# Patient Record
Sex: FEMALE | Race: BLACK OR AFRICAN AMERICAN | Employment: OTHER | URBAN - METROPOLITAN AREA
[De-identification: names, ages, dates, MRNs, and addresses within clinical notes are randomized per-mention and may not be internally consistent; named-entity substitution may affect disease eponyms.]

---

## 2017-06-02 ENCOUNTER — CLINICAL SUPPORT (OUTPATIENT)
Dept: SURGERY | Age: 67
End: 2017-06-02

## 2017-06-02 VITALS
HEART RATE: 83 BPM | OXYGEN SATURATION: 95 % | TEMPERATURE: 97.9 F | HEIGHT: 61 IN | BODY MASS INDEX: 32.05 KG/M2 | WEIGHT: 169.75 LBS | RESPIRATION RATE: 16 BRPM

## 2017-06-02 DIAGNOSIS — Z12.11 COLON CANCER SCREENING: Primary | ICD-10-CM

## 2017-06-02 NOTE — MR AVS SNAPSHOT
Visit Information Date & Time Provider Department Dept. Phone Encounter #  
 6/2/2017 10:00 AM TSS HBV NURSE VISIT [facility] [phone] [id number] Your Appointments 9/12/2017  9:30 AM  
ESTABLISHED PATIENT with Lonny Peacock MD  
[facility]) Appt Note: 9 months [address] [facility], [address] [facility] [address] [address], [address] [place] [address] Upcoming Health Maintenance Date Due Hepatitis C Screening 1950 DTaP/Tdap/Td series (1 - Tdap) 6/10/1971 FOBT Q 1 YEAR AGE 50-75 6/10/2000 ZOSTER VACCINE AGE 60> 6/10/2010 GLAUCOMA SCREENING Q2Y 6/10/2015 OSTEOPOROSIS SCREENING (DEXA) 6/10/2015 Pneumococcal 65+ Low/Medium Risk (1 of 2 - PCV13) 6/10/2015 MEDICARE YEARLY EXAM 6/10/2015 INFLUENZA AGE 9 TO ADULT 8/1/2017 BREAST CANCER SCRN MAMMOGRAM 7/23/2018 Allergies as of 6/2/2017  Review Complete On: 6/2/2017 By: Екатерина Sam. Early, LPN Severity Noted Reaction Type Reactions Latex, Natural Rubber  05/30/2013    Itching Symbicort [Budesonide-formoterol] High 11/23/2015   Systemic Other (comments) Tachycardia pt denies Vicodin [Hydrocodone-acetaminophen]  05/30/2013    Itching Current Immunizations  Never Reviewed Name Date Influenza Vaccine (Quad) PF 10/7/2015 11:42 AM  
  
 Not reviewed this visit You Were Diagnosed With   
  
 Codes Comments Colon cancer screening    -  Primary ICD-10-CM: Z12.11 ICD-9-CM: V76.51 Vitals Pulse Temp Resp Height(growth percentile) Weight(growth percentile) SpO2  
 83 97.9 °F (36.6 °C) (Oral) 16 5' 1\" (1.549 m) 169 lb 12.1 oz (77 kg) 95% BMI OB Status Smoking Status 32.07 kg/m2 Hysterectomy Former Smoker Vitals History BMI and BSA Data Body Mass Index Body Surface Area 32.07 kg/m 2 1.82 m 2 Preferred Pharmacy Pharmacy Name Phone [address] [phone] Your Updated Medication List  
  
   
This list is accurate as of: 6/2/17 10:14 AM.  Always use your most recent med list.  
  
  
  
  
 albuterol 90 mcg/actuation inhaler Commonly known as:  PROVENTIL HFA, VENTOLIN HFA, PROAIR HFA Take 2 Puffs by inhalation every four (4) hours as needed for Wheezing. BABY ASPIRIN PO Take 81 mg by mouth. fluticasone 110 mcg/actuation inhaler Commonly known as:  FLOVENT HFA Take 2 Puffs by inhalation every twelve (12) hours. HYDROcodone-acetaminophen 5-325 mg per tablet Commonly known as:  Ernesto Tolu Take 1 Tab by mouth every four (4) hours as needed. Max Daily Amount: 6 Tabs. iron polysaccharides 150 mg iron capsule Commonly known as:  Archanacarly Rowels Take 1 Cap by mouth daily. methIMAzole 10 mg tablet Commonly known as:  TAPAZOLE Take 10 mg by mouth daily. montelukast 10 mg tablet Commonly known as:  SINGULAIR Take 10 mg by mouth daily. omeprazole 20 mg capsule Commonly known as:  PRILOSEC Take 20 mg by mouth daily. VITAMIN D3 2,000 unit Tab Generic drug:  cholecalciferol (vitamin D3) Take  by mouth. To-Do List   
 08/02/2017 GI:  COLONOSCOPY Introducing [facility]! Dear Mariela Scruggs: Thank you for requesting a Imperator account. Our records indicate that you already have an active Imperator account. You can access your account anytime at [email or web address]. [employer] Did you know that you can access your hospital and ER discharge instructions at any time in Imperator? You can also review all of your test results from your hospital stay or ER visit. Additional Information If you have questions, please visit the Frequently Asked Questions section of the Imperator website at [email or web address]. [employer]/. Remember, GeeYeehart is NOT to be used for urgent needs. For medical emergencies, dial 911. Now available from your iPhone and Android! Please provide this summary of care documentation to your next provider. Your primary care clinician is listed as Julia Leiva. If you have any questions after today's visit, please call [phone].

## 2017-06-02 NOTE — PROGRESS NOTES
Review of Systems   Constitutional: Positive for diaphoresis. Negative for chills, fever, malaise/fatigue and weight loss. Hot flashes occas. HENT: Positive for ear pain. Negative for congestion, ear discharge, hearing loss, nosebleeds, sore throat and tinnitus. Eyes: Positive for blurred vision. Negative for double vision, photophobia, pain, discharge and redness. Respiratory: Negative. Negative for stridor. Cardiovascular: Negative. Gastrointestinal: Positive for constipation. Negative for abdominal pain, blood in stool, diarrhea, heartburn, melena, nausea and vomiting. Genitourinary: Negative. Musculoskeletal: Positive for neck pain. Negative for back pain, falls, joint pain and myalgias. Skin: Negative. Neurological: Negative. Negative for weakness and headaches. Endo/Heme/Allergies: Negative. Psychiatric/Behavioral: Negative. Colon Screen    Patient: Donna White MRN: [id number]  SSN: [id number]    YOB: 1950  Age: 77 y.o. Sex: female        Subjective:   Donna White is here to schedule her recall colonoscopy. Her PCP is Dimitris Robledo MD.  Patient referred for colonoscopy for   Personal history of colon polyps (screening only). Patient denies rectal pain or bleeding. Abdominal surgeries as described below, specifically laparoscopic right hemicolectomy due to benign cecal polyp, tubal ligation and hysterectomy. Family history as described below, specifically none. Last colonoscopy was 2 years ago which found a cecal polyp that had to be surgically removed. Polyp was benign, she was recalled for 2 years for a surveillance colonoscopy.     Allergies   Allergen Reactions    Latex, Natural Rubber Itching    Symbicort [Budesonide-Formoterol] Other (comments)     Tachycardia pt denies    Vicodin [Hydrocodone-Acetaminophen] Itching       Past Medical History:   Diagnosis Date    Arthritis     Asthma     Chronic obstructive pulmonary disease ([facility] [address]     mild    GERD (gastroesophageal reflux disease)     Hypercholesteremia     Hyperthyroidism     Thyroid disease      Past Surgical History:   Procedure Laterality Date    HX BUNIONECTOMY      bilateral and \"removed some arthritis in feet\"    HX CATARACT REMOVAL      with implants    HX COLECTOMY  10/6/15    lap right hemicolectomy    HX COLONOSCOPY  08/2015    HX HAMMER TOE REPAIR      right     HX HEMORRHOIDECTOMY      HX HYSTERECTOMY N/A     HX ORTHOPAEDIC      HX OTHER SURGICAL      keiloid removed off chest near shoulder area    HX TUBAL LIGATION      US GUIDED CORE BREAST BIOPSY Right 2013    benign      Family History   Problem Relation Age of Onset    Hypertension Mother     Stroke Mother     Cancer Father     Asthma Sister     Cancer Brother      lung cancer    Asthma Brother     Breast Cancer Maternal Grandmother 78    Cancer Brother      Social History   Substance Use Topics    Smoking status: Former Smoker     Packs/day: 0.20     Years: 5.00     Types: Cigarettes     Start date: 3/13/1972     Quit date: 3/13/1977    Smokeless tobacco: Never Used      Comment: quit smoking in 1970's    Alcohol use 0.0 oz/week     0 Standard drinks or equivalent per week      Comment: rarely      Prior to Admission medications    Medication Sig Start Date End Date Taking? Authorizing Provider   montelukast (SINGULAIR) 10 mg tablet Take 10 mg by mouth daily. Yes Historical Provider   fluticasone (FLOVENT HFA) 110 mcg/actuation inhaler Take 2 Puffs by inhalation every twelve (12) hours. 11/23/15  Yes Claudia Arvizu MD   methimazole (TAPAZOLE) 10 mg tablet Take 10 mg by mouth daily. Yes Historical Provider   albuterol (PROVENTIL HFA, VENTOLIN HFA, PROAIR HFA) 90 mcg/actuation inhaler Take 2 Puffs by inhalation every four (4) hours as needed for Wheezing. Yes Historical Provider   omeprazole (PRILOSEC) 20 mg capsule Take 20 mg by mouth daily.    Yes Historical Provider   BABY ASPIRIN PO Take 81 mg by mouth. Yes Historical Provider   cholecalciferol, vitamin D3, (VITAMIN D3) 2,000 unit tab Take  by mouth. Yes Historical Provider   HYDROcodone-acetaminophen (NORCO) 5-325 mg per tablet Take 1 Tab by mouth every four (4) hours as needed. Max Daily Amount: 6 Tabs. 10/9/15   Jeri Levin MD   iron polysaccharides (NIFEREX) 150 mg iron capsule Take 1 Cap by mouth daily. Patient taking differently: Take 150 mg by mouth two (2) times a day. 10/9/15   Jeri Levin MD          Review of Systems:      Risks colonoscopy described- colon injury, missed lesion, anesthesia problems, bleeding       Anyi Moore, LPN  June 2, 6860  10:50 AM

## 2017-06-15 ENCOUNTER — HOSPITAL ENCOUNTER (OUTPATIENT)
Dept: LAB | Age: 67
Discharge: HOME | End: 2017-06-15
Payer: MEDICARE

## 2017-06-15 DIAGNOSIS — E78.00 HYPERCHOLESTEREMIA: ICD-10-CM

## 2017-06-15 DIAGNOSIS — J45.20 MILD INTERMITTENT ASTHMA WITHOUT COMPLICATION: ICD-10-CM

## 2017-06-15 LAB
ALBUMIN SERPL BCP-MCNC: 3.7 G/DL (ref 3.4–5)
ALBUMIN/GLOB SERPL: 1.2 {RATIO} (ref 0.8–1.7)
ALP SERPL-CCNC: 135 U/L (ref 45–117)
ALT SERPL-CCNC: 15 U/L (ref 13–56)
ANION GAP BLD CALC-SCNC: 5 MMOL/L (ref 3–18)
AST SERPL W P-5'-P-CCNC: 11 U/L (ref 15–37)
BASOPHILS # BLD AUTO: 0 K/UL (ref 0–0.1)
BASOPHILS # BLD: 0 % (ref 0–2)
BILIRUB SERPL-MCNC: 0.3 MG/DL (ref 0.2–1)
BUN SERPL-MCNC: 11 MG/DL (ref 7–18)
BUN/CREAT SERPL: 14 (ref 12–20)
CALCIUM SERPL-MCNC: 9 MG/DL (ref 8.5–10.1)
CHLORIDE SERPL-SCNC: 111 MMOL/L (ref 100–108)
CHOLEST SERPL-MCNC: 159 MG/DL
CO2 SERPL-SCNC: 31 MMOL/L (ref 21–32)
CREAT SERPL-MCNC: 0.77 MG/DL (ref 0.6–1.3)
DIFFERENTIAL METHOD BLD: ABNORMAL
EOSINOPHIL # BLD: 0.1 K/UL (ref 0–0.4)
EOSINOPHIL NFR BLD: 2 % (ref 0–5)
ERYTHROCYTE [DISTWIDTH] IN BLOOD BY AUTOMATED COUNT: 15.7 % (ref 11.6–14.5)
GLOBULIN SER CALC-MCNC: 3 G/DL (ref 2–4)
GLUCOSE SERPL-MCNC: 93 MG/DL (ref 74–99)
HCT VFR BLD AUTO: 35.6 % (ref 35–45)
HDLC SERPL-MCNC: 85 MG/DL (ref 40–60)
HDLC SERPL: 1.9 {RATIO} (ref 0–5)
HGB BLD-MCNC: 11.5 G/DL (ref 12–16)
LDLC SERPL CALC-MCNC: 49.4 MG/DL (ref 0–100)
LIPID PROFILE,FLP: ABNORMAL
LYMPHOCYTES # BLD AUTO: 47 % (ref 21–52)
LYMPHOCYTES # BLD: 3.4 K/UL (ref 0.9–3.6)
MCH RBC QN AUTO: 27.8 PG (ref 24–34)
MCHC RBC AUTO-ENTMCNC: 32.3 G/DL (ref 31–37)
MCV RBC AUTO: 86 FL (ref 74–97)
MONOCYTES # BLD: 0.4 K/UL (ref 0.05–1.2)
MONOCYTES NFR BLD AUTO: 6 % (ref 3–10)
NEUTS SEG # BLD: 3.2 K/UL (ref 1.8–8)
NEUTS SEG NFR BLD AUTO: 45 % (ref 40–73)
PLATELET # BLD AUTO: 275 K/UL (ref 135–420)
PMV BLD AUTO: 11.3 FL (ref 9.2–11.8)
POTASSIUM SERPL-SCNC: 3.9 MMOL/L (ref 3.5–5.5)
PROT SERPL-MCNC: 6.7 G/DL (ref 6.4–8.2)
RBC # BLD AUTO: 4.14 M/UL (ref 4.2–5.3)
SODIUM SERPL-SCNC: 147 MMOL/L (ref 136–145)
TRIGL SERPL-MCNC: 123 MG/DL (ref ?–150)
VLDLC SERPL CALC-MCNC: 24.6 MG/DL
WBC # BLD AUTO: 7.1 K/UL (ref 4.6–13.2)

## 2017-06-15 PROCEDURE — 36415 COLL VENOUS BLD VENIPUNCTURE: CPT | Performed by: FAMILY MEDICINE

## 2017-06-15 PROCEDURE — 80061 LIPID PANEL: CPT | Performed by: FAMILY MEDICINE

## 2017-06-15 PROCEDURE — 80053 COMPREHEN METABOLIC PANEL: CPT | Performed by: FAMILY MEDICINE

## 2017-06-15 PROCEDURE — 85025 COMPLETE CBC W/AUTO DIFF WBC: CPT | Performed by: FAMILY MEDICINE

## 2017-08-11 ENCOUNTER — HOSPITAL ENCOUNTER (OUTPATIENT)
Dept: MAMMOGRAPHY | Age: 67
Discharge: HOME | End: 2017-08-11
Attending: FAMILY MEDICINE
Payer: MEDICARE

## 2017-08-11 DIAGNOSIS — Z12.31 VISIT FOR SCREENING MAMMOGRAM: ICD-10-CM

## 2017-08-11 PROCEDURE — 77063 BREAST TOMOSYNTHESIS BI: CPT

## 2018-02-20 ENCOUNTER — ANESTHESIA EVENT (OUTPATIENT)
Dept: ENDOSCOPY | Age: 68
End: 2018-02-20
Payer: MEDICARE

## 2018-02-21 ENCOUNTER — HOSPITAL ENCOUNTER (OUTPATIENT)
Age: 68
Setting detail: OUTPATIENT SURGERY
Discharge: HOME | End: 2018-02-21
Attending: COLON & RECTAL SURGERY | Admitting: COLON & RECTAL SURGERY
Payer: MEDICARE

## 2018-02-21 ENCOUNTER — ANESTHESIA (OUTPATIENT)
Dept: ENDOSCOPY | Age: 68
End: 2018-02-21
Payer: MEDICARE

## 2018-02-21 VITALS
TEMPERATURE: 97.6 F | HEART RATE: 73 BPM | DIASTOLIC BLOOD PRESSURE: 72 MMHG | RESPIRATION RATE: 17 BRPM | WEIGHT: 171 LBS | BODY MASS INDEX: 32.28 KG/M2 | OXYGEN SATURATION: 100 % | HEIGHT: 61 IN | SYSTOLIC BLOOD PRESSURE: 157 MMHG

## 2018-02-21 PROCEDURE — 74011000250 HC RX REV CODE- 250: Performed by: NURSE ANESTHETIST, CERTIFIED REGISTERED

## 2018-02-21 PROCEDURE — 74011000250 HC RX REV CODE- 250

## 2018-02-21 PROCEDURE — 76060000032 HC ANESTHESIA 0.5 TO 1 HR: Performed by: COLON & RECTAL SURGERY

## 2018-02-21 PROCEDURE — 77030011223 HC DEV LIG POLYLP OCOA -B: Performed by: COLON & RECTAL SURGERY

## 2018-02-21 PROCEDURE — 77030003657 HC NDL SCLER BSC -B: Performed by: COLON & RECTAL SURGERY

## 2018-02-21 PROCEDURE — 74011250636 HC RX REV CODE- 250/636: Performed by: NURSE ANESTHETIST, CERTIFIED REGISTERED

## 2018-02-21 PROCEDURE — 77030013992 HC SNR POLYP ENDOSC BSC -B: Performed by: COLON & RECTAL SURGERY

## 2018-02-21 PROCEDURE — 77030020018 HC MRKR ENDOSC SPOT 5ML SYR GISP -B: Performed by: COLON & RECTAL SURGERY

## 2018-02-21 PROCEDURE — 74011250636 HC RX REV CODE- 250/636: Performed by: COLON & RECTAL SURGERY

## 2018-02-21 PROCEDURE — 77030011640 HC PAD GRND REM COVD -A: Performed by: COLON & RECTAL SURGERY

## 2018-02-21 PROCEDURE — 88305 TISSUE EXAM BY PATHOLOGIST: CPT | Performed by: COLON & RECTAL SURGERY

## 2018-02-21 PROCEDURE — 74011250636 HC RX REV CODE- 250/636

## 2018-02-21 PROCEDURE — 76040000007: Performed by: COLON & RECTAL SURGERY

## 2018-02-21 RX ORDER — EPINEPHRINE 0.1 MG/ML
INJECTION INTRACARDIAC; INTRAVENOUS AS NEEDED
Status: DISCONTINUED | OUTPATIENT
Start: 2018-02-21 | End: 2018-02-21 | Stop reason: HOSPADM

## 2018-02-21 RX ORDER — SODIUM CHLORIDE, SODIUM LACTATE, POTASSIUM CHLORIDE, CALCIUM CHLORIDE 600; 310; 30; 20 MG/100ML; MG/100ML; MG/100ML; MG/100ML
75 INJECTION, SOLUTION INTRAVENOUS CONTINUOUS
Status: DISCONTINUED | OUTPATIENT
Start: 2018-02-21 | End: 2018-02-21 | Stop reason: HOSPADM

## 2018-02-21 RX ORDER — PROPOFOL 10 MG/ML
INJECTION, EMULSION INTRAVENOUS AS NEEDED
Status: DISCONTINUED | OUTPATIENT
Start: 2018-02-21 | End: 2018-02-21 | Stop reason: HOSPADM

## 2018-02-21 RX ORDER — SODIUM CHLORIDE 0.9 % (FLUSH) 0.9 %
5-10 SYRINGE (ML) INJECTION AS NEEDED
Status: DISCONTINUED | OUTPATIENT
Start: 2018-02-21 | End: 2018-02-21 | Stop reason: HOSPADM

## 2018-02-21 RX ORDER — SODIUM CHLORIDE, SODIUM LACTATE, POTASSIUM CHLORIDE, CALCIUM CHLORIDE 600; 310; 30; 20 MG/100ML; MG/100ML; MG/100ML; MG/100ML
INJECTION, SOLUTION INTRAVENOUS
Status: DISCONTINUED | OUTPATIENT
Start: 2018-02-21 | End: 2018-02-21 | Stop reason: HOSPADM

## 2018-02-21 RX ORDER — LIDOCAINE HYDROCHLORIDE 20 MG/ML
INJECTION, SOLUTION EPIDURAL; INFILTRATION; INTRACAUDAL; PERINEURAL AS NEEDED
Status: DISCONTINUED | OUTPATIENT
Start: 2018-02-21 | End: 2018-02-21 | Stop reason: HOSPADM

## 2018-02-21 RX ORDER — SODIUM CHLORIDE 0.9 % (FLUSH) 0.9 %
5-10 SYRINGE (ML) INJECTION EVERY 8 HOURS
Status: DISCONTINUED | OUTPATIENT
Start: 2018-02-21 | End: 2018-02-21 | Stop reason: HOSPADM

## 2018-02-21 RX ADMIN — PROPOFOL 30 MG: 10 INJECTION, EMULSION INTRAVENOUS at 08:55

## 2018-02-21 RX ADMIN — LIDOCAINE HYDROCHLORIDE 50 MG: 20 INJECTION, SOLUTION EPIDURAL; INFILTRATION; INTRACAUDAL; PERINEURAL at 08:27

## 2018-02-21 RX ADMIN — PROPOFOL 30 MG: 10 INJECTION, EMULSION INTRAVENOUS at 08:52

## 2018-02-21 RX ADMIN — PROPOFOL 30 MG: 10 INJECTION, EMULSION INTRAVENOUS at 08:44

## 2018-02-21 RX ADMIN — SODIUM CHLORIDE, SODIUM LACTATE, POTASSIUM CHLORIDE, CALCIUM CHLORIDE: 600; 310; 30; 20 INJECTION, SOLUTION INTRAVENOUS at 08:25

## 2018-02-21 RX ADMIN — PROPOFOL 30 MG: 10 INJECTION, EMULSION INTRAVENOUS at 08:42

## 2018-02-21 RX ADMIN — PROPOFOL 30 MG: 10 INJECTION, EMULSION INTRAVENOUS at 08:30

## 2018-02-21 RX ADMIN — PROPOFOL 30 MG: 10 INJECTION, EMULSION INTRAVENOUS at 08:38

## 2018-02-21 RX ADMIN — PROPOFOL 50 MG: 10 INJECTION, EMULSION INTRAVENOUS at 08:28

## 2018-02-21 RX ADMIN — FAMOTIDINE 20 MG: 10 INJECTION, SOLUTION INTRAVENOUS at 08:19

## 2018-02-21 RX ADMIN — SODIUM CHLORIDE, SODIUM LACTATE, POTASSIUM CHLORIDE, AND CALCIUM CHLORIDE 75 ML/HR: 600; 310; 30; 20 INJECTION, SOLUTION INTRAVENOUS at 08:19

## 2018-02-21 RX ADMIN — PROPOFOL 30 MG: 10 INJECTION, EMULSION INTRAVENOUS at 08:34

## 2018-02-21 RX ADMIN — PROPOFOL 20 MG: 10 INJECTION, EMULSION INTRAVENOUS at 08:48

## 2018-02-21 NOTE — DISCHARGE INSTRUCTIONS
From Dr. Rainer Scott: FOLLOW UP VISIT Appointment in: Other (Specify) No aspirin or ibuprofen (e.g. Aleve, Motrin, Advil) for 7 days. Repeat colonoscopy in 2 years. Colonoscopy: What to Expect at [address] [place] [address]  After you have a colonoscopy, you will stay at the clinic for 1 to 2 hours until the medicines wear off. Then you can go home. But you will need to arrange for a ride. Your doctor will tell you when you can eat and do your other usual activities. Your doctor will talk to you about when you will need your next colonoscopy. Your doctor can help you decide how often you need to be checked. This will depend on the results of your test and your risk for colorectal cancer. After the test, you may be bloated or have gas pains. You may need to pass gas. If a biopsy was done or a polyp was removed, you may have streaks of blood in your stool (feces) for a few days. This care sheet gives you a general idea about how long it will take for you to recover. But each person recovers at a different pace. Follow the steps below to get better as quickly as possible. How can you care for yourself at home? Activity  · Rest when you feel tired. · You can do your normal activities when it feels okay to do so. Diet  · Follow your doctor's directions for eating. · Unless your doctor has told you not to, drink plenty of fluids. This helps to replace the fluids that were lost during the colon prep. · Do not drink alcohol. Medicines  · If polyps were removed or a biopsy was done during the test, your doctor may tell you not to take aspirin or other anti-inflammatory medicines for a few days. These include ibuprofen (Advil, Motrin) and naproxen (Aleve). Other instructions  · For your safety, do not drive or operate machinery until the medicine wears off and you can think clearly.  Your doctor may tell you not to drive or operate machinery until the day after your test.  · Do not sign legal documents or make major decisions until the medicine wears off and you can think clearly. The anesthesia can make it hard for you to fully understand what you are agreeing to. Follow-up care is a key part of your treatment and safety. Be sure to make and go to all appointments, and call your doctor if you are having problems. It's also a good idea to know your test results and keep a list of the medicines you take. When should you call for help? Call 911 anytime you think you may need emergency care. For example, call if:  · You passed out (lost consciousness). · You pass maroon or bloody stools. · You have severe belly pain. Call your doctor now or seek immediate medical care if:  · Your stools are black and tarlike. · Your stools have streaks of blood, but you did not have a biopsy or any polyps removed. · You have belly pain, or your belly is swollen and firm. · You vomit. · You have a fever. · You are very dizzy. Watch closely for changes in your health, and be sure to contact your doctor if you have any problems. Where can you learn more? Go to [employer]  Enter E264 in the search box to learn more about \"Colonoscopy: What to Expect at Home. \"   © 5070-3827 [employer]wise[employer]. Care instructions adapted under license by Nadia Shin (which disclaims liability or warranty for this information). This care instruction is for use with your licensed healthcare professional. If you have questions about a medical condition or this instruction, always ask your healthcare professional. [place] [address] any warranty or liability for your use of this information. Content Version: 15.9.538333; Current as of: November 14, 2014     Colon Polyps: Care Instructions  Your Care Instructions    Colon polyps are growths in the colon or the rectum. The cause of most colon polyps is not known, and most people who get them do not have any problems. But a certain kind can turn into cancer.  For this reason, regular testing for colon polyps is important for people age 48 and older and anyone who has an increased risk for colon cancer. Polyps are usually found through routine colon cancer screening tests. Although most colon polyps are not cancerous, they are usually removed and then tested for cancer. Screening for colon cancer saves lives because the cancer can usually be cured if it is caught early. If you have a polyp that is the type that can turn into cancer, you may need more tests to examine your entire colon. The doctor will remove any other polyps that he or she finds, and you will be tested more often. Follow-up care is a key part of your treatment and safety. Be sure to make and go to all appointments, and call your doctor if you are having problems. It's also a good idea to know your test results and keep a list of the medicines you take. How can you care for yourself at home? Regular exams to look for colon polyps are the best way to prevent polyps from turning into colon cancer. These can include stool tests, sigmoidoscopy, colonoscopy, and CT colonography. Talk with your doctor about a testing schedule that is right for you. To prevent polyps  There is no home treatment that can prevent colon polyps. But these steps may help lower your risk for cancer. · Stay active. Being active can help you get to and stay at a healthy weight. Try to exercise on most days of the week. Walking is a good choice. · Eat well. Choose a variety of vegetables, fruits, legumes (such as peas and beans), fish, poultry, and whole grains. · Do not smoke. If you need help quitting, talk to your doctor about stop-smoking programs and medicines. These can increase your chances of quitting for good. · If you drink alcohol, limit how much you drink. Limit alcohol to 2 drinks a day for men and 1 drink a day for women. When should you call for help?   Call your doctor now or seek immediate medical care if:  ? · You have severe belly pain. ? · Your stools are maroon or very bloody. ? Watch closely for changes in your health, and be sure to contact your doctor if:  ? · You have a fever. ? · You have nausea or vomiting. ? · You have a change in bowel habits (new constipation or diarrhea). ? · Your symptoms get worse or are not improving as expected. Where can you learn more? Go to http://jeri-gita.info/. Enter 95 [id number] in the search box to learn more about \"Colon Polyps: Care Instructions. \"  Current as of: May 12, 2017  Content Version: 11.4  © 2563-6075 [employer]. Care instructions adapted under license by [employer] (which disclaims liability or warranty for this information). If you have questions about a medical condition or this instruction, always ask your healthcare professional. [address] any warranty or liability for your use of this information. DISCHARGE SUMMARY from Nurse     POST-PROCEDURE INSTRUCTIONS:    Call your Physician if you:  ? Observe any excess bleeding. ? Develop a temperature over 100.5o F.  ? Experience abdominal, shoulder or chest pain. ? Notice any signs of decreased circulation or nerve impairment to an extremity such as a change in color, persistent numbness, tingling, coldness or increase in pain. ? Vomit blood or you have nausea and vomiting lasting longer than 4 hours. ? Are unable to take medications. ? Are unable to urinate within 8 hours after discharge following general anesthesia or intravenous sedation. For the next 24 hours after receiving general anesthesia or intravenous sedation, or while taking prescription Narcotics, limit your activities:  ? Do NOT drive a motor vehicle, operate hazard machinery or power tools, or perform tasks that require coordination. The medication you received during your procedure may have some effect on your mental awareness.   ? Do NOT make important personal or business decisions. The medication you received during your procedure may have some effect on your mental awareness. ? Do NOT drink alcoholic beverages. These drinks do not mix well with the medications that have been given to you. ? Upon discharge from the hospital, you must be accompanied by a responsible adult. ? Resume your diet as directed by your physician. ? Resume medications as your physician has prescribed. ? Please give a list of your current medications to your Primary Care Provider. ? Please update this list whenever your medications are discontinued, doses are changed, or new medications (including over-the-counter products) are added. ? Please carry medication information at all times in case of emergency situations. These are general instructions for a healthy lifestyle:    No smoking/ No tobacco products/ Avoid exposure to second hand smoke.  Surgeon General's Warning:  Quitting smoking now greatly reduces serious risk to your health. Obesity, smoking, and a sedentary lifestyle greatly increase your risk for illness.  A healthy diet, regular physical exercise & weight monitoring are important for maintaining a healthy lifestyle   You may be retaining fluid if you have a history of heart failure or if you experience any of the following symptoms:  Weight gain of 3 pounds or more overnight or 5 pounds in a week, increased swelling in our hands or feet or shortness of breath while lying flat in bed. Please call your doctor as soon as you notice any of these symptoms; do not wait until your next office visit. Recognize signs and symptoms of STROKE:  F  -  Face looks uneven  A  -  Arms unable to move or move unevenly  S  -  Speech slurred or non-existent  T  -  Time to call 911 - as soon as signs and symptoms begin - DO NOT go back to bed or wait to see If you get better - TIME IS BRAIN.     Colorectal Screening   Colorectal cancer almost always develops from precancerous polyps (abnormal growths) in the colon or rectum. Screening tests can find precancerous polyps, so that they can be removed before they turn into cancer. Screening tests can also find colorectal cancer early, when treatment works best.  [employer] Speak with your physician about when you should begin screening and how often you should be tested. Additional Information    If you have questions, please call [phone]. Remember, MyChart is NOT to be used for urgent needs. For medical emergencies, dial 911. Educational references and/or instructions provided during this visit included:    Colon Polyps      APPOINTMENTS:    Please make a follow-up appointment with your physician. Discharge information has been reviewed with the patient. The patient verbalized understanding.

## 2018-02-21 NOTE — PROCEDURES
[facility]  [facility] [address] [facility], [address]  [phone]                    Colonoscopy Procedure Note      Olayinka Vaughan  1950  [id number]                Date of Procedure: 2/21/2018    Preoperative diagnosis: History of Polyps, s/p Lap R Colectomy     Postoperative diagnosis: Mid Transverse polyps x 2, Giant Sigmoid polyp    :  Cathie Bacon MD    Assistant(s): Endoscopy Technician-1: Amita Sawyer  Endoscopy RN-1: Wily Castillo RN; Namita Vieyra RN    Sedation: MAC    Procedure Details:  Prior to the procedure, a history and physical were performed. The patients medications, allergies and sensitivities were reviewed and all questions were answered. After informed consent was obtained for the procedure, with all risks and benefits of procedure explained. The patient was taken to the endoscopy suite and placed in the left lateral decubitus position. Patient identification and proposed procedure were verified prior to the procedure by the nurse and I. Following sequential administration of sedation as per Anesthesia, a digital rectal exam was performed and was normal.  The Olympus video colonoscope was introduced through the anus and advanced to surgical anastomosis . The quality of preparation was excellent. The colonoscope was slowly withdrawn and the mucosa examined for any abnormalities. Cecal withdrawl time was greater than six minutes. The patient tolerated the procedure well. Findings/Interventions:   Polyps - #1, 5 mm in size, located in the transverse colon, removed by snare cautery and retrieved for pathology, - #2, 5 mm in size, located in the transverse colon, removed by snare cautery and retrieved for pathology, - #3, 20 mm in size, pedunculated, located in the sigmoid, ligated with polyloop after injection of epinephrine, removed by snare cautery and retrieved for pathology. Ink injected proximal to lesion.     EBL: none    Recommendations: -Repeat colonoscopy in 2 years   NO aspirin for 7 days.      Discharge Disposition:  Eddie Tejeda MD  2/21/2018  9:03 AM

## 2018-02-21 NOTE — ANESTHESIA POSTPROCEDURE EVALUATION
Post-Anesthesia Evaluation and Assessment    Patient: Roc Gerard MRN: [id number]  SSN: [id number]    YOB: 1950  Age: 79 y.o. Sex: female       Cardiovascular Function/Vital Signs  Visit Vitals    /52    Pulse 87    Temp 37.1 °C (98.7 °F)    Resp 20    Ht 5' 1.25\" (1.556 m)    Wt 77.6 kg (171 lb)    SpO2 100%    BMI 32.05 kg/m2       Patient is status post MAC anesthesia for Procedure(s):  COLONOSCOPY/polypectomy/polyloop/ink tatoo . Nausea/Vomiting: None    Postoperative hydration reviewed and adequate. Pain:  Pain Scale 1: Numeric (0 - 10) (02/21/18 0810)  Pain Intensity 1: 0 (02/21/18 0810)   Managed    Neurological Status: At baseline    Mental Status and Level of Consciousness: Arousable    Pulmonary Status:   O2 Device: Room air (02/21/18 0906)   Adequate oxygenation and airway patent    Complications related to anesthesia: None    Post-anesthesia assessment completed.  No concerns    Signed By: Mounika Choudhury MD     February 21, 2018

## 2018-02-21 NOTE — IP AVS SNAPSHOT
[address] [facility] [address] [address] [address] [address] [phone] Patient: Angela Parson MRN: [id number] About your hospitalization You were admitted on:  February 21, 2018 You last received care in the:  [facility] You were discharged on:  February 21, 2018 Why you were hospitalized Your primary diagnosis was:  Not on File Follow-up Information Follow up With Details Comments Contact Info Patty Spencer MD   [address] 
[phone] Katie Bales MD   [address] [facility] [address] 
[phone] Discharge Orders None A check rosa indicates which time of day the medication should be taken. My Medications CONTINUE taking these medications Instructions Each Dose to Equal  
 Morning Noon Evening Bedtime  
 albuterol 90 mcg/actuation inhaler Commonly known as:  PROVENTIL HFA, VENTOLIN HFA, PROAIR HFA Your last dose was: Your next dose is: Take 2 Puffs by inhalation every four (4) hours as needed for Wheezing. 2 Puff  
    
   
   
   
  
 fluticasone 110 mcg/actuation inhaler Commonly known as:  FLOVENT HFA Your last dose was: Your next dose is: Take 2 Puffs by inhalation every twelve (12) hours. 2 Puff  
    
   
   
   
  
 methIMAzole 10 mg tablet Commonly known as:  TAPAZOLE Your last dose was: Your next dose is: Take 10 mg by mouth daily. 10 mg  
    
   
   
   
  
 montelukast 10 mg tablet Commonly known as:  SINGULAIR Your last dose was: Your next dose is: Take 10 mg by mouth daily. 10 mg  
    
   
   
   
  
 omeprazole 20 mg capsule Commonly known as:  PRILOSEC Your last dose was: Your next dose is: Take 20 mg by mouth daily.   
 20 mg  
    
   
   
   
  
 VITAMIN D3 2,000 unit Tab Generic drug:  cholecalciferol (vitamin D3) Your last dose was: Your next dose is: Take  by mouth. STOP taking these medications BABY ASPIRIN PO Discharge Instructions From Dr. Alan Benavides: FOLLOW UP VISIT Appointment in: Other (Specify) No aspirin or ibuprofen (e.g. Aleve, Motrin, Advil) for 7 days. Repeat colonoscopy in 2 years. Colonoscopy: What to Expect at [facility] Your Recovery After you have a colonoscopy, you will stay at the clinic for 1 to 2 hours until the medicines wear off. Then you can go home. But you will need to arrange for a ride. Your doctor will tell you when you can eat and do your other usual activities. Your doctor will talk to you about when you will need your next colonoscopy. Your doctor can help you decide how often you need to be checked. This will depend on the results of your test and your risk for colorectal cancer. After the test, you may be bloated or have gas pains. You may need to pass gas. If a biopsy was done or a polyp was removed, you may have streaks of blood in your stool (feces) for a few days. This care sheet gives you a general idea about how long it will take for you to recover. But each person recovers at a different pace. Follow the steps below to get better as quickly as possible. How can you care for yourself at home? Activity · Rest when you feel tired. · You can do your normal activities when it feels okay to do so. Diet · Follow your doctor's directions for eating. · Unless your doctor has told you not to, drink plenty of fluids. This helps to replace the fluids that were lost during the colon prep. · Do not drink alcohol. Medicines · If polyps were removed or a biopsy was done during the test, your doctor may tell you not to take aspirin or other anti-inflammatory medicines for a few days. These include ibuprofen (Advil, Motrin) and naproxen (Aleve). Other instructions · For your safety, do not drive or operate machinery until the medicine wears off and you can think clearly. Your doctor may tell you not to drive or operate machinery until the day after your test. 
· Do not sign legal documents or make major decisions until the medicine wears off and you can think clearly. The anesthesia can make it hard for you to fully understand what you are agreeing to. Follow-up care is a key part of your treatment and safety. Be sure to make and go to all appointments, and call your doctor if you are having problems. It's also a good idea to know your test results and keep a list of the medicines you take. When should you call for help? Call 911 anytime you think you may need emergency care. For example, call if: 
· You passed out (lost consciousness). · You pass maroon or bloody stools. · You have severe belly pain. Call your doctor now or seek immediate medical care if: 
· Your stools are black and tarlike. · Your stools have streaks of blood, but you did not have a biopsy or any polyps removed. · You have belly pain, or your belly is swollen and firm. · You vomit. · You have a fever. · You are very dizzy. Watch closely for changes in your health, and be sure to contact your doctor if you have any problems. Where can you learn more? Go to [employer]be Enter E264 in the search box to learn more about \"Colonoscopy: What to Expect at Home. \"  
© 8022-7780 Healthwise, Incorporated. Care instructions adapted under license by Dion Hylton (which disclaims liability or warranty for this information).  This care instruction is for use with your licensed healthcare professional. If you have questions about a medical condition or this instruction, always ask your healthcare professional. [address] any warranty or liability for your use of this information. Content Version: 27.8.403097; Current as of: November 14, 2014 Colon Polyps: Care Instructions Your Care Instructions Colon polyps are growths in the colon or the rectum. The cause of most colon polyps is not known, and most people who get them do not have any problems. But a certain kind can turn into cancer. For this reason, regular testing for colon polyps is important for people age 48 and older and anyone who has an increased risk for colon cancer. Polyps are usually found through routine colon cancer screening tests. Although most colon polyps are not cancerous, they are usually removed and then tested for cancer. Screening for colon cancer saves lives because the cancer can usually be cured if it is caught early. If you have a polyp that is the type that can turn into cancer, you may need more tests to examine your entire colon. The doctor will remove any other polyps that he or she finds, and you will be tested more often. Follow-up care is a key part of your treatment and safety. Be sure to make and go to all appointments, and call your doctor if you are having problems. It's also a good idea to know your test results and keep a list of the medicines you take. How can you care for yourself at home? Regular exams to look for colon polyps are the best way to prevent polyps from turning into colon cancer. These can include stool tests, sigmoidoscopy, colonoscopy, and CT colonography. Talk with your doctor about a testing schedule that is right for you. To prevent polyps There is no home treatment that can prevent colon polyps. But these steps may help lower your risk for cancer. · Stay active. Being active can help you get to and stay at a healthy weight. Try to exercise on most days of the week. Walking is a good choice. · Eat well. Choose a variety of vegetables, fruits, legumes (such as peas and beans), fish, poultry, and whole grains. · Do not smoke. If you need help quitting, talk to your doctor about stop-smoking programs and medicines. These can increase your chances of quitting for good. · If you drink alcohol, limit how much you drink. Limit alcohol to 2 drinks a day for men and 1 drink a day for women. When should you call for help? Call your doctor now or seek immediate medical care if: 
? · You have severe belly pain. ? · Your stools are maroon or very bloody. ? Watch closely for changes in your health, and be sure to contact your doctor if: 
? · You have a fever. ? · You have nausea or vomiting. ? · You have a change in bowel habits (new constipation or diarrhea). ? · Your symptoms get worse or are not improving as expected. Where can you learn more? Go to http://jeri-gita.info/. Enter 95 [id number] in the search box to learn more about \"Colon Polyps: Care Instructions. \" Current as of: May 12, 2017 Content Version: 11.4 © 5085-7293 [place] [employer]. Care instructions adapted under license by [employer] (which disclaims liability or warranty for this information). If you have questions about a medical condition or this instruction, always ask your healthcare professional. [place] [address] any warranty or liability for your use of this information. DISCHARGE SUMMARY from Nurse POST-PROCEDURE INSTRUCTIONS: 
 
Call your Physician if you: 
? Observe any excess bleeding. ? Develop a temperature over 100.5o F. 
? Experience abdominal, shoulder or chest pain. ? Notice any signs of decreased circulation or nerve impairment to an extremity such as a change in color, persistent numbness, tingling, coldness or increase in pain. ? Vomit blood or you have nausea and vomiting lasting longer than 4 hours. ? Are unable to take medications. ? Are unable to urinate within 8 hours after discharge following general anesthesia or intravenous sedation. For the next 24 hours after receiving general anesthesia or intravenous sedation, or while taking prescription Narcotics, limit your activities: 
? Do NOT drive a motor vehicle, operate hazard machinery or power tools, or perform tasks that require coordination. The medication you received during your procedure may have some effect on your mental awareness. ? Do NOT make important personal or business decisions. The medication you received during your procedure may have some effect on your mental awareness. ? Do NOT drink alcoholic beverages. These drinks do not mix well with the medications that have been given to you. ? Upon discharge from the hospital, you must be accompanied by a responsible adult. ? Resume your diet as directed by your physician. ? Resume medications as your physician has prescribed. ? Please give a list of your current medications to your Primary Care Provider. ? Please update this list whenever your medications are discontinued, doses are changed, or new medications (including over-the-counter products) are added. ? Please carry medication information at all times in case of emergency situations. These are general instructions for a healthy lifestyle: No smoking/ No tobacco products/ Avoid exposure to second hand smoke. ? Surgeon General's Warning:  Quitting smoking now greatly reduces serious risk to your health. Obesity, smoking, and a sedentary lifestyle greatly increase your risk for illness. ? A healthy diet, regular physical exercise & weight monitoring are important for maintaining a healthy lifestyle ? You may be retaining fluid if you have a history of heart failure or if you experience any of the following symptoms:  Weight gain of 3 pounds or more overnight or 5 pounds in a week, increased swelling in our hands or feet or shortness of breath while lying flat in bed.   Please call your doctor as soon as you notice any of these symptoms; do not wait until your next office visit. Recognize signs and symptoms of STROKE: 
F  -  Face looks uneven A  -  Arms unable to move or move unevenly S  -  Speech slurred or non-existent T  -  Time to call 911 - as soon as signs and symptoms begin - DO NOT go back to bed or wait to see If you get better - TIME IS BRAIN. Colorectal Screening ? Colorectal cancer almost always develops from precancerous polyps (abnormal growths) in the colon or rectum. Screening tests can find precancerous polyps, so that they can be removed before they turn into cancer. Screening tests can also find colorectal cancer early, when treatment works best. 
? Speak with your physician about when you should begin screening and how often you should be tested. Additional Information If you have questions, please call [phone]. Remember, Fastly is NOT to be used for urgent needs. For medical emergencies, dial 911. Educational references and/or instructions provided during this visit included: 
 
Colon Polyps APPOINTMENTS: 
 
Please make a follow-up appointment with your physician. Discharge information has been reviewed with the patient. The patient verbalized understanding. Introducing [facility]! Dear Katheryn Curling: Thank you for requesting a Fastly account. Our records indicate that you already have an active Fastly account. You can access your account anytime at [email or web address] Did you know that you can access your hospital and ER discharge instructions at any time in Fastly? You can also review all of your test results from your hospital stay or ER visit. Additional Information If you have questions, please visit the Frequently Asked Questions section of the Fastly website at [email or web address] Remember, Fastly is NOT to be used for urgent needs.  For medical emergencies, dial 911. Now available from your iPhone and Android! Providers Seen During Your Hospitalization Provider Specialty Primary office phone Kerwin Haney MD Colon and Rectal Surgery [phone] Your Primary Care Physician (PCP) Primary Care Physician Office Phone Office Fax Marcelino Peres [phone] You are allergic to the following Allergen Reactions Latex, Natural Rubber Itching Symbicort (Budesonide-Formoterol) Other (comments) Tachycardia pt denies Vicodin (Hydrocodone-Acetaminophen) Itching Recent Documentation Height Weight BMI OB Status Smoking Status 1.556 m 77.6 kg 32.05 kg/m2 Hysterectomy Former Smoker Emergency Contacts Name Discharge Info Relation Home Work Mobile Jose Florentino DISCHARGE CAREGIVER [3] Spouse [3] [phone] Patient Belongings The following personal items are in your possession at time of discharge: 
  Dental Appliances: None  Visual Aid: None Please provide this summary of care documentation to your next provider. Signatures-by signing, you are acknowledging that this After Visit Summary has been reviewed with you and you have received a copy. Patient Signature:  ____________________________________________________________ Date:  ____________________________________________________________  
  
Belen Berrios Provider Signature:  ____________________________________________________________ Date:  ____________________________________________________________

## 2018-02-21 NOTE — ANESTHESIA PREPROCEDURE EVALUATION
Anesthetic History   No history of anesthetic complications            Review of Systems / Medical History  Patient summary reviewed, nursing notes reviewed and pertinent labs reviewed    Pulmonary    COPD        Asthma        Neuro/Psych   Within defined limits           Cardiovascular  Within defined limits                     GI/Hepatic/Renal     GERD           Endo/Other      Hypothyroidism  Arthritis     Other Findings   Comments: Documentation of current medication  Current medications obtained, documented and obtained? YES      Risk Factors for Postoperative nausea/vomiting:       History of postoperative nausea/vomiting? NO       Female? YES       Motion sickness? NO       Intended opioid administration for postoperative analgesia? NO      Smoking Abstinence:  Current Smoker? NO  Elective Surgery? YES  Seen preoperatively by anesthesiologist or proxy prior to day of surgery? YES  Pt abstained from smoking 24 hours prior to anesthesia?  YES    Preventive care/screening for High Blood Pressure:  Aged 18 years and older: YES  Screened for high blood pressure: YES  Patients with high blood pressure referred to primary care provider   for BP management: YES                 Physical Exam    Airway  Mallampati: I  TM Distance: 4 - 6 cm  Neck ROM: normal range of motion   Mouth opening: Normal     Cardiovascular    Rhythm: regular  Rate: normal         Dental  No notable dental hx       Pulmonary  Breath sounds clear to auscultation               Abdominal  GI exam deferred       Other Findings            Anesthetic Plan    ASA: 2  Anesthesia type: MAC          Induction: Intravenous  Anesthetic plan and risks discussed with: Patient

## 2018-02-21 NOTE — H&P
HPI: Sheryle Alder is a 79 y.o. female presenting with chief complain of history of polyps and r colectomy. Past Medical History:   Diagnosis Date    Arthritis     Asthma     Chronic obstructive pulmonary disease (HCC)     mild    GERD (gastroesophageal reflux disease)     Hypercholesteremia     Hyperthyroidism     Thyroid disease        Past Surgical History:   Procedure Laterality Date    HX BUNIONECTOMY      bilateral and \"removed some arthritis in feet\"    HX CATARACT REMOVAL      with implants    HX COLECTOMY  10/6/15    lap right hemicolectomy    HX COLONOSCOPY  08/2015    HX HAMMER TOE REPAIR      right     HX HEMORRHOIDECTOMY      HX HYSTERECTOMY N/A     HX ORTHOPAEDIC      HX OTHER SURGICAL      keiloid removed off chest near shoulder area    HX TUBAL LIGATION      US GUIDED CORE BREAST BIOPSY Right 2013    benign       Family History   Problem Relation Age of Onset    Hypertension Mother     Stroke Mother     Cancer Father     Asthma Sister     Cancer Brother      lung cancer    Asthma Brother     Breast Cancer Maternal Grandmother 78    Cancer Brother        Social History     Social History    Marital status:      Spouse name: N/A    Number of children: N/A    Years of education: N/A     Social History Main Topics    Smoking status: Former Smoker     Packs/day: 0.20     Years: 5.00     Types: Cigarettes     Start date: 3/13/1972     Quit date: 3/13/1977    Smokeless tobacco: Never Used      Comment: quit smoking in 1970's    Alcohol use 0.0 oz/week     0 Standard drinks or equivalent per week      Comment: rarely    Drug use: No    Sexual activity: Yes     Partners: Male     Birth control/ protection: None     Other Topics Concern    None     Social History Narrative    Worked as  for [employer] until 2010. Denies any history of asbestos and or chemical exposure.          Review of Systems - negative    Outpatient Prescriptions Marked as Taking for the 2/21/18 encounter [facility] Encounter)   Medication Sig Dispense Refill    montelukast (SINGULAIR) 10 mg tablet Take 10 mg by mouth daily.  methimazole (TAPAZOLE) 10 mg tablet Take 10 mg by mouth daily.  albuterol (PROVENTIL HFA, VENTOLIN HFA, PROAIR HFA) 90 mcg/actuation inhaler Take 2 Puffs by inhalation every four (4) hours as needed for Wheezing.  omeprazole (PRILOSEC) 20 mg capsule Take 20 mg by mouth daily.  BABY ASPIRIN PO Take 81 mg by mouth.  cholecalciferol, vitamin D3, (VITAMIN D3) 2,000 unit tab Take  by mouth. Allergies   Allergen Reactions    Latex, Natural Rubber Itching    Symbicort [Budesonide-Formoterol] Other (comments)     Tachycardia pt denies    Vicodin [Hydrocodone-Acetaminophen] Itching       Vitals:    10/11/17 1141 02/21/18 0810   BP:  149/62   Pulse:  87   Resp:  16   Temp:  98.7 °F (37.1 °C)   SpO2:  100%   Weight: 77.6 kg (171 lb) 77.6 kg (171 lb)   Height: 5' 1.25\" (1.556 m) 5' 1.25\" (1.556 m)       Physical Exam   Constitutional: She appears well-developed and well-nourished. HENT:   Head: Normocephalic and atraumatic. Eyes: Conjunctivae and EOM are normal.   Abdominal: Soft. She exhibits no distension. There is no tenderness. Musculoskeletal: Normal range of motion. Lymphadenopathy:     She has no cervical adenopathy. Right: No inguinal adenopathy present. Left: No inguinal adenopathy present. Neurological: She exhibits normal muscle tone. Skin: Skin is warm and dry. No rash noted. Psychiatric: She has a normal mood and affect. Her speech is normal.       Assessment / Plan    colonoscopy    The diagnoses and plan were discussed with the patient. All questions answered. Plan of care agreed to by all concerned.

## 2018-03-08 ENCOUNTER — TELEPHONE (OUTPATIENT)
Dept: SURGERY | Age: 68
End: 2018-03-08

## 2018-03-08 NOTE — TELEPHONE ENCOUNTER
----- Message from Eder Luz MD sent at 2/26/2018  8:07 AM EST -----  Benign polyp(s). Repeat colonoscopy in 2 years as planned. Notified patient of pathology results. Rolf in [place] for 2 years. Patient understands.

## 2018-08-16 ENCOUNTER — HOSPITAL ENCOUNTER (OUTPATIENT)
Dept: MAMMOGRAPHY | Age: 68
Discharge: HOME | End: 2018-08-16
Attending: FAMILY MEDICINE
Payer: MEDICARE

## 2018-08-16 DIAGNOSIS — Z12.31 VISIT FOR SCREENING MAMMOGRAM: ICD-10-CM

## 2018-08-16 PROCEDURE — 77063 BREAST TOMOSYNTHESIS BI: CPT

## 2018-08-25 ENCOUNTER — HOSPITAL ENCOUNTER (OUTPATIENT)
Dept: LAB | Age: 68
Discharge: HOME | End: 2018-08-25
Payer: MEDICARE

## 2018-08-25 DIAGNOSIS — J30.9 ALLERGIC RHINITIS, MILD: ICD-10-CM

## 2018-08-25 DIAGNOSIS — E78.00 HYPERCHOLESTEREMIA: ICD-10-CM

## 2018-08-25 LAB
ALBUMIN SERPL-MCNC: 3.6 G/DL (ref 3.4–5)
ALBUMIN/GLOB SERPL: 1.1 {RATIO} (ref 0.8–1.7)
ALP SERPL-CCNC: 119 U/L (ref 45–117)
ALT SERPL-CCNC: 20 U/L (ref 13–56)
ANION GAP SERPL CALC-SCNC: 7 MMOL/L (ref 3–18)
AST SERPL-CCNC: 11 U/L (ref 15–37)
BASOPHILS # BLD: 0 K/UL (ref 0–0.1)
BASOPHILS NFR BLD: 0 % (ref 0–2)
BILIRUB SERPL-MCNC: 0.3 MG/DL (ref 0.2–1)
BUN SERPL-MCNC: 12 MG/DL (ref 7–18)
BUN/CREAT SERPL: 17 (ref 12–20)
CALCIUM SERPL-MCNC: 9.1 MG/DL (ref 8.5–10.1)
CHLORIDE SERPL-SCNC: 111 MMOL/L (ref 100–108)
CHOLEST SERPL-MCNC: 156 MG/DL
CO2 SERPL-SCNC: 29 MMOL/L (ref 21–32)
CREAT SERPL-MCNC: 0.71 MG/DL (ref 0.6–1.3)
DIFFERENTIAL METHOD BLD: NORMAL
EOSINOPHIL # BLD: 0.1 K/UL (ref 0–0.4)
EOSINOPHIL NFR BLD: 2 % (ref 0–5)
ERYTHROCYTE [DISTWIDTH] IN BLOOD BY AUTOMATED COUNT: 14 % (ref 11.6–14.5)
GLOBULIN SER CALC-MCNC: 3.2 G/DL (ref 2–4)
GLUCOSE SERPL-MCNC: 99 MG/DL (ref 74–99)
HCT VFR BLD AUTO: 37.2 % (ref 35–45)
HDLC SERPL-MCNC: 68 MG/DL (ref 40–60)
HDLC SERPL: 2.3 {RATIO} (ref 0–5)
HGB BLD-MCNC: 12.3 G/DL (ref 12–16)
LDLC SERPL CALC-MCNC: 65.2 MG/DL (ref 0–100)
LIPID PROFILE,FLP: ABNORMAL
LYMPHOCYTES # BLD: 2.8 K/UL (ref 0.9–3.6)
LYMPHOCYTES NFR BLD: 41 % (ref 21–52)
MCH RBC QN AUTO: 27.4 PG (ref 24–34)
MCHC RBC AUTO-ENTMCNC: 33.1 G/DL (ref 31–37)
MCV RBC AUTO: 82.9 FL (ref 74–97)
MONOCYTES # BLD: 0.4 K/UL (ref 0.05–1.2)
MONOCYTES NFR BLD: 6 % (ref 3–10)
NEUTS SEG # BLD: 3.5 K/UL (ref 1.8–8)
NEUTS SEG NFR BLD: 51 % (ref 40–73)
PLATELET # BLD AUTO: 298 K/UL (ref 135–420)
PMV BLD AUTO: 11.3 FL (ref 9.2–11.8)
POTASSIUM SERPL-SCNC: 4.2 MMOL/L (ref 3.5–5.5)
PROT SERPL-MCNC: 6.8 G/DL (ref 6.4–8.2)
RBC # BLD AUTO: 4.49 M/UL (ref 4.2–5.3)
SODIUM SERPL-SCNC: 147 MMOL/L (ref 136–145)
TRIGL SERPL-MCNC: 114 MG/DL (ref ?–150)
VLDLC SERPL CALC-MCNC: 22.8 MG/DL
WBC # BLD AUTO: 6.8 K/UL (ref 4.6–13.2)

## 2018-08-25 PROCEDURE — 80061 LIPID PANEL: CPT | Performed by: FAMILY MEDICINE

## 2018-08-25 PROCEDURE — 85025 COMPLETE CBC W/AUTO DIFF WBC: CPT | Performed by: FAMILY MEDICINE

## 2018-08-25 PROCEDURE — 36415 COLL VENOUS BLD VENIPUNCTURE: CPT | Performed by: FAMILY MEDICINE

## 2018-08-25 PROCEDURE — 80053 COMPREHEN METABOLIC PANEL: CPT | Performed by: FAMILY MEDICINE

## 2018-08-30 ENCOUNTER — HOSPITAL ENCOUNTER (OUTPATIENT)
Dept: ULTRASOUND IMAGING | Age: 68
Discharge: HOME | End: 2018-08-30
Attending: NURSE PRACTITIONER
Payer: MEDICARE

## 2018-08-30 ENCOUNTER — HOSPITAL ENCOUNTER (OUTPATIENT)
Dept: MAMMOGRAPHY | Age: 68
Discharge: HOME | End: 2018-08-30
Attending: NURSE PRACTITIONER
Payer: MEDICARE

## 2018-08-30 DIAGNOSIS — R92.8 ABNORMAL MAMMOGRAM: ICD-10-CM

## 2018-08-30 PROCEDURE — 77062 BREAST TOMOSYNTHESIS BI: CPT

## 2018-08-30 PROCEDURE — 76642 ULTRASOUND BREAST LIMITED: CPT

## 2018-09-07 ENCOUNTER — HOSPITAL ENCOUNTER (OUTPATIENT)
Dept: GENERAL RADIOLOGY | Age: 68
Discharge: HOME | End: 2018-09-07
Payer: MEDICARE

## 2018-09-07 DIAGNOSIS — M50.30 MILD DEGENERATION OF CERVICAL INTERVERTEBRAL DISC: ICD-10-CM

## 2018-09-07 PROCEDURE — 72052 X-RAY EXAM NECK SPINE 6/>VWS: CPT

## 2019-06-17 ENCOUNTER — HOSPITAL ENCOUNTER (OUTPATIENT)
Dept: LAB | Age: 69
Discharge: HOME | End: 2019-06-17
Payer: MEDICARE

## 2019-06-17 DIAGNOSIS — E05.90 HYPERTHYROIDISM: ICD-10-CM

## 2019-06-17 DIAGNOSIS — E78.00 HYPERCHOLESTEREMIA: ICD-10-CM

## 2019-06-17 DIAGNOSIS — E55.9 VITAMIN D DEFICIENCY: ICD-10-CM

## 2019-06-17 DIAGNOSIS — Z00.00 BLOOD TESTS FOR ROUTINE GENERAL PHYSICAL EXAMINATION: ICD-10-CM

## 2019-06-17 LAB
25(OH)D3 SERPL-MCNC: 23.5 NG/ML (ref 30–100)
ALBUMIN SERPL-MCNC: 3.3 G/DL (ref 3.4–5)
ALBUMIN/GLOB SERPL: 1.1 {RATIO} (ref 0.8–1.7)
ALP SERPL-CCNC: 117 U/L (ref 45–117)
ALT SERPL-CCNC: 15 U/L (ref 13–56)
ANION GAP SERPL CALC-SCNC: 3 MMOL/L (ref 3–18)
AST SERPL-CCNC: 9 U/L (ref 15–37)
BASOPHILS # BLD: 0 K/UL (ref 0–0.1)
BASOPHILS NFR BLD: 0 % (ref 0–2)
BILIRUB SERPL-MCNC: 0.3 MG/DL (ref 0.2–1)
BUN SERPL-MCNC: 12 MG/DL (ref 7–18)
BUN/CREAT SERPL: 16 (ref 12–20)
CALCIUM SERPL-MCNC: 9.2 MG/DL (ref 8.5–10.1)
CHLORIDE SERPL-SCNC: 110 MMOL/L (ref 100–108)
CHOLEST SERPL-MCNC: 137 MG/DL
CO2 SERPL-SCNC: 31 MMOL/L (ref 21–32)
CREAT SERPL-MCNC: 0.76 MG/DL (ref 0.6–1.3)
DIFFERENTIAL METHOD BLD: ABNORMAL
EOSINOPHIL # BLD: 0.1 K/UL (ref 0–0.4)
EOSINOPHIL NFR BLD: 2 % (ref 0–5)
ERYTHROCYTE [DISTWIDTH] IN BLOOD BY AUTOMATED COUNT: 14.3 % (ref 11.6–14.5)
GLOBULIN SER CALC-MCNC: 3.1 G/DL (ref 2–4)
GLUCOSE SERPL-MCNC: 97 MG/DL (ref 74–99)
HCT VFR BLD AUTO: 37.4 % (ref 35–45)
HDLC SERPL-MCNC: 73 MG/DL (ref 40–60)
HDLC SERPL: 1.9 {RATIO} (ref 0–5)
HGB BLD-MCNC: 11.9 G/DL (ref 12–16)
LDLC SERPL CALC-MCNC: 48.4 MG/DL (ref 0–100)
LIPID PROFILE,FLP: ABNORMAL
LYMPHOCYTES # BLD: 2.7 K/UL (ref 0.9–3.6)
LYMPHOCYTES NFR BLD: 30 % (ref 21–52)
MCH RBC QN AUTO: 26.9 PG (ref 24–34)
MCHC RBC AUTO-ENTMCNC: 31.8 G/DL (ref 31–37)
MCV RBC AUTO: 84.6 FL (ref 74–97)
MONOCYTES # BLD: 0.4 K/UL (ref 0.05–1.2)
MONOCYTES NFR BLD: 5 % (ref 3–10)
NEUTS SEG # BLD: 5.5 K/UL (ref 1.8–8)
NEUTS SEG NFR BLD: 63 % (ref 40–73)
PLATELET # BLD AUTO: 265 K/UL (ref 135–420)
PMV BLD AUTO: 11.2 FL (ref 9.2–11.8)
POTASSIUM SERPL-SCNC: 4.2 MMOL/L (ref 3.5–5.5)
PROT SERPL-MCNC: 6.4 G/DL (ref 6.4–8.2)
RBC # BLD AUTO: 4.42 M/UL (ref 4.2–5.3)
SODIUM SERPL-SCNC: 144 MMOL/L (ref 136–145)
TRIGL SERPL-MCNC: 78 MG/DL (ref ?–150)
TSH SERPL DL<=0.05 MIU/L-ACNC: 0.01 UIU/ML (ref 0.36–3.74)
VLDLC SERPL CALC-MCNC: 15.6 MG/DL
WBC # BLD AUTO: 8.8 K/UL (ref 4.6–13.2)

## 2019-06-17 PROCEDURE — 84443 ASSAY THYROID STIM HORMONE: CPT

## 2019-06-17 PROCEDURE — 85025 COMPLETE CBC W/AUTO DIFF WBC: CPT

## 2019-06-17 PROCEDURE — 82306 VITAMIN D 25 HYDROXY: CPT

## 2019-06-17 PROCEDURE — 80053 COMPREHEN METABOLIC PANEL: CPT

## 2019-06-17 PROCEDURE — 36415 COLL VENOUS BLD VENIPUNCTURE: CPT

## 2019-06-17 PROCEDURE — 80061 LIPID PANEL: CPT

## 2019-06-24 ENCOUNTER — HOSPITAL ENCOUNTER (OUTPATIENT)
Dept: CT IMAGING | Age: 69
Discharge: HOME | End: 2019-06-24
Attending: FAMILY MEDICINE
Payer: MEDICARE

## 2019-06-24 DIAGNOSIS — R10.30 LOWER ABDOMINAL PAIN, UNSPECIFIED: ICD-10-CM

## 2019-06-24 LAB — CREAT UR-MCNC: 0.4 MG/DL (ref 0.6–1.3)

## 2019-06-24 PROCEDURE — 74011636320 HC RX REV CODE- 636/320: Performed by: FAMILY MEDICINE

## 2019-06-24 PROCEDURE — 74177 CT ABD & PELVIS W/CONTRAST: CPT

## 2019-06-24 PROCEDURE — 82565 ASSAY OF CREATININE: CPT

## 2019-06-24 RX ADMIN — IOPAMIDOL 100 ML: 612 INJECTION, SOLUTION INTRAVENOUS at 08:44

## 2019-07-11 ENCOUNTER — OFFICE VISIT (OUTPATIENT)
Dept: SURGERY | Age: 69
End: 2019-07-11

## 2019-07-11 VITALS
HEART RATE: 85 BPM | BODY MASS INDEX: 31.91 KG/M2 | OXYGEN SATURATION: 98 % | RESPIRATION RATE: 16 BRPM | DIASTOLIC BLOOD PRESSURE: 70 MMHG | TEMPERATURE: 98.2 F | SYSTOLIC BLOOD PRESSURE: 128 MMHG | HEIGHT: 61 IN | WEIGHT: 169 LBS

## 2019-07-11 DIAGNOSIS — L30.9 DERMATITIS OF PERIANAL REGION: ICD-10-CM

## 2019-07-11 DIAGNOSIS — K57.32 DIVERTICULITIS OF COLON: Primary | ICD-10-CM

## 2019-07-11 RX ORDER — ASPIRIN 81 MG/1
81 TABLET ORAL DAILY
COMMUNITY

## 2019-07-11 RX ORDER — SIMVASTATIN 40 MG/1
40 TABLET, FILM COATED ORAL
COMMUNITY

## 2019-07-11 NOTE — LETTER
7/11/19 Patient: Alix Torres YOB: 1950 Date of Visit: 7/11/2019 Cara Cook MD 
[address] [facility] 
[address] [facility] [address] VIA Facsimile: [phone] Dear Raymond Ariza, I saw Pool Manrique in the office in follow-up for abdominal pain. She recently had developed a severe episode of crampy lower abdominal pain. CT imaging raise suspicion for diverticulitis. She has completed your antibiotic course and currently is pain-free. Her exam is benign. I reviewed the CT imaging and think this is extremely mild disease at worst.  I do not recommend surgical therapy at this point, but observation alone. I have told her if she begins to develop constipation again she continue with the Linzess or simply try MiraLAX daily. She will follow-up with me in the office PRN. If you have questions, please do not hesitate to call me. I look forward to following your patient along with you. Sincerely, Francisco Boswell MD

## 2019-07-11 NOTE — PROGRESS NOTES
Subjective: Patient had developed some abdominal pain. CT imaging shows diverticulitis she was put on a course of antibiotics. Currently she says her pain is resolved. Her bowels are still loose. She has been on Linzess in the past for constipation predominant irritable bowel syndrome. She has no prior history of diverticulitis. She also complains of some soreness to the perianal area. She feels there are some abrasions which forms as she wipes. Past medical history and ROS were reviewed and unchanged. Abdomen: Soft, nontender nondistended  Rectum: Normal perianal exam, no erythema  Digital rectal exam: Moderate tone no mass    CT abdomen pelvis personally visualized by me; extremely mild to possibly nonexistent disease in the proximal sigmoid colon    Assessment / Plan    Simple diverticulitis, first episode and resolved  Expectant management for now, do not recommend surgery    Perianal dermatitis  Reviewed proper perianal hygiene  Follow-up if symptoms persist    A total of 15 minutes was spent with the patient, with >50% of time spent on counseling and coordination of care. The diagnoses and plan were discussed with patient. All questions answered. Plan of care agreed to by all concerned.

## 2019-07-11 NOTE — PATIENT INSTRUCTIONS
Discontinue use of all wipes     Instead of wipes try Balneol or [employer] brand perianal hygiene lotion to use on toilet paper    Use bland soap such as Dove    Do not over clean area, 1-2 swipes then done    After washing use barrier cream such as Desitin or A & D ointment

## 2019-07-19 ENCOUNTER — APPOINTMENT (OUTPATIENT)
Dept: GENERAL RADIOLOGY | Age: 69
End: 2019-07-19
Attending: EMERGENCY MEDICINE
Payer: MEDICARE

## 2019-07-19 ENCOUNTER — APPOINTMENT (OUTPATIENT)
Dept: CT IMAGING | Age: 69
End: 2019-07-19
Attending: EMERGENCY MEDICINE
Payer: MEDICARE

## 2019-07-19 ENCOUNTER — DOCUMENTATION ONLY (OUTPATIENT)
Dept: SURGERY | Age: 69
End: 2019-07-19

## 2019-07-19 ENCOUNTER — HOSPITAL ENCOUNTER (EMERGENCY)
Age: 69
Discharge: HOME | End: 2019-07-19
Attending: EMERGENCY MEDICINE
Payer: MEDICARE

## 2019-07-19 VITALS
BODY MASS INDEX: 31.91 KG/M2 | RESPIRATION RATE: 14 BRPM | TEMPERATURE: 98.2 F | WEIGHT: 169 LBS | OXYGEN SATURATION: 97 % | HEART RATE: 94 BPM | DIASTOLIC BLOOD PRESSURE: 94 MMHG | SYSTOLIC BLOOD PRESSURE: 184 MMHG | HEIGHT: 61 IN

## 2019-07-19 DIAGNOSIS — K57.92 DIVERTICULITIS: Primary | ICD-10-CM

## 2019-07-19 LAB
ALBUMIN SERPL-MCNC: 3.8 G/DL (ref 3.4–5)
ALBUMIN/GLOB SERPL: 0.9 {RATIO} (ref 0.8–1.7)
ALP SERPL-CCNC: 129 U/L (ref 45–117)
ALT SERPL-CCNC: 21 U/L (ref 13–56)
ANION GAP SERPL CALC-SCNC: 6 MMOL/L (ref 3–18)
AST SERPL-CCNC: 9 U/L (ref 10–38)
BASOPHILS # BLD: 0 K/UL (ref 0–0.1)
BASOPHILS NFR BLD: 0 % (ref 0–2)
BILIRUB DIRECT SERPL-MCNC: 0.1 MG/DL (ref 0–0.2)
BILIRUB SERPL-MCNC: 0.2 MG/DL (ref 0.2–1)
BUN SERPL-MCNC: 8 MG/DL (ref 7–18)
BUN/CREAT SERPL: 10 (ref 12–20)
CALCIUM SERPL-MCNC: 9.6 MG/DL (ref 8.5–10.1)
CHLORIDE SERPL-SCNC: 110 MMOL/L (ref 100–111)
CO2 SERPL-SCNC: 28 MMOL/L (ref 21–32)
CREAT SERPL-MCNC: 0.78 MG/DL (ref 0.6–1.3)
DIFFERENTIAL METHOD BLD: ABNORMAL
EOSINOPHIL # BLD: 0 K/UL (ref 0–0.4)
EOSINOPHIL NFR BLD: 0 % (ref 0–5)
ERYTHROCYTE [DISTWIDTH] IN BLOOD BY AUTOMATED COUNT: 13.9 % (ref 11.6–14.5)
GLOBULIN SER CALC-MCNC: 4.1 G/DL (ref 2–4)
GLUCOSE SERPL-MCNC: 129 MG/DL (ref 74–99)
HCT VFR BLD AUTO: 39.5 % (ref 35–45)
HGB BLD-MCNC: 13 G/DL (ref 12–16)
LACTATE BLD-SCNC: 1.78 MMOL/L (ref 0.4–2)
LIPASE SERPL-CCNC: 70 U/L (ref 73–393)
LYMPHOCYTES # BLD: 1.3 K/UL (ref 0.9–3.6)
LYMPHOCYTES NFR BLD: 14 % (ref 21–52)
MCH RBC QN AUTO: 27.1 PG (ref 24–34)
MCHC RBC AUTO-ENTMCNC: 32.9 G/DL (ref 31–37)
MCV RBC AUTO: 82.3 FL (ref 74–97)
MONOCYTES # BLD: 0.1 K/UL (ref 0.05–1.2)
MONOCYTES NFR BLD: 1 % (ref 3–10)
NEUTS SEG # BLD: 8.3 K/UL (ref 1.8–8)
NEUTS SEG NFR BLD: 85 % (ref 40–73)
PLATELET # BLD AUTO: 287 K/UL (ref 135–420)
PMV BLD AUTO: 11 FL (ref 9.2–11.8)
POTASSIUM SERPL-SCNC: 3.8 MMOL/L (ref 3.5–5.5)
PROT SERPL-MCNC: 7.9 G/DL (ref 6.4–8.2)
RBC # BLD AUTO: 4.8 M/UL (ref 4.2–5.3)
SODIUM SERPL-SCNC: 144 MMOL/L (ref 136–145)
WBC # BLD AUTO: 9.8 K/UL (ref 4.6–13.2)

## 2019-07-19 PROCEDURE — 99283 EMERGENCY DEPT VISIT LOW MDM: CPT

## 2019-07-19 PROCEDURE — 96374 THER/PROPH/DIAG INJ IV PUSH: CPT

## 2019-07-19 PROCEDURE — 74177 CT ABD & PELVIS W/CONTRAST: CPT

## 2019-07-19 PROCEDURE — 96375 TX/PRO/DX INJ NEW DRUG ADDON: CPT

## 2019-07-19 PROCEDURE — 74011636320 HC RX REV CODE- 636/320: Performed by: EMERGENCY MEDICINE

## 2019-07-19 PROCEDURE — 74011250636 HC RX REV CODE- 250/636: Performed by: EMERGENCY MEDICINE

## 2019-07-19 PROCEDURE — 80076 HEPATIC FUNCTION PANEL: CPT

## 2019-07-19 PROCEDURE — 83690 ASSAY OF LIPASE: CPT

## 2019-07-19 PROCEDURE — 85025 COMPLETE CBC W/AUTO DIFF WBC: CPT

## 2019-07-19 PROCEDURE — 83605 ASSAY OF LACTIC ACID: CPT

## 2019-07-19 PROCEDURE — 71046 X-RAY EXAM CHEST 2 VIEWS: CPT

## 2019-07-19 PROCEDURE — 80048 BASIC METABOLIC PNL TOTAL CA: CPT

## 2019-07-19 RX ORDER — CIPROFLOXACIN 500 MG/1
500 TABLET ORAL 2 TIMES DAILY
Qty: 20 TAB | Refills: 0 | Status: SHIPPED | OUTPATIENT
Start: 2019-07-19 | End: 2019-07-29

## 2019-07-19 RX ORDER — METRONIDAZOLE 500 MG/1
500 TABLET ORAL 4 TIMES DAILY
Qty: 40 TAB | Refills: 0 | Status: SHIPPED | OUTPATIENT
Start: 2019-07-19 | End: 2019-07-29

## 2019-07-19 RX ORDER — ONDANSETRON 2 MG/ML
4 INJECTION INTRAMUSCULAR; INTRAVENOUS
Status: COMPLETED | OUTPATIENT
Start: 2019-07-19 | End: 2019-07-19

## 2019-07-19 RX ORDER — ACETAMINOPHEN AND CODEINE PHOSPHATE 300; 30 MG/1; MG/1
1 TABLET ORAL
Qty: 18 TAB | Refills: 0 | Status: SHIPPED | OUTPATIENT
Start: 2019-07-19 | End: 2019-07-22

## 2019-07-19 RX ORDER — MORPHINE SULFATE 2 MG/ML
4 INJECTION, SOLUTION INTRAMUSCULAR; INTRAVENOUS ONCE
Status: DISCONTINUED | OUTPATIENT
Start: 2019-07-19 | End: 2019-07-19 | Stop reason: HOSPADM

## 2019-07-19 RX ORDER — MORPHINE SULFATE 2 MG/ML
4 INJECTION, SOLUTION INTRAMUSCULAR; INTRAVENOUS ONCE
Status: COMPLETED | OUTPATIENT
Start: 2019-07-19 | End: 2019-07-19

## 2019-07-19 RX ADMIN — ONDANSETRON 4 MG: 2 INJECTION INTRAMUSCULAR; INTRAVENOUS at 11:08

## 2019-07-19 RX ADMIN — IOPAMIDOL 100 ML: 612 INJECTION, SOLUTION INTRAVENOUS at 12:17

## 2019-07-19 RX ADMIN — MORPHINE SULFATE 4 MG: 2 INJECTION, SOLUTION INTRAMUSCULAR; INTRAVENOUS at 11:13

## 2019-07-19 NOTE — PROGRESS NOTES
Patient calling with 10/10 constant left side abdominal pain since 2 AM this morning. Patient denies fever, chills, nausea and vomiting. She was instructed to go to ED to be evaluated. Patient verbalized understanding.  She will be going to [facility] ED.

## 2019-07-19 NOTE — ED TRIAGE NOTES
\"I have diverticulitis. My stomach has been hurting since about 2:30 this morning. \"  Denies vomiting or diarrhea.

## 2019-07-19 NOTE — ED PROVIDER NOTES
EMERGENCY DEPARTMENT HISTORY AND PHYSICAL EXAM    10:50 AM  Date: 7/19/2019  Patient Name: Pretty Mojica    History of Presenting Illness     Chief Complaint   Patient presents with    Abdominal Pain        History Provided By: Patient    HPI: Pretty Mojica is a 71 y.o. female with history of COPD and hypertension. She was recently diagnosed with diverticulitis and finished a 10-day course of antibiotics 5 days ago. She reports that she was feeling better until 2 AM this morning when she had sharp left lower quadrant abdominal pain that woke her up from sleep and is been constant since then. Denies nausea vomiting or diarrhea. She had a normal bowel movement this morning that was nonbloody. No fever or chills. The pain is similar to her first diverticulitis episode but now it significantly worse. No urinary symptoms    Location:  Severity:  Timing/course:    Onset/Duration:     PCP: Tyrell Swann., MD    Past History     Past Medical History:  Past Medical History:   Diagnosis Date    Arthritis     Asthma     Chronic obstructive pulmonary disease ([address]     mild    GERD (gastroesophageal reflux disease)     Hypercholesteremia     Hyperthyroidism     Thyroid disease        Past Surgical History:  Past Surgical History:   Procedure Laterality Date    COLONOSCOPY N/A 2/21/2018    COLONOSCOPY/polypectomy/polyloop/ink tatoo  performed by Andreea Goode MD at [facility] ENDOSCOPY    HX BUNIONECTOMY      bilateral and \"removed some arthritis in feet\"    HX CATARACT REMOVAL      with implants    HX COLECTOMY  10/6/15    lap right hemicolectomy    HX COLONOSCOPY  08/2015    HX HAMMER TOE REPAIR      right     HX HEMORRHOIDECTOMY      HX HYSTERECTOMY N/A     HX ORTHOPAEDIC      HX OTHER SURGICAL      keiloid removed off chest near shoulder area    HX TUBAL LIGATION      US GUIDED CORE BREAST BIOPSY Right 2013    benign       Family History:  Family History   Problem Relation Age of Onset    Hypertension Mother     Stroke Mother     Cancer Father     Asthma Sister     Breast Problems Sister     Cancer Brother         lung cancer    Asthma Brother     Breast Cancer Maternal Grandmother 78    Cancer Brother     Breast Cancer Maternal Grandfather        Social History:  Social History     Tobacco Use    Smoking status: Former Smoker     Packs/day: 0.20     Years: 5.00     Pack years: 1.00     Types: Cigarettes     Start date: 3/13/1972     Last attempt to quit: 3/13/1977     Years since quittin.3    Smokeless tobacco: Never Used    Tobacco comment: quit smoking in    Substance Use Topics    Alcohol use: Yes     Alcohol/week: 0.0 standard drinks     Comment: rarely    Drug use: No       Allergies: Allergies   Allergen Reactions    Latex, Natural Rubber Itching    Symbicort [Budesonide-Formoterol] Other (comments)     Tachycardia pt denies    Vicodin [Hydrocodone-Acetaminophen] Itching       Review of Systems   Review of Systems   Gastrointestinal: Positive for abdominal pain. All other systems reviewed and are negative. Physical Exam     Patient Vitals for the past 12 hrs:   Temp Pulse Resp BP SpO2   19 1000 98.2 °F (36.8 °C) 94 14 (!) 184/94 97 %       Physical Exam   Constitutional: She is oriented to person, place, and time. She appears well-developed and well-nourished. No distress. HENT:   Head: Normocephalic and atraumatic. Eyes: Conjunctivae and EOM are normal.   Neck: Normal range of motion. Neck supple. Cardiovascular: Normal rate and intact distal pulses. Pulmonary/Chest: Effort normal. No respiratory distress. Abdominal: Soft. She exhibits no distension. There is no tenderness. Musculoskeletal: Normal range of motion. She exhibits no deformity. Neurological: She is alert and oriented to person, place, and time. Skin: Skin is warm and dry. Psychiatric: She has a normal mood and affect.  Her behavior is normal. Judgment and thought content normal.       Diagnostic Study Results     Labs -  Recent Results (from the past 12 hour(s))   CBC WITH AUTOMATED DIFF    Collection Time: 07/19/19 10:16 AM   Result Value Ref Range    WBC 9.8 4.6 - 13.2 K/uL    RBC 4.80 4.20 - 5.30 M/uL    HGB 13.0 12.0 - 16.0 g/dL    HCT 39.5 35.0 - 45.0 %    MCV 82.3 74.0 - 97.0 FL    MCH 27.1 24.0 - 34.0 PG    MCHC 32.9 31.0 - 37.0 g/dL    RDW 13.9 11.6 - 14.5 %    PLATELET 008 044 - 558 K/uL    MPV 11.0 9.2 - 11.8 FL    NEUTROPHILS 85 (H) 40 - 73 %    LYMPHOCYTES 14 (L) 21 - 52 %    MONOCYTES 1 (L) 3 - 10 %    EOSINOPHILS 0 0 - 5 %    BASOPHILS 0 0 - 2 %    ABS. NEUTROPHILS 8.3 (H) 1.8 - 8.0 K/UL    ABS. LYMPHOCYTES 1.3 0.9 - 3.6 K/UL    ABS. MONOCYTES 0.1 0.05 - 1.2 K/UL    ABS. EOSINOPHILS 0.0 0.0 - 0.4 K/UL    ABS. BASOPHILS 0.0 0.0 - 0.1 K/UL    DF AUTOMATED     POC LACTIC ACID    Collection Time: 07/19/19 10:24 AM   Result Value Ref Range    Lactic Acid (POC) 1.78 0.40 - 2.00 mmol/L       Radiologic Studies -   Xr Chest Pa Lat    Result Date: 7/19/2019  IMPRESSION: No acute cardiopulmonary disease. No significant interval change. Medical Decision Making     ED Course: Progress Notes, Reevaluation, and Consults:    10:50 AM Initial assessment performed. The patients presenting problems have been discussed, and they/their family are in agreement with the care plan formulated and outlined with them. I have encouraged them to ask questions as they arise throughout their visit. Provider Notes (Medical Decision Making): 51-year-old female history of recent diverticulitis S/P 10-day course of antibiotics. Patient is presenting with worsening of her pain woke her up from sleep today. No other constitutional symptoms or red flags on history or exam.  However I am still concerned about an abscess versus microperforation. Patient is well-appearing and not toxic. Will order labs IV hydration and analgesics and abdominal CT.   Also check urine    1:07 PM  I reviewed the results of the CT scan with Dr. Benjamin Pierson who had come to the ER to see the patient because he was aware she was no he feels comfortable with the plan of discharging the patient home with another course of antibiotics and he will see her in the office next week    Procedures:     Critical Care Time:     Vital Signs-Reviewed the patient's vital signs. Reviewed pt's pulse ox reading. EKG: Interpreted by the EP. Time Interpreted:    Rate:    Rhythm:    Interpretation:   Comparison:     Records Reviewed: Nursing Notes, Old Medical Records, Previous electrocardiograms, Previous Radiology Studies and Previous Laboratory Studies (Time of Review: 10:50 AM)  -I am the first provider for this patient.  -I reviewed the vital signs, available nursing notes, past medical history, past surgical history, family history and social history. Current Outpatient Medications   Medication Sig Dispense Refill    simvastatin (ZOCOR) 40 mg tablet Take  by mouth nightly.  aspirin delayed-release 81 mg tablet Take  by mouth daily.  montelukast (SINGULAIR) 10 mg tablet Take 10 mg by mouth daily.  fluticasone (FLOVENT HFA) 110 mcg/actuation inhaler Take 2 Puffs by inhalation every twelve (12) hours. 3 Inhaler 3    methimazole (TAPAZOLE) 10 mg tablet Take 10 mg by mouth daily.  albuterol (PROVENTIL HFA, VENTOLIN HFA, PROAIR HFA) 90 mcg/actuation inhaler Take 2 Puffs by inhalation every four (4) hours as needed for Wheezing.  omeprazole (PRILOSEC) 20 mg capsule Take 20 mg by mouth daily.  cholecalciferol, vitamin D3, (VITAMIN D3) 2,000 unit tab Take  by mouth. Clinical Impression     Clinical Impression: No diagnosis found. Disposition: Dc      DISCHARGE NOTE:     Pt has been reexamined. Patient has no new complaints, changes, or physical findings. Care plan outlined and precautions discussed. Results of labs and CT were reviewed with the patient.  All medications were reviewed with the patient; will d/c home with Cipro and Flagyl. All of pt's questions and concerns were addressed. Patient was instructed and agrees to follow up with Dr. Ivan Marcelino, as well as to return to the ED upon further deterioration. Patient is ready to go home. This note was dictated utilizing voice recognition software which may lead to typographical errors. I apologize in advance if the situation occurs. If questions arise please do not hesitate to contact me or call our department.     Giuseppe Aguilar MD  10:50 AM

## 2019-07-19 NOTE — DISCHARGE INSTRUCTIONS
Patient Education        Diverticulitis: Care Instructions  Your Care Instructions    Diverticulitis occurs when pouches form in the wall of the colon and become inflamed or infected. It can be very painful. Doctors aren't sure what causes diverticulitis. There is no proof that foods such as nuts, seeds, or berries cause it or make it worse. A low-fiber diet may cause the colon to work harder to push stool forward. Pouches may form because of this extra work. It may be hard to think about healthy eating while you're in pain. But as you recover, you might think about how you can use healthy eating for overall better health. Healthy eating may help you avoid future attacks. Follow-up care is a key part of your treatment and safety. Be sure to make and go to all appointments, and call your doctor if you are having problems. It's also a good idea to know your test results and keep a list of the medicines you take. How can you care for yourself at home? · Drink plenty of fluids, enough so that your urine is light yellow or clear like water. If you have kidney, heart, or liver disease and have to limit fluids, talk with your doctor before you increase the amount of fluids you drink. · Stick to liquids or a bland diet (plain rice, bananas, dry toast or crackers, applesauce) until you are feeling better. Then you can return to regular foods and gradually increase the amount of fiber in your diet. · Use a heating pad set on low on your belly to relieve mild cramps and pain. · Get extra rest until you are feeling better. · Be safe with medicines. Read and follow all instructions on the label. ? If the doctor gave you a prescription medicine for pain, take it as prescribed. ? If you are not taking a prescription pain medicine, ask your doctor if you can take an over-the-counter medicine. · If your doctor prescribed antibiotics, take them as directed. Do not stop taking them just because you feel better.  You need to take the full course of antibiotics. To prevent future attacks of diverticulitis  · Avoid constipation:  ? Include fruits, vegetables, beans, and whole grains in your diet each day. These foods are high in fiber. ? Drink plenty of fluids, enough so that your urine is light yellow or clear like water. If you have kidney, heart, or liver disease and have to limit fluids, talk with your doctor before you increase the amount of fluids you drink. ? Get some exercise every day. Build up slowly to 30 to 60 minutes a day on 5 or more days of the week. ? Take a fiber supplement, such as Citrucel or Metamucil, every day if needed. Read and follow all instructions on the label. ? Schedule time each day for a bowel movement. Having a daily routine may help. Take your time and do not strain when having a bowel movement. When should you call for help? Call your doctor now or seek immediate medical care if:    · You have a fever.     · You are vomiting.     · You have new or worse belly pain.     · You cannot pass stools or gas.    Watch closely for changes in your health, and be sure to contact your doctor if you have any problems. Where can you learn more? Go to http://jeri-gita.info/. Enter H901 in the search box to learn more about \"Diverticulitis: Care Instructions. \"  Current as of: March 27, 2018  Content Version: 11.9  © 0092-1524 [employer]. Care instructions adapted under license by [employer] (which disclaims liability or warranty for this information). If you have questions about a medical condition or this instruction, always ask your healthcare professional. [place] [address] any warranty or liability for your use of this information.

## 2019-07-19 NOTE — PROGRESS NOTES
HPI: Hugo Fletcher is a 71 y.o. female presenting with chief complain of abdominal pain. Pain began this am and was constant, sharp, in the LLQ, and severe. Pt had bm which did not change pain. Normal bm. Denies fevers. Has had mild diverticular episode in recent past, better after antibiotic therapy. Denies nausea, vomiting. History of R colectomy.      Past Medical History:   Diagnosis Date    Arthritis     Asthma     Chronic obstructive pulmonary disease ([address]     mild    GERD (gastroesophageal reflux disease)     Hypercholesteremia     Hyperthyroidism     Thyroid disease        Past Surgical History:   Procedure Laterality Date    COLONOSCOPY N/A 2/21/2018    COLONOSCOPY/polypectomy/polyloop/ink tatoo  performed by Boogie Kessler [facility] at [facility] HX BUNIONECTOMY      bilateral and \"removed some arthritis in feet\"    HX CATARACT REMOVAL      with implants    HX COLECTOMY  10/6/15    lap right hemicolectomy    HX COLONOSCOPY  08/2015    HX HAMMER TOE REPAIR      right     HX HEMORRHOIDECTOMY      HX HYSTERECTOMY N/A     HX ORTHOPAEDIC      HX OTHER SURGICAL      keiloid removed off chest near shoulder area    HX TUBAL LIGATION      US GUIDED CORE BREAST BIOPSY Right 2013    benign       Family History   Problem Relation Age of Onset    Hypertension Mother     Stroke Mother     Cancer Father     Asthma Sister     Breast Problems Sister     Cancer Brother         lung cancer    Asthma Brother     Breast Cancer Maternal Grandmother 78    Cancer Brother     Breast Cancer Maternal Grandfather        Social History     Socioeconomic History    Marital status:      Spouse name: Not on file    Number of children: Not on file    Years of education: Not on file    Highest education level: Not on file   Tobacco Use    Smoking status: Former Smoker     Packs/day: 0.20     Years: 5.00     Pack years: 1.00     Types: Cigarettes     Start date: 3/13/1972     Last attempt to quit: 3/13/1977     Years since quittin.3    Smokeless tobacco: Never Used    Tobacco comment: quit smoking in 1970's   Substance and Sexual Activity    Alcohol use: Yes     Alcohol/week: 0.0 standard drinks     Comment: rarely    Drug use: No    Sexual activity: Yes     Partners: Male     Birth control/protection: None   Social History Narrative    Worked as  for [employer] until . Denies any history of asbestos and or chemical exposure. Review of Systems - aside from above all others negative    Allergies   Allergen Reactions    Latex, Natural Rubber Itching    Symbicort [Budesonide-Formoterol] Other (comments)     Tachycardia pt denies    Vicodin [Hydrocodone-Acetaminophen] Itching       Vitals:    19 1000   BP: (!) 184/94   Pulse: 94   Resp: 14   Temp: 98.2 °F (36.8 °C)   SpO2: 97%   Weight: 76.7 kg (169 lb)   Height: 5' 1\" (1.549 m)       Physical Exam   Constitutional: She appears well-developed and well-nourished. HENT:   Head: Normocephalic and atraumatic. Eyes: Conjunctivae and EOM are normal.   Abdominal: Soft. She exhibits no distension. There is tenderness (LLQ, mild). Musculoskeletal: Normal range of motion. Lymphadenopathy:     She has no cervical adenopathy. Right: No inguinal adenopathy present. Left: No inguinal adenopathy present. Neurological: No sensory deficit. Skin: Skin is warm and dry. No rash noted. Psychiatric: She has a normal mood and affect.  Her speech is normal.   area of hernia seen on CT is non-tender    CMP:   Lab Results   Component Value Date/Time     2019 10:16 AM    K 3.8 2019 10:16 AM     2019 10:16 AM    CO2 28 2019 10:16 AM    AGAP 6 2019 10:16 AM     (H) 2019 10:16 AM    BUN 8 2019 10:16 AM    CREA 0.78 2019 10:16 AM    GFRAA >60 2019 10:16 AM    GFRNA >60 2019 10:16 AM    CA 9.6 2019 10:16 AM    ALB 3.8 2019 10:16 AM    TP 7.9 07/19/2019 10:16 AM    GLOB 4.1 (H) 07/19/2019 10:16 AM    AGRAT 0.9 07/19/2019 10:16 AM    SGOT 9 (L) 07/19/2019 10:16 AM    ALT 21 07/19/2019 10:16 AM     CBC:   Lab Results   Component Value Date/Time    WBC 9.8 07/19/2019 10:16 AM    HGB 13.0 07/19/2019 10:16 AM    HCT 39.5 07/19/2019 10:16 AM     07/19/2019 10:16 AM     CT personally visualized by me; no clear diverticulitis, however sigmoid colon looks slightly thickened; hernia with omentum    Assessment / Plan    Possible mild diverticulitis  Await radiology read of CT  Recommend home on low fiber diet and 2 weeks cipro/flagyl  Follow up my office 2 weeks    The diagnoses and plan were discussed with the patient. All questions answered. Plan of care agreed to by all concerned.

## 2019-07-25 ENCOUNTER — OFFICE VISIT (OUTPATIENT)
Dept: SURGERY | Age: 69
End: 2019-07-25

## 2019-07-25 VITALS
DIASTOLIC BLOOD PRESSURE: 71 MMHG | OXYGEN SATURATION: 98 % | BODY MASS INDEX: 31.53 KG/M2 | RESPIRATION RATE: 16 BRPM | HEIGHT: 61 IN | TEMPERATURE: 97.4 F | WEIGHT: 167 LBS | HEART RATE: 68 BPM | SYSTOLIC BLOOD PRESSURE: 151 MMHG

## 2019-07-25 DIAGNOSIS — K57.32 DIVERTICULITIS OF COLON: Primary | ICD-10-CM

## 2019-08-08 ENCOUNTER — OFFICE VISIT (OUTPATIENT)
Dept: SURGERY | Age: 69
End: 2019-08-08

## 2019-08-08 VITALS
BODY MASS INDEX: 31.53 KG/M2 | SYSTOLIC BLOOD PRESSURE: 131 MMHG | HEIGHT: 61 IN | RESPIRATION RATE: 16 BRPM | WEIGHT: 167 LBS | TEMPERATURE: 98.5 F | HEART RATE: 81 BPM | DIASTOLIC BLOOD PRESSURE: 70 MMHG

## 2019-08-08 DIAGNOSIS — K57.32 DIVERTICULITIS OF COLON: Primary | ICD-10-CM

## 2019-08-08 RX ORDER — HYOSCYAMINE SULFATE 0.125 MG
125 TABLET ORAL
Qty: 40 TAB | Refills: 0 | Status: SHIPPED | OUTPATIENT
Start: 2019-08-08 | End: 2019-08-09 | Stop reason: SDUPTHER

## 2019-08-08 NOTE — PROGRESS NOTES
Subjective: Diet tolerated. Moving her bowels. Denies pain. Past medical history and ROS were reviewed and unchanged. Abdomen: Soft, nontender nondistended    Assessment / Plan    Possibly mild simple diverticulitis, resolved  This may be IBS, trial of Levsin  Follow-up in the office PRN    A total of 15 minutes was spent with the patient, with >50% of time spent on counseling and coordination of care. The diagnoses and plan were discussed with patient. All questions answered. Plan of care agreed to by all concerned.

## 2019-08-08 NOTE — LETTER
8/8/19 Patient: Mary Floyd YOB: 1950 Date of Visit: 8/8/2019 Melva Doss MD 
[address] [facility] 
[address] [facility] [address] VIA Facsimile: [phone] Dear Juan Luis Zavalaangelo Recinos is seen in follow-up after recent emergency room visit for abdominal pain. Although the radiologist read mild diverticulitis I am not truly convinced this is the case. She may have irritable bowel syndrome. I have recommended Levsin for any further abdominal pain and she will follow-up in the office if symptoms persist.  I do not recommend surgery for her at this time. If you have questions, please do not hesitate to call me. I look forward to following your patient along with you. Sincerely, Gely Michel MD

## 2019-08-09 DIAGNOSIS — K57.32 DIVERTICULITIS OF COLON: ICD-10-CM

## 2019-08-09 RX ORDER — HYOSCYAMINE SULFATE 0.125 MG
125 TABLET ORAL
Qty: 40 TAB | Refills: 0 | Status: SHIPPED | OUTPATIENT
Start: 2019-08-09 | End: 2020-11-09 | Stop reason: SDUPTHER

## 2019-08-27 ENCOUNTER — HOSPITAL ENCOUNTER (OUTPATIENT)
Age: 69
Discharge: HOME | End: 2019-08-27
Attending: FAMILY MEDICINE
Payer: MEDICARE

## 2019-08-27 DIAGNOSIS — N28.89 RENAL MASS: ICD-10-CM

## 2019-08-27 LAB — CREAT UR-MCNC: 0.7 MG/DL (ref 0.6–1.3)

## 2019-08-27 PROCEDURE — 82565 ASSAY OF CREATININE: CPT

## 2019-08-27 PROCEDURE — 74183 MRI ABD W/O CNTR FLWD CNTR: CPT

## 2019-08-27 PROCEDURE — A9577 INJ MULTIHANCE: HCPCS

## 2019-08-27 PROCEDURE — 74011250636 HC RX REV CODE- 250/636

## 2019-08-27 RX ADMIN — GADOBENATE DIMEGLUMINE 20 ML: 529 INJECTION, SOLUTION INTRAVENOUS at 08:00

## 2019-10-15 ENCOUNTER — OFFICE VISIT (OUTPATIENT)
Dept: UROLOGY | Age: 69
End: 2019-10-15

## 2019-10-15 VITALS
HEIGHT: 61 IN | WEIGHT: 167 LBS | BODY MASS INDEX: 31.53 KG/M2 | DIASTOLIC BLOOD PRESSURE: 71 MMHG | HEART RATE: 81 BPM | SYSTOLIC BLOOD PRESSURE: 172 MMHG | OXYGEN SATURATION: 100 %

## 2019-10-15 DIAGNOSIS — N28.89 RENAL MASS: Primary | ICD-10-CM

## 2019-10-15 LAB
BILIRUB UR QL STRIP: NORMAL
GLUCOSE UR-MCNC: NEGATIVE MG/DL
KETONES P FAST UR STRIP-MCNC: NEGATIVE MG/DL
PH UR STRIP: 5.5 [PH] (ref 4.6–8)
PROT UR QL STRIP: NORMAL
SP GR UR STRIP: 1.03 (ref 1–1.03)
UA UROBILINOGEN AMB POC: NORMAL (ref 0.2–1)
URINALYSIS CLARITY POC: CLEAR
URINALYSIS COLOR POC: YELLOW
URINE BLOOD POC: NEGATIVE
URINE LEUKOCYTES POC: NEGATIVE
URINE NITRITES POC: NEGATIVE

## 2019-10-15 NOTE — PATIENT INSTRUCTIONS
Learning About Your Kidneys What do your kidneys do? Your kidneys are two bean-shaped organs. Each one is about the size of your fist. They are part of your internal organs. They are located in the back on either side of your spine. Your kidneys are protected by your ribs. The kidneys do three main things in your body: · Remove wastes. They filter waste products and extra fluid out of your blood. These wastes leave your body through your urine. · Balance the fluids and chemicals in your body. The kidneys keep the right balance of fluids and chemicals your body needs. · Produce hormones. They make hormones that help your body make red blood cells. Hormones also manage your blood pressure, build healthy bones, and keep your muscles working as they should. What problems can happen to your kidneys? High blood pressure and diabetes can lead to kidney problems. These include kidney infections and chronic kidney disease. Chronic kidney disease means that for some time your kidneys have not been working the way they should. Some medicines can also lead to kidney damage. Other kidney problems include kidney stones and kidney cancer. How can you prevent kidney problems? Many kidney problems come from other conditions. These include high blood pressure and diabetes. If you take steps to manage these, your kidneys can be healthier. A healthy lifestyle may help prevent kidney problems: 
· Stay at a healthy weight. · Talk to your doctor about getting more exercise. · Avoid or limit alcohol and salt. · Eat plenty of fruits, vegetables, legumes, whole grains, and low-fat dairy products. · Drink plenty of fluids, enough so that your urine is light yellow or clear like water. · Lower the amount of saturated fat in your diet. · Don't smoke. Smoking can make these conditions worse. If you need help quitting, talk to your doctor about stop-smoking programs and medicines. These can increase your chances of quitting for good. Doing these things may also lower your risk for kidney cancer. Where can you learn more? Go to http://jeri-gita.info/. Enter E620 in the search box to learn more about \"Learning About Your Kidneys. \" Current as of: October 31, 2018 Content Version: 12.2 © 8586-7498 [employer]. Care instructions adapted under license by [employer] (which disclaims liability or warranty for this information). If you have questions about a medical condition or this instruction, always ask your healthcare professional. [place] [address] any warranty or liability for your use of this information.

## 2019-10-15 NOTE — PROGRESS NOTES
Ms. Thom Dominguez has a reminder for a \"due or due soon\" health maintenance. I have asked that she contact her primary care provider for follow-up on this health maintenance.

## 2019-10-16 NOTE — PROGRESS NOTES
Cisco Florentino 71 y.o. female     Ms. Florentino seen today for evaluation of a small mass lesion in the left kidney found on imaging study in July 2019 assessing symptomatic diverticulitis  Patient has no history of  tract disease, trauma, or surgery-no episodes of gross or microscopic hematuria      CT scan imaging of the abdomen and pelvis 19 July 2019: Images have been reviewed on PACS today with the patient downloaded for scanning into the electronic medical record also laser printed for the patient  IMPRESSION:  1. Severe sigmoid diverticulitis with very minimal pericolonic stranding that  may represent early inflammation or scarring due to prior inflammation. Overall  improved appearance since comparison CT 6/24/2019.  2. Redemonstration of left kidney partially exophytic complex mass 1.2 cm which  could represent enhancing mass/renal cell carcinoma versus hemorrhagic cyst with  recent hemorrhage, recommend evaluation with CT or MRI renal protocol with and  without contrast or at least initially with ultrasound to exclude solid mass. 3. Moderate sized periumbilical fat-containing hernia. 4. Cholelithiasis without evidence of acute cholecystitis. MRI of the abdomen on 27 August 2019:  IMPRESSION:  Left renal lesion appears solid, with some imaging features of angiomyolipoma as  described above. Lesion has been present since at least 2015, but has  progressively become more heterogeneous in appearance with mild growth since  2015. Recommend urology consultation. At the minimum, continued imaging  follow-up is recommended. CT scan imaging of the abdomen pelvis on 23 March 2015   Kidneys: A 7 x 11 mm hypodensity is noted in the anterior left kidney. This  measures about 30 Hounsfield units-implying a non-simple abnormality. Kidneys  are otherwise unremarkable.     Allergies:    Allergies   Allergen Reactions    Latex, Natural Rubber Itching    Symbicort [Budesonide-Formoterol] Other (comments) Tachycardia pt denies    Vicodin [Hydrocodone-Acetaminophen] Itching      Medications:    Current Outpatient Medications   Medication Sig Dispense Refill    simvastatin (ZOCOR) 40 mg tablet Take  by mouth nightly.  aspirin delayed-release 81 mg tablet Take  by mouth daily.  montelukast (SINGULAIR) 10 mg tablet Take 10 mg by mouth daily.  methimazole (TAPAZOLE) 10 mg tablet Take 10 mg by mouth daily.  albuterol (PROVENTIL HFA, VENTOLIN HFA, PROAIR HFA) 90 mcg/actuation inhaler Take 2 Puffs by inhalation every four (4) hours as needed for Wheezing.  omeprazole (PRILOSEC) 20 mg capsule Take 20 mg by mouth daily.  cholecalciferol, vitamin D3, (VITAMIN D3) 2,000 unit tab Take  by mouth.  hyoscyamine (LEVSIN) 0.125 mg tablet Take 1 Tab by mouth every four (4) hours as needed for Cramping. 40 Tab 0    fluticasone (FLOVENT HFA) 110 mcg/actuation inhaler Take 2 Puffs by inhalation every twelve (12) hours.  3 Inhaler 3       Past Medical History:   Diagnosis Date    Arthritis     Asthma     Chronic obstructive pulmonary disease (HCC)     mild    GERD (gastroesophageal reflux disease)     Hypercholesteremia     Hyperthyroidism     Thyroid disease       Past Surgical History:   Procedure Laterality Date    COLONOSCOPY N/A 2/21/2018    COLONOSCOPY/polypectomy/polyloop/ink tatoo  performed by [facility]rell Hutchins [facility] at [facility]COPY    HX BUNIONECTOMY      bilateral and \"removed some arthritis in feet\"    HX CATARACT REMOVAL      with implants    HX COLECTOMY  10/6/15    lap right hemicolectomy    HX COLONOSCOPY  08/2015    HX HAMMER TOE REPAIR      right     HX HEMORRHOIDECTOMY      HX HYSTERECTOMY N/A     HX ORTHOPAEDIC      HX OTHER SURGICAL      keiloid removed off chest near shoulder area    HX TUBAL LIGATION     [facility] BREAST BIOPSY Right 2013    benign     Social History     Socioeconomic History    Marital status:      Spouse name: Not on file    Number of children: Not on file    Years of education: Not on file    Highest education level: Not on file   Occupational History    Not on file   Social Needs    Financial resource strain: Not on file    Food insecurity:     Worry: Not on file     Inability: Not on file    Transportation needs:     Medical: Not on file     Non-medical: Not on file   Tobacco Use    Smoking status: Former Smoker     Packs/day: 0.20     Years: 5.00     Pack years: 1.00     Types: Cigarettes     Start date: 3/13/1972     Last attempt to quit: 3/13/1977     Years since quittin.6    Smokeless tobacco: Never Used    Tobacco comment: quit smoking in    Substance and Sexual Activity    Alcohol use: Not Currently     Alcohol/week: 0.0 standard drinks     Comment: rarely    Drug use: Never    Sexual activity: Yes     Partners: Male     Birth control/protection: None   Lifestyle    Physical activity:     Days per week: Not on file     Minutes per session: Not on file    Stress: Not on file   Relationships    Social connections:     Talks on phone: Not on file     Gets together: Not on file     Attends [employer] service: Not on file     Active member of club or organization: Not on file     Attends meetings of clubs or organizations: Not on file     Relationship status: Not on file    Intimate partner violence:     Fear of current or ex partner: Not on file     Emotionally abused: Not on file     Physically abused: Not on file     Forced sexual activity: Not on file   Other Topics Concern    Not on file   Social History Narrative    Worked as  for [address] [place] [address] until . Denies any history of asbestos and or chemical exposure.         Family History   Problem Relation Age of Onset    Hypertension Mother     Stroke Mother     Cancer Father     Heart Disease Father     Hypertension Father     Asthma Sister     Breast Problems Sister     Cancer Sister     Cancer Brother         lung cancer    Asthma Brother     Breast Cancer Maternal Grandmother 78    Cancer Brother     Breast Cancer Maternal Grandfather     Diabetes Paternal Uncle         Physical Examination: Well-nourished mature female in no apparent distress    Abdomen is nontender no palpable masses  Back-no percussion CVA tenderness on either side  Lower extremity motor and sensory function are normal    Urinalysis: Negative dipstick/nitrite negative/heme-negative    Impression: 1.2 cm enlarging complex left renal mass lesion-suspect renal cell carcinoma    Plan: Renal ultrasound imaging    Consult urologic oncology-Dr. Juan Jose Granados for evaluation and consideration of patient as a candidate for wedge resection    RTC prquynh Rangel MD  -electronically signed-    PLEASE NOTE:  This document has been produced using voice recognition software. Unrecognized errors in transcription may be present.

## 2019-10-22 ENCOUNTER — HOSPITAL ENCOUNTER (OUTPATIENT)
Dept: MAMMOGRAPHY | Age: 69
Discharge: HOME | End: 2019-10-22
Attending: FAMILY MEDICINE
Payer: MEDICARE

## 2019-10-22 DIAGNOSIS — Z12.31 SCREENING MAMMOGRAM FOR HIGH-RISK PATIENT: ICD-10-CM

## 2019-10-22 PROCEDURE — 77063 BREAST TOMOSYNTHESIS BI: CPT

## 2019-12-18 ENCOUNTER — HOSPITAL ENCOUNTER (OUTPATIENT)
Dept: LAB | Age: 69
Discharge: HOME | End: 2019-12-18
Payer: MEDICARE

## 2019-12-18 ENCOUNTER — HOSPITAL ENCOUNTER (OUTPATIENT)
Dept: GENERAL RADIOLOGY | Age: 69
Discharge: HOME | End: 2019-12-18
Payer: MEDICARE

## 2019-12-18 DIAGNOSIS — N28.89 RENAL MASS: ICD-10-CM

## 2019-12-18 DIAGNOSIS — E05.90 HYPERTHYROIDISM: ICD-10-CM

## 2019-12-18 DIAGNOSIS — R06.02 SOB (SHORTNESS OF BREATH): ICD-10-CM

## 2019-12-18 LAB
ANION GAP SERPL CALC-SCNC: 4 MMOL/L (ref 3–18)
BUN SERPL-MCNC: 14 MG/DL (ref 7–18)
BUN/CREAT SERPL: 17 (ref 12–20)
CALCIUM SERPL-MCNC: 9.1 MG/DL (ref 8.5–10.1)
CHLORIDE SERPL-SCNC: 110 MMOL/L (ref 100–111)
CO2 SERPL-SCNC: 30 MMOL/L (ref 21–32)
CREAT SERPL-MCNC: 0.81 MG/DL (ref 0.6–1.3)
ERYTHROCYTE [DISTWIDTH] IN BLOOD BY AUTOMATED COUNT: 15.1 % (ref 11.6–14.5)
GLUCOSE SERPL-MCNC: 98 MG/DL (ref 74–99)
HCT VFR BLD AUTO: 38.2 % (ref 35–45)
HGB BLD-MCNC: 12.2 G/DL (ref 12–16)
MCH RBC QN AUTO: 27.2 PG (ref 24–34)
MCHC RBC AUTO-ENTMCNC: 31.9 G/DL (ref 31–37)
MCV RBC AUTO: 85.3 FL (ref 74–97)
PLATELET # BLD AUTO: 271 K/UL (ref 135–420)
PMV BLD AUTO: 11.5 FL (ref 9.2–11.8)
POTASSIUM SERPL-SCNC: 4.2 MMOL/L (ref 3.5–5.5)
RBC # BLD AUTO: 4.48 M/UL (ref 4.2–5.3)
SODIUM SERPL-SCNC: 144 MMOL/L (ref 136–145)
WBC # BLD AUTO: 6.5 K/UL (ref 4.6–13.2)

## 2019-12-18 PROCEDURE — 87086 URINE CULTURE/COLONY COUNT: CPT

## 2019-12-18 PROCEDURE — 36415 COLL VENOUS BLD VENIPUNCTURE: CPT

## 2019-12-18 PROCEDURE — 85027 COMPLETE CBC AUTOMATED: CPT

## 2019-12-18 PROCEDURE — 71046 X-RAY EXAM CHEST 2 VIEWS: CPT

## 2019-12-18 PROCEDURE — 93005 ELECTROCARDIOGRAM TRACING: CPT

## 2019-12-18 PROCEDURE — 80048 BASIC METABOLIC PNL TOTAL CA: CPT

## 2019-12-19 LAB
ATRIAL RATE: 68 BPM
BACTERIA SPEC CULT: NORMAL
CALCULATED P AXIS, ECG09: 58 DEGREES
CALCULATED R AXIS, ECG10: 4 DEGREES
CALCULATED T AXIS, ECG11: 37 DEGREES
DIAGNOSIS, 93000: NORMAL
P-R INTERVAL, ECG05: 136 MS
Q-T INTERVAL, ECG07: 414 MS
QRS DURATION, ECG06: 84 MS
QTC CALCULATION (BEZET), ECG08: 440 MS
SERVICE CMNT-IMP: NORMAL
VENTRICULAR RATE, ECG03: 68 BPM

## 2020-01-22 ENCOUNTER — HOSPITAL ENCOUNTER (OUTPATIENT)
Dept: LAB | Age: 70
Discharge: HOME | End: 2020-01-22
Payer: MEDICARE

## 2020-01-22 DIAGNOSIS — C64.9 RENAL CELL CARCINOMA, UNSPECIFIED LATERALITY (HCC): ICD-10-CM

## 2020-01-22 LAB
ANION GAP SERPL CALC-SCNC: 2 MMOL/L (ref 3–18)
BUN SERPL-MCNC: 10 MG/DL (ref 7–18)
BUN/CREAT SERPL: 11 (ref 12–20)
CALCIUM SERPL-MCNC: 9.8 MG/DL (ref 8.5–10.1)
CHLORIDE SERPL-SCNC: 112 MMOL/L (ref 100–111)
CO2 SERPL-SCNC: 32 MMOL/L (ref 21–32)
CREAT SERPL-MCNC: 0.87 MG/DL (ref 0.6–1.3)
ERYTHROCYTE [DISTWIDTH] IN BLOOD BY AUTOMATED COUNT: 14.2 % (ref 11.6–14.5)
GLUCOSE SERPL-MCNC: 90 MG/DL (ref 74–99)
HCT VFR BLD AUTO: 33.6 % (ref 35–45)
HGB BLD-MCNC: 10.8 G/DL (ref 12–16)
MCH RBC QN AUTO: 27.2 PG (ref 24–34)
MCHC RBC AUTO-ENTMCNC: 32.1 G/DL (ref 31–37)
MCV RBC AUTO: 84.6 FL (ref 74–97)
PLATELET # BLD AUTO: 391 K/UL (ref 135–420)
PMV BLD AUTO: 11.1 FL (ref 9.2–11.8)
POTASSIUM SERPL-SCNC: 4.8 MMOL/L (ref 3.5–5.5)
RBC # BLD AUTO: 3.97 M/UL (ref 4.2–5.3)
SODIUM SERPL-SCNC: 146 MMOL/L (ref 136–145)
WBC # BLD AUTO: 7.4 K/UL (ref 4.6–13.2)

## 2020-01-22 PROCEDURE — 85027 COMPLETE CBC AUTOMATED: CPT

## 2020-01-22 PROCEDURE — 80048 BASIC METABOLIC PNL TOTAL CA: CPT

## 2020-01-22 PROCEDURE — 36415 COLL VENOUS BLD VENIPUNCTURE: CPT

## 2020-02-05 PROBLEM — N28.89 RENAL MASS: Status: ACTIVE | Noted: 2019-12-30

## 2020-08-27 ENCOUNTER — HOSPITAL ENCOUNTER (OUTPATIENT)
Dept: GENERAL RADIOLOGY | Age: 70
Discharge: HOME | End: 2020-08-27
Payer: MEDICARE

## 2020-08-27 ENCOUNTER — HOSPITAL ENCOUNTER (OUTPATIENT)
Dept: LAB | Age: 70
Discharge: HOME | End: 2020-08-27
Payer: MEDICARE

## 2020-08-27 DIAGNOSIS — E05.90 PRETIBIAL MYXEDEMA: ICD-10-CM

## 2020-08-27 DIAGNOSIS — Z00.00 ROUTINE GENERAL MEDICAL EXAMINATION AT A HEALTH CARE FACILITY: ICD-10-CM

## 2020-08-27 DIAGNOSIS — E78.00 PURE HYPERCHOLESTEROLEMIA: ICD-10-CM

## 2020-08-27 DIAGNOSIS — J44.9 OBSTRUCTIVE CHRONIC BRONCHITIS WITHOUT EXACERBATION (HCC): ICD-10-CM

## 2020-08-27 LAB
ALBUMIN SERPL-MCNC: 3.6 G/DL (ref 3.4–5)
ALBUMIN/GLOB SERPL: 1 {RATIO} (ref 0.8–1.7)
ALP SERPL-CCNC: 123 U/L (ref 45–117)
ALT SERPL-CCNC: 14 U/L (ref 13–56)
ANION GAP SERPL CALC-SCNC: 6 MMOL/L (ref 3–18)
AST SERPL-CCNC: 12 U/L (ref 10–38)
BASOPHILS # BLD: 0 K/UL (ref 0–0.1)
BASOPHILS NFR BLD: 0 % (ref 0–2)
BILIRUB SERPL-MCNC: 0.5 MG/DL (ref 0.2–1)
BUN SERPL-MCNC: 13 MG/DL (ref 7–18)
BUN/CREAT SERPL: 13 (ref 12–20)
CALCIUM SERPL-MCNC: 9 MG/DL (ref 8.5–10.1)
CHLORIDE SERPL-SCNC: 114 MMOL/L (ref 100–111)
CHOLEST SERPL-MCNC: 155 MG/DL
CO2 SERPL-SCNC: 27 MMOL/L (ref 21–32)
CREAT SERPL-MCNC: 0.97 MG/DL (ref 0.6–1.3)
DIFFERENTIAL METHOD BLD: ABNORMAL
EOSINOPHIL # BLD: 0.1 K/UL (ref 0–0.4)
EOSINOPHIL NFR BLD: 2 % (ref 0–5)
ERYTHROCYTE [DISTWIDTH] IN BLOOD BY AUTOMATED COUNT: 16.9 % (ref 11.6–14.5)
GLOBULIN SER CALC-MCNC: 3.5 G/DL (ref 2–4)
GLUCOSE SERPL-MCNC: 95 MG/DL (ref 74–99)
HCT VFR BLD AUTO: 33.9 % (ref 35–45)
HDLC SERPL-MCNC: 90 MG/DL (ref 40–60)
HDLC SERPL: 1.7 {RATIO} (ref 0–5)
HGB BLD-MCNC: 10.4 G/DL (ref 12–16)
LDLC SERPL CALC-MCNC: 46.8 MG/DL (ref 0–100)
LIPID PROFILE,FLP: ABNORMAL
LYMPHOCYTES # BLD: 2.5 K/UL (ref 0.9–3.6)
LYMPHOCYTES NFR BLD: 38 % (ref 21–52)
MCH RBC QN AUTO: 23.9 PG (ref 24–34)
MCHC RBC AUTO-ENTMCNC: 30.7 G/DL (ref 31–37)
MCV RBC AUTO: 77.8 FL (ref 74–97)
MONOCYTES # BLD: 0.3 K/UL (ref 0.05–1.2)
MONOCYTES NFR BLD: 5 % (ref 3–10)
NEUTS SEG # BLD: 3.7 K/UL (ref 1.8–8)
NEUTS SEG NFR BLD: 55 % (ref 40–73)
PLATELET # BLD AUTO: 334 K/UL (ref 135–420)
PMV BLD AUTO: 11 FL (ref 9.2–11.8)
POTASSIUM SERPL-SCNC: 4.4 MMOL/L (ref 3.5–5.5)
PROT SERPL-MCNC: 7.1 G/DL (ref 6.4–8.2)
RBC # BLD AUTO: 4.36 M/UL (ref 4.2–5.3)
SODIUM SERPL-SCNC: 147 MMOL/L (ref 136–145)
TRIGL SERPL-MCNC: 91 MG/DL (ref ?–150)
TSH SERPL DL<=0.05 MIU/L-ACNC: 0.16 UIU/ML (ref 0.36–3.74)
VLDLC SERPL CALC-MCNC: 18.2 MG/DL
WBC # BLD AUTO: 6.7 K/UL (ref 4.6–13.2)

## 2020-08-27 PROCEDURE — 85025 COMPLETE CBC W/AUTO DIFF WBC: CPT

## 2020-08-27 PROCEDURE — 80061 LIPID PANEL: CPT

## 2020-08-27 PROCEDURE — 36415 COLL VENOUS BLD VENIPUNCTURE: CPT

## 2020-08-27 PROCEDURE — 84443 ASSAY THYROID STIM HORMONE: CPT

## 2020-08-27 PROCEDURE — 80053 COMPREHEN METABOLIC PANEL: CPT

## 2020-08-27 PROCEDURE — 71046 X-RAY EXAM CHEST 2 VIEWS: CPT

## 2020-09-16 ENCOUNTER — OFFICE VISIT (OUTPATIENT)
Dept: PULMONOLOGY | Age: 70
End: 2020-09-16

## 2020-09-16 VITALS
BODY MASS INDEX: 37.08 KG/M2 | DIASTOLIC BLOOD PRESSURE: 80 MMHG | SYSTOLIC BLOOD PRESSURE: 124 MMHG | HEART RATE: 80 BPM | OXYGEN SATURATION: 99 % | HEIGHT: 61 IN | RESPIRATION RATE: 18 BRPM | TEMPERATURE: 98 F | WEIGHT: 196.4 LBS

## 2020-09-16 DIAGNOSIS — R06.02 SOB (SHORTNESS OF BREATH): Primary | ICD-10-CM

## 2020-09-16 RX ORDER — FLUTICASONE FUROATE, UMECLIDINIUM BROMIDE AND VILANTEROL TRIFENATATE 100; 62.5; 25 UG/1; UG/1; UG/1
1 POWDER RESPIRATORY (INHALATION) DAILY
COMMUNITY
Start: 2020-12-04 | End: 2020-12-04

## 2020-09-16 NOTE — PATIENT INSTRUCTIONS
Stop taking Trelegy Albuterol 2 puffs every 4 hours as needed only if you require albuterol too often to control respiratory symptoms call the office for severe symptoms go to the emergency room Obtain your COVID 19 virus test at [facility] on Monday Wednesday or Friday  1 week before your return visit and breathing tests

## 2020-09-16 NOTE — PROGRESS NOTES
[facility]  Pulmonary, Critical Care, and Sleep Medicine      Dear Ricardo Hitchcock,    Chief complaint:  Shortness of breath primarily dyspnea on exertion    HPI:  Brady Holden is 79years old and she relates for at least 6 years she has had shortness of breath but it seems to have improved significantly several years ago and then this year her shortness of breath was noted again with activity like housework shopping for food. She says she has no PND and is never been seen to stop breathing when sleeping but she does take naps occasionally. She denies chest pain but says she feels some chest tightness when she gets short of breath which is substernal and nonradiating she also relates she has no leg edema. She denies a cough mucus production hemoptysis. She takes Trelegy but her technique is not that good and it does not appear to have helped her breathing but she is only been taking it for short period of time she also uses albuterol occasionally. The patient denies fever chills poor appetite and says she is been eating more and gaining weight in the last 6 months  Allergies   Allergen Reactions    Latex Swelling and Contact Dermatitis    Latex, Natural Rubber Itching    Other Food Hives, Diarrhea and Other (comments)     Mushrooms and oranges/OJ Other: throat swelling and mouth 9/16/209sores  Pt denes it's allergy related    Codeine Anaphylaxis     Pt denies    Symbicort [Budesonide-Formoterol] Other (comments)     Tachycardia pt denies    Iodine Povacry-Iso Alcohol Other (comments)     Blisters  9/16/20 Pt. denies    Vicodin [Hydrocodone-Acetaminophen] Itching    Tramadol Other (comments)     9/16/20 Pt. denies     Current Outpatient Medications   Medication Sig    albuterol 2.5 mg /3 mL (0.083 %) nebu 3 mL, albuterol-ipratropium 2.5 mg-0.5 mg/3 ml nebu 3 mL 1 Dose by Nebulization route every four (4) hours as needed for Wheezing or Shortness of Breath.     fluticasone-umeclidinium-vilanterol (Trelegy Ellipta) 100-62.5-25 mcg inhaler Take 1 Puff by inhalation daily.  simvastatin (ZOCOR) 40 mg tablet Take  by mouth nightly.  aspirin delayed-release 81 mg tablet Take  by mouth daily.  montelukast (SINGULAIR) 10 mg tablet Take 10 mg by mouth daily.  methimazole (TAPAZOLE) 10 mg tablet Take 10 mg by mouth daily.  albuterol (PROVENTIL HFA, VENTOLIN HFA, PROAIR HFA) 90 mcg/actuation inhaler Take 2 Puffs by inhalation every four (4) hours as needed for Wheezing.  omeprazole (PRILOSEC) 20 mg capsule Take 20 mg by mouth daily.  cholecalciferol, vitamin D3, (VITAMIN D3) 2,000 unit tab Take  by mouth.  hyoscyamine (LEVSIN) 0.125 mg tablet Take 1 Tab by mouth every four (4) hours as needed for Cramping.  fluticasone (FLOVENT HFA) 110 mcg/actuation inhaler Take 2 Puffs by inhalation every twelve (12) hours. No current facility-administered medications for this visit.       Past Medical History:   Diagnosis Date    Alopecia     Arthritis     Asthma     Chronic obstructive pulmonary disease (HCC)     mild    GERD (gastroesophageal reflux disease)     Hypercholesteremia     Hyperthyroidism     Thyroid disease    She denies heart disease diabetes hypertension kidney disease liver disease ulcers tuberculosis cancer  Past Surgical History:   Procedure Laterality Date    COLONOSCOPY N/A 2/21/2018    COLONOSCOPY/polypectomy/polyloop/ink tatoo  performed by Megan [facility]hani [facility] at [facility] ENDOSCOPY    HX BUNIONECTOMY      bilateral and \"removed some arthritis in feet\"    HX CATARACT REMOVAL      with implants    HX COLECTOMY  10/6/15    lap right hemicolectomy    HX COLONOSCOPY  08/2015    HX HAMMER TOE REPAIR      right     HX HEMORRHOIDECTOMY      HX HYSTERECTOMY N/A     HX ORTHOPAEDIC      HX OTHER SURGICAL      keiloid removed off chest near shoulder area    HX TUBAL LIGATION      [address] [facility] [address] BREAST BIOPSY Right 2013    benign Family history: Aneurysm and cancer        Social History: Smokes cigarettes for about 6 years no smoking for over 40 years    Review of systems:  She denies cold intolerance easy bruising seizures syncope focal muscle weakness or numbness trouble hearing trouble seeing trouble swallowing chronic abdominal pain melena blood in her stools dysuria hematuria rashes but does note arthritis symptoms in her back and hands    Physical Exam:  Visit Vitals  /80 (BP 1 Location: Left arm, BP Patient Position: Sitting)   Pulse 80   Temp 98 °F (36.7 °C)   Resp 18   Ht 5' 1\" (1.549 m)   Wt 89.1 kg (196 lb 6.4 oz)   SpO2 99%   BMI 37.11 kg/m²     Well-developed obese  HEENT: pupils equal, reactive, sclera, non-icteric   Oropharynx tongue: normal   Neck: Supple   Lymph Nodes: Supra clavicular and cervical nodes, negative   Chest: Equal symmetrical expansion, no dullness, no wheezes, rales or rubs   Heart: Regular, rhythm without carlos manuel or murmur no carotid bruits  Abdomen: soft, non-tender no masses or organomegaly   Extremities: no cyanosis, clubbing, no edema no calf tenderness  Musculoskeletal: No acute joint inflammation or muscle wasting  Skin: No rash   Neurological: alert, oriented, moves all extremities      LABS:  O2 sat 99% room air at rest  Chest x-ray 8/27/2020: Personally reviewed no significant abnormalities except for a linear scar in the left lung base  Spirometry 6/9/14: Very mild obstructive lung defect    Impression:   The cause of the patient's shortness of breath is unclear it would be unlikely to be due to COPD because the patient has not smoked in many years is not having a cough or symptoms suggestive of an exacerbation of COPD. Also her chest x-ray does not suggest a cause such as heart failure or pneumonia interstitial lung disease.   The most likely cause would be deconditioning but this is a diagnosis of exclusion    Plan:  Echocardiogram, repeat spirometry, 6-minute walk and follow-up in approximately 1 month    Thanks for allowing me to share in this patient's evaluation    Sincerely,    Greer Suh MD , [facility]    CC: Dustin Martinez., MD    [address] [facility]. [facility], [address] [place] [id number],[id number]     P: [phone]     F: [phone]

## 2020-09-16 NOTE — PROGRESS NOTES
The pt. C/o some chest tightness today. She denies wheezing and coughing. SOB with activity scale 5/10. Albuterol MDI with good effect. Nebulizer at home with good effect. Shweta Smith presents today for COPD. Is someone accompanying this pt? *No  Is the patient using any DME equipment during OV? Nebulizer   -[address]    Depression Screening:  3 most recent PHQ Screens 10/15/2019   Little interest or pleasure in doing things Not at all   Feeling down, depressed, irritable, or hopeless Not at all   Total Score PHQ 2 0       Learning Assessment:  Learning Assessment 6/2/2017   PRIMARY LEARNER Patient   BARRIERS PRIMARY LEARNER NONE   PRIMARY LANGUAGE ENGLISH   LEARNER PREFERENCE PRIMARY READING     DEMONSTRATION     -   ANSWERED BY patient   RELATIONSHIP SELF       Abuse Screening:  Abuse Screening Questionnaire 10/15/2019   Do you ever feel afraid of your partner? N   Are you in a relationship with someone who physically or mentally threatens you? N   Is it safe for you to go home? Y       Fall Risk  Fall Risk Assessment, last 12 mths 9/16/2020   Able to walk? Yes   Fall in past 12 months? No         Coordination of Care:  1. Have you been to the ER, urgent care clinic since your last visit? Hospitalized since your last visit? IP 12/30 for surg. 2. Have you seen or consulted any other health care providers outside of the [address] [place] [address] since your last visit? PCP, Dr. Amber Aguilar (GI)    Medication variance in dosage/sig per patient as follows: Per Med Rec.

## 2020-09-29 ENCOUNTER — HOSPITAL ENCOUNTER (OUTPATIENT)
Dept: NON INVASIVE DIAGNOSTICS | Age: 70
Discharge: HOME | End: 2020-09-29
Attending: INTERNAL MEDICINE
Payer: MEDICARE

## 2020-09-29 VITALS
HEIGHT: 61 IN | BODY MASS INDEX: 37 KG/M2 | WEIGHT: 196 LBS | DIASTOLIC BLOOD PRESSURE: 80 MMHG | SYSTOLIC BLOOD PRESSURE: 124 MMHG

## 2020-09-29 DIAGNOSIS — R06.02 SOB (SHORTNESS OF BREATH): ICD-10-CM

## 2020-09-29 LAB
ECHO AO ROOT DIAM: 3.15 CM
ECHO LA AREA 4C: 15.78 CM2
ECHO LA VOL 2C: 57.35 ML (ref 22–52)
ECHO LA VOL 4C: 43.24 ML (ref 22–52)
ECHO LA VOL BP: 55.25 ML (ref 22–52)
ECHO LA VOL/BSA BIPLANE: 29.5 ML/M2 (ref 16–28)
ECHO LA VOLUME INDEX A2C: 30.62 ML/M2 (ref 16–28)
ECHO LA VOLUME INDEX A4C: 23.09 ML/M2 (ref 16–28)
ECHO LV INTERNAL DIMENSION DIASTOLIC: 4.6 CM (ref 3.9–5.3)
ECHO LV INTERNAL DIMENSION SYSTOLIC: 3.13 CM
ECHO LV IVSD: 0.94 CM (ref 0.6–0.9)
ECHO LV MASS 2D: 150.2 G (ref 67–162)
ECHO LV MASS INDEX 2D: 80.2 G/M2 (ref 43–95)
ECHO LV POSTERIOR WALL DIASTOLIC: 0.98 CM (ref 0.6–0.9)
ECHO LVOT DIAM: 1.75 CM
ECHO LVOT PEAK GRADIENT: 6.3 MMHG
ECHO LVOT PEAK VELOCITY: 125.48 CM/S
ECHO LVOT SV: 57.4 ML
ECHO LVOT VTI: 23.91 CM
ECHO MV A VELOCITY: 119.59 CM/S
ECHO MV E DECELERATION TIME (DT): 0.26 S
ECHO MV E VELOCITY: 77.99 CM/S
ECHO MV E/A RATIO: 0.65
ECHO TV REGURGITANT MAX VELOCITY: 258 CM/S
ECHO TV REGURGITANT PEAK GRADIENT: 26.6 MMHG
LVOT MG: 3.17 MMHG

## 2020-09-29 PROCEDURE — 93306 TTE W/DOPPLER COMPLETE: CPT

## 2020-10-23 ENCOUNTER — HOSPITAL ENCOUNTER (OUTPATIENT)
Dept: MAMMOGRAPHY | Age: 70
Discharge: HOME | End: 2020-10-23
Attending: FAMILY MEDICINE
Payer: MEDICARE

## 2020-10-23 DIAGNOSIS — Z12.31 VISIT FOR SCREENING MAMMOGRAM: ICD-10-CM

## 2020-10-23 PROCEDURE — 77067 SCR MAMMO BI INCL CAD: CPT

## 2020-10-23 PROCEDURE — 77063 BREAST TOMOSYNTHESIS BI: CPT

## 2020-11-09 ENCOUNTER — OFFICE VISIT (OUTPATIENT)
Dept: SURGERY | Age: 70
End: 2020-11-09
Payer: MEDICARE

## 2020-11-09 VITALS
OXYGEN SATURATION: 99 % | SYSTOLIC BLOOD PRESSURE: 118 MMHG | WEIGHT: 194 LBS | RESPIRATION RATE: 18 BRPM | HEIGHT: 61 IN | BODY MASS INDEX: 36.63 KG/M2 | TEMPERATURE: 98.2 F | DIASTOLIC BLOOD PRESSURE: 66 MMHG | HEART RATE: 72 BPM

## 2020-11-09 DIAGNOSIS — Z86.010 HISTORY OF COLON POLYPS: ICD-10-CM

## 2020-11-09 DIAGNOSIS — K58.9 IRRITABLE BOWEL SYNDROME, UNSPECIFIED TYPE: Primary | ICD-10-CM

## 2020-11-09 DIAGNOSIS — E66.01 SEVERE OBESITY (HCC): ICD-10-CM

## 2020-11-09 DIAGNOSIS — K57.32 DIVERTICULITIS OF COLON: ICD-10-CM

## 2020-11-09 PROCEDURE — G8400 PT W/DXA NO RESULTS DOC: HCPCS | Performed by: COLON & RECTAL SURGERY

## 2020-11-09 PROCEDURE — 1090F PRES/ABSN URINE INCON ASSESS: CPT | Performed by: COLON & RECTAL SURGERY

## 2020-11-09 PROCEDURE — G8536 NO DOC ELDER MAL SCRN: HCPCS | Performed by: COLON & RECTAL SURGERY

## 2020-11-09 PROCEDURE — G8417 CALC BMI ABV UP PARAM F/U: HCPCS | Performed by: COLON & RECTAL SURGERY

## 2020-11-09 PROCEDURE — G9711 PT HX TOT COL OR COLON CA: HCPCS | Performed by: COLON & RECTAL SURGERY

## 2020-11-09 PROCEDURE — G8427 DOCREV CUR MEDS BY ELIG CLIN: HCPCS | Performed by: COLON & RECTAL SURGERY

## 2020-11-09 PROCEDURE — G8432 DEP SCR NOT DOC, RNG: HCPCS | Performed by: COLON & RECTAL SURGERY

## 2020-11-09 PROCEDURE — 99213 OFFICE O/P EST LOW 20 MIN: CPT | Performed by: COLON & RECTAL SURGERY

## 2020-11-09 PROCEDURE — G9899 SCRN MAM PERF RSLTS DOC: HCPCS | Performed by: COLON & RECTAL SURGERY

## 2020-11-09 PROCEDURE — 1101F PT FALLS ASSESS-DOCD LE1/YR: CPT | Performed by: COLON & RECTAL SURGERY

## 2020-11-09 RX ORDER — HYOSCYAMINE SULFATE 0.125 MG
125 TABLET ORAL
Qty: 40 TAB | Refills: 0 | Status: ON HOLD | OUTPATIENT
Start: 2020-11-09 | End: 2022-08-23

## 2020-11-09 NOTE — PROGRESS NOTES
Subjective: She has noted some crampy abdominal pain. It is left-sided. It is intermittent. It is not affected by bowel function. Past medical history and ROS were reviewed and unchanged. Abdomen: Soft, nontender nondistended    Assessment / Plan    History of extremely mild to nonexistent diverticulitis based on CT imaging, crampy abdominal pain  This may be IBS  Trial of Levsin  Schedule follow-up colonoscopy for history of polyps    A total of 15 minutes was spent with the patient, with >50% of time spent on counseling and coordination of care. The diagnoses and plan were discussed with patient. All questions answered. Plan of care agreed to by all concerned.

## 2020-11-09 NOTE — PROGRESS NOTES
Ping Kohli is a 79 y.o. female  Chief Complaint   Patient presents with    Follow-up     history of diverticutlis         Is someone accompanying this pt? no    Is the patient using any DME equipment during OV? no      There were no vitals filed for this visit. Depression Screening:  3 most recent PHQ Screens 10/15/2019   Little interest or pleasure in doing things Not at all   Feeling down, depressed, irritable, or hopeless Not at all   Total Score PHQ 2 0       Learning Assessment:  Learning Assessment 6/2/2017   PRIMARY LEARNER Patient   BARRIERS PRIMARY LEARNER NONE   PRIMARY LANGUAGE ENGLISH   LEARNER PREFERENCE PRIMARY READING     DEMONSTRATION     -   ANSWERED BY patient   RELATIONSHIP SELF         Fall Risk  Fall Risk Assessment, last 12 mths 9/16/2020   Able to walk? Yes   Fall in past 12 months? No       Health Maintenance reviewed and discussed and ordered per Provider. Coordination of Care:  1. Have you been to the ER, urgent care clinic since your last visit? Hospitalized since your last visit? no    2. Have you seen or consulted any other health care providers outside of the [address] [place] [address] since your last visit? Include any pap smears or colon screening.  no

## 2020-11-16 ENCOUNTER — HOSPITAL ENCOUNTER (OUTPATIENT)
Dept: LAB | Age: 70
Discharge: HOME | End: 2020-11-16
Payer: MEDICARE

## 2020-11-16 DIAGNOSIS — R06.02 SOB (SHORTNESS OF BREATH): ICD-10-CM

## 2020-11-16 PROCEDURE — 87635 SARS-COV-2 COVID-19 AMP PRB: CPT

## 2020-11-19 LAB — SARS-COV-2, COV2NT: NOT DETECTED

## 2020-11-23 ENCOUNTER — CLINICAL SUPPORT (OUTPATIENT)
Dept: PULMONOLOGY | Age: 70
End: 2020-11-23

## 2020-11-23 ENCOUNTER — OFFICE VISIT (OUTPATIENT)
Dept: PULMONOLOGY | Age: 70
End: 2020-11-23
Payer: MEDICARE

## 2020-11-23 VITALS
DIASTOLIC BLOOD PRESSURE: 68 MMHG | HEART RATE: 75 BPM | OXYGEN SATURATION: 98 % | RESPIRATION RATE: 18 BRPM | WEIGHT: 195 LBS | BODY MASS INDEX: 36.82 KG/M2 | TEMPERATURE: 98.4 F | HEIGHT: 61 IN | SYSTOLIC BLOOD PRESSURE: 180 MMHG

## 2020-11-23 VITALS — WEIGHT: 195 LBS | HEIGHT: 61 IN | TEMPERATURE: 98.4 F | BODY MASS INDEX: 36.82 KG/M2

## 2020-11-23 DIAGNOSIS — J45.909 UNCOMPLICATED ASTHMA, UNSPECIFIED ASTHMA SEVERITY, UNSPECIFIED WHETHER PERSISTENT: Primary | ICD-10-CM

## 2020-11-23 DIAGNOSIS — J44.9 CHRONIC OBSTRUCTIVE PULMONARY DISEASE, UNSPECIFIED COPD TYPE (HCC): ICD-10-CM

## 2020-11-23 PROCEDURE — 94060 EVALUATION OF WHEEZING: CPT | Performed by: INTERNAL MEDICINE

## 2020-11-23 PROCEDURE — 94727 GAS DIL/WSHOT DETER LNG VOL: CPT | Performed by: INTERNAL MEDICINE

## 2020-11-23 PROCEDURE — 94618 PULMONARY STRESS TESTING: CPT | Performed by: INTERNAL MEDICINE

## 2020-11-23 PROCEDURE — 94729 DIFFUSING CAPACITY: CPT | Performed by: INTERNAL MEDICINE

## 2020-12-04 ENCOUNTER — OFFICE VISIT (OUTPATIENT)
Dept: PULMONOLOGY | Age: 70
End: 2020-12-04
Payer: MEDICARE

## 2020-12-04 VITALS
BODY MASS INDEX: 37.38 KG/M2 | DIASTOLIC BLOOD PRESSURE: 81 MMHG | HEART RATE: 74 BPM | SYSTOLIC BLOOD PRESSURE: 174 MMHG | WEIGHT: 198 LBS | HEIGHT: 61 IN | OXYGEN SATURATION: 97 % | RESPIRATION RATE: 18 BRPM | TEMPERATURE: 98.1 F

## 2020-12-04 DIAGNOSIS — R53.81 PHYSICAL DECONDITIONING: ICD-10-CM

## 2020-12-04 DIAGNOSIS — J45.30 MILD PERSISTENT ASTHMA WITHOUT COMPLICATION: ICD-10-CM

## 2020-12-04 DIAGNOSIS — R06.09 DYSPNEA ON EXERTION: Primary | ICD-10-CM

## 2020-12-04 DIAGNOSIS — E66.01 SEVERE OBESITY (BMI 35.0-35.9 WITH COMORBIDITY) (HCC): ICD-10-CM

## 2020-12-04 DIAGNOSIS — G47.33 OSA (OBSTRUCTIVE SLEEP APNEA): ICD-10-CM

## 2020-12-04 DIAGNOSIS — R06.83 SNORING: ICD-10-CM

## 2020-12-04 DIAGNOSIS — I42.8 OTHER CARDIOMYOPATHY (HCC): ICD-10-CM

## 2020-12-04 PROCEDURE — 99215 OFFICE O/P EST HI 40 MIN: CPT | Performed by: INTERNAL MEDICINE

## 2020-12-04 PROCEDURE — G8400 PT W/DXA NO RESULTS DOC: HCPCS | Performed by: INTERNAL MEDICINE

## 2020-12-04 PROCEDURE — G8427 DOCREV CUR MEDS BY ELIG CLIN: HCPCS | Performed by: INTERNAL MEDICINE

## 2020-12-04 PROCEDURE — 1090F PRES/ABSN URINE INCON ASSESS: CPT | Performed by: INTERNAL MEDICINE

## 2020-12-04 PROCEDURE — 1101F PT FALLS ASSESS-DOCD LE1/YR: CPT | Performed by: INTERNAL MEDICINE

## 2020-12-04 PROCEDURE — G8510 SCR DEP NEG, NO PLAN REQD: HCPCS | Performed by: INTERNAL MEDICINE

## 2020-12-04 PROCEDURE — G8417 CALC BMI ABV UP PARAM F/U: HCPCS | Performed by: INTERNAL MEDICINE

## 2020-12-04 PROCEDURE — G9711 PT HX TOT COL OR COLON CA: HCPCS | Performed by: INTERNAL MEDICINE

## 2020-12-04 PROCEDURE — G9899 SCRN MAM PERF RSLTS DOC: HCPCS | Performed by: INTERNAL MEDICINE

## 2020-12-04 PROCEDURE — G8536 NO DOC ELDER MAL SCRN: HCPCS | Performed by: INTERNAL MEDICINE

## 2020-12-04 RX ORDER — BUDESONIDE AND FORMOTEROL FUMARATE DIHYDRATE 160; 4.5 UG/1; UG/1
2 AEROSOL RESPIRATORY (INHALATION) 2 TIMES DAILY
Qty: 3 INHALER | Refills: 3 | Status: SHIPPED | OUTPATIENT
Start: 2020-12-04 | End: 2022-02-14 | Stop reason: SDUPTHER

## 2020-12-04 NOTE — PROGRESS NOTES
Edin Lopez presents today for   Chief Complaint   Patient presents with    Shortness of Breath     follow up from 9/16/2020 (Dr. Danay Allen)    Results     COVID (-) 11/16/2020, Echo 9/29/2020    Procedure     Spirometry & 6 minute walk 11/23/2020       Is someone accompanying this pt? No    Is the patient using any DME equipment during OV? Yes. nebulizer    -DME Company N/A    Depression Screening:  3 most recent PHQ Screens 12/4/2020   Little interest or pleasure in doing things Not at all   Feeling down, depressed, irritable, or hopeless Not at all   Total Score PHQ 2 0       Learning Assessment:  Learning Assessment 6/2/2017   PRIMARY LEARNER Patient   BARRIERS PRIMARY LEARNER NONE   PRIMARY LANGUAGE ENGLISH   LEARNER PREFERENCE PRIMARY READING     DEMONSTRATION     -   ANSWERED BY patient   RELATIONSHIP SELF       Abuse Screening:  Abuse Screening Questionnaire 12/4/2020   Do you ever feel afraid of your partner? N   Are you in a relationship with someone who physically or mentally threatens you? N   Is it safe for you to go home? Y       Fall Risk  Fall Risk Assessment, last 12 mths 12/4/2020   Able to walk? Yes   Fall in past 12 months? No         Coordination of Care:  1. Have you been to the ER, urgent care clinic since your last visit? Hospitalized since your last visit? No    2. Have you seen or consulted any other health care providers outside of the [address] [place] [address] since your last visit? Include any pap smears or colon screening. Yes. Dr. Phuong Villa, PCP, Dr. Maraih Riley, GI    Influenza vaccine received from Dr. Blackman Player office on November 2020 per patient. Immunization record updated.

## 2020-12-04 NOTE — PROGRESS NOTES
[address] [place] [address] [facility]  Pulmonary, Critical Care, and Sleep Medicine    Pulmonary Office F/U  Name: Cristin Hicks 79 y.o. female  MRN: [id number]  : 1950  Service Date: 20  Chief Complaint:   Chief Complaint   Patient presents with    Shortness of Breath     follow up from 2020 (Dr. Cassie Griggs)    Results     COVID (-) 2020, Echo 2020    Procedure     Spirometry & 6 minute walk 2020         History of Present Illness:  Cristin Hicks is a 79 y.o. female, who presents to Pulmonary clinic for follow-up of shortness of breath. Patient was last seen by Dr. Wing Cornell on 2020. In the interval, patient reports her dyspnea remains stable. She reports she has dyspnea with moderate exertion. Dyspnea with exertion - such as when she's out shopping. mMRC of 2. Symptoms are alleviated with rest.  Using PRN albuterol 2x per day -- notes some mild improvement to dyspnea when she uses it. Using albuterol neb infrequently. Her symptoms include: chest tightness, dyspnea, nonproductive cough  Triggers: pesticide - chemicals  Nocturnal awakenings: none  PRN albuterol use:  Exacerbations requiring prednisone/hospitalization: none  Patient denies any fevers, chills, night sweats, nausea, vomiting, diarrhea, chest pain, angina, orthopnea, LE swelling, voice hoarseness  Patient has very minimal smoking history, 2 to 3 years. Quit over 30 years ago  Of note patient reports that her  has noted that she snores. Patient also reports excessive daytime sleepiness, ESS of 11. Patient reports infrequent morning headaches, and does report nocturia at times. Patient also reports fragmented sleep, sleeps 4 to 5 hours at night.     Past Medical History:   Diagnosis Date    Alopecia     Arthritis     Asthma     Chronic lung disease     Chronic obstructive pulmonary disease (HCC)     mild    GERD (gastroesophageal reflux disease)     Hypercholesteremia     Hyperthyroidism     Thyroid disease      Past Surgical History:   Procedure Laterality Date    COLONOSCOPY N/A 2018    COLONOSCOPY/polypectomy/polyloop/ink tatoo  performed by Gr[facility] HX BUNIONECTOMY      bilateral and \"removed some arthritis in feet\"    HX CATARACT REMOVAL      with implants    HX COLECTOMY  10/6/15    lap right hemicolectomy    HX COLONOSCOPY  2015    HX HAMMER TOE REPAIR      right     HX HEMORRHOIDECTOMY      HX HYSTERECTOMY N/A     HX ORTHOPAEDIC      HX OTHER SURGICAL      keiloid removed off chest near shoulder area    HX TUBAL LIGATION      US GUIDED CORE BREAST BIOPSY Right     benign     Family History   Problem Relation Age of Onset    Hypertension Mother     Stroke Mother     Cancer Father     Heart Disease Father     Hypertension Father     Asthma Sister     Breast Problems Sister     Cancer Sister     Cancer Brother         lung cancer    Asthma Brother     Breast Cancer Maternal Grandmother 78    Cancer Brother     Breast Cancer Maternal Grandfather     Diabetes Paternal Uncle      Social History     Socioeconomic History    Marital status:      Spouse name: Not on file    Number of children: Not on file    Years of education: Not on file    Highest education level: Not on file   Occupational History    Not on file   Social Needs    Financial resource strain: Not on file    Food insecurity     Worry: Not on file     Inability: Not on file    Transportation needs     Medical: Not on file     Non-medical: Not on file   Tobacco Use    Smoking status: Former Smoker     Packs/day: 0.50     Years: 5.00     Pack years: 2.50     Types: Cigarettes     Start date: 3/13/1972     Last attempt to quit: 3/13/1977     Years since quittin.7    Smokeless tobacco: Never Used    Tobacco comment: quit smoking in    Substance and Sexual Activity    Alcohol use: Not Currently     Alcohol/week: 0.0 standard drinks     Comment: rarely    Drug use: Never    Sexual activity: Yes     Partners: Male     Birth control/protection: None   Lifestyle    Physical activity     Days per week: Not on file     Minutes per session: Not on file    Stress: Not on file   Relationships    Social connections     Talks on phone: Not on file     Gets together: Not on file     Attends Scientologist service: Not on file     Active member of club or organization: Not on file     Attends meetings of clubs or organizations: Not on file     Relationship status: Not on file    Intimate partner violence     Fear of current or ex partner: Not on file     Emotionally abused: Not on file     Physically abused: Not on file     Forced sexual activity: Not on file   Other Topics Concern    Not on file   Social History Narrative    Worked as  for [employer] until 2010. Denies any history of asbestos and or chemical exposure. Allergies   Allergen Reactions    Latex Swelling and Contact Dermatitis    Latex, Natural Rubber Itching    Other Food Hives, Diarrhea and Other (comments)     Mushrooms and oranges/OJ Other: throat swelling and mouth 9/16/209sores  Pt denes it's allergy related    Codeine Anaphylaxis     Pt denies    Symbicort [Budesonide-Formoterol] Other (comments)     Tachycardia pt denies    Iodine Povacry-Iso Alcohol Other (comments)     Blisters  9/16/20 Pt. denies    Vicodin [Hydrocodone-Acetaminophen] Itching    Tramadol Other (comments)     9/16/20 Pt. denies       Medications:  I have reviewed the patient's medications  Prior to Admission medications    Medication Sig Start Date End Date Taking? Authorizing Provider   hyoscyamine (Levsin) 0.125 mg tablet Take 1 Tab by mouth every four (4) hours as needed for Cramping.  11/9/20  Yes Marla Bhat MD   albuterol 2.5 mg /3 mL (0.083 %) nebu 3 mL, albuterol-ipratropium 2.5 mg-0.5 mg/3 ml nebu 3 mL 1 Dose by Nebulization route every four (4) hours as needed for Wheezing or Shortness of Breath. Yes Provider, Historical   simvastatin (ZOCOR) 40 mg tablet Take 40 mg by mouth nightly. Yes Provider, Historical   aspirin delayed-release 81 mg tablet Take 81 mg by mouth daily. Yes Provider, Historical   montelukast (SINGULAIR) 10 mg tablet Take 10 mg by mouth daily. Yes Provider, Historical   methimazole (TAPAZOLE) 10 mg tablet Take 10 mg by mouth daily. Yes Provider, Historical   albuterol (PROVENTIL HFA, VENTOLIN HFA, PROAIR HFA) 90 mcg/actuation inhaler Take 2 Puffs by inhalation every four (4) hours as needed for Wheezing. Yes Provider, Historical   omeprazole (PRILOSEC) 20 mg capsule Take 20 mg by mouth daily. Yes Provider, Historical   cholecalciferol, vitamin D3, (VITAMIN D3) 2,000 unit tab Take 1 Tab by mouth daily. Yes Provider, Historical   fluticasone-umeclidinium-vilanterol (Trelegy Ellipta) 100-62.5-25 mcg inhaler Take 1 Puff by inhalation daily. 12/4/20 12/4/20  Provider, Historical   fluticasone (FLOVENT HFA) 110 mcg/actuation inhaler Take 2 Puffs by inhalation every twelve (12) hours. 11/23/15   Rashaad Zhao MD       Immunizations:  I have reviewed the patient's immunizations  Immunization History   Administered Date(s) Administered    Influenza High Dose Vaccine PF 11/06/2019, 11/04/2020    Influenza Vaccine (Quad) PF (>6 Mo Flulaval, Fluarix, and >3 Yrs [address] [place] [address], Fluzone [address]) 10/07/2015       Review of Systems:  A complete review of systems was performed as stated in the HPI, all others are negative.       Objective:    Physical Exam:  BP (!) 174/81 (BP 1 Location: Right arm, BP Patient Position: Sitting)   Pulse 74   Temp 98.1 °F (36.7 °C) (Oral)   Resp 18   Ht 5' 1\" (1.549 m)   Wt 89.8 kg (198 lb)   SpO2 97%   BMI 37.41 kg/m²   Vitals were personally reviewed  Gen: no acute distress, pleasant and cooperative, sitting up in chair, able to climb to exam table w/o difficulty  HEENT: normocephalic/atraumatic, PERRLA, EOMI, nose and mouth covered wearing mask, unable to be assessed due to COVID-19 pandemic  Neck: supple, trachea midline, no JVD, no cervical and supraclavicular adenopathy  Chest: no lesions, with even rise and fall, no pectus excavatum or flail chest  CVS: regular rate rhythm, S1/S2, no murmurs/rubs/gallops  Lungs: good air entry B/L, CTA BL, no wheezes/rales/rhonchi  Back: no kyphosis or scoliosis  Abdomen: soft, nontender, bowel sounds present, no hepatosplenomegaly  Ext: no pitting edema B/L, no peripheral cyanosis or clubbing  Neuro: grossly normal, AAOx3, normal strength and coordination grossly, no focal deficits  Skin: no rashes, erythema, lesions  Psych: normal memory, thought content, and processing    Labs: I have reviewed the patient's available labs  Lab Results   Component Value Date/Time    WBC 6.7 08/27/2020 08:21 AM    HGB 10.4 (L) 08/27/2020 08:21 AM    HCT 33.9 (L) 08/27/2020 08:21 AM    PLATELET 642 21/28/1800 08:21 AM    MCV 77.8 08/27/2020 08:21 AM   Peripheral eosinophil count 100  Lab Results   Component Value Date/Time    Sodium 147 (H) 08/27/2020 08:21 AM    Potassium 4.4 08/27/2020 08:21 AM    Chloride 114 (H) 08/27/2020 08:21 AM    CO2 27 08/27/2020 08:21 AM    Anion gap 6 08/27/2020 08:21 AM    Glucose 95 08/27/2020 08:21 AM    BUN 13 08/27/2020 08:21 AM    Creatinine 0.97 08/27/2020 08:21 AM    BUN/Creatinine ratio 13 08/27/2020 08:21 AM    GFR est AA >60 08/27/2020 08:21 AM    GFR est non-AA 57 (L) 08/27/2020 08:21 AM    Calcium 9.0 08/27/2020 08:21 AM    Bilirubin, total 0.5 08/27/2020 08:21 AM    Alk.  phosphatase 123 (H) 08/27/2020 08:21 AM    Protein, total 7.1 08/27/2020 08:21 AM    Albumin 3.6 08/27/2020 08:21 AM    Globulin 3.5 08/27/2020 08:21 AM    A-G Ratio 1.0 08/27/2020 08:21 AM    ALT (SGPT) 14 08/27/2020 08:21 AM    AST (SGOT) 12 08/27/2020 08:21 AM       Imaging:  I have personally reviewed patient's imaging as follows--CT abdomen pelvis, reviewed lung bases from 7/19/2019, shows some linear atelectasis in bilateral bases, otherwise clear, no masses, infiltrates, consolidations, effusions. Official report per radiology:  CT Results (most recent):  Results from Hospital Encounter encounter on 07/19/19   CT ABD PELV W CONT    Narrative CT abdomen and pelvis with enhancement    INDICATION: Left lower quadrant abdominal pain    TECHNIQUE: CT volumetric imaging obtained from the diaphragm to the level of the  pubic symphysis following the uneventful administration of oral and nonionic  intravenous contrast. Coronal, sagittal and axial reformations obtained. Patient  received 100 mL  Isovue 300 intravenously    All CT scans at this facility are performed using dose optimization technique as  appropriate to the performed exam, to include automated exposure control,  adjustment of the mA and/or kV according to patient's size (Including  appropriate matching for site-specific examinations), or use of iterative  reconstruction technique. COMPARISON: 6/24/2019     FINDINGS:   Lung bases: Minimal dependent atelectasis  Liver: Stable left hepatic subcapsular 9 mm hypodensity  Spleen: Negative  Pancreas: Negative  Adrenal glands: Negative  Gallbladder: Peripherally calcified gallstone. No cholecystitis    Left Kidney: Partially exophytic complex mass anterior left mid kidney 1.2 x 1.2  cm. Right Kidney: Negative  Bladder: Mildly distended and unremarkable    Stomach and bowel:Severe sigmoid diverticulosis with very minimal pericolonic  stranding in the proximal sigmoid colon but overall improved since comparison. No evidence of adjacent fluid or significant peritoneal reflection thickening. Surgical sutures noted in the right hepatic flexure with evidence of right  hemicolectomy. Peritoneal spaces: No free intraperitoneal fluid or gas.      Vessels:Non aneurysmal.  Lymph nodes: No enlargement  Abdominal wall: Supraumbilical fat-containing midline hernia measuring 9 cm TV  by 5 cm CC. There about 3 x 4 cm hernia neck. SKELETAL FINDINGS:  No destructive lesion. Multilevel moderate to advanced degenerative disc and facet joint disease in the  lumbar spine. Impression IMPRESSION:    1. Severe sigmoid diverticulitis with very minimal pericolonic stranding that  may represent early inflammation or scarring due to prior inflammation. Overall  improved appearance since comparison CT 2019.  2. Redemonstration of left kidney partially exophytic complex mass 1.2 cm which  could represent enhancing mass/renal cell carcinoma versus hemorrhagic cyst with  recent hemorrhage, recommend evaluation with CT or MRI renal protocol with and  without contrast or at least initially with ultrasound to exclude solid mass. 3. Moderate sized periumbilical fat-containing hernia. 4. Cholelithiasis without evidence of acute cholecystitis. PFTs:  I have reviewed the patient's PFTs from 2020 as follows, spirometry shows a mild obstructive defect, significant bronchodilator response seen, lung volumes are normal, diffusion capacity is borderline low. 6-minute walk test from 2020, no significant oxygen desaturation seen, lowest SPO2 was 95%, patient ambulated 427 m over 6 minutes on room air.     TTE:  I have reviewed the patient's TTE results  Results for orders placed or performed during the hospital encounter of 10/06/15   2D ECHO COMPLETE ADULT (TTE) W OR WO CONTR     Status: None    [address]  [place], [address]  [phone]    Transthoracic Echocardiogram    Patient: Laura Rush  MRN: [id number]  ACCT #: [de-identified]  : 10-Brian-1950  Age: 72 years  Gender: Female  Height: 61 in  Weight: 147.6 lb  BSA: 1.66 m squared  BP: 113 / 60 mmHg  Study date: 07-Oct-2015  Status: Routine  Location: [facility] DMS [address] #: [id number]    Allergies: LATEX, NATURAL RUBBER, HYDROCODONE-ACETAMINOPHEN    Interpreting Group:  [facility]  Interpreting Physician:  Tejinder Burton MD  Technologist:  PANCHO Steen  Referring_Ordering Physician:  Brenda Manley. Brion Kayser, MD    SUMMARY:  Left ventricle: Size was normal. Systolic function was normal by EF  (biplane method of disks). Ejection fraction was estimated in the range of  65 % to 70 %. No obvious wall motion abnormalities identified in the views  obtained. Wall thickness was normal. Left ventricular diastolic function  parameters were normal for the patient's age. Right ventricle: Systolic function was normal. Systolic pressure was  mildly increased. Estimated peak pressure was 41 mmHg. Left atrium: The atrium was mildly to moderately dilated. LA volume index  was 41 ml/m squared. Inferior vena cava, hepatic veins: The inferior vena cava was upper normal  in size. The respirophasic change in diameter was less than 50%. INDICATIONS: Tachycardia. HISTORY: Prior history: Patient has no history of cardiovascular disease. PROCEDURE: This was a routine study. The study included complete 2D  imaging, M-mode, complete spectral Doppler, and color Doppler. The heart  rate was 100 bpm, at the start of the study. Systolic blood pressure was  113 mmHg, at the start of the study. Diastolic blood pressure was 60 mmHg,  at the start of the study. Images were obtained from the parasternal,  apical, subcostal, and suprasternal notch acoustic windows. Image quality  was adequate. LEFT VENTRICLE: Size was normal. Systolic function was normal by EF  (biplane method of disks). Ejection fraction was estimated in the range of  65 % to 70 %. No obvious wall motion abnormalities identified in the views  obtained. Wall thickness was normal. DOPPLER: Left ventricular diastolic  function parameters were normal for the patient's age.     RIGHT VENTRICLE: The size was normal. Systolic function was normal.  DOPPLER: Systolic pressure was mildly increased. Estimated peak pressure  was 41 mmHg. LEFT ATRIUM: The atrium was mildly to moderately dilated. LA volume index  was 41 ml/m squared. RIGHT ATRIUM: Size was normal.    MITRAL VALVE: There was annular calcification. There was minimal diffuse  thickening. DOPPLER: There was no evidence for stenosis. There was trivial  regurgitation. AORTIC VALVE: The valve was probably trileaflet. Leaflets exhibited good  mobility. DOPPLER: There was no stenosis. There was no significant  regurgitation. TRICUSPID VALVE: Normal valve structure. DOPPLER: There was no evidence  for tricuspid stenosis. There was trivial regurgitation. Right atrial  pressure estimate: 10 mmHg. PULMONIC VALVE: Not well visualized, but normal Doppler findings. DOPPLER:  There was no stenosis. There was no significant regurgitation. AORTA: The root exhibited normal size. SYSTEMIC VEINS: IVC: The inferior vena cava was upper normal in size. The  respirophasic change in diameter was less than 50%. PERICARDIUM: No significant pericardial effusion identified.     SYSTEM MEASUREMENT TABLES    2D  Ao Diam: 2.64 cm  IVSd: 1.02 cm  LVIDd: 4.26 cm  LVIDs: 2.88 cm  LVPWd: 1.02 cm  SV(Teich): 49.64 ml  EDV(Teich): 81.35 ml  ESV(Cube): 23.92 ml  ESV(Teich): 31.71 ml  LAAs A2C: 20.8 cm2  LAAs A4C: 22.39 cm2  LAESV A-L A2C: 63.34 ml  LAESV A-L A4C: 68.04 ml  LAESV Index (A-L): 41.07 ml/m2  LAESV MOD A2C: 61.46 ml  LAESV MOD A4C: 64.14 ml  LAESV(A-L): 68.18 ml  LALs A2C: 5.8 cm  LALs A4C: 6.25 cm  LVEDV MOD A2C: 48.72 ml  LVEDV MOD A4C: 58.35 ml  LVEDV MOD BP: 53.55 ml  LVESV MOD A2C: 13.4 ml  LVESV MOD A4C: 24.36 ml  LVESV MOD BP: 18.26 ml  LVLd A2C: 6.86 cm  LVLd A4C: 6.98 cm  LVLs A2C: 5.61 cm  LVLs A4C: 5.8 cm  LVOT Diam: 1.86 cm  RA Area: 15.44 cm2  RV Minor: 3.89 cm  SV MOD A2C: 35.32 ml  SV MOD A4C: 33.99 ml    CW  TR Vmax: 2.76 m/s  IVRT: 50.75 ms  TR maxP.55 mmHG    PW  LVOT VTI: 30.16 cm  LVOT Vmax: 1.92 m/s  LVOT Vmean: 1.23 m/s  MV A Cy: 1.02 m/s  MV Dec [place]: 8.3 m/s2  MV DecT: 155.56 ms  MV E Cy: 1.29 m/s  MV E/A Ratio: 1.27  HR: 97.42 BPM  LVCI Dopp: 4.8 l/minm2  LVCO Dopp: 7.97 L/min  LVOT maxP.77 mmHG  LVOT meanP mmHG  LVSI Dopp: 49.26 ml/m2  LVSV Dopp: 81.77 ml  P Vein A: 0.28 m/s  P Vein A Dur: 103.81 ms    Prepared and E-signed by    Mordecai Lefort, MD  Signed 08-Oct-2015 14:10:31       20   ECHO ADULT COMPLETE 2020    Narrative · LV: Estimated LVEF is 60 - 65%. Visually measured ejection fraction. Normal cavity size, wall thickness and systolic function (ejection   fraction normal). Wall motion: normal. Mild (grade 1) left ventricular   diastolic dysfunction. · LA: Left Atrium volume index is 29.54 mL/m2. · RV: Not well visualized. · TV: Mild tricuspid valve regurgitation is present. · PA: Pulmonary arterial systolic pressure is 30 mmHg. · Pericardium: Pericardial fat pad present. · MV: Mild mitral valve regurgitation is present. Signed by: William Pierre MD         Assessment and Plan:  79 y.o. female with:    Impression:  1. Dyspnea on exertion/shortness of breath: Etiology multifactorial, there is an component of mild persistent asthma given PFT results and patient's dyspnea with exposure to cleaning chemicals. Patient did not improve on bronchodilators likely due to poor inhaler technique. There is also contribution from deconditioning and body habitus, as well as possible EREN. 2.  Mild persistent asthma  3. Deconditioning  4. Severe obesity: Body mass index is 37.41 kg/m². 5.  Suspect EREN: Given snoring as well as excessive daytime sleepiness, fragmented sleep, and infrequent morning headaches and nocturia in the setting of cardiomyopathy  6. Mild cardiomyopathy: Seen on transthoracic echo, no intervention required    Plan:  -Had a long discussion with the patient regarding etiologies of dyspnea noted above.   Advised the patient that we will start her on long-acting inhaled bronchodilator therapy to maximize her asthma treatment. Advised the patient that although transthoracic echo is abnormal, changes are very mild, nonspecific, normal LVEF, will hold off on cardiology referral.  Counseled patient to start graded exercise  -Start Symbicort 160/4.5 MCG 2 puffs twice daily. Provided spacer in clinic. Counseled patient to rinse mouth thoroughly after each use and wash and dry spacer completely  -Continue albuterol HFA 1-2puffs q4-6h PRN. Counseled patient that this is their rescue inhaler. Also counseled patient regarding premedication 15-30m prior to exercise or exposure to very cold air  -Counseled patient on proper inhaler technique  -Counseled patient to avoid potential triggers of asthma.   -Immunizations reviewed, influenza vaccination up-to-date  -Discussed possibility of sleep apnea, patient agreeable to therapy if indicated. Level 3 home polysomnography ordered. Counseled patient against sleepy driving  -Counseled patient regarding weight loss  -Counseled patient regarding lifestyle precautions in COVID-19 pandemic including wearing mask in public and confined spaces, social/physical distancing, frequent hand hygiene, etc.    Follow-up and Dispositions    · Return in about 4 months (around 4/4/2021).        Orders Placed This Encounter    SLEEP STUDY LIMITED    budesonide-formoteroL (SYMBICORT) 160-4.5 mcg/actuation HUEY Louise MD/MPH     Pulmonary, Critical Care Medicine  [facility]

## 2020-12-04 NOTE — LETTER
12/6/20    Patient: Sylwia Carvajal   YOB: 1950   Date of Visit: 12/4/2020     Kaylah Meza MD  [address] [address] Outside Provider Messaging    Dear Kaylah Meza MD,      Thank you for referring Ms. Ever Rodas to [address] [place] [address] for evaluation. My notes for this consultation are attached. If you have questions, please do not hesitate to call me. I look forward to following your patient along with you.       Sincerely,    Jefferson Eckert MD

## 2020-12-08 ENCOUNTER — TRANSCRIBE ORDER (OUTPATIENT)
Dept: SLEEP MEDICINE | Age: 70
End: 2020-12-08

## 2020-12-08 DIAGNOSIS — G47.33 OSA (OBSTRUCTIVE SLEEP APNEA): Primary | ICD-10-CM

## 2021-01-20 ENCOUNTER — HOSPITAL ENCOUNTER (OUTPATIENT)
Dept: ULTRASOUND IMAGING | Age: 71
Discharge: HOME | End: 2021-01-20
Attending: UROLOGY
Payer: MEDICARE

## 2021-01-20 DIAGNOSIS — N28.89 RENAL MASS: ICD-10-CM

## 2021-01-20 DIAGNOSIS — D17.9 ANGIOMYOLIPOMA: ICD-10-CM

## 2021-01-20 PROCEDURE — 76770 US EXAM ABDO BACK WALL COMP: CPT

## 2021-01-27 ENCOUNTER — HOSPITAL ENCOUNTER (OUTPATIENT)
Dept: LAB | Age: 71
Discharge: HOME | End: 2021-01-27
Payer: MEDICARE

## 2021-01-27 LAB
ANION GAP SERPL CALC-SCNC: 4 MMOL/L (ref 3–18)
BUN SERPL-MCNC: 13 MG/DL (ref 7–18)
BUN/CREAT SERPL: 13 (ref 12–20)
CALCIUM SERPL-MCNC: 9 MG/DL (ref 8.5–10.1)
CHLORIDE SERPL-SCNC: 113 MMOL/L (ref 100–111)
CO2 SERPL-SCNC: 30 MMOL/L (ref 21–32)
CREAT SERPL-MCNC: 1.03 MG/DL (ref 0.6–1.3)
GLUCOSE SERPL-MCNC: 81 MG/DL (ref 74–99)
POTASSIUM SERPL-SCNC: 3.9 MMOL/L (ref 3.5–5.5)
SODIUM SERPL-SCNC: 147 MMOL/L (ref 136–145)

## 2021-01-27 PROCEDURE — 36415 COLL VENOUS BLD VENIPUNCTURE: CPT

## 2021-01-27 PROCEDURE — 80048 BASIC METABOLIC PNL TOTAL CA: CPT

## 2021-02-18 ENCOUNTER — DOCUMENTATION ONLY (OUTPATIENT)
Dept: PULMONOLOGY | Age: 71
End: 2021-02-18

## 2021-02-19 ENCOUNTER — HOSPITAL ENCOUNTER (OUTPATIENT)
Dept: LAB | Age: 71
Discharge: HOME | End: 2021-02-19
Payer: MEDICARE

## 2021-02-19 PROCEDURE — U0003 INFECTIOUS AGENT DETECTION BY NUCLEIC ACID (DNA OR RNA); SEVERE ACUTE RESPIRATORY SYNDROME CORONAVIRUS 2 (SARS-COV-2) (CORONAVIRUS DISEASE [COVID-19]), AMPLIFIED PROBE TECHNIQUE, MAKING USE OF HIGH THROUGHPUT TECHNOLOGIES AS DESCRIBED BY CMS-2020-01-R: HCPCS

## 2021-02-20 LAB — SARS-COV-2, COV2NT: NOT DETECTED

## 2021-02-23 ENCOUNTER — ANESTHESIA EVENT (OUTPATIENT)
Dept: ENDOSCOPY | Age: 71
End: 2021-02-23
Payer: MEDICARE

## 2021-02-24 ENCOUNTER — ANESTHESIA (OUTPATIENT)
Dept: ENDOSCOPY | Age: 71
End: 2021-02-24
Payer: MEDICARE

## 2021-02-24 ENCOUNTER — HOSPITAL ENCOUNTER (OUTPATIENT)
Age: 71
Setting detail: OUTPATIENT SURGERY
Discharge: HOME | End: 2021-02-24
Attending: COLON & RECTAL SURGERY | Admitting: COLON & RECTAL SURGERY
Payer: MEDICARE

## 2021-02-24 VITALS
TEMPERATURE: 97.8 F | OXYGEN SATURATION: 100 % | DIASTOLIC BLOOD PRESSURE: 50 MMHG | RESPIRATION RATE: 15 BRPM | SYSTOLIC BLOOD PRESSURE: 146 MMHG | WEIGHT: 199 LBS | HEIGHT: 61 IN | HEART RATE: 64 BPM | BODY MASS INDEX: 37.57 KG/M2

## 2021-02-24 PROCEDURE — 74011250636 HC RX REV CODE- 250/636: Performed by: NURSE ANESTHETIST, CERTIFIED REGISTERED

## 2021-02-24 PROCEDURE — 76060000032 HC ANESTHESIA 0.5 TO 1 HR: Performed by: COLON & RECTAL SURGERY

## 2021-02-24 PROCEDURE — 77030009426 HC FCPS BIOP ENDOSC BSC -B: Performed by: COLON & RECTAL SURGERY

## 2021-02-24 PROCEDURE — 45380 COLONOSCOPY AND BIOPSY: CPT | Performed by: COLON & RECTAL SURGERY

## 2021-02-24 PROCEDURE — 88305 TISSUE EXAM BY PATHOLOGIST: CPT

## 2021-02-24 PROCEDURE — 00811 ANES LWR INTST NDSC NOS: CPT | Performed by: ANESTHESIOLOGY

## 2021-02-24 PROCEDURE — 2709999900 HC NON-CHARGEABLE SUPPLY: Performed by: COLON & RECTAL SURGERY

## 2021-02-24 PROCEDURE — 76040000019: Performed by: COLON & RECTAL SURGERY

## 2021-02-24 PROCEDURE — 74011250636 HC RX REV CODE- 250/636: Performed by: ANESTHESIOLOGY

## 2021-02-24 RX ORDER — PROPOFOL 10 MG/ML
INJECTION, EMULSION INTRAVENOUS AS NEEDED
Status: DISCONTINUED | OUTPATIENT
Start: 2021-02-24 | End: 2021-02-24 | Stop reason: HOSPADM

## 2021-02-24 RX ORDER — SODIUM CHLORIDE 0.9 % (FLUSH) 0.9 %
5-40 SYRINGE (ML) INJECTION EVERY 8 HOURS
Status: DISCONTINUED | OUTPATIENT
Start: 2021-02-24 | End: 2021-02-24 | Stop reason: HOSPADM

## 2021-02-24 RX ORDER — FAMOTIDINE 20 MG/1
20 TABLET, FILM COATED ORAL ONCE
Status: DISCONTINUED | OUTPATIENT
Start: 2021-02-24 | End: 2021-02-24 | Stop reason: HOSPADM

## 2021-02-24 RX ORDER — SODIUM CHLORIDE 0.9 % (FLUSH) 0.9 %
5-40 SYRINGE (ML) INJECTION AS NEEDED
Status: DISCONTINUED | OUTPATIENT
Start: 2021-02-24 | End: 2021-02-24 | Stop reason: HOSPADM

## 2021-02-24 RX ORDER — LIDOCAINE HYDROCHLORIDE 10 MG/ML
0.1 INJECTION, SOLUTION EPIDURAL; INFILTRATION; INTRACAUDAL; PERINEURAL AS NEEDED
Status: DISCONTINUED | OUTPATIENT
Start: 2021-02-24 | End: 2021-02-24 | Stop reason: HOSPADM

## 2021-02-24 RX ORDER — SODIUM CHLORIDE, SODIUM LACTATE, POTASSIUM CHLORIDE, CALCIUM CHLORIDE 600; 310; 30; 20 MG/100ML; MG/100ML; MG/100ML; MG/100ML
75 INJECTION, SOLUTION INTRAVENOUS CONTINUOUS
Status: DISCONTINUED | OUTPATIENT
Start: 2021-02-24 | End: 2021-02-24 | Stop reason: HOSPADM

## 2021-02-24 RX ADMIN — PROPOFOL 50 MG: 10 INJECTION, EMULSION INTRAVENOUS at 07:45

## 2021-02-24 RX ADMIN — PROPOFOL 70 MG: 10 INJECTION, EMULSION INTRAVENOUS at 07:39

## 2021-02-24 RX ADMIN — SODIUM CHLORIDE, SODIUM LACTATE, POTASSIUM CHLORIDE, AND CALCIUM CHLORIDE 75 ML/HR: 600; 310; 30; 20 INJECTION, SOLUTION INTRAVENOUS at 07:33

## 2021-02-24 RX ADMIN — PROPOFOL 50 MG: 10 INJECTION, EMULSION INTRAVENOUS at 07:53

## 2021-02-24 NOTE — ANESTHESIA PREPROCEDURE EVALUATION
Relevant Problems   No relevant active problems       Anesthetic History   No history of anesthetic complications            Review of Systems / Medical History  Patient summary reviewed and pertinent labs reviewed    Pulmonary            Asthma : well controlled       Neuro/Psych   Within defined limits           Cardiovascular              Hyperlipidemia         GI/Hepatic/Renal     GERD           Endo/Other      Hypothyroidism  Obesity and arthritis     Other Findings              Physical Exam    Airway  Mallampati: II  TM Distance: 4 - 6 cm  Neck ROM: normal range of motion   Mouth opening: Normal     Cardiovascular    Rhythm: regular  Rate: normal         Dental    Dentition: Upper partial plate  Comments: Missing teeth   Pulmonary                Comments: Non labored Abdominal  GI exam deferred       Other Findings            Anesthetic Plan    ASA: 3  Anesthesia type: MAC            Anesthetic plan and risks discussed with: Patient

## 2021-02-24 NOTE — ANESTHESIA POSTPROCEDURE EVALUATION
Procedure(s):  COLONOSCOPY with Polypectomies. MAC    Anesthesia Post Evaluation      Multimodal analgesia: multimodal analgesia used between 6 hours prior to anesthesia start to PACU discharge  Patient location during evaluation: PACU  Patient participation: complete - patient participated  Level of consciousness: awake and alert  Pain management: adequate  Airway patency: patent  Anesthetic complications: no  Cardiovascular status: acceptable  Respiratory status: acceptable  Hydration status: acceptable  Post anesthesia nausea and vomiting:  none      INITIAL Post-op Vital signs:   Vitals Value Taken Time   /51 02/24/21 0810   Temp     Pulse 74 02/24/21 0811   Resp 20 02/24/21 0811   SpO2 100 % 02/24/21 0811   Vitals shown include unvalidated device data.

## 2021-02-24 NOTE — H&P
HPI: Gene Gomez is a 79 y.o. female presenting with chief complain of history polyps    Past Medical History:   Diagnosis Date    Alopecia     Arthritis     Asthma     Chronic lung disease     Chronic obstructive pulmonary disease ([address]     mild    GERD (gastroesophageal reflux disease)     Hypercholesteremia     Hyperthyroidism     Thyroid disease        Past Surgical History:   Procedure Laterality Date    COLONOSCOPY N/A 2018    COLONOSCOPY/polypectomy/polyloop/ink tatoo  performed by Graciela Jim MD at [facility] HX BUNIONECTOMY      bilateral and \"removed some arthritis in feet\"    HX CATARACT REMOVAL      with implants    HX COLONOSCOPY  2015    HX HAMMER TOE REPAIR      right     HX HEMORRHOIDECTOMY      HX HYSTERECTOMY N/A     HX NEPHRECTOMY Left 2019    partial    HX ORTHOPAEDIC      HX OTHER SURGICAL      keiloid removed off chest near shoulder area    HX TOTAL COLECTOMY  10/6/15    lap right hemicolectomy    HX TUBAL LIGATION      US GUIDED CORE BREAST BIOPSY Right     benign       Family History   Problem Relation Age of Onset    Hypertension Mother     Stroke Mother     Cancer Father     Heart Disease Father     Hypertension Father     Asthma Sister     Breast Problems Sister     Cancer Sister     Cancer Brother         lung cancer    Asthma Brother     Breast Cancer Maternal Grandmother 78    Cancer Brother     Breast Cancer Maternal Grandfather     Diabetes Paternal Uncle        Social History     Socioeconomic History    Marital status:      Spouse name: Not on file    Number of children: Not on file    Years of education: Not on file    Highest education level: Not on file   Tobacco Use    Smoking status: Former Smoker     Packs/day: 0.50     Years: 5.00     Pack years: 2.50     Types: Cigarettes     Start date: 3/13/1972     Quit date: 3/13/1977     Years since quittin.9    Smokeless tobacco: Never Used    Tobacco comment: quit smoking in 1970's   Substance and Sexual Activity    Alcohol use: Not Currently     Alcohol/week: 0.0 standard drinks    Drug use: Never    Sexual activity: Yes     Partners: Male     Birth control/protection: None   Social History Narrative    Worked as  for [employer] until 2010. Denies any history of asbestos and or chemical exposure. Review of Systems - neg    Outpatient Medications Marked as Taking for the 2/24/21 encounter [facility] Encounter)   Medication Sig Dispense Refill    budesonide-formoteroL (SYMBICORT) 160-4.5 mcg/actuation HFAA Take 2 Puffs by inhalation two (2) times a day. Rinse and gargle thoroughly after each use 3 Inhaler 3    albuterol 2.5 mg /3 mL (0.083 %) nebu 3 mL, albuterol-ipratropium 2.5 mg-0.5 mg/3 ml nebu 3 mL 1 Dose by Nebulization route every four (4) hours as needed for Wheezing or Shortness of Breath.  simvastatin (ZOCOR) 40 mg tablet Take 40 mg by mouth nightly.  aspirin delayed-release 81 mg tablet Take 81 mg by mouth daily.  montelukast (SINGULAIR) 10 mg tablet Take 10 mg by mouth daily.  methimazole (TAPAZOLE) 10 mg tablet Take 10 mg by mouth daily.  albuterol (PROVENTIL HFA, VENTOLIN HFA, PROAIR HFA) 90 mcg/actuation inhaler Take 2 Puffs by inhalation every four (4) hours as needed for Wheezing.  omeprazole (PRILOSEC) 20 mg capsule Take 20 mg by mouth daily.  cholecalciferol, vitamin D3, (VITAMIN D3) 2,000 unit tab Take 1 Tab by mouth daily.          Allergies   Allergen Reactions    Latex Swelling and Contact Dermatitis    Latex, Natural Rubber Itching    Other Food Hives, Diarrhea and Other (comments)     Mushrooms and oranges/OJ Other: throat swelling and mouth 9/16/209sores  Pt denes it's allergy related    Codeine Anaphylaxis     Pt denies    Iodine Povacry-Iso Alcohol Other (comments)     Blisters  9/16/20 Pt. denies    Vicodin [Hydrocodone-Acetaminophen] Itching    Tramadol Other (comments)     9/16/20 Pt. denies       Vitals:    02/16/21 0856 02/24/21 0720   BP:  (!) 171/81   Pulse:  75   Resp:  20   Temp:  97.8 °F (36.6 °C)   SpO2:  100%   Weight: 90.3 kg (199 lb)    Height: 5' 1\" (1.549 m)        Physical Exam  Constitutional:       Appearance: She is well-developed. HENT:      Head: Normocephalic and atraumatic. Eyes:      Conjunctiva/sclera: Conjunctivae normal.   Abdominal:      General: There is no distension. Palpations: Abdomen is soft. Tenderness: There is no abdominal tenderness. Musculoskeletal: Normal range of motion. Lymphadenopathy:      Cervical: No cervical adenopathy. Skin:     General: Skin is warm and dry. Findings: No rash. Neurological:      Sensory: No sensory deficit. Psychiatric:         Speech: Speech normal.         Assessment / Plan    colonoscopy    The diagnoses and plan were discussed with the patient. All questions answered. Plan of care agreed to by all concerned.

## 2021-02-24 NOTE — DISCHARGE INSTRUCTIONS
No aspirin or ibuprofen (e.g. Aleve, Motrin, Advil) for 5 days. Repeat colonoscopy in 5 year(s). Colon Polyps: Care Instructions  Your Care Instructions     Colon polyps are growths in the colon or the rectum. The cause of most colon polyps is not known, and most people who get them do not have any problems. But a certain kind can turn into cancer. For this reason, regular testing for colon polyps is important for people as they get older. It is also important for anyone who has an increased risk for colon cancer. Polyps are usually found through routine colon cancer screening tests. Although most colon polyps are not cancerous, they are usually removed and then tested for cancer. Screening for colon cancer saves lives because the cancer can usually be cured if it is caught early. If you have a polyp that is the type that can turn into cancer, you may need more tests to examine your entire colon. The doctor will remove any other polyps that he or she finds, and you will be tested more often. Follow-up care is a key part of your treatment and safety. Be sure to make and go to all appointments, and call your doctor if you are having problems. It's also a good idea to know your test results and keep a list of the medicines you take. How can you care for yourself at home? Regular exams to look for colon polyps are the best way to prevent polyps from turning into colon cancer. These can include stool tests, sigmoidoscopy, colonoscopy, and CT colonography. Talk with your doctor about a testing schedule that is right for you. To prevent polyps  There is no home treatment that can prevent colon polyps. But these steps may help lower your risk for cancer. · Stay active. Being active can help you get to and stay at a healthy weight. Try to exercise on most days of the week. Walking is a good choice. · Eat well.  Choose a variety of vegetables, fruits, legumes (such as peas and beans), fish, poultry, and whole grains. · Do not smoke. If you need help quitting, talk to your doctor about stop-smoking programs and medicines. These can increase your chances of quitting for good. · If you drink alcohol, limit how much you drink. Limit alcohol to 2 drinks a day for men and 1 drink a day for women. When should you call for help? Call your doctor now or seek immediate medical care if:    · You have severe belly pain.     · Your stools are maroon or very bloody. Watch closely for changes in your health, and be sure to contact your doctor if:    · You have a fever.     · You have nausea or vomiting.     · You have a change in bowel habits (new constipation or diarrhea).     · Your symptoms get worse or are not improving as expected. Where can you learn more? Go to http://www.durán.com/  Enter C571 in the search box to learn more about \"Colon Polyps: Care Instructions. \"  Current as of: April 29, 2020               Content Version: 12.6  © 2006-2020 [employer] ([employer]). Care instructions adapted under license by [employer] (which disclaims liability or warranty for this information). If you have questions about a medical condition or this instruction, always ask your healthcare professional. [place] [address] any warranty or liability for your use of this information. Colonoscopy: What to Expect at [address] [place] [address]  After a colonoscopy, you'll stay at the clinic for 1 to 2 hours until the medicines wear off. Then you can go home. But you'll need to arrange for a ride. Your doctor will tell you when you can eat and do your other usual activities. Your doctor will talk to you about when you'll need your next colonoscopy. Your doctor can help you decide how often you need to be checked. This will depend on the results of your test and your risk for colorectal cancer. After the test, you may be bloated or have gas pains. You may need to pass gas.  If a biopsy was done or a polyp was removed, you may have streaks of blood in your stool (feces) for a few days. Problems such as heavy rectal bleeding may not occur until several weeks after the test. This isn't common. But it can happen after polyps are removed. This care sheet gives you a general idea about how long it will take for you to recover. But each person recovers at a different pace. Follow the steps below to get better as quickly as possible. How can you care for yourself at home? Activity    · Rest when you feel tired.     · You can do your normal activities when it feels okay to do so. Diet    · Follow your doctor's directions for eating.     · Unless your doctor has told you not to, drink plenty of fluids. This helps to replace the fluids that were lost during the colon prep.     · Do not drink alcohol. Medicines    · Your doctor will tell you if and when you can restart your medicines. He or she will also give you instructions about taking any new medicines.     · If you take aspirin or some other blood thinner, ask your doctor if and when to start taking it again. Make sure that you understand exactly what your doctor wants you to do.     · If polyps were removed or a biopsy was done during the test, your doctor may tell you not to take aspirin or other anti-inflammatory medicines for a few days. These include ibuprofen (Advil, Motrin) and naproxen (Aleve). Other instructions    · For your safety, do not drive or operate machinery until the medicine wears off and you can think clearly. Your doctor may tell you not to drive or operate machinery until the day after your test.     · Do not sign legal documents or make major decisions until the medicine wears off and you can think clearly. The anesthesia can make it hard for you to fully understand what you are agreeing to. Follow-up care is a key part of your treatment and safety.  Be sure to make and go to all appointments, and call your doctor if you are having problems. It's also a good idea to know your test results and keep a list of the medicines you take. When should you call for help? Call 911 anytime you think you may need emergency care. For example, call if:    · You passed out (lost consciousness).     · You pass maroon or bloody stools.     · You have trouble breathing. Call your doctor now or seek immediate medical care if:    · You have pain that does not get better after you take pain medicine.     · You are sick to your stomach or cannot drink fluids.     · You have new or worse belly pain.     · You have blood in your stools.     · You have a fever.     · You cannot pass stools or gas. Watch closely for changes in your health, and be sure to contact your doctor if you have any problems. Where can you learn more? Go to http://www.gray.com/  Enter E264 in the search box to learn more about \"Colonoscopy: What to Expect at Home. \"  Current as of: April 29, 2020               Content Version: 12.6  © 4842-1997 [employer] Care instructions adapted under license by [employer] (which disclaims liability or warranty for this information). If you have questions about a medical condition or this instruction, always ask your healthcare professional. [address] any warranty or liability for your use of this information. DISCHARGE SUMMARY from Nurse    PATIENT INSTRUCTIONS:    After general anesthesia or intravenous sedation, for 24 hours or while taking prescription Narcotics:  · Limit your activities  · Do not drive and operate hazardous machinery  · Do not make important personal or business decisions  · Do  not drink alcoholic beverages  · If you have not urinated within 8 hours after discharge, please contact your surgeon on call.     Report the following to your surgeon:  · Excessive pain, swelling, redness or odor of or around the surgical area  · Temperature over 100.5  · Nausea and vomiting lasting longer than 4 hours or if unable to take medications  · Any signs of decreased circulation or nerve impairment to extremity: change in color, persistent  numbness, tingling, coldness or increase pain  · Any questions    What to do at Home:  Recommended activity: Activity as tolerated and no driving for today. *  Please give a list of your current medications to your Primary Care Provider. *  Please update this list whenever your medications are discontinued, doses are      changed, or new medications (including over-the-counter products) are added. *  Please carry medication information at all times in case of emergency situations. These are general instructions for a healthy lifestyle:    No smoking/ No tobacco products/ Avoid exposure to second hand smoke  Surgeon General's Warning:  Quitting smoking now greatly reduces serious risk to your health. Obesity, smoking, and sedentary lifestyle greatly increases your risk for illness    A healthy diet, regular physical exercise & weight monitoring are important for maintaining a healthy lifestyle    You may be retaining fluid if you have a history of heart failure or if you experience any of the following symptoms:  Weight gain of 3 pounds or more overnight or 5 pounds in a week, increased swelling in our hands or feet or shortness of breath while lying flat in bed. Please call your doctor as soon as you notice any of these symptoms; do not wait until your next office visit. The discharge information has been reviewed with the patient. The patient verbalized understanding. Discharge medications reviewed with the patient and appropriate educational materials and side effects teaching were provided.   ___________________________________________________________________________________________________________________________________

## 2021-02-24 NOTE — PROCEDURES
[facility]  [facility], [address]   [place], [address]  [phone]                    Colonoscopy Procedure Note      Cindi Fregoso  1950  [id number]                Date of Procedure: 2/24/2021    Preoperative diagnosis: History of colon polyps:   Z86.010    Postoperative diagnosis: Rectum Polyps x 2    :  Ancelmo Sagastume MD    Assistant(s): Endoscopy Technician-1: Pinkey Channel  Endoscopy RN-1: Imani Pepe RN    Sedation: MAC    Complications: None    Implants: None    Procedure Details:  Prior to the procedure, a history and physical were performed. The patients medications, allergies and sensitivities were reviewed and all questions were answered. After informed consent was obtained for the procedure, with all risks and benefits of procedure explained. The patient was taken to the endoscopy suite and placed in the left lateral decubitus position. Patient identification and proposed procedure were verified prior to the procedure by the nurse and I. After sequential anesthesia administered by anesthesiologist, a digital rectal exam was performed and was normal.  The Olympus video colonoscope was introduced through the anus and advanced to surgical anastomosis . The quality of preparation was excellent. The colonoscope was slowly withdrawn and the mucosa examined for any abnormalities. Cecal withdrawal time was greater than 6 minutes. The patient tolerated the procedure well. There were no complications. Findings/Interventions:   Polyps - #1, 5 mm in size, located in the rectum, removed by cold biopsy and sent for pathology, - #2, 5 mm in size, located in the rectum, removed by cold biopsy and sent for pathology    EBL: none    Recommendations: -Repeat colonoscopy in 5 years.    NO aspirin for 5 days     Discharge Disposition:  Olu Orlando MD  2/24/2021  8:00 AM

## 2021-03-02 ENCOUNTER — HOSPITAL ENCOUNTER (OUTPATIENT)
Dept: SLEEP MEDICINE | Age: 71
Discharge: HOME | End: 2021-03-02
Payer: MEDICARE

## 2021-03-02 DIAGNOSIS — G47.33 OSA (OBSTRUCTIVE SLEEP APNEA): ICD-10-CM

## 2021-03-02 PROCEDURE — 95806 SLEEP STUDY UNATT&RESP EFFT: CPT

## 2021-03-03 ENCOUNTER — HOSPITAL ENCOUNTER (OUTPATIENT)
Dept: SLEEP MEDICINE | Age: 71
Discharge: HOME | End: 2021-03-03
Payer: MEDICARE

## 2021-03-04 ENCOUNTER — DOCUMENTATION ONLY (OUTPATIENT)
Dept: PULMONOLOGY | Age: 71
End: 2021-03-04

## 2021-03-05 ENCOUNTER — TELEPHONE (OUTPATIENT)
Dept: PULMONOLOGY | Age: 71
End: 2021-03-05

## 2021-03-05 NOTE — TELEPHONE ENCOUNTER
Per Dr. Pina De La Torre, called patient to inform her that her sleep study result done on 3/2/2020. was negative for sleep apnea or hypoxia. Patient verbalized understanding.

## 2021-03-10 ENCOUNTER — TELEPHONE (OUTPATIENT)
Dept: SURGERY | Age: 71
End: 2021-03-10

## 2021-03-10 NOTE — TELEPHONE ENCOUNTER
----- Message from Noemi Hdz MD sent at 2/26/2021  2:27 PM EST -----  Benign polyp(s). Repeat colonoscopy in 5 year(s) as planned. Notified patient of pathology results. Tickler placed in recall for 5 years. Patient understands.

## 2021-03-11 ENCOUNTER — TELEPHONE (OUTPATIENT)
Dept: PULMONOLOGY | Age: 71
End: 2021-03-11

## 2021-03-11 NOTE — PROGRESS NOTES
Patient contacted and study results reviewed with her.  She is encouraged to contact our office with any additional questions or concerns she may have

## 2021-03-11 NOTE — TELEPHONE ENCOUNTER
Patient contacted and study results reviewed with her. She is encouraged to contact our office with any additional questions or concerns she may have.

## 2021-05-19 ENCOUNTER — OFFICE VISIT (OUTPATIENT)
Dept: PULMONOLOGY | Age: 71
End: 2021-05-19
Payer: MEDICARE

## 2021-05-19 VITALS
WEIGHT: 203.2 LBS | OXYGEN SATURATION: 96 % | TEMPERATURE: 98.3 F | RESPIRATION RATE: 18 BRPM | BODY MASS INDEX: 38.36 KG/M2 | SYSTOLIC BLOOD PRESSURE: 185 MMHG | DIASTOLIC BLOOD PRESSURE: 77 MMHG | HEIGHT: 61 IN | HEART RATE: 71 BPM

## 2021-05-19 DIAGNOSIS — I42.8 OTHER CARDIOMYOPATHY (HCC): ICD-10-CM

## 2021-05-19 DIAGNOSIS — R06.09 DYSPNEA ON EXERTION: Primary | ICD-10-CM

## 2021-05-19 DIAGNOSIS — R53.81 PHYSICAL DECONDITIONING: ICD-10-CM

## 2021-05-19 DIAGNOSIS — J45.50 POORLY CONTROLLED SEVERE PERSISTENT ASTHMA WITHOUT COMPLICATION: ICD-10-CM

## 2021-05-19 DIAGNOSIS — R06.02 SHORTNESS OF BREATH: ICD-10-CM

## 2021-05-19 DIAGNOSIS — E66.01 SEVERE OBESITY (BMI 35.0-39.9) WITH COMORBIDITY (HCC): ICD-10-CM

## 2021-05-19 PROCEDURE — G8400 PT W/DXA NO RESULTS DOC: HCPCS | Performed by: INTERNAL MEDICINE

## 2021-05-19 PROCEDURE — 99214 OFFICE O/P EST MOD 30 MIN: CPT | Performed by: INTERNAL MEDICINE

## 2021-05-19 PROCEDURE — 1101F PT FALLS ASSESS-DOCD LE1/YR: CPT | Performed by: INTERNAL MEDICINE

## 2021-05-19 PROCEDURE — G9899 SCRN MAM PERF RSLTS DOC: HCPCS | Performed by: INTERNAL MEDICINE

## 2021-05-19 PROCEDURE — G8536 NO DOC ELDER MAL SCRN: HCPCS | Performed by: INTERNAL MEDICINE

## 2021-05-19 PROCEDURE — G9711 PT HX TOT COL OR COLON CA: HCPCS | Performed by: INTERNAL MEDICINE

## 2021-05-19 PROCEDURE — G8417 CALC BMI ABV UP PARAM F/U: HCPCS | Performed by: INTERNAL MEDICINE

## 2021-05-19 PROCEDURE — G8510 SCR DEP NEG, NO PLAN REQD: HCPCS | Performed by: INTERNAL MEDICINE

## 2021-05-19 PROCEDURE — G8427 DOCREV CUR MEDS BY ELIG CLIN: HCPCS | Performed by: INTERNAL MEDICINE

## 2021-05-19 PROCEDURE — 1090F PRES/ABSN URINE INCON ASSESS: CPT | Performed by: INTERNAL MEDICINE

## 2021-05-19 NOTE — PROGRESS NOTES
[address] [place], [place] [phone]    [facility]  Pulmonary, Critical Care, and Sleep Medicine    Pulmonary Office F/U  Name: Liza Edwards 79 y.o. female  MRN: [id number]  : 1950  Service Date: 21  Chief Complaint:   Chief Complaint   Patient presents with    Asthma     follow up from 2020    Snoring    Sleep Apnea     HST done 3/2/2021    Results     COVID (-) 2021         History of Present Illness:  Liza Edwards is a 79 y.o. female, who presents to Pulmonary clinic for follow-up of shortness of breath. Patient was last seen 2020. In the interval, pt reports worsening SOB. Getting SOB going around the house -- SOB going 50-100ft and going up 3 steps.   mMRC of 3  PRN albuterol (HFA and neb) use -- using 0-2x per day  Only using symbicort about once evvery few days  No exacerbations in the interval.  Home PSG was performed and was negative for sleep apnea      Past Medical History:   Diagnosis Date    Alopecia     Arthritis     Asthma     Chronic lung disease     Chronic obstructive pulmonary disease (HCC)     mild    GERD (gastroesophageal reflux disease)     Hypercholesteremia     Hyperthyroidism     Thyroid disease      Past Surgical History:   Procedure Laterality Date    COLONOSCOPY N/A 2018    COLONOSCOPY/polypectomy/polyloop/ink tatoo  performed by Lilo Calixto MD at [facility] ENDOSCOPY    COLONOSCOPY N/A 2021    COLONOSCOPY with Polypectomies performed by Gustavo Santillan MD at [facility] ENDOSCOPY    HX BUNIONECTOMY      bilateral and \"removed some arthritis in feet\"    HX CATARACT REMOVAL      with implants    HX COLONOSCOPY  2015    HX HAMMER TOE REPAIR      right     HX HEMORRHOIDECTOMY      HX HYSTERECTOMY N/A     HX NEPHRECTOMY Left 2019    partial    HX ORTHOPAEDIC      HX OTHER SURGICAL      keiloid removed off chest near shoulder area    HX TOTAL COLECTOMY  10/6/15    lap right hemicolectomy    HX TUBAL LIGATION      US GUIDED CORE BREAST BIOPSY Right 2013    benign     Family History   Problem Relation Age of Onset    Hypertension Mother     Stroke Mother     Cancer Father     Heart Disease Father     Hypertension Father     Asthma Sister     Breast Problems Sister     Cancer Sister     Cancer Brother         lung cancer    Asthma Brother     Breast Cancer Maternal Grandmother 78    Cancer Brother     Breast Cancer Maternal Grandfather     Diabetes Paternal Uncle      Social History     Socioeconomic History    Marital status:      Spouse name: Not on file    Number of children: Not on file    Years of education: Not on file    Highest education level: Not on file   Occupational History    Not on file   Tobacco Use    Smoking status: Former Smoker     Packs/day: 0.50     Years: 5.00     Pack years: 2.50     Types: Cigarettes     Start date: 3/13/1972     Quit date: 3/13/1977     Years since quittin.2    Smokeless tobacco: Never Used    Tobacco comment: quit smoking in    Vaping Use    Vaping Use: Never used   Substance and Sexual Activity    Alcohol use: Not Currently     Alcohol/week: 0.0 standard drinks    Drug use: Never    Sexual activity: Yes     Partners: Male     Birth control/protection: None   Other Topics Concern    Not on file   Social History Narrative    Worked as  for [address] [place] [address] until . Denies any history of asbestos and or chemical exposure. Social Determinants of Health     Financial Resource Strain:     Difficulty of Paying Living Expenses:    Food Insecurity:     Worried About Running Out of Food in the Last Year:     [address] in the Last Year:    Transportation Needs:     Lack of Transportation (Medical):      Lack of Transportation (Non-Medical):    Physical Activity:     Days of Exercise per Week:     Minutes of Exercise per Session:    Stress:     Feeling of Stress : Social Connections:     Frequency of Communication with Friends and Family:     Frequency of Social Gatherings with Friends and Family:     Attends Gnosticist Services:     Active Member of Clubs or Organizations:     Attends Club or Organization Meetings:     Marital Status:    Intimate Partner Violence:     Fear of Current or Ex-Partner:     Emotionally Abused:     Physically Abused:     Sexually Abused: Allergies   Allergen Reactions    Latex Swelling and Contact Dermatitis    Latex, Natural Rubber Itching    Other Food Hives, Diarrhea and Other (comments)     Mushrooms and oranges/OJ Other: throat swelling and mouth 9/16/209sores  Pt denes it's allergy related    Codeine Anaphylaxis     Pt denies    Iodine Povacry-Iso Alcohol Other (comments)     Blisters  9/16/20 Pt. denies    Vicodin [Hydrocodone-Acetaminophen] Itching    Tramadol Other (comments)     9/16/20 Pt. denies     Prior to Admission medications    Medication Sig Start Date End Date Taking? Authorizing Provider   budesonide-formoteroL (SYMBICORT) 160-4.5 mcg/actuation HFAA Take 2 Puffs by inhalation two (2) times a day. Rinse and gargle thoroughly after each use 12/4/20   Guillermina Jacob MD   hyoscyamine (Levsin) 0.125 mg tablet Take 1 Tab by mouth every four (4) hours as needed for Cramping. 11/9/20   Gustavo Santillan MD   albuterol 2.5 mg /3 mL (0.083 %) nebu 3 mL, albuterol-ipratropium 2.5 mg-0.5 mg/3 ml nebu 3 mL 1 Dose by Nebulization route every four (4) hours as needed for Wheezing or Shortness of Breath. Provider, Historical   simvastatin (ZOCOR) 40 mg tablet Take 40 mg by mouth nightly. Provider, Historical   aspirin delayed-release 81 mg tablet Take 81 mg by mouth daily. Provider, Historical   montelukast (SINGULAIR) 10 mg tablet Take 10 mg by mouth daily. Provider, Historical   methimazole (TAPAZOLE) 10 mg tablet Take 10 mg by mouth daily.     Provider, Historical   albuterol (PROVENTIL HFA, VENTOLIN HFA, PROAIR HFA) 90 mcg/actuation inhaler Take 2 Puffs by inhalation every four (4) hours as needed for Wheezing. Provider, Historical   omeprazole (PRILOSEC) 20 mg capsule Take 20 mg by mouth daily. Provider, Historical   cholecalciferol, vitamin D3, (VITAMIN D3) 2,000 unit tab Take 1 Tab by mouth daily. Provider, Historical       Immunizations:  I have reviewed the patient's immunizations  Immunization History   Administered Date(s) Administered    COVID-19, PFIZER, MRNA, LNP-S, PF, 30MCG/0.3ML DOSE 03/19/2021, 04/09/2021    Influenza High Dose Vaccine PF 11/06/2019, 11/04/2020    Influenza Vaccine (Quad) PF (>6 Mo Flulaval, Fluarix, and >3 Yrs Roger, Fluzone 17990) 10/07/2015       Review of Systems:  A complete review of systems was performed as stated in the HPI, all others are negative. Objective:    Physical Exam:  BP (!) 185/77 (BP 1 Location: Right arm, BP Patient Position: Sitting, BP Cuff Size: Adult) Comment: had to use albuterol inhaler this am per pt  Pulse 71   Temp 98.3 °F (36.8 °C) (Oral)   Resp 18   Ht 5' 1\" (1.549 m)   Wt 92.2 kg (203 lb 3.2 oz)   SpO2 96%   BMI 38.39 kg/m²   Vitals were personally reviewed  Gen: no acute distress, pleasant and cooperative, sitting up in chair  HEENT: normocephalic/atraumatic, no ocular drainage, EOMI, nose and mouth covered wearing mask, unable to be assessed due to COVID-19 pandemic  Neck: supple, trachea midline, no JVD  CVS: regular rate rhythm, S1/S2, no murmurs/rubs/gallops  Lungs: good air entry B/L, some scattered wheezes, no rales/rhonchi  Psych: normal memory, thought content, and processing    Labs:   I have reviewed the patient's available labs  Lab Results   Component Value Date/Time    WBC 6.7 08/27/2020 08:21 AM    HGB 10.4 (L) 08/27/2020 08:21 AM    HCT 33.9 (L) 08/27/2020 08:21 AM    PLATELET 008 99/73/5610 08:21 AM    MCV 77.8 08/27/2020 08:21 AM   Peripheral eosinophil count 100  Lab Results   Component Value Date/Time Sodium 147 (H) 01/27/2021 09:34 AM    Potassium 3.9 01/27/2021 09:34 AM    Chloride 113 (H) 01/27/2021 09:34 AM    CO2 30 01/27/2021 09:34 AM    Anion gap 4 01/27/2021 09:34 AM    Glucose 81 01/27/2021 09:34 AM    BUN 13 01/27/2021 09:34 AM    Creatinine 1.03 01/27/2021 09:34 AM    BUN/Creatinine ratio 13 01/27/2021 09:34 AM    GFR est AA >60 01/27/2021 09:34 AM    GFR est non-AA 53 (L) 01/27/2021 09:34 AM    Calcium 9.0 01/27/2021 09:34 AM    Bilirubin, total 0.5 08/27/2020 08:21 AM    Alk. phosphatase 123 (H) 08/27/2020 08:21 AM    Protein, total 7.1 08/27/2020 08:21 AM    Albumin 3.6 08/27/2020 08:21 AM    Globulin 3.5 08/27/2020 08:21 AM    A-G Ratio 1.0 08/27/2020 08:21 AM    ALT (SGPT) 14 08/27/2020 08:21 AM    AST (SGOT) 12 08/27/2020 08:21 AM       Imaging:  I have personally reviewed patient's imaging as follows--no new imaging in the interval  CT Results (most recent):  Results from [place] encounter on 07/19/19    CT ABD PELV W CONT    Narrative  CT abdomen and pelvis with enhancement    INDICATION: Left lower quadrant abdominal pain    TECHNIQUE: CT volumetric imaging obtained from the diaphragm to the level of the  pubic symphysis following the uneventful administration of oral and nonionic  intravenous contrast. Coronal, sagittal and axial reformations obtained. Patient  received 100 mL  Isovue 300 intravenously    All CT scans at this facility are performed using dose optimization technique as  appropriate to the performed exam, to include automated exposure control,  adjustment of the mA and/or kV according to patient's size (Including  appropriate matching for site-specific examinations), or use of iterative  reconstruction technique. COMPARISON: 6/24/2019    FINDINGS:  Lung bases: Minimal dependent atelectasis  Liver: Stable left hepatic subcapsular 9 mm hypodensity  Spleen: Negative  Pancreas: Negative  Adrenal glands: Negative  Gallbladder: Peripherally calcified gallstone.  No cholecystitis    Left Kidney: Partially exophytic complex mass anterior left mid kidney 1.2 x 1.2  cm. Right Kidney: Negative  Bladder: Mildly distended and unremarkable    Stomach and bowel:Severe sigmoid diverticulosis with very minimal pericolonic  stranding in the proximal sigmoid colon but overall improved since comparison. No evidence of adjacent fluid or significant peritoneal reflection thickening. Surgical sutures noted in the right hepatic flexure with evidence of right  hemicolectomy. Peritoneal spaces: No free intraperitoneal fluid or gas. Vessels:Non aneurysmal.  Lymph nodes: No enlargement  Abdominal wall: Supraumbilical fat-containing midline hernia measuring 9 cm TV  by 5 cm CC. There about 3 x 4 cm hernia neck. SKELETAL FINDINGS:  No destructive lesion. Multilevel moderate to advanced degenerative disc and facet joint disease in the  lumbar spine. Impression  IMPRESSION:    1. Severe sigmoid diverticulitis with very minimal pericolonic stranding that  may represent early inflammation or scarring due to prior inflammation. Overall  improved appearance since comparison CT 6/24/2019.  2. Redemonstration of left kidney partially exophytic complex mass 1.2 cm which  could represent enhancing mass/renal cell carcinoma versus hemorrhagic cyst with  recent hemorrhage, recommend evaluation with CT or MRI renal protocol with and  without contrast or at least initially with ultrasound to exclude solid mass. 3. Moderate sized periumbilical fat-containing hernia. 4. Cholelithiasis without evidence of acute cholecystitis. PFTs:  I have reviewed the patient's PFTs from 11/23/2020 as follows, spirometry shows a mild obstructive defect, significant bronchodilator response seen, lung volumes are normal, diffusion capacity is borderline low.     6-minute walk test from 11/23/2020, no significant oxygen desaturation seen, lowest SPO2 was 95%, patient ambulated 427 m over 6 minutes on room air.    TTE:  I have reviewed the patient's TTE results  Results for orders placed or performed during the hospital encounter of 10/06/15   2D ECHO COMPLETE ADULT (TTE) W OR WO CONTR     Status: None    [facility]  [facility], [address]  [phone]    Transthoracic Echocardiogram    Patient: Thomas Ledezma  MRN: [id number]  [address] #: [de-identified]  : 10-Brian-1950  Age: 72 years  Gender: Female  Height: 61 in  Weight: 147.6 lb  BSA: 1.66 m squared  BP: 113 / 60 mmHg  Study date: 07-Oct-2015  Status: Routine  Location: [facility] DMS [address] #: [id number]    Allergies: LATEX, NATURAL RUBBER, HYDROCODONE-ACETAMINOPHEN    Interpreting Group:  [facility]  Interpreting Physician:  Seema Hannon MD  Technologist:  PANCHO Hurtado  Referring_Ordering Physician:  Gutierrez Hurst. Esme Salinas MD    SUMMARY:  Left ventricle: Size was normal. Systolic function was normal by EF  (biplane method of disks). Ejection fraction was estimated in the range of  65 % to 70 %. No obvious wall motion abnormalities identified in the views  obtained. Wall thickness was normal. Left ventricular diastolic function  parameters were normal for the patient's age. Right ventricle: Systolic function was normal. Systolic pressure was  mildly increased. Estimated peak pressure was 41 mmHg. Left atrium: The atrium was mildly to moderately dilated. LA volume index  was 41 ml/m squared. Inferior vena cava, hepatic veins: The inferior vena cava was upper normal  in size. The respirophasic change in diameter was less than 50%. INDICATIONS: Tachycardia. HISTORY: Prior history: Patient has no history of cardiovascular disease. PROCEDURE: This was a routine study. The study included complete 2D  imaging, M-mode, complete spectral Doppler, and color Doppler. The heart  rate was 100 bpm, at the start of the study. Systolic blood pressure was  113 mmHg, at the start of the study.  Diastolic blood pressure was 60 mmHg,  at the start of the study. Images were obtained from the parasternal,  apical, subcostal, and suprasternal notch acoustic windows. Image quality  was adequate. LEFT VENTRICLE: Size was normal. Systolic function was normal by EF  (biplane method of disks). Ejection fraction was estimated in the range of  65 % to 70 %. No obvious wall motion abnormalities identified in the views  obtained. Wall thickness was normal. DOPPLER: Left ventricular diastolic  function parameters were normal for the patient's age. RIGHT VENTRICLE: The size was normal. Systolic function was normal.  DOPPLER: Systolic pressure was mildly increased. Estimated peak pressure  was 41 mmHg. LEFT ATRIUM: The atrium was mildly to moderately dilated. LA volume index  was 41 ml/m squared. RIGHT ATRIUM: Size was normal.    MITRAL VALVE: There was annular calcification. There was minimal diffuse  thickening. DOPPLER: There was no evidence for stenosis. There was trivial  regurgitation. AORTIC VALVE: The valve was probably trileaflet. Leaflets exhibited good  mobility. DOPPLER: There was no stenosis. There was no significant  regurgitation. TRICUSPID VALVE: Normal valve structure. DOPPLER: There was no evidence  for tricuspid stenosis. There was trivial regurgitation. Right atrial  pressure estimate: 10 mmHg. PULMONIC VALVE: Not well visualized, but normal Doppler findings. DOPPLER:  There was no stenosis. There was no significant regurgitation. AORTA: The root exhibited normal size. SYSTEMIC VEINS: IVC: The inferior vena cava was upper normal in size. The  respirophasic change in diameter was less than 50%. PERICARDIUM: No significant pericardial effusion identified.     SYSTEM MEASUREMENT TABLES    2D  Ao Diam: 2.64 cm  IVSd: 1.02 cm  LVIDd: 4.26 cm  LVIDs: 2.88 cm  LVPWd: 1.02 cm  SV(Teich): 49.64 ml  EDV(Teich): 81.35 ml  ESV(Cube): 23.92 ml  ESV(Teich): 31.71 ml  LAAs A2C: 20.8 cm2  LAAs A4C: 22.39 cm2  LAESV A-L A2C: 63.34 ml  LAESV A-L A4C: 68.04 ml  LAESV Index (A-L): 41.07 ml/m2  LAESV MOD A2C: 61.46 ml  LAESV MOD A4C: 64.14 ml  LAESV(A-L): 68.18 ml  LALs A2C: 5.8 cm  LALs A4C: 6.25 cm  LVEDV MOD A2C: 48.72 ml  LVEDV MOD A4C: 58.35 ml  LVEDV MOD BP: 53.55 ml  LVESV MOD A2C: 13.4 ml  LVESV MOD A4C: 24.36 ml  LVESV MOD BP: 18.26 ml  LVLd A2C: 6.86 cm  LVLd A4C: 6.98 cm  LVLs A2C: 5.61 cm  LVLs A4C: 5.8 cm  LVOT Diam: 1.86 cm  RA Area: 15.44 cm2  RV Minor: 3.89 cm  SV MOD A2C: 35.32 ml  SV MOD A4C: 33.99 ml    CW  TR Vmax: 2.76 m/s  IVRT: 50.75 ms  TR maxP.55 mmHG    PW  LVOT VTI: 30.16 cm  LVOT Vmax: 1.92 m/s  LVOT Vmean: 1.23 m/s  MV A Cy: 1.02 m/s  MV Dec Jewell: 8.3 m/s2  MV DecT: 155.56 ms  MV E Cy: 1.29 m/s  MV E/A Ratio: 1.27  HR: 97.42 BPM  LVCI Dopp: 4.8 l/minm2  LVCO Dopp: 7.97 L/min  LVOT maxP.77 mmHG  LVOT meanP mmHG  LVSI Dopp: 49.26 ml/m2  LVSV Dopp: 81.77 ml  P Vein A: 0.28 m/s  P Vein A Dur: 103.81 ms    Prepared and E-signed by    Seema Hannon MD  Signed 08-Oct-2015 14:10:31       20   ECHO ADULT COMPLETE 2020    Narrative · LV: Estimated LVEF is 60 - 65%. Visually measured ejection fraction. Normal cavity size, wall thickness and systolic function (ejection   fraction normal). Wall motion: normal. Mild (grade 1) left ventricular   diastolic dysfunction. · LA: Left Atrium volume index is 29.54 mL/m2. · RV: Not well visualized. · TV: Mild tricuspid valve regurgitation is present. · PA: Pulmonary arterial systolic pressure is 30 mmHg. · Pericardium: Pericardial fat pad present. · MV: Mild mitral valve regurgitation is present. Signed by: Nate Weinberg MD       Unintended Level 3 polysomnography from 3/2/2021, shows no evidence of EREN, AHI of 3.6      Assessment and Plan:  79 y.o. female with:    Impression:  1. Dyspnea on exertion/shortness of breath  2. Poorly controlled severe persistent asthma  3. Deconditioning  4.   Severe obesity: There is no height or weight on file to calculate BMI. 5.  Snoring as well as excessive daytime sleepiness, fragmented sleep, and infrequent morning headaches and nocturia in the setting of cardiomyopathy --- level 3 unattended HST on 3/2/21, AHI 3.6, thus no EREN  6. Mild cardiomyopathy: Seen on transthoracic echo, no intervention required    Plan:  -We will refer patient to pulmonary rehab given worsening shortness of breath with exertion  -Advised patient to use Symbicort 160/4.5 MCG 2 puffs twice daily with spacer. Counseled patient to rinse mouth thoroughly after each use and wash and dry spacer completely. Counseled patient regarding the need to use daily controller inhaler to improve episodes of recurrent bronchospasm  -Continue albuterol HFA 1-2puffs q4-6h PRN. Counseled patient that this is their rescue inhaler. Also counseled patient regarding premedication 15-30m prior to exercise or exposure to very cold air  -Counseled patient on proper inhaler technique  -Counseled patient to avoid potential triggers of asthma.   -Immunizations reviewed, influenza and Covid vaccinations up-to-date  -Counseled patient regarding weight loss  -Counseled patient regarding lifestyle precautions in COVID-19 pandemic including wearing mask in public and confined spaces, social/physical distancing, frequent hand hygiene, etc.       Follow-up and Dispositions    · Return in about 4 months (around 9/19/2021).          Orders Placed This Encounter    REFERRAL TO PULMONARY [facility]         Pia Mcmahon MD/MPH     Pulmonary, [address] [facility]

## 2021-05-19 NOTE — PROGRESS NOTES
Colt Justice presents today for   Chief Complaint   Patient presents with    Asthma     follow up from 12/4/2020    Snoring    Sleep Apnea     HST done 3/2/2021    Results     COVID (-) 2/19/2021       Is someone accompanying this pt? No    Is the patient using any DME equipment during OV? Yes. nebulizer    -DME Company N/A    Depression Screening:  3 most recent PHQ Screens 5/19/2021   Little interest or pleasure in doing things Not at all   Feeling down, depressed, irritable, or hopeless Not at all   Total Score PHQ 2 0       Learning Assessment:  Learning Assessment 5/19/2021   PRIMARY LEARNER Patient   BARRIERS PRIMARY LEARNER -   PRIMARY LANGUAGE ENGLISH   LEARNER PREFERENCE PRIMARY READING     DEMONSTRATION     -   ANSWERED BY Patient   RELATIONSHIP SELF       Abuse Screening:  Abuse Screening Questionnaire 5/19/2021   Do you ever feel afraid of your partner? N   Are you in a relationship with someone who physically or mentally threatens you? N   Is it safe for you to go home? Y       Fall Risk  Fall Risk Assessment, last 12 mths 5/19/2021   Able to walk? Yes   Fall in past 12 months? 0   Do you feel unsteady? 0   Are you worried about falling 0         Coordination of Care:  1. Have you been to the ER, urgent care clinic since your last visit? Hospitalized since your last visit? Yes; Where: [facility] , When: 2/24/2021-colonoscopy with polypectomies    2. Have you seen or consulted any other health care providers outside of the [address] [place] [address] since your last visit? Include any pap smears or colon screening. Yes. NP Sudheer Odonnell, PCP    COVID vaccine (Kenny Chase) received on 3/19/2021 (1st dose) and 4/9/2021 (2nd dose) per patient. Immunization record updated. Card scanned to patient's chart.

## 2021-05-19 NOTE — LETTER
5/23/2021    Patient: Radha Caldwell   YOB: 1950   Date of Visit: 5/19/2021     Yamile Martinez MD  [address] [id number]  Via Outside Provider Messaging    Dear Yamile Martinez MD,      Thank you for referring Ms. Radha Caldwell to [address] [place] [address] for evaluation. My notes for this consultation are attached. If you have questions, please do not hesitate to call me. I look forward to following your patient along with you.       Sincerely,    Christine Caruso MD

## 2021-06-02 ENCOUNTER — TELEPHONE (OUTPATIENT)
Dept: PULMONOLOGY | Age: 71
End: 2021-06-02

## 2021-06-02 NOTE — TELEPHONE ENCOUNTER
Pulmonary Rehab called patient and spoke to her about the program. She is interested and wants to see what her insurance covers. Will follow up with benefit information.     Thank you,  Andrez Thomas

## 2021-06-30 ENCOUNTER — HOSPITAL ENCOUNTER (OUTPATIENT)
Dept: PULMONOLOGY | Age: 71
Discharge: HOME | End: 2021-06-30
Payer: MEDICARE

## 2021-06-30 PROCEDURE — 94618 PULMONARY STRESS TESTING: CPT

## 2021-06-30 NOTE — PROGRESS NOTES
Summary Report   Ashvin Duarte  Pulmonary Tx Plan  Description: Female : 1950   Initial Pulmonary ITP     [facility] INTAKE from 2021 in [facility]   Most recent reading at 2021  9:52 AM   Referral Date 21   Significant Cardiovascular History --  [Cardiomyopathy]   Co-morbidities Pulmonary disease   Stages of change  Preparation   Oxygen Use No   ITP Visit Type Initial Assessment   1st Date of Exercise 21   ITP Next Review Date 21   Visit #/Total Visits    EF % 65 %   FVC  2.8 liters   FEV1% Predicted 100 %   FEV1/FVC 64   Risk Stratification Low   Pulmonary ITP Exercise, Psychosocial, Tobacco, Nutrition, Education   Total Score 0   Stages of Change Preparation   Test Six minute walk test   Distance Walked in  ft   Distance Walked (ft) 780 ft   Peak HR 84   Peak /68   Peak RPE 13   Peak Mets 2.13   O2 Saturation 97   Stops 0   SPO2 Range    BMI (calculated) --   Mode Stepper, Ergometer, Other (comment)   Frequency per week 2   Duration per session 60   Intensity  METS       2.5   Progression moderate   O2 Sat (%) --   O2 Device --   O2 Flow Rate (L/min) 0 l/min   Symptoms with Exercise Shortness of breath   Target Heart Rate 108-123   Resistance Training Yes   Assisted Devices None   Resting /68   Peak /68   Is BP WDL?  Yes   Type home bike   Frequency 2   Duration 15   Resistance Training No   Education Exercise safety, Self pulse, Signs/Symptoms to report, Low sodium diet, Blood pressure medications, RPE scale, Equipment orientation, Warm up/Cool down, Understand blood pressure, Home exercise plan, Energy conservation, RPE/RPD scale, O2 therapy, Purse lip/Abdominal breathing   Target Goal(s) Individual exercise RX, BP < 140/90 or < 130/80, if DM or CKD, Aerobic activity 30 + minutes/day  5 days/week, SA02>89%, Home activity   Stages of Change Preparation   Interventions PCP notified   Consults PCP   Currently Taking Psychotropic Meds No   Education Advanced directives, Benefits of CPR completion, Cardiac meds, Coping techniques, Impact self care behaviors on health, Environmental triggers, Relaxation techniques, Sexual activity, Signs/Symptoms of depression, Stress management, Support groups, Tobacco dangers, Traveling with lung disease   Target Goal(s) Assess presence or absence of depression using a valid screening tool, Demonstrates appropriate interaction with others, Engages in self-care behaviors, Maximizes coping skills   Uses Stress Mgmt Techniques Yes   Stages of Change Maintenance   Tobacco Use No   Smokeless Tobacco Use No   Smoking Cessation Referral No   Tobacco Adjunct No   Resource Information Provided No   Target Goal Complete cessation of tobacco use   Stages of Change Preparation   Diabetes No   BG at Home No   Date Lipids Drawn 08/27/21   Total 155   HDL 90   LDL 46.8   Triglycerides 91   Weight  95.3 kg (210 lb)   Height  5' 1\" (1.549 m)   BMI 39.76   Weight Goal 190   Waist Circumference  40   Alcohol None   Nutrition Assessment Tool RYP   Rate Your Plate Total Score 35   Dietitian Consult No   Nurse/Patient Discussion No   Nutrition Class No   Diabetes Education Referral No   Lipid Clinic Referral No   Weight Management Referral No   Education Signs/Symptoms Hypo/Hyperglycemia, DM & CAD relationship, Low fat & cholesterol diet, Carb-controlled diet, Low sodium diet, High fiber diet, Healthy eating, Nutrition and lung disease, Supplemental dietary needs, Special diet   Target Goals LDL-C less than 70 for high risk, LDL-C less than 100, Non HDL-C less than 130, LDL-C 70 high risk, HbA1C less than 7 percent, BMI less than 25, Waist size less than 40 inches males and less than 35 inches for females, Weight loss of 5-10%   Learning Barrier Other (comment)   Education Schedule Given No   Education Tobacco triggers, CAD, Risk factors, Cardiac A&P, Med Compliance, Signs/Symptoms of Angina , Sexuality, Pulmonary Medications, Breaking the Dyspnea Cycle, Pulmonary A&P, lung/gas exchange, Heart/Lung association, Activity of Daily Living, Preventing Infection, Nebulizer Use, Bronchial Hygiene/controlled cough, Signs/Symptoms Hypo/hyperglycemia, DM & lung disease   Target Goals Complete cessation of tobacco cessation, Correct demonstration of breathing techniques, Correct demonstration of MDI use and care, Correct demonstration/verbalization of 02 therapy         MD Signature: _______________________________________________        Date/Time:  _______________________________

## 2021-07-07 ENCOUNTER — HOSPITAL ENCOUNTER (OUTPATIENT)
Dept: PULMONOLOGY | Age: 71
Discharge: HOME | End: 2021-07-07
Payer: MEDICARE

## 2021-07-07 PROCEDURE — G0237 THERAPEUTIC PROCD STRG ENDUR: HCPCS

## 2021-07-07 PROCEDURE — G0238 OTH RESP PROC, INDIV: HCPCS

## 2021-07-12 ENCOUNTER — HOSPITAL ENCOUNTER (OUTPATIENT)
Dept: PULMONOLOGY | Age: 71
Discharge: HOME | End: 2021-07-12
Payer: MEDICARE

## 2021-07-12 PROCEDURE — G0237 THERAPEUTIC PROCD STRG ENDUR: HCPCS

## 2021-07-12 NOTE — Clinical Note
Pulmonary/RT Daily Note     Visit # 3/36       Rj Gipson 70 y.o. presented today for pulmonary rehab. She stated that there have been no changes in medication or health since last visit. Rj Gipson has a target heart rate of 108-123. She tolerated the exercise session well. Patient states RPE for session today was *** and RPD was a ***. {HIS/HER:19834} pain level upon finishing exercise session is a ***.  Today the pt did:    breathing retraining  stretches  seated marches                  resistance bands  [facility]  arm bike    Physician available for emergencies:      Yuli Hilario 7/12/2021 8:16 AM

## 2021-07-12 NOTE — PROGRESS NOTES
Pulmonary/RT Daily Note     Visit # 3/36       Rj Gipson 70 y.o. presented today for pulmonary rehab. She stated that there have been no changes in medication or health since last visit. Rj Gipson has a target heart rate of 108-123. She tolerated the exercise session well. Patient states RPE for session today was 13 and RPD was a 2. Her pain level upon finishing exercise session is a 3 in her left shoulder, which she has an appointment with her PCP July 26.  Today the pt did:    breathing retraining 15 min                  Nustep: 10 min on level 4  Education: peak flow 20min    Physician available for emergencies: Demetrio Mckinley 7/12/2021 8:47 AM

## 2021-07-14 ENCOUNTER — HOSPITAL ENCOUNTER (OUTPATIENT)
Dept: LAB | Age: 71
Discharge: HOME | End: 2021-07-14
Payer: MEDICARE

## 2021-07-14 ENCOUNTER — TRANSCRIBE ORDER (OUTPATIENT)
Dept: REGISTRATION | Age: 71
End: 2021-07-14

## 2021-07-14 ENCOUNTER — HOSPITAL ENCOUNTER (OUTPATIENT)
Dept: PULMONOLOGY | Age: 71
Discharge: HOME | End: 2021-07-14
Payer: MEDICARE

## 2021-07-14 DIAGNOSIS — E78.00 PURE HYPERCHOLESTEROLEMIA: ICD-10-CM

## 2021-07-14 DIAGNOSIS — E78.00 PURE HYPERCHOLESTEROLEMIA: Primary | ICD-10-CM

## 2021-07-14 DIAGNOSIS — E05.90 PRETIBIAL MYXEDEMA: ICD-10-CM

## 2021-07-14 LAB
ALBUMIN SERPL-MCNC: 3.7 G/DL (ref 3.4–5)
ALBUMIN/GLOB SERPL: 1.1 {RATIO} (ref 0.8–1.7)
ALP SERPL-CCNC: 106 U/L (ref 45–117)
ALT SERPL-CCNC: 19 U/L (ref 13–56)
ANION GAP SERPL CALC-SCNC: 5 MMOL/L (ref 3–18)
AST SERPL-CCNC: 13 U/L (ref 10–38)
BILIRUB SERPL-MCNC: 0.3 MG/DL (ref 0.2–1)
BUN SERPL-MCNC: 13 MG/DL (ref 7–18)
BUN/CREAT SERPL: 15 (ref 12–20)
CALCIUM SERPL-MCNC: 9.2 MG/DL (ref 8.5–10.1)
CHLORIDE SERPL-SCNC: 110 MMOL/L (ref 100–111)
CHOLEST SERPL-MCNC: 148 MG/DL
CO2 SERPL-SCNC: 30 MMOL/L (ref 21–32)
CREAT SERPL-MCNC: 0.86 MG/DL (ref 0.6–1.3)
GLOBULIN SER CALC-MCNC: 3.5 G/DL (ref 2–4)
GLUCOSE SERPL-MCNC: 98 MG/DL (ref 74–99)
HDLC SERPL-MCNC: 79 MG/DL (ref 40–60)
HDLC SERPL: 1.9 {RATIO} (ref 0–5)
LDLC SERPL CALC-MCNC: 54.4 MG/DL (ref 0–100)
LIPID PROFILE,FLP: ABNORMAL
POTASSIUM SERPL-SCNC: 4.1 MMOL/L (ref 3.5–5.5)
PROT SERPL-MCNC: 7.2 G/DL (ref 6.4–8.2)
SODIUM SERPL-SCNC: 145 MMOL/L (ref 136–145)
T4 FREE SERPL-MCNC: 1.2 NG/DL (ref 0.7–1.5)
TRIGL SERPL-MCNC: 73 MG/DL (ref ?–150)
TSH SERPL DL<=0.05 MIU/L-ACNC: 0.2 UIU/ML (ref 0.36–3.74)
VLDLC SERPL CALC-MCNC: 14.6 MG/DL

## 2021-07-14 PROCEDURE — 80053 COMPREHEN METABOLIC PANEL: CPT

## 2021-07-14 PROCEDURE — G0237 THERAPEUTIC PROCD STRG ENDUR: HCPCS

## 2021-07-14 PROCEDURE — 80061 LIPID PANEL: CPT

## 2021-07-14 PROCEDURE — 84439 ASSAY OF FREE THYROXINE: CPT

## 2021-07-14 PROCEDURE — 36415 COLL VENOUS BLD VENIPUNCTURE: CPT

## 2021-07-14 PROCEDURE — 84443 ASSAY THYROID STIM HORMONE: CPT

## 2021-07-14 NOTE — PROGRESS NOTES
Pulmonary/RT Daily Note     Visit # 4/36       Dinora Cali 70 y.o. presented today for pulmonary rehab. She stated that there have been changes in medication or health since last visit. She is having pain in her left shoulder, and her blood pressure was elevated upon arrival. (160/84)  Blood pressure repeated after 10 minutes of rest, and it was 138/60,    Dinora Cali has a target heart rate of 108-123. She tolerated the exercise session well. Patient states RPE for session today was 14 and RPD was a 4. Her pain level upon finishing exercise session is a 4.  Today the pt did:    Nustep: 15 mins @ 4-5 intervals 2:1)  Breathing Retraining: 10 mins  Toe taps/Sit to stands/Seated marches: 15  Stretches:5                      Physician available for emergencies: Dr. Gina Jade, RT 7/14/2021 8:14 AM

## 2021-07-19 ENCOUNTER — HOSPITAL ENCOUNTER (OUTPATIENT)
Dept: PULMONOLOGY | Age: 71
Discharge: HOME | End: 2021-07-19
Payer: MEDICARE

## 2021-07-19 NOTE — PROGRESS NOTES
Pt arrived for GA but was unable to start due to her slight increased WOB despite taking her inhaler prior to arriving to the clinic and also her increased sensitivity to smell today. Her pain was a 4/10 in her right shoulder today. She believes could be from the [address] on her last visit that's lasted throughout the weekend and she state she has an appointment with her PCP tomorrow and will discuss the matter. She also shared having a history of cervical DDD that she received PT for 4-5 yrs ago. Pt was less whined when leaving the clinic.      Upon arrival her vital signs were: BP: 142/78, SpO2: 96%, HR76 BPM

## 2021-07-26 ENCOUNTER — HOSPITAL ENCOUNTER (OUTPATIENT)
Dept: PULMONOLOGY | Age: 71
Discharge: HOME | End: 2021-07-26
Payer: MEDICARE

## 2021-07-26 ENCOUNTER — TRANSCRIBE ORDER (OUTPATIENT)
Dept: REGISTRATION | Age: 71
End: 2021-07-26

## 2021-07-26 ENCOUNTER — HOSPITAL ENCOUNTER (OUTPATIENT)
Dept: GENERAL RADIOLOGY | Age: 71
Discharge: HOME | End: 2021-07-26
Payer: MEDICARE

## 2021-07-26 DIAGNOSIS — M89.8X2: ICD-10-CM

## 2021-07-26 DIAGNOSIS — M25.461 SWELLING OF RIGHT KNEE JOINT: Primary | ICD-10-CM

## 2021-07-26 DIAGNOSIS — M25.461 SWELLING OF RIGHT KNEE JOINT: ICD-10-CM

## 2021-07-26 DIAGNOSIS — M25.512 LEFT SHOULDER PAIN, UNSPECIFIED CHRONICITY: ICD-10-CM

## 2021-07-26 PROCEDURE — G0237 THERAPEUTIC PROCD STRG ENDUR: HCPCS

## 2021-07-26 PROCEDURE — 73060 X-RAY EXAM OF HUMERUS: CPT

## 2021-07-26 PROCEDURE — 73030 X-RAY EXAM OF SHOULDER: CPT

## 2021-07-26 PROCEDURE — 73560 X-RAY EXAM OF KNEE 1 OR 2: CPT

## 2021-07-26 NOTE — PROGRESS NOTES
Pulmonary/RT Daily Note     Visit # 3/36       Mellisa Mathews 70 y.o. presented today for pulmonary rehab. She stated that there have been no changes in medication or health since last visit. Mellisa Mathews has a target heart rate of 108-123. She tolerated the exercise session well. Patient states RPE for session today was 13 and RPD was a 3. Her pain level upon finishing exercise session is a 0.  Today the pt did:breathing retraining: 15 min  Stretches: 5 min  Bodyweight: 10 min  seated marches          NuStep: 10min    Physician available for emergencies: Delma Arriaga 7/26/2021 8:16 AM

## 2021-07-26 NOTE — PROGRESS NOTES
Pulmonary/RT Daily Note     Visit # 5/36        Tami Daugherty 70 y.o. presented today for pulmonary rehab. She stated that there have been changes in medication or health since last visit . Last week she had a flare up at home but she resteddid focused breathing and was able to get rest and take her inhaler and did not require hospital admission. Today she took her Symbicort and albuterol w/ the spacer in the clinic then rinsed her mouth with water    Tami Daugherty has a target heart rate of 108-126. She tolerated the exercise session well. Patient states RPE for session today was 14 and RPD was a 4,  Her pain level upon finishing exercise session is a 4, she has left shoulder and right knee pain and had swelling in her right knee, she has an x ray today. Today the pt did:    Weights:5 min  2 lb curls  Breathing retraining: 10 min  Bodyweight exercises: 15 min  Education: 20 min  MDI/spacer/ energy conservation                   Physician available for Ruby Collazo 7/26/2021 8:53 AM

## 2021-07-27 NOTE — PROGRESS NOTES
Summary Report   Alondra Coreas  Pulmonary Tx Plan  Description: Female : 1950   Pulmonary ITP     RESP 1:1 from 2021 in [place] [id number]   Referral Date 21   Significant Cardiovascular History    Co-morbidities Pulmonary disease   Stages of change  Preparation   Oxygen Use No   ITP Visit Type Initial Assessment   1st Date of Exercise 21   ITP Next Review Date 21   Visit #/Total Visits    EF % 65 %   FVC  2.8 liters   FEV1% Predicted 100 %   FEV1/FVC 64   Risk Stratification    Pulmonary ITP Exercise, Psychosocial, Tobacco, Nutrition, Education   Total Score    Stages of Change Preparation   Test Six minute walk test   Distance Walked in  ft   Distance Walked (ft) 780 ft  [780]   Peak HR 84   Peak /68   Peak RPE 13   Peak Mets 2.13   O2 Saturation 97   Stops 0   SPO2 Range    BMI (calculated)    Mode Stepper, Ergometer, Other (comment)   Frequency per week 2   Duration per session 60   Intensity  METS       2.5   Progression moderate   O2 Sat (%)    O2 Device    O2 Flow Rate (L/min) 0 l/min   Symptoms with Exercise Shortness of breath   Target Heart Rate 108-123   Resistance Training Yes   Assisted Devices None   Resting /68   Peak /64   Is BP WDL?  Yes   Type home bike   Frequency 2   Duration 15   Resistance Training No   Education Self pulse, Exercise safety, Blood pressure medications, RPE scale, Equipment orientation, Warm up/Cool down, Understand blood pressure, O2 therapy, Energy conservation, Home exercise plan, RPE/RPD scale, Purse lip/Abdominal breathing   Target Goal(s) Individual exercise RX, BP < 140/90 or < 130/80, if DM or CKD, Aerobic activity 30 + minutes/day  5 days/week, SA02>89%, Home activity   Stages of Change Preparation   Interventions PCP notified   Consults PCP   Currently Taking Psychotropic Meds No   Education Advanced directives, Benefits of CPR completion, Cardiac meds, Coping techniques, Impact self care behaviors on health, Environmental triggers, Relaxation techniques, Sexual activity, Signs/Symptoms of depression, Stress management, Support groups, Tobacco dangers, Traveling with lung disease   Target Goal(s) Assess presence or absence of depression using a valid screening tool, Demonstrates appropriate interaction with others, Engages in self-care behaviors, Maximizes coping skills   Uses Stress Mgmt Techniques Yes   Stages of Change Maintenance   Tobacco Use No   Smokeless Tobacco Use No   Smoking Cessation Referral No   Tobacco Adjunct No   Resource Information Provided No   Target Goal    Stages of Change Preparation   Diabetes No   BG at Home No   Date Lipids Drawn 08/27/21   Total 155   HDL 90   LDL 46.8   Triglycerides 91   Weight  95.3 kg (210 lb)   Height  5' 1\" (1.549 m)   BMI 39.76   Weight Goal 190   Waist Circumference  40   Alcohol None   Nutrition Assessment Tool RYP   Rate Your Plate Total Score    Dietitian Consult No   Nurse/Patient Discussion No   Nutrition Class No   Diabetes Education Referral No   Lipid Clinic Referral No   Weight Management Referral No   Education Signs/Symptoms Hypo/Hyperglycemia, Carb-controlled diet, DM & CAD relationship, Healthy eating, High fiber diet, Nutrition and lung disease, Supplemental dietary needs, Special diet   Target Goals LDL-C less than 70 for high risk, LDL-C less than 100, Non HDL-C less than 130, LDL-C 70 high risk, HbA1C less than 7 percent, BMI less than 25, Waist size less than 40 inches males and less than 35 inches for females, Weight loss of 5-10%   Learning Barrier Ready to learn   Education Schedule Given No   Education CAD, Tobacco triggers, Risk factors, Med Compliance, Cardiac A&P, Signs/Symptoms of Angina , Pulmonary Medications, Breaking the Dyspnea Cycle, Pulmonary A&P, lung/gas exchange, Heart/Lung association, Activity of Daily Living, Nebulizer Use, Preventing Infection, Bronchial Hygiene/controlled cough, Signs/Symptoms Hypo/hyperglycemia, DM & lung disease   Target Goals Correct demonstration of MDI use and care, Correct demonstration of breathing techniques, Correct demonstration/verbalization of 02 therapy, Complete cessation of tobacco cessation (P)          MD Signature: _______________________________________________        Date/Time:  _______________________________        Pharmacist Signature:_________________________________________        Date/Time: _______________________________        Goals Comments   1. To be able to do laundry without becoming MARIE within the next 30 days []? initial  []? met                             []? not met  []? progressing     2. Getting Dressed without becoming SOB or having to stop and take break within the next 30 days    []? initial  []? met                             []? not met  []? progressing     3. Hobbies; Patient would like to be feel like she has enough energy to enjoy her hobby of scrap booking again in the coming 30-60 days []? initial  []? met                             []? not met  []? progressing     4. Weight loss  Over the course of the Pulmonary Rehab program patient would like to reduce her bodyweight by 5-10% []? initial  []? met                             []? not met  [x]?  progressing

## 2021-07-28 ENCOUNTER — HOSPITAL ENCOUNTER (OUTPATIENT)
Dept: PULMONOLOGY | Age: 71
Discharge: HOME | End: 2021-07-28
Payer: MEDICARE

## 2021-07-28 PROCEDURE — G0237 THERAPEUTIC PROCD STRG ENDUR: HCPCS

## 2021-07-28 NOTE — PROGRESS NOTES
Summary Report   Genna Urbano  Pulmonary Tx Plan  Description: Female : 1950   Pulmonary ITP     RESP 1:1 from 2021 in [place] [address]   Referral Date 21   Significant Cardiovascular History    Co-morbidities Pulmonary disease   Stages of change  Preparation   Oxygen Use No   ITP Visit Type Initial Assessment   1st Date of Exercise 21   ITP Next Review Date 21   Visit #/Total Visits    EF % 65 %   FVC  2.8 liters   FEV1% Predicted 100 %   FEV1/FVC 64   Risk Stratification    Pulmonary ITP Exercise, Psychosocial, Tobacco, Nutrition, Education   Total Score    Stages of Change Preparation   Test Six minute walk test   Distance Walked in  ft   Distance Walked (ft) 780 ft  [780]   Peak HR 84   Peak /68   Peak RPE 13   Peak Mets 2.13   O2 Saturation 97   Stops 0   SPO2 Range    BMI (calculated)    Mode Stepper, Ergometer, Other (comment)   Frequency per week 2   Duration per session 60   Intensity  METS       2.5   Progression moderate   O2 Sat (%)    O2 Device    O2 Flow Rate (L/min) 0 l/min   Symptoms with Exercise Shortness of breath   Target Heart Rate 108-123   Resistance Training Yes   Assisted Devices None   Resting /68   Peak /64   Is BP WDL?  Yes   Type home bike   Frequency 2   Duration 15   Resistance Training No   Education Self pulse, Exercise safety, Blood pressure medications, RPE scale, Equipment orientation, Warm up/Cool down, Understand blood pressure, O2 therapy, Energy conservation, Home exercise plan, RPE/RPD scale, Purse lip/Abdominal breathing   Target Goal(s) Individual exercise RX, BP < 140/90 or < 130/80, if DM or CKD, Aerobic activity 30 + minutes/day  5 days/week, SA02>89%, Home activity   Stages of Change Preparation   Interventions PCP notified   Consults PCP   Currently Taking Psychotropic Meds No   Education Advanced directives, Benefits of CPR completion, Cardiac meds, Coping techniques, Impact self care behaviors on health, Environmental triggers, Relaxation techniques, Sexual activity, Signs/Symptoms of depression, Stress management, Support groups, Tobacco dangers, Traveling with lung disease   Target Goal(s) Assess presence or absence of depression using a valid screening tool, Demonstrates appropriate interaction with others, Engages in self-care behaviors, Maximizes coping skills   Uses Stress Mgmt Techniques Yes   Stages of Change Maintenance   Tobacco Use No   Smokeless Tobacco Use No   Smoking Cessation Referral No   Tobacco Adjunct No   Resource Information Provided No   Target Goal    Stages of Change Preparation   Diabetes No   BG at Home No   Date Lipids Drawn 08/27/21   Total 155   HDL 90   LDL 46.8   Triglycerides 91   Weight  95.3 kg (210 lb)   Height  5' 1\" (1.549 m)   BMI 39.76   Weight Goal 190   Waist Circumference  40   Alcohol None   Nutrition Assessment Tool RYP   Rate Your Plate Total Score    Dietitian Consult No   Nurse/Patient Discussion No   Nutrition Class No   Diabetes Education Referral No   Lipid Clinic Referral No   Weight Management Referral No   Education Signs/Symptoms Hypo/Hyperglycemia, Carb-controlled diet, DM & CAD relationship, Healthy eating, High fiber diet, Nutrition and lung disease, Supplemental dietary needs, Special diet   Target Goals LDL-C less than 70 for high risk, LDL-C less than 100, Non HDL-C less than 130, LDL-C 70 high risk, HbA1C less than 7 percent, BMI less than 25, Waist size less than 40 inches males and less than 35 inches for females, Weight loss of 5-10%   Learning Barrier Ready to learn   Education Schedule Given No   Education CAD, Tobacco triggers, Risk factors, Med Compliance, Cardiac A&P, Signs/Symptoms of Angina , Pulmonary Medications, Breaking the Dyspnea Cycle, Pulmonary A&P, lung/gas exchange, Heart/Lung association, Activity of Daily Living, Nebulizer Use, Preventing Infection, Bronchial Hygiene/controlled cough, Signs/Symptoms Hypo/hyperglycemia, DM & lung disease   Target Goals Correct demonstration of MDI use and care, Correct demonstration of breathing techniques, Correct demonstration/verbalization of 02 therapy, Complete cessation of tobacco cessation (P)          MD Signature: _______________________________________________        Date/Time:  _______________________________        Pharmacist Signature:_________________________________________        Date/Time: _______________________________        Goals Comments   1. To be able to do laundry without becoming MARIE within the next 30 days []? initial  []? met                             [x]? not met  []? progressing Pt's been more whined over the past couple of weeks and has been more sensitive to certain fumes and smells. 2. Getting Dressed without becoming SOB or having to stop and take break within the next 30 days    []? initial  []? met                             [x]? not met  []? progressing  will re-assess    3. Hobbies; Patient would like to be feel like she has enough energy to enjoy her hobby of scrap booking again in the coming 30-60 days []? initial  []? met                             [x]? not met  []? progressing    4. Weight loss  Over the course of the Pulmonary Rehab program patient would like to reduce her bodyweight by 5-10% []? initial  []? met                             [x]? not met  []?  progressing

## 2021-08-02 ENCOUNTER — HOSPITAL ENCOUNTER (OUTPATIENT)
Dept: PULMONOLOGY | Age: 71
Discharge: HOME | End: 2021-08-02
Payer: MEDICARE

## 2021-08-02 PROCEDURE — G0237 THERAPEUTIC PROCD STRG ENDUR: HCPCS

## 2021-08-02 NOTE — PROGRESS NOTES
Pulmonary/RT Daily Note     Visit # 7/36       Franca Jane 70 y.o. presented today for pulmonary rehab. She stated that there have been no changes in medication or health since last visit. She states she has some swelling at times in her right shoulder humeral area and radiates down at times to her elbow. She also have right knee swelling for over 3 months and she's awaiting a call back from her PCP to discuss her XRAY results. Her blood pressure is elevated 150/75 but decreased after she sis her breathing retraining (140/74). She took her maintenance inhaler in the clinic, she took her rescue inhaler prior to PA. Franca Jane has a target heart rate of 108-123. He tolerated the exercise session well. Patient states RPE for session today was 14 and RPD was a 3 Her pain level upon finishing exercise session is a 1.  Today the pt did:    Nustep: 10 min level 3  Weights: 10 min bicep curls, 2 lb  Walking: 3 min  Breathing retraining:15 min  Bodyweight exercises: 10 seated marches    Inhaler: 5 min  Recovery time: 5 min                   Physician available for emergencies:      Bhargavi Connelly 8/2/2021 8:11 AM

## 2021-08-04 ENCOUNTER — HOSPITAL ENCOUNTER (OUTPATIENT)
Dept: PULMONOLOGY | Age: 71
End: 2021-08-04
Payer: MEDICARE

## 2021-08-09 ENCOUNTER — APPOINTMENT (OUTPATIENT)
Dept: PULMONOLOGY | Age: 71
End: 2021-08-09
Payer: MEDICARE

## 2021-08-09 ENCOUNTER — HOSPITAL ENCOUNTER (OUTPATIENT)
Dept: PULMONOLOGY | Age: 71
Discharge: HOME | End: 2021-08-09
Payer: MEDICARE

## 2021-08-09 PROCEDURE — G0237 THERAPEUTIC PROCD STRG ENDUR: HCPCS

## 2021-08-09 NOTE — Clinical Note
Pulmonary/RT Daily Note     Visit # 7/36       Alcira Reilly 70 y.o. presented today for pulmonary rehab. She stated that there have been  changes in medication or health since last visit. Alcira Reilly has a target heart rate of ***. {He/she (caps):10235} tolerated the exercise session {DESC; WELL/MODERATELY/POORLY:72676}. Patient states RPE for session today was *** and RPD was a ***. {HIS/HER:19834} pain level upon finishing exercise session is a ***. Today the pt did:    Nustep:  Arm bike:  Weights  Resistance bands:  Walking:  Breathing retraining:  Stretches:   Bodyweight exercises:                       Physician available for emergencies:      Freddy Clark 8/9/2021 8:22 AM

## 2021-08-09 NOTE — PROGRESS NOTES
Pulmonary/RT Daily Note     Visit # 8/36       Maryjane Lanes 70 y.o. presented today for pulmonary rehab. She stated that there have been  changes in medication or health since last visit. She has a left shoulder sprain, she stated her PCP did not place on her on any restrictions at this time. Maryjane Lanes has a target heart rate of 108-123. She tolerated the exercise session well. Patient states RPE for session today was 13 and RPD was a 2. Her pain level upon finishing exercise session is a 2. Today the pt did:    Weights 5 min 2 lbs  Walking:3 min  Breathing retraining: 15 min  Stretches: 5 min   Bodyweight exercises: 25 min  Seated marches , knee extension.  ankle pumps & circles, clam shells, seated to tap w bench                    Physician available for emergencies:      Dina Lopes 8/9/2021 8:40 AM

## 2021-08-11 ENCOUNTER — APPOINTMENT (OUTPATIENT)
Dept: PULMONOLOGY | Age: 71
End: 2021-08-11
Payer: MEDICARE

## 2021-08-11 ENCOUNTER — HOSPITAL ENCOUNTER (OUTPATIENT)
Dept: PULMONOLOGY | Age: 71
Discharge: HOME | End: 2021-08-11
Payer: MEDICARE

## 2021-08-11 PROCEDURE — G0237 THERAPEUTIC PROCD STRG ENDUR: HCPCS

## 2021-08-11 NOTE — PROGRESS NOTES
Pulmonary/RT Daily Note     Visit #        Aleja Orozco 70 y.o. presented today for pulmonary rehab. She stated that there have been no changes in medication or health since last visit. She states she has the usual chronic back pain, knee and shoulder pain today. Aleja Orozco has a target heart rate of 108-123. She tolerated the exercise session well. Patient states RPE for session today was 14 and RPD was a 3. Her pain level upon finishing exercise session is a 0. Today the pt did:      Weights: 5 min 2 lbs  Walkin min . 9 mph  Breathing retraining: 15 min  Stretches:10 min total body  Bodyweight exercises:  20  Seated marches, knee extensions, ankle pumps, arm punches and nick cross                  Physician available for emergencies:Fredo Armstrong 2021 8:51 AM

## 2021-08-16 ENCOUNTER — APPOINTMENT (OUTPATIENT)
Dept: PULMONOLOGY | Age: 71
End: 2021-08-16
Payer: MEDICARE

## 2021-08-17 ENCOUNTER — HOSPITAL ENCOUNTER (OUTPATIENT)
Dept: PULMONOLOGY | Age: 71
Discharge: HOME | End: 2021-08-17
Payer: MEDICARE

## 2021-08-17 PROCEDURE — G0237 THERAPEUTIC PROCD STRG ENDUR: HCPCS

## 2021-08-17 NOTE — PROGRESS NOTES
Pulmonary/RT Daily Note     Visit # 10/36       Ashely Blackburn 70 y.o. presented today for pulmonary rehab. She stated that there have been no changes in medication or health since last visit. Pt now takes Ibuprofen once a day for her pain. Her blood pressure has been elevated for the past 3 weeks now, she was encouraged to contact her PCP, this could be as a result of increased pain. Ashely Blackburn has a target heart rate of . She tolerated the exercise session well. Patient states RPE for session today was 14 and RPD was a 4. His pain level upon finishing exercise session is a 5. Today the pt did:    Ashely Blackburn has a target heart rate of 108-123. She tolerated the exercise session well. Patient states RPE for session today was 14 and RPD was a 3. Her pain level upon finishing exercise session is a 0. Today the pt did:        Weights: 5 min 2 lbs  Walkin min . 9 mph  Breathing retraining: 15 min  Stretches:10 min total body  Bodyweight exercises:  20  Seated marches, knee extensions, ankle pumps, arm punches and nick cross                   Physician available for emergencies:      Katheryn Soriano 2021 9:22 AM

## 2021-08-17 NOTE — PROGRESS NOTES
Pulmonary/RT Daily Note     Visit # 8/36       Forrest Keys 70 y.o. presented today for pulmonary rehab. She stated that there have been  changes in medication or health since last visit. She has a left shoulder sprain, she stated her PCP did not place on her on any restrictions at this time. Forrest Keys has a target heart rate of 108-123. She tolerated the exercise session well. Patient states RPE for session today was 13 and RPD was a 2. Her pain level upon finishing exercise session is a 2. Today the pt did:    Weights 5 min 2 lbs  Walking:3 min  Breathing retraining: 15 min  Stretches: 5 min   Bodyweight exercises: 25 min  Seated marches , knee extension.  ankle pumps & circles, clam shells, seated to tap w bench                    Physician available for emergencies: Thomas Bis 8/17/2021 8:40 AM

## 2021-08-18 ENCOUNTER — APPOINTMENT (OUTPATIENT)
Dept: PULMONOLOGY | Age: 71
End: 2021-08-18
Payer: MEDICARE

## 2021-08-19 ENCOUNTER — HOSPITAL ENCOUNTER (OUTPATIENT)
Dept: PULMONOLOGY | Age: 71
Discharge: HOME | End: 2021-08-19
Payer: MEDICARE

## 2021-08-19 PROCEDURE — G0237 THERAPEUTIC PROCD STRG ENDUR: HCPCS

## 2021-08-19 NOTE — PROGRESS NOTES
Pulmonary/RT Daily Note     Visit #        Elyssa Bhakta 70 y.o. presented today for pulmonary rehab. She stated that there have been no changes in medication or health since last visit. Elyssa Bhakta has a target heart rate of 108-123. She tolerated the exercise session well. Patient states RPE for session today was 14 and RPD was a 3. Her pain level upon finishing exercise session is a 0.  Today the pt did:        Weights: 10 min 2 & 3 lbs  Walkin min  Breathing retraining: 10min  Stretches:10 min total body  Bodyweight exercises:  20  Seated marches, knee extensions, ankle pumps, arm punches and nick cross (2x for 30 sec)         Physician available for emergencies:Jose Guadalupe Jackson 2021 9:12 AM

## 2021-08-23 ENCOUNTER — APPOINTMENT (OUTPATIENT)
Dept: PULMONOLOGY | Age: 71
End: 2021-08-23
Payer: MEDICARE

## 2021-08-24 NOTE — PROGRESS NOTES
Summary Report   Carlitos Bell  Pulmonary Tx Plan  Description: Female : 1950   Pulmonary ITP     RESP 1:1 from 2021 in [place] [address]   Referral Date 21   Significant Cardiovascular History    Co-morbidities Pulmonary disease   Stages of change  Maintenance   Oxygen Use No   ITP Visit Type Re-Assessment   1st Date of Exercise 21   ITP Next Review Date 21   Visit #/Total Visits 11   EF % 65 %   FVC  2.8 liters   FEV1% Predicted 100 %   FEV1/FVC 64   Risk Stratification    Pulmonary ITP Exercise, Psychosocial, Tobacco, Nutrition, Education   Total Score    Stages of Change Preparation   Test Six minute walk test   Distance Walked in  ft   Distance Walked (ft) 780 ft   Peak HR 84   Peak /68   Peak RPE 13   Peak Mets 2.13   O2 Saturation --  [97]   Stops 0   SPO2 Range    BMI (calculated)    Mode Stepper, Ergometer, Other (comment)   Frequency per week 2   Duration per session 60   Intensity  METS       2.5   Progression moderate   O2 Sat (%)    O2 Flow Rate (L/min) 0 l/min   Symptoms with Exercise Shortness of breath   Target Heart Rate 108-123   Resistance Training Yes   Assisted Devices None   Resting /79   Peak /69   Is BP WDL?  Yes   Type home bike   Frequency 2   Duration 15   Resistance Training No   Education Self pulse, Exercise safety, Signs/Symptoms to report, Low sodium diet, Blood pressure medications, RPE scale, Equipment orientation, Warm up/Cool down, Home exercise plan, Understand blood pressure, Energy conservation, O2 therapy, RPE/RPD scale, Purse lip/Abdominal breathing   Target Goal(s) Individual exercise RX, BP < 140/90 or < 130/80, if DM or CKD, Aerobic activity 30 + minutes/day  5 days/week, SA02>89%, Home activity   Stages of Change Preparation   Interventions PCP notified   Consults PCP   Currently Taking Psychotropic Meds No   Education Advanced directives, Benefits of CPR completion, Cardiac meds, Coping techniques, Impact self care behaviors on health, Environmental triggers, Relaxation techniques, Sexual activity, Signs/Symptoms of depression, Stress management, Support groups, Tobacco dangers, Traveling with lung disease   Target Goal(s) Assess presence or absence of depression using a valid screening tool, Demonstrates appropriate interaction with others, Engages in self-care behaviors, Maximizes coping skills   Uses Stress Mgmt Techniques    Stages of Change Maintenance   Tobacco Use No   Smokeless Tobacco Use No   Smoking Cessation Referral No   Tobacco Adjunct No   Resource Information Provided No   Target Goal    Stages of Change Preparation   Diabetes No   BG at Home No   Date Lipids Drawn 07/14/21   Total 148   HDL 79   LDL 54.4   Triglycerides 73   Weight  95.3 kg (210 lb)   Height  5' 1\" (1.549 m)   BMI 39.76   Weight Goal 190   Waist Circumference  40   Alcohol None   Nutrition Assessment Tool RYP   Rate Your Plate Total Score    Dietitian Consult No   Nurse/Patient Discussion No   Nutrition Class No   Diabetes Education Referral No   Lipid Clinic Referral No   Weight Management Referral No   Education Signs/Symptoms Hypo/Hyperglycemia, DM & CAD relationship, Low fat & cholesterol diet, Low sodium diet, High fiber diet, Healthy eating, Nutrition and lung disease, Supplemental dietary needs, Special diet   Target Goals LDL-C less than 70 for high risk, LDL-C less than 100, Non HDL-C less than 130, LDL-C 70 high risk, HbA1C less than 7 percent, BMI less than 25, Waist size less than 40 inches males and less than 35 inches for females, Weight loss of 5-10%   Learning Barrier Ready to learn   Education Schedule Given No   Education Tobacco triggers, CAD, Risk factors, Med Compliance, Cardiac A&P, Signs/Symptoms of Angina , Pulmonary Medications, Breaking the Dyspnea Cycle, Activity of Daily Living, Pulmonary A&P, lung/gas exchange, Heart/Lung association, Preventing Infection, Nebulizer Use, Bronchial Hygiene/controlled cough, Signs/Symptoms Hypo/hyperglycemia, Other (Comment), DM & lung disease   Target Goals Correct demonstration of MDI use and care, Correct demonstration of breathing techniques, Correct demonstration/verbalization of 02 therapy, Complete cessation of tobacco cessation         MD Signature: _______________________________________________        Date/Time:  _______________________________        Pharmacist Signature:_________________________________________        Date/Time: _______________________________    Pt has had ongoing shoulder pain she has cancelled a few AL appointments, she states she has a left shoulder sprain, she stated her PCP did not place on her on any restrictions at this time, exercises in AL setting are modified per her tolerance. Goals Comments   1.  To be able to do laundry without becoming MARIE within the next 30 days []? ? initial  []?? met                             []?? not met  []? ? progressing  N/A Will re-assess in 30 days   2. Getting Dressed without becoming SOB or having to stop and take break within the next 30 days    []?? initial  []?? met                             [x]? ? not met  []? ? progressing Will re-assess  in 30 days   3.  Hobbies; Patient would like to be feel like she has enough energy to enjoy her hobby of scrap booking again in the coming 30-60 days []? ? initial  []?? met                             []?? not met  []? ? progressing  N/A Pt hasn't attempted   4. Weight loss  Over the course of the Pulmonary Rehab program patient would like to reduce her bodyweight by 5-10% []? ? initial  []?? met                             [x]? ? not met  []? ? progressing

## 2021-08-25 ENCOUNTER — APPOINTMENT (OUTPATIENT)
Dept: PULMONOLOGY | Age: 71
End: 2021-08-25
Payer: MEDICARE

## 2021-08-30 ENCOUNTER — APPOINTMENT (OUTPATIENT)
Dept: PULMONOLOGY | Age: 71
End: 2021-08-30
Payer: MEDICARE

## 2021-08-31 ENCOUNTER — TELEPHONE (OUTPATIENT)
Dept: PULMONOLOGY | Age: 71
End: 2021-08-31

## 2021-08-31 NOTE — TELEPHONE ENCOUNTER
Patient states she sprained her arm and it hurts when she does her pulmonary rehab. She has had to cancel a couple visits.  She is going to call them and see if she can postpone the visits for several weeks and then start back

## 2021-08-31 NOTE — TELEPHONE ENCOUNTER
Pt called asking to speak with someone re pul rehab. She has missed a few appointments d/t her arm hurting. She said that she is not sure how long her arm will be hurting and she is not sure if she should reschedule the appointments or not.  Please advise [phone]

## 2021-09-01 ENCOUNTER — APPOINTMENT (OUTPATIENT)
Dept: PULMONOLOGY | Age: 71
End: 2021-09-01

## 2021-09-08 ENCOUNTER — APPOINTMENT (OUTPATIENT)
Dept: PULMONOLOGY | Age: 71
End: 2021-09-08

## 2021-09-13 ENCOUNTER — APPOINTMENT (OUTPATIENT)
Dept: PULMONOLOGY | Age: 71
End: 2021-09-13

## 2021-09-14 ENCOUNTER — OFFICE VISIT (OUTPATIENT)
Dept: ORTHOPEDIC SURGERY | Age: 71
End: 2021-09-14
Payer: MEDICARE

## 2021-09-14 VITALS
OXYGEN SATURATION: 98 % | HEART RATE: 73 BPM | DIASTOLIC BLOOD PRESSURE: 83 MMHG | TEMPERATURE: 98.3 F | SYSTOLIC BLOOD PRESSURE: 155 MMHG | RESPIRATION RATE: 18 BRPM | BODY MASS INDEX: 39.99 KG/M2 | HEIGHT: 61 IN | WEIGHT: 211.8 LBS

## 2021-09-14 DIAGNOSIS — M54.50 LUMBAR PAIN: Primary | ICD-10-CM

## 2021-09-14 DIAGNOSIS — M48.02 CERVICAL SPINAL STENOSIS: ICD-10-CM

## 2021-09-14 DIAGNOSIS — M47.816 LUMBAR FACET ARTHROPATHY: ICD-10-CM

## 2021-09-14 DIAGNOSIS — M51.36 DDD (DEGENERATIVE DISC DISEASE), LUMBAR: ICD-10-CM

## 2021-09-14 DIAGNOSIS — M62.838 MUSCLE SPASM: ICD-10-CM

## 2021-09-14 DIAGNOSIS — M75.42 IMPINGEMENT SYNDROME OF LEFT SHOULDER: ICD-10-CM

## 2021-09-14 PROCEDURE — 72110 X-RAY EXAM L-2 SPINE 4/>VWS: CPT | Performed by: PHYSICAL MEDICINE & REHABILITATION

## 2021-09-14 PROCEDURE — G9711 PT HX TOT COL OR COLON CA: HCPCS | Performed by: PHYSICAL MEDICINE & REHABILITATION

## 2021-09-14 PROCEDURE — 1090F PRES/ABSN URINE INCON ASSESS: CPT | Performed by: PHYSICAL MEDICINE & REHABILITATION

## 2021-09-14 PROCEDURE — 99203 OFFICE O/P NEW LOW 30 MIN: CPT | Performed by: PHYSICAL MEDICINE & REHABILITATION

## 2021-09-14 PROCEDURE — G8536 NO DOC ELDER MAL SCRN: HCPCS | Performed by: PHYSICAL MEDICINE & REHABILITATION

## 2021-09-14 PROCEDURE — G8427 DOCREV CUR MEDS BY ELIG CLIN: HCPCS | Performed by: PHYSICAL MEDICINE & REHABILITATION

## 2021-09-14 PROCEDURE — G8417 CALC BMI ABV UP PARAM F/U: HCPCS | Performed by: PHYSICAL MEDICINE & REHABILITATION

## 2021-09-14 PROCEDURE — 96372 THER/PROPH/DIAG INJ SC/IM: CPT | Performed by: PHYSICAL MEDICINE & REHABILITATION

## 2021-09-14 PROCEDURE — G8400 PT W/DXA NO RESULTS DOC: HCPCS | Performed by: PHYSICAL MEDICINE & REHABILITATION

## 2021-09-14 PROCEDURE — G8432 DEP SCR NOT DOC, RNG: HCPCS | Performed by: PHYSICAL MEDICINE & REHABILITATION

## 2021-09-14 PROCEDURE — 1101F PT FALLS ASSESS-DOCD LE1/YR: CPT | Performed by: PHYSICAL MEDICINE & REHABILITATION

## 2021-09-14 PROCEDURE — G9899 SCRN MAM PERF RSLTS DOC: HCPCS | Performed by: PHYSICAL MEDICINE & REHABILITATION

## 2021-09-14 RX ORDER — DICLOFENAC SODIUM 75 MG/1
75 TABLET, DELAYED RELEASE ORAL 2 TIMES DAILY WITH MEALS
Qty: 60 TABLET | Refills: 1 | Status: SHIPPED | OUTPATIENT
Start: 2021-09-14 | End: 2022-02-23

## 2021-09-14 RX ORDER — IBUPROFEN 800 MG/1
800 TABLET ORAL
COMMUNITY
Start: 2021-08-05 | End: 2022-02-14

## 2021-09-14 RX ORDER — METHYLPREDNISOLONE 4 MG/1
TABLET ORAL
Qty: 1 DOSE PACK | Refills: 0 | Status: SHIPPED | OUTPATIENT
Start: 2021-09-14 | End: 2021-12-02 | Stop reason: ALTCHOICE

## 2021-09-14 RX ORDER — KETOROLAC TROMETHAMINE 30 MG/ML
30 INJECTION, SOLUTION INTRAMUSCULAR; INTRAVENOUS
Status: COMPLETED | OUTPATIENT
Start: 2021-09-14 | End: 2021-09-14

## 2021-09-14 RX ADMIN — KETOROLAC TROMETHAMINE 30 MG: 30 INJECTION, SOLUTION INTRAMUSCULAR; INTRAVENOUS at 10:26

## 2021-09-14 NOTE — LETTER
9/14/2021    Patient: Owen Rangel   YOB: 1950   Date of Visit: 9/14/2021     Russ Green MD  [address] [facility] [address]  Via Outside Provider Messaging    Dear Russ Green MD,      Thank you for referring Ms. Abby Florentino to [facility] for evaluation. My notes for this consultation are attached. If you have questions, please do not hesitate to call me. I look forward to following your patient along with you.       Sincerely,    Anna Ann MD

## 2021-09-14 NOTE — PROGRESS NOTES
Consent was explained to the pt and signed. No questions or concerns voiced. Pt given 30mg/1ml of toradol IM in right gluteus. No sign or symptoms of infection noted at injection site. There was no bleeding, swelling or leaking noted after injection. Pt handed injection information sheet to take home. Ms. Gerri Kim tolerated the injection well and did not want to wait in the exam room for 10 minutes for observation. She ambulated to check out with out any issues.

## 2021-09-14 NOTE — PATIENT INSTRUCTIONS
Low Back Arthritis: Exercises  Introduction  Here are some examples of typical rehabilitation exercises for your condition. Start each exercise slowly. Ease off the exercise if you start to have pain. Your doctor or physical therapist will tell you when you can start these exercises and which ones will work best for you. When you are not being active, find a comfortable position for rest. Some people are comfortable on the floor or a medium-firm bed with a small pillow under their head and another under their knees. Some people prefer to lie on their side with a pillow between their knees. Don't stay in one position for too long. Take short walks (10 to 20 minutes) every 2 to 3 hours. Avoid slopes, hills, and stairs until you feel better. Walk only distances you can manage without pain, especially leg pain. How to do the exercises  Pelvic tilt   1. Lie on your back with your knees bent. 2. \"Brace\" your stomachtighten your muscles by pulling in and imagining your belly button moving toward your spine. 3. Press your lower back into the floor. You should feel your hips and pelvis rock back. 4. Hold for 6 seconds while breathing smoothly. 5. Relax and allow your pelvis and hips to rock forward. 6. Repeat 8 to 12 times. Back stretches   1. Get down on your hands and knees on the floor. 2. Relax your head and allow it to droop. Round your back up toward the ceiling until you feel a nice stretch in your upper, middle, and lower back. Hold this stretch for as long as it feels comfortable, or about 15 to 30 seconds. 3. Return to the starting position with a flat back while you are on your hands and knees. 4. Let your back sway by pressing your stomach toward the floor. Lift your buttocks toward the ceiling. 5. Hold this position for 15 to 30 seconds. 6. Repeat 2 to 4 times. Follow-up care is a key part of your treatment and safety.  Be sure to make and go to all appointments, and call your doctor if you are having problems. It's also a good idea to know your test results and keep a list of the medicines you take. Where can you learn more? Go to http://www.Peb[employer].com/  Enter T094 in the search box to learn more about \"Low Back Arthritis: Exercises. \"  Current as of: November 16, 2020               Content Version: 12.8  © 2006-2021 [employer]. Care instructions adapted under license by "[employer] (which disclaims liability or warranty for this information). If you have questions about a medical condition or this instruction, always ask your healthcare professional. [place] [address] any warranty or liability for your use of this information. Shoulder Arthritis: Exercises  Introduction  Here are some examples of exercises for you to try. The exercises may be suggested for a condition or for rehabilitation. Start each exercise slowly. Ease off the exercises if you start to have pain. You will be told when to start these exercises and which ones will work best for you. How to do the exercises  Shoulder flexion (lying down)   To make a wand for this exercise, use a piece of PVC pipe or a broom handle with the broom removed. Make the wand about a foot wider than your shoulders. 7. Lie on your back, holding a wand with both hands. Your palms should face down as you hold the wand. 8. Keeping your elbows straight, slowly raise your arms over your head. Raise them until you feel a stretch in your shoulders, upper back, and chest.  9. Hold for 15 to 30 seconds. 10. Repeat 2 to 4 times. Shoulder rotation (lying down)   To make a wand for this exercise, use a piece of PVC pipe or a broom handle with the broom removed. Make the wand about a foot wider than your shoulders. 7. Lie on your back. Hold a wand with both hands with your elbows bent and palms up.   8. Keep your elbows close to your body, and move the wand across your body toward the sore arm. 9. Hold for 8 to 12 seconds. 10. Repeat 2 to 4 times. Shoulder internal rotation with towel   1. Hold a towel above and behind your head with the arm that is not sore. 2. With your sore arm, reach behind your back and grasp the towel. 3. With the arm above your head, pull the towel upward. Do this until you feel a stretch on the front and outside of your sore shoulder. 4. Hold 15 to 30 seconds. 5. Repeat 2 to 4 times. Shoulder blade squeeze   1. Stand with your arms at your sides, and squeeze your shoulder blades together. Do not raise your shoulders up as you squeeze. 2. Hold 6 seconds. 3. Repeat 8 to 12 times. Resisted rows   For this exercise, you will need elastic exercise material, such as surgical tubing or Thera-Band. 1. Put the band around a solid object at about waist level. (A bedpost will work well.) Each hand should hold an end of the band. 2. With your elbows at your sides and bent to 90 degrees, pull the band back. Your shoulder blades should move toward each other. Return to the starting position. 3. Repeat 8 to 12 times. External rotator strengthening exercise   1. Start by tying a piece of elastic exercise material to a doorknob. You can use surgical tubing or Thera-Band. (You may also hold one end of the band in each hand.)  2. Stand or sit with your shoulder relaxed and your elbow bent 90 degrees. Your upper arm should rest comfortably against your side. Squeeze a rolled towel between your elbow and your body for comfort. This will help keep your arm at your side. 3. Hold one end of the elastic band with the hand of the painful arm. 4. Start with your forearm across your belly. Slowly rotate the forearm out away from your body. Keep your elbow and upper arm tucked against the towel roll or the side of your body until you begin to feel tightness in your shoulder. Slowly move your arm back to where you started. 5. Repeat 8 to 12 times.     Internal rotator strengthening exercise   1. Start by tying a piece of elastic exercise material to a doorknob. You can use surgical tubing or Thera-Band. 2. Stand or sit with your shoulder relaxed and your elbow bent 90 degrees. Your upper arm should rest comfortably against your side. Squeeze a rolled towel between your elbow and your body for comfort. This will help keep your arm at your side. 3. Hold one end of the elastic band in the hand of the painful arm. 4. Slowly rotate your forearm toward your body until it touches your belly. Slowly move it back to where you started. 5. Keep your elbow and upper arm firmly tucked against the towel roll or at your side. 6. Repeat 8 to 12 times. Pendulum swing   If you have pain in your back, do not do this exercise. 1. Hold on to a table or the back of a chair with your good arm. Then bend forward a little and let your sore arm hang straight down. This exercise does not use the arm muscles. Rather, use your legs and your hips to create movement that makes your arm swing freely. 2. Use the movement from your hips and legs to guide the slightly swinging arm back and forth like a pendulum (or elephant trunk). Then guide it in circles that start small (about the size of a dinner plate). Make the circles a bit larger each day, as your pain allows. 3. Do this exercise for 5 minutes, 5 to 7 times each day. 4. As you have less pain, try bending over a little farther to do this exercise. This will increase the amount of movement at your shoulder. Follow-up care is a key part of your treatment and safety. Be sure to make and go to all appointments, and call your doctor if you are having problems. It's also a good idea to know your test results and keep a list of the medicines you take. Where can you learn more? Go to http://www.gray.com/  Enter H562 in the search box to learn more about \"Shoulder Arthritis: Exercises. \"  Current as of: November 16, 2020               Content Version: 12.8  © 6247-2755 [employer]. Care instructions adapted under license by [employer] (which disclaims liability or warranty for this information). If you have questions about a medical condition or this instruction, always ask your healthcare professional. [address] any warranty or liability for your use of this information. Learning About the [address] Diet  What is the Mediterranean diet? The Mediterranean diet is a style of eating rather than a diet plan. It features foods eaten in [place], [place], [place] and [place], and other countries along the [facility]. It emphasizes eating foods like fish, fruits, vegetables, beans, high-fiber breads and whole grains, nuts, and olive oil. This style of eating includes limited red meat, cheese, and sweets. Why choose the Mediterranean diet? A Mediterranean-style diet may improve heart health. It contains more fat than other heart-healthy diets. But the fats are mainly from nuts, unsaturated oils (such as fish oils and olive oil), and certain nut or seed oils (such as canola, soybean, or flaxseed oil). These fats may help protect the heart and blood vessels. How can you get started on the Mediterranean diet? Here are some things you can do to switch to a more Mediterranean way of eating. What to eat  · Eat a variety of fruits and vegetables each day, such as grapes, blueberries, tomatoes, broccoli, peppers, figs, olives, spinach, eggplant, beans, lentils, and chickpeas. · Eat a variety of whole-grain foods each day, such as oats, brown rice, and whole wheat bread, pasta, and couscous. · Eat fish at least 2 times a week. Try tuna, salmon, mackerel, lake trout, herring, or sardines. · Eat moderate amounts of low-fat dairy products, such as milk, cheese, or yogurt. · Eat moderate amounts of poultry and eggs.   · Choose healthy (unsaturated) fats, such as nuts, olive oil, and certain nut or seed oils like canola, soybean, and flaxseed. · Limit unhealthy (saturated) fats, such as butter, palm oil, and coconut oil. And limit fats found in animal products, such as meat and dairy products made with whole milk. Try to eat red meat only a few times a month in very small amounts. · Limit sweets and desserts to only a few times a week. This includes sugar-sweetened drinks like soda. The Mediterranean diet may also include red wine with your meal1 glass each day for women and up to 2 glasses a day for men. Tips for eating at home  · Use herbs, spices, garlic, lemon zest, and citrus juice instead of salt to add flavor to foods. · Add avocado slices to your sandwich instead of lindsey. · Have fish for lunch or dinner instead of red meat. Brush the fish with olive oil, and broil or grill it. · Sprinkle your salad with seeds or nuts instead of cheese. · Cook with olive or canola oil instead of butter or oils that are high in saturated fat. · Switch from 2% milk or whole milk to 1% or fat-free milk. · Dip raw vegetables in a vinaigrette dressing or hummus instead of dips made from mayonnaise or sour cream.  · Have a piece of fruit for dessert instead of a piece of cake. Try baked apples, or have some dried fruit. Tips for eating out  · Try broiled, grilled, baked, or poached fish instead of having it fried or breaded. · Ask your  to have your meals prepared with olive oil instead of butter. · Order dishes made with marinara sauce or sauces made from olive oil. Avoid sauces made from cream or mayonnaise. · Choose whole-grain breads, whole wheat pasta and pizza crust, brown rice, beans, and lentils. · Cut back on butter or margarine on bread. Instead, you can dip your bread in a small amount of olive oil. · Ask for a side salad or grilled vegetables instead of french fries or chips. Where can you learn more?   Go to http://www.gray.com/  Enter O407 in the search box to learn more about \"Learning About the Mediterranean Diet. \"  Current as of: December 17, 2020               Content Version: 12.8  © 5292-7766 [employer]. Care instructions adapted under license by [employer] (which disclaims liability or warranty for this information). If you have questions about a medical condition or this instruction, always ask your healthcare professional. [place] [address] any warranty or liability for your use of this information.

## 2021-09-14 NOTE — PROGRESS NOTES
[facility]  Lisandro Dutton[address] [place] [address], [facility] [address]  Phone: [phone]  Fax: [phone]    NEW PATIENT  Pt's YOB: 1950    ASSESSMENT   Diagnoses and all orders for this visit:    1. Lumbar pain  -     AMB POC XRAY, SPINE, LUMBOSACRAL; 4+  -     methylPREDNISolone (MEDROL DOSEPACK) 4 mg tablet; Per dose pack instructions  -     diclofenac EC (VOLTAREN) 75 mg EC tablet; Take 1 Tablet by mouth two (2) times daily (with meals). -     THER/PROPH/DIAG INJECTION, SUBCUT/IM  -     ketorolac (TORADOL) injection 30 mg    2. Lumbar facet arthropathy  -     AMB POC XRAY, SPINE, LUMBOSACRAL; 4+  -     methylPREDNISolone (MEDROL DOSEPACK) 4 mg tablet; Per dose pack instructions  -     diclofenac EC (VOLTAREN) 75 mg EC tablet; Take 1 Tablet by mouth two (2) times daily (with meals). -     THER/PROPH/DIAG INJECTION, SUBCUT/IM  -     ketorolac (TORADOL) injection 30 mg    3. Muscle spasm    4. Impingement syndrome of left shoulder  -     diclofenac EC (VOLTAREN) 75 mg EC tablet; Take 1 Tablet by mouth two (2) times daily (with meals). -     THER/PROPH/DIAG INJECTION, SUBCUT/IM  -     ketorolac (TORADOL) injection 30 mg    5. Cervical spinal stenosis    6. DDD (degenerative disc disease), lumbar         IMPRESSION AND PLAN:  Alcira Reilly is a 70 y.o. right hand dominant female with history of low back and left shoulder pain. She reports a long history lower back pain but notes severe pain over the last few months. Pt also complains of pain in the neck/left shoulder that radiates down the left arm. She has been using ibuprofen 800 mg, Icy Hot, and a heating pad as needed and is currently enrolled in pulmonary rehab. 1) Pt was given information on lumbar and shoulder arthritis exercises. 2) Discussed treatment options with the patient including medication and physical therapy.    3) She was prescribed Voltaren 75 mg 1 cap BID prn pain with food to better manage her inflammatory symptoms. 4) I encouraged the patient to lose weight and she was given information on the Mediterranean diet. 5) Discussed referral to physical therapy after she completes pulmonary rehab. 6) Pt was prescribed a Medrol Dosepak. 7) Pt received a 30 mg Toradol injection in the office today. 8) Ms. Angel Luis Gimenez has a reminder for a \"due or due soon\" health maintenance. I have asked that she contact her primary care provider, Yessi Oreilly MD, for follow-up on this health maintenance. 9)  demonstrated consistency with prescribing. Follow-up and Dispositions    · Return in about 6 weeks (around 10/26/2021) for Medication follow up. HISTORY OF PRESENT ILLNESS:  Elyssa Bhakta is a 70 y.o. right hand dominant female with history of low back and left shoulder pain. Pt presents to the office today as a new patient. She reports a long history lower back pain but notes severe pain over the last few months. Pt denies any pain radiating down the legs at this time. She notes that she occasionally feels like her back is going to new [place] in half\" when standing to wash dishes. Pt also complains of pain in the neck/left shoulder that radiates down the left arm. She notes that she has been taking ibuprofen 800 mg as needed for her left shoulder/arm pain. Pt has also been using [facility] and a heating pad on the left shoulder/upper back as needed. She denies relief when taking Naprosyn. Pt reports that she is currently enrolled in pulmonary rehab (2 x a week for 9 weeks) and is followed by Dr. Danitza Chinchilla for asthma. She has attended about 10 sessions of pulmonary rehab total. Pt notes she had a mass removed from her left kidney on 12/19/2020 but denies any history of renal disease. She admits to recently weight loss and notes that she is taking vitamin D 2000 units daily. Pt at this time desires to proceed with medication evaluation and home exercises.     Pain Scale: 5/10     PCP: Anant Paulson Kulwant Vincent MD    Past Medical History:   Diagnosis Date    Alopecia     Arthritis     Asthma     Chronic lung disease     Chronic obstructive pulmonary disease ([facility] [address]     mild    GERD (gastroesophageal reflux disease)     Hypercholesteremia     Hyperthyroidism     Thyroid disease         Social History     Socioeconomic History    Marital status:      Spouse name: Not on file    Number of children: Not on file    Years of education: Not on file    Highest education level: Not on file   Occupational History    Not on file   Tobacco Use    Smoking status: Former Smoker     Packs/day: 0.50     Years: 5.00     Pack years: 2.50     Types: Cigarettes     Start date: 3/13/1972     Quit date: 3/13/1977     Years since quittin.5    Smokeless tobacco: Never Used    Tobacco comment: quit smoking in s   Vaping Use    Vaping Use: Never used   Substance and Sexual Activity    Alcohol use: Not Currently     Alcohol/week: 0.0 standard drinks    Drug use: Never    Sexual activity: Yes     Partners: Male     Birth control/protection: None   Other Topics Concern    Not on file   Social History Narrative    Worked as  for [address] [place] [address] until . Denies any history of asbestos and or chemical exposure. Social Determinants of Health     Financial Resource Strain:     Difficulty of Paying Living Expenses:    Food Insecurity:     Worried About Running Out of Food in the Last Year:     [address] in the Last Year:    Transportation Needs:     Lack of Transportation (Medical):      Lack of Transportation (Non-Medical):    Physical Activity:     Days of Exercise per Week:     Minutes of Exercise per Session:    Stress:     Feeling of Stress :    Social Connections:     Frequency of Communication with Friends and Family:     Frequency of Social Gatherings with Friends and Family:     Attends Faith Services:     Active Member of Clubs or Organizations:     Attends Club or Organization Meetings:     Marital Status:    Intimate Partner Violence:     Fear of Current or Ex-Partner:     Emotionally Abused:     Physically Abused:     Sexually Abused:        Current Outpatient Medications   Medication Sig Dispense Refill    ibuprofen (MOTRIN) 800 mg tablet TAKE 1 TABLET BY MOUTH TWICE DAILY WITH FOOD      methylPREDNISolone (MEDROL DOSEPACK) 4 mg tablet Per dose pack instructions 1 Dose Pack 0    diclofenac EC (VOLTAREN) 75 mg EC tablet Take 1 Tablet by mouth two (2) times daily (with meals). 60 Tablet 1    budesonide-formoteroL (SYMBICORT) 160-4.5 mcg/actuation HFAA Take 2 Puffs by inhalation two (2) times a day. Rinse and gargle thoroughly after each use 3 Inhaler 3    hyoscyamine (Levsin) 0.125 mg tablet Take 1 Tab by mouth every four (4) hours as needed for Cramping. 40 Tab 0    albuterol 2.5 mg /3 mL (0.083 %) nebu 3 mL, albuterol-ipratropium 2.5 mg-0.5 mg/3 ml nebu 3 mL 1 Dose by Nebulization route every four (4) hours as needed for Wheezing or Shortness of Breath.  simvastatin (ZOCOR) 40 mg tablet Take 40 mg by mouth nightly.  aspirin delayed-release 81 mg tablet Take 81 mg by mouth daily.  montelukast (SINGULAIR) 10 mg tablet Take 10 mg by mouth daily.  methimazole (TAPAZOLE) 10 mg tablet Take 10 mg by mouth daily.  albuterol (PROVENTIL HFA, VENTOLIN HFA, PROAIR HFA) 90 mcg/actuation inhaler Take 2 Puffs by inhalation every four (4) hours as needed for Wheezing.  omeprazole (PRILOSEC) 20 mg capsule Take 20 mg by mouth daily.  cholecalciferol, vitamin D3, (VITAMIN D3) 2,000 unit tab Take 1 Tab by mouth daily.          Allergies   Allergen Reactions    Latex Swelling and Contact Dermatitis    Latex, Natural Rubber Itching    Other Food Hives, Diarrhea and Other (comments)     Mushrooms and oranges/OJ Other: throat swelling and mouth 9/16/209sores  Pt denes it's allergy related    Codeine Anaphylaxis     Pt denies    Iodine Povacry-Iso Alcohol Other (comments)     Blisters  9/16/20 Pt. denies    Vicodin [Hydrocodone-Acetaminophen] Itching    Tramadol Other (comments)     9/16/20 Pt. denies       REVIEW OF SYSTEMS    Constitutional: Negative for fever, chills, or weight change. Respiratory: Negative for cough or shortness of breath. Cardiovascular: Negative for chest pain or palpitations. Gastrointestinal: Negative for acid reflux, change in bowel habits, or constipation. Genitourinary: Negative for dysuria and flank pain. Musculoskeletal: Positive for lumbar and left shoulder pain. Positive for arm weakness. Skin: Negative for rash. Neurological: Negative for headaches, dizziness, or numbness. Endo/Heme/Allergies: Negative for increased bruising. Psychiatric/Behavioral: Negative for difficulty with sleep. As per HPI    PHYSICAL EXAMINATION  Visit Vitals  BP (!) 155/83 (BP 1 Location: Right arm, BP Patient Position: Sitting)   Pulse 73   Temp 98.3 °F (36.8 °C) (Temporal)   Resp 18   Ht 5' 1\" (1.549 m)   Wt 211 lb 12.8 oz (96.1 kg)   SpO2 98%   BMI 40.02 kg/m²       Constitutional: Awake, alert, and in no acute distress. HEENT: Normocephalic. Atraumatic. Oropharynx is moist and clear. PERRL. EOMI. Sclerae are nonicteric  Cardiovascular: Regular rate and rhythm  Lungs: Clear to auscultation bilaterally  Abdomen: Soft and nontender. Bowel sounds are present  Neurological: 1+ symmetrical DTRs in the upper extremities. 1+ symmetrical DTRs in the lower extremities. Sensation to light touch is intact. Negative Chatterjee's sign bilaterally. Skin: warm, dry, and intact. Musculoskeletal: Very tight across the upper trapezius bilaterally. Mild decreased range of motion with side to side cervical flexion. Good range of motion in both shoulders. Positive Garvey' and impingement sign on the left. Negative Spurling's test bilaterally. Tenderness to palpation in the lumbar region. No pain with extension or forward flexion. Pain with axial loading. No pain with internal or external rotation of her hips. Negative straight leg raise on the right; mildly positive straight leg raise on the left. Difficulty with heel and toe walking. . Mild difficulty with the single leg stance bilaterally. Biceps  Triceps Deltoids Wrist Ext Wrist Flex Hand Intrin   Right  4/5  4/5  4/5 +4/5 +4/5 +4/5   Left  4/5  4/5  4/5 +4/5 +4/5 +4/5      Hip Flex  Quads Hamstrings Ankle DF EHL Ankle PF   Right +4/5 +4/5 +4/5 +4/5 +4/5 +4/5   Left +4/5 +4/5 +4/5 +4/5 +4/5 +4/5     TORADOL INJECTION:  Administrations This Visit     ketorolac (TORADOL) injection 30 mg     Admin Date  09/14/2021  10:26 Action  Given Dose  30 mg Route  IntraMUSCular Site  Right Gluteus Arash Administered By  Elliot Amor LPN    NDC: 49794-550-20    Patient Supplied?: No                IMAGING:    Lumbar spine 4V x-rays from 9/14/2021 were personally reviewed with the patient and demonstrated:  Dextro scoliosis. Multilevel degenerative facets. Degenerative discs at L2-3, L3-4, and L4-5. Degenerative discs in the lower thoracic (T8-T10) with anterior osteophyte formation. Left shoulder x-rays from 7/26/2021 were personally reviewed with the patient and demonstrated:  FINDINGS: The glenohumeral joint is intact. No fracture or dislocation  appreciated. The joint space is preserved. No erosions or periostitis  appreciated. No lytic or blastic focus identified. The acromioclavicular joint  is without evidence of separation or step-off. The joint space is preserved. The  visualized left lung is unremarkable.     IMPRESSION  1. No acute pathology appreciated in the left shoulder.     Cervical spine MRI from 5/16/2013 was personally reviewed with the patient and demonstrated:    5/16/2013 09:04) Provider Status: Open   Study Result    Narrative   Procedure: MRI of the cervical spine without contrast.     CPT code: 86488     Indications:  Neck pain extending into the left arm Comparisons: None. Technique: T1 weighted, T2 FSE with fat saturation, FSE inversion recovery   sagittal images are supplemented by T2 weighted with fat saturation axial   images. Findings: There is reversal of the normal cervical lordosis centered at C5-C6 because of   degenerative disc disease below. Sagittal images reveal normal vertebral body morphology    . No fractures   noted. No suspicious lesions No abnormal marrow signal present. Alignments are anatomic, no evidence for subluxation. Visualized prevertebral soft tissues are unremarkable. Atlanto-dens interval normal.   No evidence for Chiari 1 malformation. Cord morphology and signal intensity are unremarkable throughout. Correlation of axial and sagittal images reveals the following: At C2-C3: Mild prominence of the posterior longitudinal ligament. Mild central   canal stenosis. No foraminal stenosis. No facet arthropathy. At C3-C4: Mild circumferential disc osteophyte complex. Mild central canal   stenosis. Mild right foraminal stenosis. Mild facet arthropathy. At C4-C5: Mild circumferential disc osteophyte complex and prominence of the   PLL area borderline central canal stenosis.] The left mild to moderate facet   arthropathy. Moderate right and mild left foraminal stenosis. At C5-C6: Loss of disc height. Moderate circumferential disc osteophyte   complex. Moderate central canal stenosis at 8.6 mm. No facet arthropathy. Mild   foraminal stenosis. At C6-C7: Severe loss of disc height. Moderate circumferential disc osteophyte   complex. Mild central canal stenosis. No facet arthropathy. Mild foraminal   stenosis. At C7-T1: Mild to moderate circumferential disc osteophyte complex. No central   canal stenosis. No facet arthropathy. No foraminal stenosis. Impression   Impression:     Degenerative changes as above.          Written by Dahlia Conn, as dictated by Jian Rocha, MD. CARDOZO, Dr. Lebron Chatters confirm that all documentation is accurate.

## 2021-09-15 ENCOUNTER — APPOINTMENT (OUTPATIENT)
Dept: PULMONOLOGY | Age: 71
End: 2021-09-15

## 2021-09-20 ENCOUNTER — APPOINTMENT (OUTPATIENT)
Dept: PULMONOLOGY | Age: 71
End: 2021-09-20

## 2021-09-21 ENCOUNTER — HOSPITAL ENCOUNTER (OUTPATIENT)
Dept: PULMONOLOGY | Age: 71
Discharge: HOME | End: 2021-09-21
Payer: MEDICARE

## 2021-09-21 PROCEDURE — G0237 THERAPEUTIC PROCD STRG ENDUR: HCPCS

## 2021-09-21 NOTE — CARDIO/PULMONARY
Pulmonary/RT Note    Visit #    12/36           Andree Mercado is a 70 y.o. female has resumed pulmonary rehab (NY was held for about a month per her pulmonologist. She state that there have been changes in medication or health since the last visit. She took her last dose of Methylprednisolone today, and not takes a voltern pill for her shoulder and back pain. She states she felt little back pain relief after being given a form of a cortisone shot about a few weeks ago. She states that her orthopaedic doctor wants her to complete NY prior to attending physical therapy for the back and shoulder pain. We addressed her goals moving forward as she expressed her desired for continue NY, her regimen is contoured in a way that's most beneficial to her. She has a target heart rate of 108-123 . She tolerated the exercise session well and has a pain level of 1 in her lower back and left shoulder that sometimes radiates down her arm with certain moverments. Patient states RPE for session today was 14 and RPD was a 3. Today the pt did:    Breathing retraining:15 min  Bodyweight:  30 min, seated marches, standing marches, knee extensions, anterior pelvic tilt, seated clam shells:  uper body nick crocs, punches (jurts shoulder) but held on the punches due to arm pain.   Stretch: 10 min neck and shoulder             Physician available for emergencies: Deirdre Henao 9/21/2021 9:44 AM

## 2021-09-21 NOTE — CARDIO/PULMONARY
Summary Report   Andreina Mejia  Pulmonary Tx Plan  Description: Female : 1950   Pulmonary ITP     RESP 1:1 from 2021 in [facility]   Referral Date 21   Co-morbidities Pulmonary disease   Stages of change     Oxygen Use No   ITP Visit Type Re-Assessment   1st Date of Exercise 21   ITP Next Review Date 10/19/21   Visit #/Total Visits 11   EF % 65 %   FVC  2.8 liters   FEV1% Predicted 100 %   FEV1/FVC 64   Pulmonary ITP Exercise, Psychosocial, Tobacco, Nutrition, Education   Stages of Change Preparation   Test Six minute walk test   Distance Walked in  ft   Distance Walked (ft) 780 ft   Peak HR 84   Peak /68   Peak RPE 13   Peak Mets 2.13   O2 Saturation 97   Stops 0   SPO2 Range    BMI (calculated)    Mode Stepper, Ergometer, Other (comment)   Frequency per week 2   Duration per session 60   Intensity  METS       2.5   Progression moderate   O2 Sat (%)    O2 Flow Rate (L/min)    Symptoms with Exercise Shortness of breath   Target Heart Rate 108-123   Resistance Training Yes   Assisted Devices None   Resting /78   Peak /76   Is BP WDL?  Yes   Type    Frequency    Duration    Resistance Training    Education Self pulse, Exercise safety, Signs/Symptoms to report, Blood pressure medications, RPE scale, Equipment orientation, Warm up/Cool down, Understand blood pressure, Home exercise plan, Energy conservation, O2 therapy, RPE/RPD scale, Purse lip/Abdominal breathing, Low sodium diet   Target Goal(s) Individual exercise RX, BP < 140/90 or < 130/80, if DM or CKD, Aerobic activity 30 + minutes/day  5 days/week, SA02>89%, Home activity   Stages of Change Preparation   Interventions PCP notified   Consults PCP   Currently Taking Psychotropic Meds No   Education Advanced directives, Benefits of CPR completion, Cardiac meds, Coping techniques, Impact self care behaviors on health, Environmental triggers, Relaxation techniques, Sexual activity, Signs/Symptoms of depression, Stress management, Support groups, Tobacco dangers, Traveling with lung disease   Target Goal(s) Assess presence or absence of depression using a valid screening tool, Demonstrates appropriate interaction with others, Engages in self-care behaviors, Maximizes coping skills   Uses Stress Mgmt Techniques    Stages of Change Maintenance   Tobacco Use No   Smokeless Tobacco Use No   Smoking Cessation Referral No   Tobacco Adjunct No   Resource Information Provided No   Target Goal Complete cessation of tobacco use   Stages of Change Preparation   Diabetes No   BG at Home No   Date Lipids Drawn 07/14/21   Total 148   HDL 79   LDL 54.4   Triglycerides 73   Weight  95.3 kg (210 lb)   Height  5' 1\" (1.549 m)   BMI 39.76   Weight Goal 190   Waist Circumference  40   Alcohol None   Nutrition Assessment Tool RYP   Dietitian Consult No   Nurse/Patient Discussion No   Nutrition Class No   Diabetes Education Referral No   Lipid Clinic Referral No   Weight Management Referral No   Education Signs/Symptoms Hypo/Hyperglycemia, DM & CAD relationship, Low sodium diet, Healthy eating, High fiber diet, Nutrition and lung disease, Supplemental dietary needs, Special diet   Target Goals LDL-C less than 70 for high risk, LDL-C less than 100, Non HDL-C less than 130, LDL-C 70 high risk, HbA1C less than 7 percent, BMI less than 25, Waist size less than 40 inches males and less than 35 inches for females, Weight loss of 5-10%   Learning Barrier Ready to learn   Education Schedule Given No   Education Tobacco triggers, CAD, Risk factors, Cardiac A&P, Med Compliance, Signs/Symptoms of Angina , Sexuality, Pulmonary Medications, Breaking the Dyspnea Cycle, Pulmonary A&P, lung/gas exchange, Heart/Lung association, Activity of Daily Living, Nebulizer Use, Preventing Infection, Bronchial Hygiene/controlled cough, Signs/Symptoms Hypo/hyperglycemia, DM & lung disease   Target Goals Correct demonstration of MDI use and care, Correct demonstration of breathing techniques, Correct demonstration/verbalization of 02 therapy, Complete cessation of tobacco cessation         MD Signature: _______________________________________________        Date/Time:  _______________________________        Pharmacist Signature:_________________________________________        Date/Time: _______________________________          Goals Comments   1. To be  able to do laundry without becoming MARIE within the next 30 days []? initial  []? met                             []? not met  []? progressing  N/A Pt hasn't attempted laundry, will evaluated in 30 days   2. Getting Dressed without becoming SOB or having to stop and take break within the next 30 days    []? initial  []? met                             []? not met  [x]? progressing Pt prepares for dressing and hygiene in 5 min phases at a times   3. Hobbies; Patient would like to be feel like she has enough energy to enjoy her hobby of scrap booking again in the coming 30-60 days []? initial  []? met                             []? not met  []? progressing  N/A Pt has not attempted scrap booking projects    4. Weight loss  Over the course of the Pulmonary Rehab program patient would like to reduce her bodyweight by 5-10% []? initial  []? met                             [x]? not met  []?  progressing Will give the pt resources during her next visit

## 2021-09-22 ENCOUNTER — APPOINTMENT (OUTPATIENT)
Dept: PULMONOLOGY | Age: 71
End: 2021-09-22

## 2021-09-27 ENCOUNTER — APPOINTMENT (OUTPATIENT)
Dept: PULMONOLOGY | Age: 71
End: 2021-09-27

## 2021-09-28 ENCOUNTER — HOSPITAL ENCOUNTER (OUTPATIENT)
Dept: PULMONOLOGY | Age: 71
Discharge: HOME | End: 2021-09-28
Payer: MEDICARE

## 2021-09-28 PROCEDURE — G0237 THERAPEUTIC PROCD STRG ENDUR: HCPCS

## 2021-09-28 NOTE — CARDIO/PULMONARY
Pulmonary/RT Note    Visit #      13/36         Naga Thibodeaux is a 70 y.o. female presented today for pulmonary rehab. She state that there have been no changes in medication or health since the last visit. She shared her fears and concerns with taking her maintenance inhaler that keeps her from taking it (thrush being one), I offered suggestions and products that would help place her mind at ease toward routinely taking her medication such as using a tongue brush and or doing her dental routine of brushing and mouthwash post MDI. She has a target heart rate of 108-123 . She tolerated the exercise session well and has a pain level of 3 in her . Patient states RPE for session today was 13 and RPD was a 2.  Today the pt did:    Breathing retraining:15 min  Bodyweight:  30 min, seated marches, standing marches, knee extensions, anterior pelvic tilt, seated clam shells:  uper body nick crocs  Stretch: 10 min neck and shoulder rolls             Physician available for emergencies: Eliseo Rolon 9/28/2021 9:40 AM

## 2021-09-29 ENCOUNTER — APPOINTMENT (OUTPATIENT)
Dept: PULMONOLOGY | Age: 71
End: 2021-09-29

## 2021-09-29 DIAGNOSIS — M62.838 MUSCLE SPASM: Primary | ICD-10-CM

## 2021-09-29 RX ORDER — METHOCARBAMOL 500 MG/1
TABLET, FILM COATED ORAL
Qty: 60 TABLET | Refills: 1 | Status: SHIPPED | OUTPATIENT
Start: 2021-09-29 | End: 2022-02-23 | Stop reason: ALTCHOICE

## 2021-09-30 ENCOUNTER — HOSPITAL ENCOUNTER (OUTPATIENT)
Dept: PULMONOLOGY | Age: 71
Discharge: HOME | End: 2021-09-30
Payer: MEDICARE

## 2021-09-30 PROCEDURE — G0237 THERAPEUTIC PROCD STRG ENDUR: HCPCS

## 2021-09-30 NOTE — PROGRESS NOTES
Pulmonary/RT Note    Visit #               Noa Arana is a 70 y.o. female presented today for pulmonary rehab. She state that there have been no changes in medication or health since the last visit. Pt was prescribed a muscle relaxer by her doctor Methocarbamol for her back pain, she has not needed them yet and has no pain today, she's had a great morning and enjoyed NV. She has a target heart rate of 108-123. She tolerated the exercise session well and has a pain level of 0. Patient states RPE for session today was 12 and RPD was a 2.  Today the pt did:    [place] min  Breathing retraining: 15 min  Bodyweight: 20 min seated marches, standing marches, knee extensions, anterior pelvic tilt, seated clam shells, shoulder rolls:  uper body nick cross  Stretches: 10 min total body     Physician available for emergencies: Mendy Wade 2021 9:09 AM

## 2021-10-04 ENCOUNTER — APPOINTMENT (OUTPATIENT)
Dept: PULMONOLOGY | Age: 71
End: 2021-10-04
Payer: MEDICARE

## 2021-10-05 ENCOUNTER — HOSPITAL ENCOUNTER (OUTPATIENT)
Dept: PULMONOLOGY | Age: 71
Discharge: HOME | End: 2021-10-05
Payer: MEDICARE

## 2021-10-05 PROCEDURE — G0237 THERAPEUTIC PROCD STRG ENDUR: HCPCS

## 2021-10-05 NOTE — PROGRESS NOTES
Pulmonary/RT Note     Visit #        15         Supriya Molina is a 70 y.o. female presented today for pulmonary rehab. She state that there have been no changes in medication or health since the last visit. Pt was prescribed a muscle relaxer by her doctor Methocarbamol for her back pain.      She has a target heart rate of 108-123. She tolerated the exercise session well and has a pain level of 0. Patient states RPE for session today was 12 and RPD was a 2.  Today the pt did:     [place] min  Breathing retraining: 15 min  Bodyweight: 20 min seated marches, knee extensions, anterior pelvic tilt, seated clam shells, shoulder rolls:  uper body nick cross, bicep curls w 3 lbs 10x2 ea  Stretches: 10 min total body     Physician available for emergencies: Rosa Leonard 10/5/2021 9:49 AM

## 2021-10-06 ENCOUNTER — APPOINTMENT (OUTPATIENT)
Dept: PULMONOLOGY | Age: 71
End: 2021-10-06
Payer: MEDICARE

## 2021-10-07 ENCOUNTER — HOSPITAL ENCOUNTER (OUTPATIENT)
Dept: PULMONOLOGY | Age: 71
Discharge: HOME | End: 2021-10-07
Payer: MEDICARE

## 2021-10-07 PROCEDURE — G0237 THERAPEUTIC PROCD STRG ENDUR: HCPCS

## 2021-10-07 NOTE — PROGRESS NOTES
Pulmonary/RT Note    Visit #        16/36       Bk Tejeda is a 70 y.o. female presented today for pulmonary rehab. She state that there have been no changes in medication or health since the last visit. Her blood pressure was elevated today after walking from her car to the clinic (152/69). She felt more short of breath this morning and she took her rescue inhaler and symbicort, she believes her flare up could be due to elevated stress levels. She ended IN earlier today and will take her HHN routinely for the remainder of the day and will contact her pulmonogist and 911 if symptoms are worse despite HHN. She has a target heart rate of 108-123. She tolerated the exercise session well and has a pain level of 0. Patient states RPE for session today was 14 and RPD was a 3.  Today the pt did:       Bodyweight: 30 min seated marches, knee extensions, anterior pelvic tilt, seated clam shells,ankle pumps, shoulder rolls:  uper body nick cross, bicep curls w 3 lbs 10x2 ea  Stretches: 5 neck     Physician available for emergencies: Geovanny Corcoran 10/7/2021 9:13 AM

## 2021-10-11 ENCOUNTER — APPOINTMENT (OUTPATIENT)
Dept: PULMONOLOGY | Age: 71
End: 2021-10-11
Payer: MEDICARE

## 2021-10-11 DIAGNOSIS — R06.2 WHEEZING: ICD-10-CM

## 2021-10-11 DIAGNOSIS — R05.9 COUGH: ICD-10-CM

## 2021-10-11 DIAGNOSIS — R06.02 SHORTNESS OF BREATH: Primary | ICD-10-CM

## 2021-10-11 RX ORDER — PREDNISONE 20 MG/1
40 TABLET ORAL
Qty: 10 TABLET | Refills: 0 | Status: SHIPPED | OUTPATIENT
Start: 2021-10-11 | End: 2021-10-16

## 2021-10-11 RX ORDER — AZITHROMYCIN 500 MG/1
500 TABLET, FILM COATED ORAL SEE ADMIN INSTRUCTIONS
Qty: 6 TABLET | Refills: 0 | Status: SHIPPED | OUTPATIENT
Start: 2021-10-11 | End: 2021-12-02 | Stop reason: ALTCHOICE

## 2021-10-11 NOTE — TELEPHONE ENCOUNTER
Spoke with patient, she has c/o increasing/worsening sob, hoarseness, nonproductive cough - although patient states she feels like mucus needs to come up but she can't get it up, and wheezing. Patient states she gets some but very little relief when using the nebulizer. Please advise.

## 2021-10-11 NOTE — TELEPHONE ENCOUNTER
Anjali Bonner MD  P [facility]  Caller: Unspecified (Today, 11:43 AM)  Let pt know to get tested for COVID.  Prescribe a burst of prednisone 40mg x 5 days and a z-gregory to her pharmacy. Shannon Butler advise her to take OTC Mucinex 600mg PO BID for 2 weeks       Dario Eastman MD/MPH   Pulmonary, [address] [place] [address],[address] [facility]

## 2021-10-12 ENCOUNTER — APPOINTMENT (OUTPATIENT)
Dept: PULMONOLOGY | Age: 71
End: 2021-10-12
Payer: MEDICARE

## 2021-10-13 ENCOUNTER — APPOINTMENT (OUTPATIENT)
Dept: PULMONOLOGY | Age: 71
End: 2021-10-13
Payer: MEDICARE

## 2021-10-14 ENCOUNTER — APPOINTMENT (OUTPATIENT)
Dept: PULMONOLOGY | Age: 71
End: 2021-10-14
Payer: MEDICARE

## 2021-10-14 ENCOUNTER — HOSPITAL ENCOUNTER (OUTPATIENT)
Dept: PULMONOLOGY | Age: 71
End: 2021-10-14
Payer: MEDICARE

## 2021-10-15 ENCOUNTER — HOSPITAL ENCOUNTER (OUTPATIENT)
Dept: LAB | Age: 71
Discharge: HOME | End: 2021-10-15
Payer: MEDICARE

## 2021-10-15 PROCEDURE — U0003 INFECTIOUS AGENT DETECTION BY NUCLEIC ACID (DNA OR RNA); SEVERE ACUTE RESPIRATORY SYNDROME CORONAVIRUS 2 (SARS-COV-2) (CORONAVIRUS DISEASE [COVID-19]), AMPLIFIED PROBE TECHNIQUE, MAKING USE OF HIGH THROUGHPUT TECHNOLOGIES AS DESCRIBED BY CMS-2020-01-R: HCPCS

## 2021-10-16 LAB — SARS-COV-2, COV2NT: NOT DETECTED

## 2021-10-18 ENCOUNTER — TELEPHONE (OUTPATIENT)
Dept: PULMONOLOGY | Age: 71
End: 2021-10-18

## 2021-10-18 ENCOUNTER — APPOINTMENT (OUTPATIENT)
Dept: PULMONOLOGY | Age: 71
End: 2021-10-18
Payer: MEDICARE

## 2021-10-18 DIAGNOSIS — J45.51 SEVERE PERSISTENT ASTHMA WITH EXACERBATION: ICD-10-CM

## 2021-10-18 DIAGNOSIS — R06.02 SHORTNESS OF BREATH: Primary | ICD-10-CM

## 2021-10-18 RX ORDER — PREDNISONE 10 MG/1
TABLET ORAL
Qty: 40 TABLET | Refills: 0 | Status: SHIPPED | OUTPATIENT
Start: 2021-10-18 | End: 2021-12-02 | Stop reason: ALTCHOICE

## 2021-10-18 NOTE — TELEPHONE ENCOUNTER
Cally Haq MD  P [facility]  Caller: Unspecified (Today,  9:26 AM)  Let's get a CXR and do a longer course of steroids - prednisone 40mg x 4 days, then 30mg x 4 days, 20mg x 4 days and 10mg for 4 days. Prudence Menchaca remind pt to use Symbicort 160 2 puffs BID (pt wasn't consistent using this before).  Also let's start on Spiriva (respimat 1.25mg 2 puffs once daily if covered, if note handihaler).  Advise pt to keep using spiriva even after she gets better until she is seen in clinic.

## 2021-10-18 NOTE — TELEPHONE ENCOUNTER
Spoke with patient. She sates she had covid test done, results were negative. She finished prednisone today and will finish the antibiotic tomorrow, but her breathing doesn't seem much better. She is still taking the mucinex. Please advise. Patient going to hold off on going to Pulmonary Rehab until she feels better.

## 2021-10-18 NOTE — TELEPHONE ENCOUNTER
Pt calling back to give update from last week. She stated that the medicine is not helping her and she would like to speak with someone today. She stated that she did go get covid tested on 10/15 @ [facility] and that it was negative. Pt stated that she is still very short of breath. She would also like to know if she should continue going to pulmonary rehab or if she should stop until she feels better.  Please advise [phone]

## 2021-10-20 ENCOUNTER — APPOINTMENT (OUTPATIENT)
Dept: PULMONOLOGY | Age: 71
End: 2021-10-20
Payer: MEDICARE

## 2021-10-21 ENCOUNTER — APPOINTMENT (OUTPATIENT)
Dept: PULMONOLOGY | Age: 71
End: 2021-10-21
Payer: MEDICARE

## 2021-10-21 ENCOUNTER — HOSPITAL ENCOUNTER (OUTPATIENT)
Dept: GENERAL RADIOLOGY | Age: 71
Discharge: HOME | End: 2021-10-21
Payer: MEDICARE

## 2021-10-21 DIAGNOSIS — J45.51 SEVERE PERSISTENT ASTHMA WITH EXACERBATION: ICD-10-CM

## 2021-10-21 DIAGNOSIS — R06.02 SHORTNESS OF BREATH: ICD-10-CM

## 2021-10-21 PROCEDURE — 71046 X-RAY EXAM CHEST 2 VIEWS: CPT

## 2021-10-25 ENCOUNTER — APPOINTMENT (OUTPATIENT)
Dept: PULMONOLOGY | Age: 71
End: 2021-10-25
Payer: MEDICARE

## 2021-10-25 ENCOUNTER — TELEPHONE (OUTPATIENT)
Dept: PULMONOLOGY | Age: 71
End: 2021-10-25

## 2021-10-25 NOTE — TELEPHONE ENCOUNTER
Patient requesting spiriva order be sent to her mail order pharmacy instead of local pharmacy due to cost.

## 2021-10-25 NOTE — TELEPHONE ENCOUNTER
Pt called([phone]). Pt states that her Spiriva that was sent to [facility] is too expensive and she wants it sent to her mail order instead. Please send to [facility]. Crescencio Valladares Please call her to let her know when it has been sent.

## 2021-10-26 ENCOUNTER — HOSPITAL ENCOUNTER (OUTPATIENT)
Dept: PULMONOLOGY | Age: 71
End: 2021-10-26
Payer: MEDICARE

## 2021-10-27 ENCOUNTER — APPOINTMENT (OUTPATIENT)
Dept: PULMONOLOGY | Age: 71
End: 2021-10-27
Payer: MEDICARE

## 2021-10-28 ENCOUNTER — APPOINTMENT (OUTPATIENT)
Dept: PULMONOLOGY | Age: 71
End: 2021-10-28
Payer: MEDICARE

## 2021-11-01 ENCOUNTER — APPOINTMENT (OUTPATIENT)
Dept: PULMONOLOGY | Age: 71
End: 2021-11-01
Payer: MEDICARE

## 2021-11-02 ENCOUNTER — APPOINTMENT (OUTPATIENT)
Dept: PULMONOLOGY | Age: 71
End: 2021-11-02

## 2021-11-03 ENCOUNTER — APPOINTMENT (OUTPATIENT)
Dept: PULMONOLOGY | Age: 71
End: 2021-11-03
Payer: MEDICARE

## 2021-11-04 ENCOUNTER — APPOINTMENT (OUTPATIENT)
Dept: PULMONOLOGY | Age: 71
End: 2021-11-04

## 2021-11-09 ENCOUNTER — APPOINTMENT (OUTPATIENT)
Dept: PULMONOLOGY | Age: 71
End: 2021-11-09

## 2021-11-11 ENCOUNTER — APPOINTMENT (OUTPATIENT)
Dept: PULMONOLOGY | Age: 71
End: 2021-11-11

## 2021-11-16 ENCOUNTER — APPOINTMENT (OUTPATIENT)
Dept: PULMONOLOGY | Age: 71
End: 2021-11-16

## 2021-11-18 ENCOUNTER — APPOINTMENT (OUTPATIENT)
Dept: PULMONOLOGY | Age: 71
End: 2021-11-18

## 2021-12-02 ENCOUNTER — OFFICE VISIT (OUTPATIENT)
Dept: PULMONOLOGY | Age: 71
End: 2021-12-02
Payer: MEDICARE

## 2021-12-02 VITALS
RESPIRATION RATE: 16 BRPM | OXYGEN SATURATION: 96 % | TEMPERATURE: 98.8 F | BODY MASS INDEX: 40.89 KG/M2 | HEART RATE: 71 BPM | DIASTOLIC BLOOD PRESSURE: 76 MMHG | SYSTOLIC BLOOD PRESSURE: 175 MMHG | WEIGHT: 216.6 LBS | HEIGHT: 61 IN

## 2021-12-02 DIAGNOSIS — E66.01 MORBID OBESITY (HCC): ICD-10-CM

## 2021-12-02 DIAGNOSIS — J45.50 POORLY CONTROLLED SEVERE PERSISTENT ASTHMA WITHOUT COMPLICATION: Primary | ICD-10-CM

## 2021-12-02 DIAGNOSIS — R53.81 PHYSICAL DECONDITIONING: ICD-10-CM

## 2021-12-02 DIAGNOSIS — R06.02 SHORTNESS OF BREATH: ICD-10-CM

## 2021-12-02 DIAGNOSIS — R06.09 DYSPNEA ON EXERTION: ICD-10-CM

## 2021-12-02 PROCEDURE — 1090F PRES/ABSN URINE INCON ASSESS: CPT | Performed by: INTERNAL MEDICINE

## 2021-12-02 PROCEDURE — 99215 OFFICE O/P EST HI 40 MIN: CPT | Performed by: INTERNAL MEDICINE

## 2021-12-02 PROCEDURE — G8432 DEP SCR NOT DOC, RNG: HCPCS | Performed by: INTERNAL MEDICINE

## 2021-12-02 PROCEDURE — G8400 PT W/DXA NO RESULTS DOC: HCPCS | Performed by: INTERNAL MEDICINE

## 2021-12-02 PROCEDURE — G9711 PT HX TOT COL OR COLON CA: HCPCS | Performed by: INTERNAL MEDICINE

## 2021-12-02 PROCEDURE — G8536 NO DOC ELDER MAL SCRN: HCPCS | Performed by: INTERNAL MEDICINE

## 2021-12-02 PROCEDURE — G8417 CALC BMI ABV UP PARAM F/U: HCPCS | Performed by: INTERNAL MEDICINE

## 2021-12-02 PROCEDURE — G9899 SCRN MAM PERF RSLTS DOC: HCPCS | Performed by: INTERNAL MEDICINE

## 2021-12-02 PROCEDURE — G8427 DOCREV CUR MEDS BY ELIG CLIN: HCPCS | Performed by: INTERNAL MEDICINE

## 2021-12-02 PROCEDURE — 1101F PT FALLS ASSESS-DOCD LE1/YR: CPT | Performed by: INTERNAL MEDICINE

## 2021-12-02 NOTE — PROGRESS NOTES
[address] [place] [address]  [phone]    [facility]  Pulmonary, Critical Care, and Sleep Medicine    Pulmonary Office F/U  Name: Dayana Rivers 70 y.o. female  MRN: [id number]  : 1950  Service Date: 21  Chief Complaint:   Chief Complaint   Patient presents with    Asthma     follow up from 2021    Shortness of Breath     MARIE       History of Present Illness:  Dayana Rivers is a 70 y.o. female, who presents to Pulmonary clinic for follow-up of shortness of breath. Patient was last seen 21. In the interval,  Pt did pulm rehab. She was discharged from pulm rehab - did  sessions. Pt had issues with left arm issues, and then had to stop due to exacerbations  She had a severe exacerbation(s) requiring prednisone in the interval -- did small course then longer course during month of october  Pt finished course of prednisone about 2 weeks ago. Reports now feeling better. Recently got spiriva this last week. PRN albuterol neb about every 4 hours while sick.   Now used infrequently  No nocturnal awakenings      Past Medical History:   Diagnosis Date    Alopecia     Arthritis     Asthma     Chronic lung disease     Chronic obstructive pulmonary disease (HCC)     mild    GERD (gastroesophageal reflux disease)     Hypercholesteremia     Hyperthyroidism     Thyroid disease      Past Surgical History:   Procedure Laterality Date    COLONOSCOPY N/A 2018    COLONOSCOPY/polypectomy/polyloop/ink tatoo  performed by Kena Mitchell MD at [facility] ENDOSCOPY    COLONOSCOPY N/A 2021    COLONOSCOPY with Polypectomies performed by Tuyet Mojica MD at [facility] ENDOSCOPY    HX BUNIONECTOMY      bilateral and \"removed some arthritis in feet\"    HX CATARACT REMOVAL      with implants    HX COLONOSCOPY  2015    HX HAMMER TOE REPAIR      right     HX HEMORRHOIDECTOMY      HX HYSTERECTOMY N/A     HX NEPHRECTOMY Left 2019    partial  HX ORTHOPAEDIC      HX OTHER SURGICAL      keiloid removed off chest near shoulder area    HX TOTAL COLECTOMY  10/6/15    lap right hemicolectomy    HX TUBAL LIGATION      US GUIDED CORE BREAST BIOPSY Right 2013    benign     Family History   Problem Relation Age of Onset    Hypertension Mother     Stroke Mother     Cancer Father     Heart Disease Father     Hypertension Father     Asthma Sister     Breast Problems Sister     Cancer Sister     Cancer Brother         lung cancer    Asthma Brother     Breast Cancer Maternal Grandmother 78    Cancer Brother     Breast Cancer Maternal Grandfather     Diabetes Paternal Uncle      Social History     Socioeconomic History    Marital status:      Spouse name: Not on file    Number of children: Not on file    Years of education: Not on file    Highest education level: Not on file   Occupational History    Not on file   Tobacco Use    Smoking status: Former Smoker     Packs/day: 0.50     Years: 5.00     Pack years: 2.50     Types: Cigarettes     Start date: 3/13/1972     Quit date: 3/13/1977     Years since quittin.7    Smokeless tobacco: Never Used    Tobacco comment: quit smoking in s   Vaping Use    Vaping Use: Never used   Substance and Sexual Activity    Alcohol use: Not Currently     Alcohol/week: 0.0 standard drinks    Drug use: Never    Sexual activity: Yes     Partners: Male     Birth control/protection: None   Other Topics Concern    Not on file   Social History Narrative    Worked as  for [address] [place] [address] until . Denies any history of asbestos and or chemical exposure. Social Determinants of Health     Financial Resource Strain:     Difficulty of Paying Living Expenses: Not on file   Food Insecurity:     Worried About Running Out of Food in the Last Year: Not on file    Lennie of Food in the Last Year: Not on file   Transportation Needs:     Lack of Transportation (Medical):  Not on file    Lack of Transportation (Non-Medical): Not on file   Physical Activity:     Days of Exercise per Week: Not on file    Minutes of Exercise per Session: Not on file   Stress:     Feeling of Stress : Not on file   Social Connections:     Frequency of Communication with Friends and Family: Not on file    Frequency of Social Gatherings with Friends and Family: Not on file    Attends Buddhist Services: Not on file    Active Member of [address] [place] [address] or Organizations: Not on file    Attends Club or Organization Meetings: Not on file    Marital Status: Not on file   Intimate Partner Violence:     Fear of Current or Ex-Partner: Not on file    Emotionally Abused: Not on file    Physically Abused: Not on file    Sexually Abused: Not on file   Housing Stability:     Unable to Pay for Housing in the Last Year: Not on file    Number of [place] in the Last Year: Not on file    Unstable Housing in the Last Year: Not on file     Allergies   Allergen Reactions    Latex Swelling and Contact Dermatitis    Latex, Natural Rubber Itching    Other Food Hives, Diarrhea and Other (comments)     Mushrooms and oranges/OJ Other: throat swelling and mouth 9/16/209sores  Pt denes it's allergy related    Codeine Anaphylaxis     Pt denies    Iodine Povacry-Iso Alcohol Other (comments)     Blisters  9/16/20 Pt. denies    Vicodin [Hydrocodone-Acetaminophen] Itching    Tramadol Other (comments)     9/16/20 Pt. denies     Prior to Admission medications    Medication Sig Start Date End Date Taking? Authorizing Provider   tiotropium bromide (Spiriva Respimat) 1.25 mcg/actuation inhaler Take 2 Puffs by inhalation daily. 11/12/21  Yes Dona Kelly MD   methocarbamoL (Robaxin) 500 mg tablet Take 1 tablet bid - tid as needed  Patient taking differently: Take 500 mg by mouth three (3) times daily as needed. 9/29/21  Yes Kandace Alva MD   ibuprofen (MOTRIN) 800 mg tablet Take 800 mg by mouth two (2) times daily as needed. 8/5/21  Yes Provider, Historical   diclofenac EC (VOLTAREN) 75 mg EC tablet Take 1 Tablet by mouth two (2) times daily (with meals). Patient taking differently: Take 75 mg by mouth two (2) times daily as needed. 9/14/21  Yes Horacio Vidal MD   budesonide-formoteroL [facility]) 160-4.5 mcg/actuation HFAA Take 2 Puffs by inhalation two (2) times a day. Rinse and gargle thoroughly after each use 12/4/20  Yes Shayne Reyes MD   hyoscyamine (Levsin) 0.125 mg tablet Take 1 Tab by mouth every four (4) hours as needed for Cramping. 11/9/20  Yes Yasmeen Dee MD   albuterol 2.5 mg /3 mL (0.083 %) nebu 3 mL, albuterol-ipratropium 2.5 mg-0.5 mg/3 ml nebu 3 mL 1 Dose by Nebulization route every four (4) hours as needed for Wheezing or Shortness of Breath. Yes Provider, Historical   simvastatin (ZOCOR) 40 mg tablet Take 40 mg by mouth nightly. Yes Provider, Historical   aspirin delayed-release 81 mg tablet Take 81 mg by mouth daily. Yes Provider, Historical   montelukast (SINGULAIR) 10 mg tablet Take 10 mg by mouth daily. Yes Provider, Historical   methimazole (TAPAZOLE) 10 mg tablet Take 10 mg by mouth daily. Yes Provider, Historical   albuterol (PROVENTIL HFA, VENTOLIN HFA, PROAIR HFA) 90 mcg/actuation inhaler Take 2 Puffs by inhalation every four (4) hours as needed for Wheezing. Yes Provider, Historical   omeprazole (PRILOSEC) 20 mg capsule Take 20 mg by mouth daily. Yes Provider, Historical   cholecalciferol, vitamin D3, (VITAMIN D3) 2,000 unit tab Take 1 Tab by mouth daily.    Yes Provider, Historical       Immunizations:  I have reviewed the patient's immunizations  Immunization History   Administered Date(s) Administered    COVID-19, PFIZER, MRNA, LNP-S, PF, 30MCG/0.3ML DOSE 03/19/2021, 04/09/2021    Influenza High Dose Vaccine PF 11/06/2019, 11/04/2020, 10/21/2021    Influenza Vaccine [facility]) PF (>6 Mo Flulaval, Fluarix, and >3 Yrs [address] [place] [address], Fluzone [id number]) 10/07/2015       Review of Systems:  A complete review of systems was performed as stated in the HPI, all others are negative. Objective:    Physical Exam:  BP (!) 175/76 (BP 1 Location: Left upper arm, BP Patient Position: Sitting, BP Cuff Size: Adult long)   Pulse 71   Temp 98.8 °F (37.1 °C) (Temporal)   Resp 16   Ht 5' 1\" (1.549 m)   Wt 98.2 kg (216 lb 9.6 oz)   SpO2 96%   BMI 40.93 kg/m²   Vitals were personally reviewed  Gen: no acute distress, pleasant and cooperative, sitting up in chair  HEENT: normocephalic/atraumatic, no ocular drainage, EOMI, nose and mouth covered wearing mask, unable to be assessed due to COVID-19 pandemic  Neck: supple, trachea midline, no JVD  CVS: regular rate rhythm, S1/S2, no murmurs/rubs/gallops  Lungs: fair air entry B/L, some scattered wheezes, no rales/rhonchi  Psych: normal memory, thought content, and processing    Labs: I have reviewed the patient's available labs  Lab Results   Component Value Date/Time    WBC 6.7 08/27/2020 08:21 AM    HGB 10.4 (L) 08/27/2020 08:21 AM    HCT 33.9 (L) 08/27/2020 08:21 AM    PLATELET 742 42/15/0184 08:21 AM    MCV 77.8 08/27/2020 08:21 AM   Peripheral eosinophil count 100  Lab Results   Component Value Date/Time    Sodium 145 07/14/2021 09:23 AM    Potassium 4.1 07/14/2021 09:23 AM    Chloride 110 07/14/2021 09:23 AM    CO2 30 07/14/2021 09:23 AM    Anion gap 5 07/14/2021 09:23 AM    Glucose 98 07/14/2021 09:23 AM    BUN 13 07/14/2021 09:23 AM    Creatinine 0.86 07/14/2021 09:23 AM    BUN/Creatinine ratio 15 07/14/2021 09:23 AM    GFR est AA >60 07/14/2021 09:23 AM    GFR est non-AA >60 07/14/2021 09:23 AM    Calcium 9.2 07/14/2021 09:23 AM    Bilirubin, total 0.3 07/14/2021 09:23 AM    Alk.  phosphatase 106 07/14/2021 09:23 AM    Protein, total 7.2 07/14/2021 09:23 AM    Albumin 3.7 07/14/2021 09:23 AM    Globulin 3.5 07/14/2021 09:23 AM    A-G Ratio 1.1 07/14/2021 09:23 AM    ALT (SGPT) 19 07/14/2021 09:23 AM    AST (SGOT) 13 07/14/2021 09:23 AM     Lab Results   Component Value Date/Time    Immunoglobulin KADEN 881 (H) 03/13/2015 10:25 AM         Imaging:  I have personally reviewed patient's imaging as follows--no new imaging in the interval  CT Results (most recent):  Results from Hospital Encounter encounter on 07/19/19    CT ABD PELV W CONT    Narrative  CT abdomen and pelvis with enhancement    INDICATION: Left lower quadrant abdominal pain    TECHNIQUE: CT volumetric imaging obtained from the diaphragm to the level of the  pubic symphysis following the uneventful administration of oral and nonionic  intravenous contrast. Coronal, sagittal and axial reformations obtained. Patient  received 100 mL  Isovue 300 intravenously    All CT scans at this facility are performed using dose optimization technique as  appropriate to the performed exam, to include automated exposure control,  adjustment of the mA and/or kV according to patient's size (Including  appropriate matching for site-specific examinations), or use of iterative  reconstruction technique. COMPARISON: 6/24/2019    FINDINGS:  Lung bases: Minimal dependent atelectasis  Liver: Stable left hepatic subcapsular 9 mm hypodensity  Spleen: Negative  Pancreas: Negative  Adrenal glands: Negative  Gallbladder: Peripherally calcified gallstone. No cholecystitis    Left Kidney: Partially exophytic complex mass anterior left mid kidney 1.2 x 1.2  cm. Right Kidney: Negative  Bladder: Mildly distended and unremarkable    Stomach and bowel:Severe sigmoid diverticulosis with very minimal pericolonic  stranding in the proximal sigmoid colon but overall improved since comparison. No evidence of adjacent fluid or significant peritoneal reflection thickening. Surgical sutures noted in the right hepatic flexure with evidence of right  hemicolectomy. Peritoneal spaces: No free intraperitoneal fluid or gas.     Vessels:Non aneurysmal.  Lymph nodes: No enlargement  Abdominal wall: Supraumbilical fat-containing midline hernia measuring 9 cm TV  by 5 cm CC. There about 3 x 4 cm hernia neck. SKELETAL FINDINGS:  No destructive lesion. Multilevel moderate to advanced degenerative disc and facet joint disease in the  lumbar spine. Impression  IMPRESSION:    1. Severe sigmoid diverticulitis with very minimal pericolonic stranding that  may represent early inflammation or scarring due to prior inflammation. Overall  improved appearance since comparison CT 2019.  2. Redemonstration of left kidney partially exophytic complex mass 1.2 cm which  could represent enhancing mass/renal cell carcinoma versus hemorrhagic cyst with  recent hemorrhage, recommend evaluation with CT or MRI renal protocol with and  without contrast or at least initially with ultrasound to exclude solid mass. 3. Moderate sized periumbilical fat-containing hernia. 4. Cholelithiasis without evidence of acute cholecystitis. PFTs:  2020 as follows, spirometry shows a mild obstructive defect, significant bronchodilator response seen, lung volumes are normal, diffusion capacity is borderline low. 6-minute walk test from 2020, no significant oxygen desaturation seen, lowest SPO2 was 95%, patient ambulated 427 m over 6 minutes on room air.     TTE:  I have reviewed the patient's TTE results  Results for orders placed or performed during the hospital encounter of 10/06/15   2D ECHO COMPLETE ADULT (TTE) W OR WO CONTR     Status: None    [address]  [place], [address]  [phone]    Transthoracic Echocardiogram    Patient: Deondre Miguel  MRN: [id number]  ACCT #: [de-identified]  : 10-Brian-1950  Age: 72 years  Gender: Female  Height: 61 in  Weight: 147.6 lb  BSA: 1.66 m squared  BP: 113 / 60 mmHg  Study date: 07-Oct-2015  Status: Routine  Location: [facility] DMS [address] #: [id number]    Allergies: LATEX, NATURAL RUBBER, HYDROCODONE-ACETAMINOPHEN    Interpreting Group:  [facility]  Interpreting Physician: Quin Gardner MD  Technologist:  PANCHO Weir  Referring_Ordering Physician:  Sd Zhuo. Jozef Hurd MD    SUMMARY:  Left ventricle: Size was normal. Systolic function was normal by EF  (biplane method of disks). Ejection fraction was estimated in the range of  65 % to 70 %. No obvious wall motion abnormalities identified in the views  obtained. Wall thickness was normal. Left ventricular diastolic function  parameters were normal for the patient's age. Right ventricle: Systolic function was normal. Systolic pressure was  mildly increased. Estimated peak pressure was 41 mmHg. Left atrium: The atrium was mildly to moderately dilated. LA volume index  was 41 ml/m squared. Inferior vena cava, hepatic veins: The inferior vena cava was upper normal  in size. The respirophasic change in diameter was less than 50%. INDICATIONS: Tachycardia. HISTORY: Prior history: Patient has no history of cardiovascular disease. PROCEDURE: This was a routine study. The study included complete 2D  imaging, M-mode, complete spectral Doppler, and color Doppler. The heart  rate was 100 bpm, at the start of the study. Systolic blood pressure was  113 mmHg, at the start of the study. Diastolic blood pressure was 60 mmHg,  at the start of the study. Images were obtained from the parasternal,  apical, subcostal, and suprasternal notch acoustic windows. Image quality  was adequate. LEFT VENTRICLE: Size was normal. Systolic function was normal by EF  (biplane method of disks). Ejection fraction was estimated in the range of  65 % to 70 %. No obvious wall motion abnormalities identified in the views  obtained. Wall thickness was normal. DOPPLER: Left ventricular diastolic  function parameters were normal for the patient's age. RIGHT VENTRICLE: The size was normal. Systolic function was normal.  DOPPLER: Systolic pressure was mildly increased. Estimated peak pressure  was 41 mmHg.     LEFT ATRIUM: The atrium was mildly to moderately dilated. LA volume index  was 41 ml/m squared. RIGHT ATRIUM: Size was normal.    MITRAL VALVE: There was annular calcification. There was minimal diffuse  thickening. DOPPLER: There was no evidence for stenosis. There was trivial  regurgitation. AORTIC VALVE: The valve was probably trileaflet. Leaflets exhibited good  mobility. DOPPLER: There was no stenosis. There was no significant  regurgitation. TRICUSPID VALVE: Normal valve structure. DOPPLER: There was no evidence  for tricuspid stenosis. There was trivial regurgitation. Right atrial  pressure estimate: 10 mmHg. PULMONIC VALVE: Not well visualized, but normal Doppler findings. DOPPLER:  There was no stenosis. There was no significant regurgitation. AORTA: The root exhibited normal size. SYSTEMIC VEINS: IVC: The inferior vena cava was upper normal in size. The  respirophasic change in diameter was less than 50%. PERICARDIUM: No significant pericardial effusion identified.     SYSTEM MEASUREMENT TABLES    2D  Ao Diam: 2.64 cm  IVSd: 1.02 cm  LVIDd: 4.26 cm  LVIDs: 2.88 cm  LVPWd: 1.02 cm  SV(Teich): 49.64 ml  EDV(Teich): 81.35 ml  ESV(Cube): 23.92 ml  ESV(Teich): 31.71 ml  LAAs A2C: 20.8 cm2  LAAs A4C: 22.39 cm2  LAESV A-L A2C: 63.34 ml  LAESV A-L A4C: 68.04 ml  LAESV Index (A-L): 41.07 ml/m2  LAESV MOD A2C: 61.46 ml  LAESV MOD A4C: 64.14 ml  LAESV(A-L): 68.18 ml  LALs A2C: 5.8 cm  LALs A4C: 6.25 cm  LVEDV MOD A2C: 48.72 ml  LVEDV MOD A4C: 58.35 ml  LVEDV MOD BP: 53.55 ml  LVESV MOD A2C: 13.4 ml  LVESV MOD A4C: 24.36 ml  LVESV MOD BP: 18.26 ml  LVLd A2C: 6.86 cm  LVLd A4C: 6.98 cm  LVLs A2C: 5.61 cm  LVLs A4C: 5.8 cm  LVOT Diam: 1.86 cm  RA Area: 15.44 cm2  RV Minor: 3.89 cm  SV MOD A2C: 35.32 ml  SV MOD A4C: 33.99 ml    CW  TR Vmax: 2.76 m/s  IVRT: 50.75 ms  TR maxP.55 mmHG    PW  LVOT VTI: 30.16 cm  LVOT Vmax: 1.92 m/s  LVOT Vmean: 1.23 m/s  MV A Cy: 1.02 m/s  MV Dec [place]: 8.3 m/s2  MV DecT: 155.56 ms  MV E Cy: 1.29 m/s  MV E/A Ratio: 1.27  HR: 97.42 BPM  LVCI Dopp: 4.8 l/minm2  LVCO Dopp: 7.97 L/min  LVOT maxP.77 mmHG  LVOT meanP mmHG  LVSI Dopp: 49.26 ml/m2  LVSV Dopp: 81.77 ml  P Vein A: 0.28 m/s  P Vein A Dur: 103.81 ms    Prepared and E-signed by    Emma Cole MD  Signed 08-Oct-2015 14:10:31       20   ECHO ADULT COMPLETE 2020    Narrative · LV: Estimated LVEF is 60 - 65%. Visually measured ejection fraction. Normal cavity size, wall thickness and systolic function (ejection   fraction normal). Wall motion: normal. Mild (grade 1) left ventricular   diastolic dysfunction. · LA: Left Atrium volume index is 29.54 mL/m2. · RV: Not well visualized. · TV: Mild tricuspid valve regurgitation is present. · PA: Pulmonary arterial systolic pressure is 30 mmHg. · Pericardium: Pericardial fat pad present. · MV: Mild mitral valve regurgitation is present. Signed by: Joy Mojica MD       Unintended Level 3 polysomnography from 3/2/2021, shows no evidence of EREN, AHI of 3.6      Assessment and Plan:  70 y.o. female with:    Impression:  1. Poorly controlled severe persistent asthma with recurrent exacerbations:  Pt had 2 exacerbations requiring corticosteroids in the interval.  Pt has hx of elevated IgE (881 in )  2. Dyspnea on exertion/shortness of breath: Multifactorial, due to severe asthma and deconditioning  3. Deconditioning  4. Morbid obesity: Body mass index is 40.93 kg/m². 13lbs wt gain in the interval  5. Snoring as well as excessive daytime sleepiness, fragmented sleep, and infrequent morning headaches and nocturia in the setting of cardiomyopathy --- level 3 unattended HST on 3/2/21, AHI 3.6, thus no EREN  6. Mild cardiomyopathy: Seen on transthoracic echo, no intervention required    Plan:  -Reconsider rehab in a subsequent visit when asthma improves  -Spent time discussing pt's poorly controlled asthma and their elevated total IgE levels.   We discussed possibility of Xolair - I went over potential benefits of medication as well as potential risks including anaphylaxis, delayed anaphylaxis, and insert warning regarding increased risk of malignancy. I reviewed administration schedule - and the need to be monitored post injection and must have epi-pen. Patient literature given in clinic. Will recheck CBC w/diff, total IgE, regional allergy panel prior to next visit (advised not to get today since recently completed steroids)  -Continue Symbicort 160/4.5 MCG 2 puffs twice daily with spacer. Counseled patient to rinse mouth thoroughly after each use and wash and dry spacer completely  -Continue Spiriva Respimat 1.25 mcg 2 puffs once daily  -Continue albuterol HFA 1-2puffs q4-6h PRN. Counseled patient that this is their rescue inhaler. Also counseled patient regarding premedication 15-30m prior to exercise or exposure to very cold air  -Counseled patient on proper inhaler technique  -Counseled patient to avoid potential triggers of asthma.   -Immunizations reviewed, influenza and Covid vaccinations up-to-date  -Weight loss  -Advised to remain active and continue exercises from pulmonary rehab  -Counseled patient regarding lifestyle precautions in COVID-19 pandemic including wearing mask in public and confined spaces, social/physical distancing, frequent hand hygiene, etc.  Advised to get COVID-19 booster when possible       Follow-up and Dispositions    · Return in about 6 weeks (around 1/13/2022).          Orders Placed This Encounter    CBC WITH AUTOMATED DIFF    IMMUNOGLOBULIN E, QT    ALLERGEN PROFILE, ZONE 2         Bertha Beltran MD/MPH     Pulmonary, Critical Care Medicine  [facility]

## 2021-12-02 NOTE — PROGRESS NOTES
Feliciana Dubin presents today for   Chief Complaint   Patient presents with    Asthma     follow up from 5/19/2021    Shortness of Breath     MARIE       Is someone accompanying this pt? No    Is the patient using any DME equipment during OV? Yes. nebulizer   -DME Company N/A    Depression Screening:  3 most recent PHQ Screens 9/28/2021   Little interest or pleasure in doing things Nearly every day   Feeling down, depressed, irritable, or hopeless Several days   Total Score PHQ 2 4   Trouble falling or staying asleep, or sleeping too much Nearly every day   Feeling tired or having little energy Nearly every day   Poor appetite, weight loss, or overeating Nearly every day   Feeling bad about yourself - or that you are a failure or have let yourself or your family down Several days   Trouble concentrating on things such as school, work, reading, or watching TV Not at all   Moving or speaking so slowly that other people could have noticed; or the opposite being so fidgety that others notice Nearly every day   Thoughts of being better off dead, or hurting yourself in some way Not at all   PHQ 9 Score 17       Learning Assessment:  Learning Assessment 5/19/2021   PRIMARY LEARNER Patient   BARRIERS PRIMARY LEARNER -   PRIMARY LANGUAGE ENGLISH   LEARNER PREFERENCE PRIMARY READING     DEMONSTRATION     -   ANSWERED BY Patient   RELATIONSHIP SELF       Abuse Screening:  Abuse Screening Questionnaire 5/19/2021   Do you ever feel afraid of your partner? N   Are you in a relationship with someone who physically or mentally threatens you? N   Is it safe for you to go home? Y       Fall Risk  Fall Risk Assessment, last 12 mths 12/2/2021   Able to walk? Yes   Fall in past 12 months? 0   Do you feel unsteady? 0   Are you worried about falling 0         Coordination of Care:  1. Have you been to the ER, urgent care clinic since your last visit? Hospitalized since your last visit? No    2.  Have you seen or consulted any other health care providers outside of the [address] [place] [address] since your last visit? Include any pap smears or colon screening. Yes. NP Ghada Gar, PCP, dR. Felice Reyna, endocrinologist    Influenza vaccine received from Dr. Neelima Orozco office on October 2021 per patient. Immunization record updated.

## 2021-12-21 ENCOUNTER — OFFICE VISIT (OUTPATIENT)
Dept: ORTHOPEDIC SURGERY | Age: 71
End: 2021-12-21
Payer: MEDICARE

## 2021-12-21 VITALS
TEMPERATURE: 98.1 F | HEART RATE: 74 BPM | WEIGHT: 218.8 LBS | HEIGHT: 61 IN | OXYGEN SATURATION: 97 % | BODY MASS INDEX: 41.31 KG/M2

## 2021-12-21 DIAGNOSIS — M51.36 DDD (DEGENERATIVE DISC DISEASE), LUMBAR: ICD-10-CM

## 2021-12-21 DIAGNOSIS — R06.02 SOB (SHORTNESS OF BREATH): ICD-10-CM

## 2021-12-21 DIAGNOSIS — M54.50 LUMBAR PAIN: ICD-10-CM

## 2021-12-21 DIAGNOSIS — M62.838 MUSCLE SPASM: ICD-10-CM

## 2021-12-21 DIAGNOSIS — F40.240 CLAUSTROPHOBIA: ICD-10-CM

## 2021-12-21 DIAGNOSIS — M47.816 LUMBAR FACET ARTHROPATHY: ICD-10-CM

## 2021-12-21 DIAGNOSIS — M47.816 LUMBAR FACET ARTHROPATHY: Primary | ICD-10-CM

## 2021-12-21 PROCEDURE — G8536 NO DOC ELDER MAL SCRN: HCPCS | Performed by: PHYSICAL MEDICINE & REHABILITATION

## 2021-12-21 PROCEDURE — G9711 PT HX TOT COL OR COLON CA: HCPCS | Performed by: PHYSICAL MEDICINE & REHABILITATION

## 2021-12-21 PROCEDURE — G8417 CALC BMI ABV UP PARAM F/U: HCPCS | Performed by: PHYSICAL MEDICINE & REHABILITATION

## 2021-12-21 PROCEDURE — G8400 PT W/DXA NO RESULTS DOC: HCPCS | Performed by: PHYSICAL MEDICINE & REHABILITATION

## 2021-12-21 PROCEDURE — G8432 DEP SCR NOT DOC, RNG: HCPCS | Performed by: PHYSICAL MEDICINE & REHABILITATION

## 2021-12-21 PROCEDURE — 1090F PRES/ABSN URINE INCON ASSESS: CPT | Performed by: PHYSICAL MEDICINE & REHABILITATION

## 2021-12-21 PROCEDURE — 1101F PT FALLS ASSESS-DOCD LE1/YR: CPT | Performed by: PHYSICAL MEDICINE & REHABILITATION

## 2021-12-21 PROCEDURE — 99213 OFFICE O/P EST LOW 20 MIN: CPT | Performed by: PHYSICAL MEDICINE & REHABILITATION

## 2021-12-21 PROCEDURE — G9899 SCRN MAM PERF RSLTS DOC: HCPCS | Performed by: PHYSICAL MEDICINE & REHABILITATION

## 2021-12-21 PROCEDURE — G8427 DOCREV CUR MEDS BY ELIG CLIN: HCPCS | Performed by: PHYSICAL MEDICINE & REHABILITATION

## 2021-12-21 RX ORDER — DIAZEPAM 5 MG/1
TABLET ORAL
Qty: 2 TABLET | Refills: 0 | Status: SHIPPED | OUTPATIENT
Start: 2021-12-21 | End: 2022-02-23

## 2021-12-21 NOTE — PROGRESS NOTES
[facility]  Lisandro Dutton[address] [place] [address], [facility] [address]  Phone: [phone]  Fax: [phone]    Pt's YOB: 1950    ASSESSMENT   Diagnoses and all orders for this visit:    1. Lumbar facet arthropathy  -     MRI LUMB SPINE WO CONT; Future    2. Muscle spasm  -     MRI LUMB SPINE WO CONT; Future    3. Lumbar pain  -     MRI LUMB SPINE WO CONT; Future    4. DDD (degenerative disc disease), lumbar  -     MRI LUMB SPINE WO CONT; Future    5. SOB (shortness of breath)    6. Claustrophobia  -     diazePAM (VALIUM) 5 mg tablet; Take 1 tab by mouth 30 minutes prior to procedure. May repeat x 1 if needed  Indications: inducing of a relaxed easy state         IMPRESSION AND PLAN:  Katelyn Kruse is a 70 y.o. right hand dominant female with history of lumbar and left shoulder pain. She complains of continued lumbar pain with ambulation but reports resolution of her left shoulder and arm pain. Pt is taking Voltaren 75 mg 1 cap BID as needed with uncertain relief. 1) Pt was given information on lumbar and shoulder arthritis exercises. 2) Discussed treatment options with the patient including physical therapy, steroid injections, and radiofrequency ablation. 3) A lumbar MRI was ordered to assess for stenosis and inflammation . 4) Pt was prescribed Valium 5 mg 2 tabs to take before her MRI in case of claustrophobia. 5) Ms. Mai Tavarez has a reminder for a \"due or due soon\" health maintenance. I have asked that she contact her primary care provider, Prudence Noonan NP, for follow-up on this health maintenance. 6)  demonstrated consistency with prescribing. Follow-up and Dispositions    · Return in about 5 weeks (around 1/25/2022) for Diagnostic Test follow up. HISTORY OF PRESENT ILLNESS:  Katelyn Kruse is a 70 y.o. right hand dominant female with history of lumbar and left shoulder pain and presents to the office today for follow up.  Pt complains of continued lumbar pain with standing more than 15-20 minutes but reports resolution of her left shoulder and arm pain. She states that she is likely not able to participate in physical therapy due to shortness of breath. She further notes shortness of breath with ambulation and lying supine and admits that she sleeps in a recliner secondary to orthopnea. Pt is taking Voltaren 75 mg 1 cap BID as needed with uncertain relief because she rests after taking the medication. She notes that she had to cancel a prior appointment due to an asthma exacerbation with shortness of breath. She states that she contacted Dr. Desi Giordano and was prescribed 2 courses of prednisone, then further notes that she followed up with him more recently and will undergo injections soon. Pt also states that she discontinued pulmonary rehab due to missing sessions because of her asthma exacerbation. She notes that she has not yet undergone a lumbar MRI. She denies any prior spinal surgery but admits to claustrophobia. Pt at this time desires to proceed with a lumbar MRI.     Pain Scale: 3/10    PCP: Robel Zayas NP     Past Medical History:   Diagnosis Date    Alopecia     Arthritis     Asthma     Chronic lung disease     Chronic obstructive pulmonary disease ([facility] [address]     mild    GERD (gastroesophageal reflux disease)     Hypercholesteremia     Hyperthyroidism     Thyroid disease         Social History     Socioeconomic History    Marital status:      Spouse name: Not on file    Number of children: Not on file    Years of education: Not on file    Highest education level: Not on file   Occupational History    Not on file   Tobacco Use    Smoking status: Former Smoker     Packs/day: 0.50     Years: 5.00     Pack years: 2.50     Types: Cigarettes     Start date: 3/13/1972     Quit date: 3/13/1977     Years since quittin.8    Smokeless tobacco: Never Used    Tobacco comment: quit smoking in    Vaping Use  Vaping Use: Never used   Substance and Sexual Activity    Alcohol use: Not Currently     Alcohol/week: 0.0 standard drinks    Drug use: Never    Sexual activity: Yes     Partners: Male     Birth control/protection: None   Other Topics Concern    Not on file   Social History Narrative    Worked as  for [address] [place] [address] until 2010. Denies any history of asbestos and or chemical exposure. Social Determinants of Health     Financial Resource Strain:     Difficulty of Paying Living Expenses: Not on file   Food Insecurity:     Worried About Running Out of Food in the Last Year: Not on file    Lennie of Food in the Last Year: Not on file   Transportation Needs:     Lack of Transportation (Medical): Not on file    Lack of Transportation (Non-Medical): Not on file   Physical Activity:     Days of Exercise per Week: Not on file    Minutes of Exercise per Session: Not on file   Stress:     Feeling of Stress : Not on file   Social Connections:     Frequency of Communication with Friends and Family: Not on file    Frequency of Social Gatherings with Friends and Family: Not on file    Attends Nondenominational Services: Not on file    Active Member of [address] [place] [address] or Organizations: Not on file    Attends Club or Organization Meetings: Not on file    Marital Status: Not on file   Intimate Partner Violence:     Fear of Current or Ex-Partner: Not on file    Emotionally Abused: Not on file    Physically Abused: Not on file    Sexually Abused: Not on file   Housing Stability:     Unable to Pay for Housing in the Last Year: Not on file    Number of [place] in the Last Year: Not on file    Unstable Housing in the Last Year: Not on file       Current Outpatient Medications   Medication Sig Dispense Refill    diazePAM (VALIUM) 5 mg tablet Take 1 tab by mouth 30 minutes prior to procedure.  May repeat x 1 if needed  Indications: inducing of a relaxed easy state 2 Tablet 0    tiotropium bromide (Spiriva Respimat) 1.25 mcg/actuation inhaler Take 2 Puffs by inhalation daily. 12 g 3    methocarbamoL (Robaxin) 500 mg tablet Take 1 tablet bid - tid as needed (Patient taking differently: Take 500 mg by mouth three (3) times daily as needed.) 60 Tablet 1    ibuprofen (MOTRIN) 800 mg tablet Take 800 mg by mouth two (2) times daily as needed.  diclofenac EC (VOLTAREN) 75 mg EC tablet Take 1 Tablet by mouth two (2) times daily (with meals). (Patient taking differently: Take 75 mg by mouth two (2) times daily as needed.) 60 Tablet 1    budesonide-formoteroL (SYMBICORT) 160-4.5 mcg/actuation HFAA Take 2 Puffs by inhalation two (2) times a day. Rinse and gargle thoroughly after each use 3 Inhaler 3    hyoscyamine (Levsin) 0.125 mg tablet Take 1 Tab by mouth every four (4) hours as needed for Cramping. 40 Tab 0    albuterol 2.5 mg /3 mL (0.083 %) nebu 3 mL, albuterol-ipratropium 2.5 mg-0.5 mg/3 ml nebu 3 mL 1 Dose by Nebulization route every four (4) hours as needed for Wheezing or Shortness of Breath.  simvastatin (ZOCOR) 40 mg tablet Take 40 mg by mouth nightly.  aspirin delayed-release 81 mg tablet Take 81 mg by mouth daily.  montelukast (SINGULAIR) 10 mg tablet Take 10 mg by mouth daily.  methimazole (TAPAZOLE) 10 mg tablet Take 10 mg by mouth daily.  albuterol (PROVENTIL HFA, VENTOLIN HFA, PROAIR HFA) 90 mcg/actuation inhaler Take 2 Puffs by inhalation every four (4) hours as needed for Wheezing.  omeprazole (PRILOSEC) 20 mg capsule Take 20 mg by mouth daily.  cholecalciferol, vitamin D3, (VITAMIN D3) 2,000 unit tab Take 1 Tab by mouth daily.          Allergies   Allergen Reactions    Latex Swelling and Contact Dermatitis    Latex, Natural Rubber Itching    Other Food Hives, Diarrhea and Other (comments)     Mushrooms and oranges/OJ Other: throat swelling and mouth 9/16/209sores  Pt denes it's allergy related    Codeine Anaphylaxis     Pt denies    Iodine Povacry-Iso Alcohol Other (comments)     Blisters  9/16/20 Pt. denies    Vicodin [Hydrocodone-Acetaminophen] Itching    Tramadol Other (comments)     9/16/20 Pt. denies         REVIEW OF SYSTEMS    Constitutional: Negative for fever, chills, or weight change. Respiratory: Negative for cough. Positive for shortness of breath. Cardiovascular: Negative for chest pain or palpitations. Gastrointestinal: Negative for acid reflux, change in bowel habits, or constipation. Genitourinary: Negative for dysuria and flank pain. Musculoskeletal: Positive for lumbar pain. Skin: Negative for rash. Neurological: Negative for headaches, dizziness, or numbness. Endo/Heme/Allergies: Negative for increased bruising. Psychiatric/Behavioral: Negative for difficulty with sleep. As per HPI    PHYSICAL EXAMINATION  Visit Vitals  Pulse 74   Temp 98.1 °F (36.7 °C) (Tympanic)   Ht 5' 1\" (1.549 m)   Wt 218 lb 12.8 oz (99.2 kg)   SpO2 97%   BMI 41.34 kg/m²       Constitutional: Awake, alert, and in no acute distress. Neurological: Sensation to light touch is intact. Skin: warm, dry, and intact. Musculoskeletal: Improvement with extension. Pain with forward flexion. No pain with internal or external rotation of her hips. Negative straight leg raise bilaterally. Hip Flex  Quads Hamstrings Ankle DF EHL Ankle PF   Right +4/5 +4/5 +4/5 +4/5 +4/5 +4/5   Left +4/5 +4/5 +4/5 +4/5 +4/5 +4/5     IMAGING:    Lumbar spine 4V x-rays from 9/14/2021 were personally reviewed with the patient and demonstrated:  Dextro scoliosis. Multilevel degenerative facets. Degenerative discs at L2-3, L3-4, and L4-5. Degenerative discs in the lower thoracic (T8-T10) with anterior osteophyte formation.       Left shoulder x-rays from 7/26/2021 were personally reviewed with the patient and demonstrated:  FINDINGS: The glenohumeral joint is intact. No fracture or dislocation  appreciated.  The joint space is preserved. No erosions or periostitis  appreciated.  No lytic or blastic focus identified. The acromioclavicular joint  is without evidence of separation or step-off. The joint space is preserved. The  visualized left lung is unremarkable.     IMPRESSION  1.  No acute pathology appreciated in the left shoulder.     Cervical spine MRI from 5/16/2013 was personally reviewed with the patient and demonstrated:     5/16/2013 09:04) Provider Status: Open   Study Result     Narrative   Procedure: MRI of the cervical spine without contrast.     CPT code: 12196     Indications:  Neck pain extending into the left arm     Comparisons: None. Technique: T1 weighted, T2 FSE with fat saturation, FSE inversion recovery   sagittal images are supplemented by T2 weighted with fat saturation axial   images. Findings: There is reversal of the normal cervical lordosis centered at C5-C6 because of   degenerative disc disease below. Sagittal images reveal normal vertebral body morphology    . No fractures   noted. No suspicious lesions No abnormal marrow signal present. Alignments are anatomic, no evidence for subluxation. Visualized prevertebral soft tissues are unremarkable. Atlanto-dens interval normal.   No evidence for Chiari 1 malformation. Cord morphology and signal intensity are unremarkable throughout. Correlation of axial and sagittal images reveals the following: At C2-C3: Mild prominence of the posterior longitudinal ligament. Mild central   canal stenosis. No foraminal stenosis. No facet arthropathy. At C3-C4: Mild circumferential disc osteophyte complex. Mild central canal   stenosis. Mild right foraminal stenosis. Mild facet arthropathy. At C4-C5: Mild circumferential disc osteophyte complex and prominence of the   PLL area borderline central canal stenosis.] The left mild to moderate facet   arthropathy. Moderate right and mild left foraminal stenosis. At C5-C6: Loss of disc height.  Moderate circumferential disc osteophyte complex. Moderate central canal stenosis at 8.6 mm. No facet arthropathy. Mild   foraminal stenosis. At C6-C7: Severe loss of disc height. Moderate circumferential disc osteophyte   complex. Mild central canal stenosis. No facet arthropathy. Mild foraminal   stenosis. At C7-T1: Mild to moderate circumferential disc osteophyte complex. No central   canal stenosis. No facet arthropathy. No foraminal stenosis.         Impression   Impression:     Degenerative changes as above.            Written by Lester Goff, as dictated by Sri De Dios MD.  I, Dr. Sri De Dios confirm that all documentation is accurate.

## 2021-12-21 NOTE — PROGRESS NOTES
Jaden Temple presents today for   Chief Complaint   Patient presents with    Follow-up     3 month       Is someone accompanying this pt? no    Is the patient using any DME equipment during OV? no    Depression Screening:  3 most recent PHQ Screens 9/28/2021   Little interest or pleasure in doing things Nearly every day   Feeling down, depressed, irritable, or hopeless Several days   Total Score PHQ 2 4   Trouble falling or staying asleep, or sleeping too much Nearly every day   Feeling tired or having little energy Nearly every day   Poor appetite, weight loss, or overeating Nearly every day   Feeling bad about yourself - or that you are a failure or have let yourself or your family down Several days   Trouble concentrating on things such as school, work, reading, or watching TV Not at all   Moving or speaking so slowly that other people could have noticed; or the opposite being so fidgety that others notice Nearly every day   Thoughts of being better off dead, or hurting yourself in some way Not at all   PHQ 9 Score 17       Learning Assessment:  Learning Assessment 5/19/2021   PRIMARY LEARNER Patient   BARRIERS PRIMARY LEARNER -   PRIMARY LANGUAGE ENGLISH   LEARNER PREFERENCE PRIMARY READING     DEMONSTRATION     -   ANSWERED BY Patient   RELATIONSHIP SELF       Abuse Screening:  Abuse Screening Questionnaire 5/19/2021   Do you ever feel afraid of your partner? N   Are you in a relationship with someone who physically or mentally threatens you? N   Is it safe for you to go home? Y       Fall Risk  Fall Risk Assessment, last 12 mths 12/2/2021   Able to walk? Yes   Fall in past 12 months? 0   Do you feel unsteady? 0   Are you worried about falling 0       OPIOID RISK TOOL  No flowsheet data found. Coordination of Care:  1. Have you been to the ER, urgent care clinic since your last visit? no  Hospitalized since your last visit? no    2.  Have you seen or consulted any other health care providers outside of the [address] [place] [address] since your last visit? no Include any pap smears or colon screening.  no

## 2021-12-29 ENCOUNTER — TRANSCRIBE ORDER (OUTPATIENT)
Dept: SCHEDULING | Age: 71
End: 2021-12-29

## 2021-12-29 DIAGNOSIS — Z12.31 VISIT FOR SCREENING MAMMOGRAM: Primary | ICD-10-CM

## 2021-12-29 DIAGNOSIS — Z78.0 POSTMENOPAUSAL: ICD-10-CM

## 2022-01-10 ENCOUNTER — DOCUMENTATION ONLY (OUTPATIENT)
Dept: PULMONOLOGY | Age: 72
End: 2022-01-10

## 2022-01-10 NOTE — PROGRESS NOTES
[facility]-pt needs to get labs done prior-will call if any cancellations-pt to cb in feb

## 2022-01-18 ENCOUNTER — HOSPITAL ENCOUNTER (OUTPATIENT)
Dept: LAB | Age: 72
Discharge: HOME | End: 2022-01-18
Payer: MEDICARE

## 2022-01-18 ENCOUNTER — HOSPITAL ENCOUNTER (OUTPATIENT)
Dept: MAMMOGRAPHY | Age: 72
Discharge: HOME | End: 2022-01-18
Attending: NURSE PRACTITIONER
Payer: MEDICARE

## 2022-01-18 ENCOUNTER — HOSPITAL ENCOUNTER (OUTPATIENT)
Dept: BONE DENSITY | Age: 72
Discharge: HOME | End: 2022-01-18
Attending: NURSE PRACTITIONER
Payer: MEDICARE

## 2022-01-18 DIAGNOSIS — J45.50 POORLY CONTROLLED SEVERE PERSISTENT ASTHMA WITHOUT COMPLICATION: ICD-10-CM

## 2022-01-18 DIAGNOSIS — R06.02 SHORTNESS OF BREATH: ICD-10-CM

## 2022-01-18 DIAGNOSIS — Z12.31 VISIT FOR SCREENING MAMMOGRAM: ICD-10-CM

## 2022-01-18 DIAGNOSIS — Z78.0 POSTMENOPAUSAL: ICD-10-CM

## 2022-01-18 DIAGNOSIS — R06.09 DYSPNEA ON EXERTION: ICD-10-CM

## 2022-01-18 LAB
25(OH)D3 SERPL-MCNC: 27.3 NG/ML (ref 30–100)
ALBUMIN SERPL-MCNC: 3.7 G/DL (ref 3.4–5)
ALBUMIN/GLOB SERPL: 1.1 {RATIO} (ref 0.8–1.7)
ALP SERPL-CCNC: 105 U/L (ref 45–117)
ALT SERPL-CCNC: 14 U/L (ref 13–56)
ANION GAP SERPL CALC-SCNC: 5 MMOL/L (ref 3–18)
AST SERPL-CCNC: 16 U/L (ref 10–38)
BASOPHILS # BLD: 0 K/UL (ref 0–0.1)
BASOPHILS NFR BLD: 0 % (ref 0–2)
BILIRUB SERPL-MCNC: 0.4 MG/DL (ref 0.2–1)
BUN SERPL-MCNC: 16 MG/DL (ref 7–18)
BUN/CREAT SERPL: 15 (ref 12–20)
CALCIUM SERPL-MCNC: 9.7 MG/DL (ref 8.5–10.1)
CHLORIDE SERPL-SCNC: 109 MMOL/L (ref 100–111)
CHOLEST SERPL-MCNC: 179 MG/DL
CO2 SERPL-SCNC: 29 MMOL/L (ref 21–32)
CREAT SERPL-MCNC: 1.06 MG/DL (ref 0.6–1.3)
DIFFERENTIAL METHOD BLD: ABNORMAL
EOSINOPHIL # BLD: 0.2 K/UL (ref 0–0.4)
EOSINOPHIL NFR BLD: 2 % (ref 0–5)
ERYTHROCYTE [DISTWIDTH] IN BLOOD BY AUTOMATED COUNT: 18.5 % (ref 11.6–14.5)
GLOBULIN SER CALC-MCNC: 3.3 G/DL (ref 2–4)
GLUCOSE SERPL-MCNC: 97 MG/DL (ref 74–99)
HCT VFR BLD AUTO: 33.6 % (ref 35–45)
HDLC SERPL-MCNC: 68 MG/DL (ref 40–60)
HDLC SERPL: 2.6 {RATIO} (ref 0–5)
HGB BLD-MCNC: 9.7 G/DL (ref 12–16)
IMM GRANULOCYTES # BLD AUTO: 0 K/UL (ref 0–0.04)
IMM GRANULOCYTES NFR BLD AUTO: 0 % (ref 0–0.5)
LDL-C, EXTERNAL: 85.2
LDLC SERPL CALC-MCNC: 85.2 MG/DL (ref 0–100)
LIPID PROFILE,FLP: ABNORMAL
LYMPHOCYTES # BLD: 2.9 K/UL (ref 0.9–3.6)
LYMPHOCYTES NFR BLD: 38 % (ref 21–52)
MCH RBC QN AUTO: 21.6 PG (ref 24–34)
MCHC RBC AUTO-ENTMCNC: 28.9 G/DL (ref 31–37)
MCV RBC AUTO: 74.8 FL (ref 78–100)
MONOCYTES # BLD: 0.5 K/UL (ref 0.05–1.2)
MONOCYTES NFR BLD: 6 % (ref 3–10)
NEUTS SEG # BLD: 4 K/UL (ref 1.8–8)
NEUTS SEG NFR BLD: 52 % (ref 40–73)
NRBC # BLD: 0 K/UL (ref 0–0.01)
NRBC BLD-RTO: 0 PER 100 WBC
PLATELET # BLD AUTO: 353 K/UL (ref 135–420)
PMV BLD AUTO: 11.6 FL (ref 9.2–11.8)
POTASSIUM SERPL-SCNC: 3.8 MMOL/L (ref 3.5–5.5)
PROT SERPL-MCNC: 7 G/DL (ref 6.4–8.2)
RBC # BLD AUTO: 4.49 M/UL (ref 4.2–5.3)
SODIUM SERPL-SCNC: 143 MMOL/L (ref 136–145)
TRIGL SERPL-MCNC: 129 MG/DL (ref ?–150)
VLDLC SERPL CALC-MCNC: 25.8 MG/DL
WBC # BLD AUTO: 7.6 K/UL (ref 4.6–13.2)

## 2022-01-18 PROCEDURE — 86003 ALLG SPEC IGE CRUDE XTRC EA: CPT

## 2022-01-18 PROCEDURE — 80053 COMPREHEN METABOLIC PANEL: CPT

## 2022-01-18 PROCEDURE — 82785 ASSAY OF IGE: CPT

## 2022-01-18 PROCEDURE — 77063 BREAST TOMOSYNTHESIS BI: CPT

## 2022-01-18 PROCEDURE — 80061 LIPID PANEL: CPT

## 2022-01-18 PROCEDURE — 82306 VITAMIN D 25 HYDROXY: CPT

## 2022-01-18 PROCEDURE — 36415 COLL VENOUS BLD VENIPUNCTURE: CPT

## 2022-01-18 PROCEDURE — 85025 COMPLETE CBC W/AUTO DIFF WBC: CPT

## 2022-01-18 PROCEDURE — 77080 DXA BONE DENSITY AXIAL: CPT

## 2022-01-23 LAB
A ALTERNATA IGE QN: 5.56 KU/L
A FUMIGATUS IGE QN: 0.49 KU/L
AMER ROACH IGE QN: 0.16 KU/L
AMER SYCAMORE IGE QN: 3.68 KU/L
BAHIA GRASS IGE QN: 5.63 KU/L
BERMUDA GRASS IGE QN: 2.81 KU/L
BOXELDER IGE QN: 0.58 KU/L
C HERBARUM IGE QN: 0.48 KU/L
CAT DANDER IGG QN: <0.1 KU/L
CLASS DESCRIPTION, 600268: ABNORMAL
COMMON RAGWEED IGE QN: 1.98 KU/L
D FARINAE IGE QN: 0.45 KU/L
D PTERONYSS IGE QN: 0.49 KU/L
DEPRECATED IGE QN: 2.87 KU/L
DOG DANDER IGE QN: <0.1 KU/L
ENGL PLANTAIN IGE QN: 1.17 KU/L
IGE SERPL-ACNC: 564 IU/ML (ref 6–495)
JOHNSON GRASS IGE QN: 1.04 KU/L
M RACEMOSUS IGE QN: 0.11 KU/L
MT JUNIPER IGE QN: 1.9 KU/L
MUGWORT IGE QN: 4.19 KU/L
NETTLE IGE QN: 1.67 KU/L
P NOTATUM IGE QN: <0.1 KU/L
S BOTRYOSUM IGE QN: 4.53 KU/L
SHEEP SORREL IGE QN: 3.62 KU/L
SWEET GUM IGE QN: 0.19 KU/L
TIMOTHY IGE QN: 2.77 KU/L
WHITE BIRCH IGE QN: 1.07 KU/L
WHITE ELM IGG QN: 5.32 KU/L
WHITE HICKORY IGE QN: 0.12 KU/L
WHITE MULBERRY IGE QN: 0.21 KU/L
WHITE OAK IGE QN: 0.47 KU/L

## 2022-01-26 ENCOUNTER — HOSPITAL ENCOUNTER (OUTPATIENT)
Dept: ULTRASOUND IMAGING | Age: 72
Discharge: HOME | End: 2022-01-26
Attending: NURSE PRACTITIONER
Payer: MEDICARE

## 2022-01-26 DIAGNOSIS — N28.89 RENAL MASS, LEFT: ICD-10-CM

## 2022-01-26 DIAGNOSIS — D17.71 ANGIOMYOLIPOMA OF LEFT KIDNEY: ICD-10-CM

## 2022-01-26 DIAGNOSIS — Z90.5 HISTORY OF PARTIAL NEPHRECTOMY: ICD-10-CM

## 2022-01-26 PROCEDURE — 76770 US EXAM ABDO BACK WALL COMP: CPT

## 2022-01-31 NOTE — PROGRESS NOTES
6 mm echogenic focus identified at the collecting system of the  mid left kidney, likely representative of a tiny nonobstructing renal calculus.

## 2022-02-04 ENCOUNTER — HOSPITAL ENCOUNTER (OUTPATIENT)
Dept: MRI IMAGING | Age: 72
Discharge: HOME | End: 2022-02-04
Attending: PHYSICAL MEDICINE & REHABILITATION
Payer: MEDICARE

## 2022-02-04 PROCEDURE — 72148 MRI LUMBAR SPINE W/O DYE: CPT

## 2022-02-09 RX ORDER — ESCITALOPRAM OXALATE 5 MG/1
5 TABLET ORAL DAILY
COMMUNITY
Start: 2021-12-29 | End: 2022-07-11

## 2022-02-09 RX ORDER — HYDROCHLOROTHIAZIDE 12.5 MG/1
12.5 TABLET ORAL DAILY
COMMUNITY
Start: 2022-01-26

## 2022-02-14 ENCOUNTER — OFFICE VISIT (OUTPATIENT)
Dept: PULMONOLOGY | Age: 72
End: 2022-02-14
Payer: MEDICARE

## 2022-02-14 VITALS
HEART RATE: 78 BPM | TEMPERATURE: 98.1 F | DIASTOLIC BLOOD PRESSURE: 66 MMHG | BODY MASS INDEX: 41.27 KG/M2 | SYSTOLIC BLOOD PRESSURE: 168 MMHG | OXYGEN SATURATION: 97 % | RESPIRATION RATE: 18 BRPM | HEIGHT: 61 IN | WEIGHT: 218.6 LBS

## 2022-02-14 DIAGNOSIS — E66.01 SEVERE OBESITY (BMI 35.0-35.9 WITH COMORBIDITY) (HCC): ICD-10-CM

## 2022-02-14 DIAGNOSIS — J45.50 POORLY CONTROLLED SEVERE PERSISTENT ASTHMA WITHOUT COMPLICATION: Primary | ICD-10-CM

## 2022-02-14 DIAGNOSIS — R53.81 PHYSICAL DECONDITIONING: ICD-10-CM

## 2022-02-14 DIAGNOSIS — R06.09 DYSPNEA ON EXERTION: ICD-10-CM

## 2022-02-14 PROCEDURE — G8536 NO DOC ELDER MAL SCRN: HCPCS | Performed by: INTERNAL MEDICINE

## 2022-02-14 PROCEDURE — 1101F PT FALLS ASSESS-DOCD LE1/YR: CPT | Performed by: INTERNAL MEDICINE

## 2022-02-14 PROCEDURE — G8417 CALC BMI ABV UP PARAM F/U: HCPCS | Performed by: INTERNAL MEDICINE

## 2022-02-14 PROCEDURE — G9711 PT HX TOT COL OR COLON CA: HCPCS | Performed by: INTERNAL MEDICINE

## 2022-02-14 PROCEDURE — G9899 SCRN MAM PERF RSLTS DOC: HCPCS | Performed by: INTERNAL MEDICINE

## 2022-02-14 PROCEDURE — G8427 DOCREV CUR MEDS BY ELIG CLIN: HCPCS | Performed by: INTERNAL MEDICINE

## 2022-02-14 PROCEDURE — 99214 OFFICE O/P EST MOD 30 MIN: CPT | Performed by: INTERNAL MEDICINE

## 2022-02-14 PROCEDURE — G8399 PT W/DXA RESULTS DOCUMENT: HCPCS | Performed by: INTERNAL MEDICINE

## 2022-02-14 PROCEDURE — G8432 DEP SCR NOT DOC, RNG: HCPCS | Performed by: INTERNAL MEDICINE

## 2022-02-14 PROCEDURE — 1090F PRES/ABSN URINE INCON ASSESS: CPT | Performed by: INTERNAL MEDICINE

## 2022-02-14 RX ORDER — BUDESONIDE AND FORMOTEROL FUMARATE DIHYDRATE 160; 4.5 UG/1; UG/1
2 AEROSOL RESPIRATORY (INHALATION) 2 TIMES DAILY
Qty: 3 EACH | Refills: 3 | Status: SHIPPED | OUTPATIENT
Start: 2022-02-14

## 2022-02-14 RX ORDER — EPINEPHRINE 0.3 MG/.3ML
0.3 INJECTION SUBCUTANEOUS
Qty: 2 EACH | Refills: 2 | Status: SHIPPED | OUTPATIENT
Start: 2022-02-14 | End: 2022-02-14

## 2022-02-14 NOTE — PROGRESS NOTES
Sachin Vazquez presents today for   Chief Complaint   Patient presents with    Asthma     follow up from 12/2/2021 per Dr. Abhishek Bailey, SOB    Results     labs 1/18/2022       Is someone accompanying this pt? No    Is the patient using any DME equipment during OV? Yes. nebulizer    -DME Company N/A    Depression Screening:  3 most recent PHQ Screens 2/14/2022   Little interest or pleasure in doing things Nearly every day   Feeling down, depressed, irritable, or hopeless Several days   Total Score PHQ 2 4   Trouble falling or staying asleep, or sleeping too much -   Feeling tired or having little energy -   Poor appetite, weight loss, or overeating -   Feeling bad about yourself - or that you are a failure or have let yourself or your family down -   Trouble concentrating on things such as school, work, reading, or watching TV -   Moving or speaking so slowly that other people could have noticed; or the opposite being so fidgety that others notice -   Thoughts of being better off dead, or hurting yourself in some way -   PHQ 9 Score -       Learning Assessment:  Learning Assessment 5/19/2021   PRIMARY LEARNER Patient   BARRIERS PRIMARY LEARNER -   PRIMARY LANGUAGE ENGLISH   LEARNER PREFERENCE PRIMARY READING     DEMONSTRATION     -   ANSWERED BY Patient   RELATIONSHIP SELF       Abuse Screening:  Abuse Screening Questionnaire 2/14/2022   Do you ever feel afraid of your partner? N   Are you in a relationship with someone who physically or mentally threatens you? N   Is it safe for you to go home? Y       Fall Risk  Fall Risk Assessment, last 12 mths 12/2/2021   Able to walk? Yes   Fall in past 12 months? 0   Do you feel unsteady? 0   Are you worried about falling 0         Coordination of Care:  1. Have you been to the ER, urgent care clinic since your last visit? Hospitalized since your last visit? No    2.  Have you seen or consulted any other health care providers outside of the [facility] [address] since your last visit? Include any pap smears or colon screening. Yes. NP Amarjit Pardo, PCP, Dr. Murdock.  [place], ophthalmologist

## 2022-02-14 NOTE — PROGRESS NOTES
[address] [place] [address] [facility]  Pulmonary, Critical Care, and Sleep Medicine    Pulmonary Office F/U  Name: Pamela Born 70 y.o. female  MRN: [id number]  : 1950  Service Date: 22  Chief Complaint:   Chief Complaint   Patient presents with    Asthma     follow up from 2021 per Dr. Dorothea Moran, SOB    Results     labs 2022       History of Present Illness:  Pamela Arnold is a 70 y.o. female, who presents to Pulmonary clinic for follow-up of shortness of breath. Patient was last seen on 2021. Pt reports asthma in the interval is stable  Pt using neb about once every 2-3 days  Pt reports able to cook dinner yesterday but having issues with back.   No nocturnal awakenings      Past Medical History:   Diagnosis Date    Alopecia     Arthritis     Asthma     Chronic lung disease     Chronic obstructive pulmonary disease (HCC)     mild    GERD (gastroesophageal reflux disease)     Hypercholesteremia     Hyperthyroidism     Thyroid disease      Past Surgical History:   Procedure Laterality Date    COLONOSCOPY N/A 2018    COLONOSCOPY/polypectomy/polyloop/ink tatoo  performed by Noemí Chu MD at [facility]    COLONOSCOPY N/A 2021    COLONOSCOPY with Polypectomies performed by Dorian Schilder, MD at [facility] END[facility]COPY    HX BUNIONECTOMY      bilateral and \"removed some arthritis in feet\"    HX CATARACT REMOVAL      with implants    HX COLONOSCOPY  2015    HX HAMMER TOE REPAIR      right     HX HEMORRHOIDECTOMY      HX HYSTERECTOMY N/A     HX NEPHRECTOMY Left     partial    HX ORTHOPAEDIC      HX OTHER SURGICAL      keiloid removed off chest near shoulder area    HX TOTAL COLECTOMY  10/6/15    lap right hemicolectomy    HX TUBAL LIGATION      US GUIDED CORE BREAST BIOPSY Right     benign     Family History   Problem Relation Age of Onset    Hypertension Mother     Stroke Mother     Cancer Father     Heart Disease Father     Hypertension Father     Asthma Sister     Breast Problems Sister     Cancer Sister     Cancer Brother         lung cancer    Asthma Brother     Breast Cancer Maternal Grandmother 78    Cancer Brother     Breast Cancer Maternal Grandfather     Diabetes Paternal Uncle      Social History     Socioeconomic History    Marital status:      Spouse name: Not on file    Number of children: Not on file    Years of education: Not on file    Highest education level: Not on file   Occupational History    Not on file   Tobacco Use    Smoking status: Former Smoker     Packs/day: 0.50     Years: 5.00     Pack years: 2.50     Types: Cigarettes     Start date: 3/13/1972     Quit date: 3/13/1977     Years since quittin.9    Smokeless tobacco: Never Used    Tobacco comment: quit smoking in s   Vaping Use    Vaping Use: Never used   Substance and Sexual Activity    Alcohol use: Not Currently     Alcohol/week: 0.0 standard drinks    Drug use: Never    Sexual activity: Yes     Partners: Male     Birth control/protection: None   Other Topics Concern    Not on file   Social History Narrative    Worked as  for [address] [place] [address] until . Denies any history of asbestos and or chemical exposure. Social Determinants of Health     Financial Resource Strain:     Difficulty of Paying Living Expenses: Not on file   Food Insecurity:     Worried About Running Out of Food in the Last Year: Not on file    Lennie of Food in the Last Year: Not on file   Transportation Needs:     Lack of Transportation (Medical): Not on file    Lack of Transportation (Non-Medical):  Not on file   Physical Activity:     Days of Exercise per Week: Not on file    Minutes of Exercise per Session: Not on file   Stress:     Feeling of Stress : Not on file   Social Connections:     Frequency of Communication with Friends and Family: Not on file    Frequency of Social Gatherings with Friends and Family: Not on file    Attends Jewish Services: Not on file    Active Member of Clubs or Organizations: Not on file    Attends Club or Organization Meetings: Not on file    Marital Status: Not on file   Intimate Partner Violence:     Fear of Current or Ex-Partner: Not on file    Emotionally Abused: Not on file    Physically Abused: Not on file    Sexually Abused: Not on file   Housing Stability:     Unable to Pay for Housing in the Last Year: Not on file    Number of [place] in the Last Year: Not on file    Unstable Housing in the Last Year: Not on file     Allergies   Allergen Reactions    Latex Swelling and Contact Dermatitis    Latex, Natural Rubber Itching    Other Food Hives, Diarrhea and Other (comments)     Mushrooms and oranges/OJ Other: throat swelling and mouth 9/16/209sores  Pt denes it's allergy related    Codeine Anaphylaxis     Pt denies    Iodine Povacry-Iso Alcohol Other (comments)     Blisters  9/16/20 Pt. denies    Vicodin [Hydrocodone-Acetaminophen] Itching    Tramadol Other (comments)     9/16/20 Pt. denies     Prior to Admission medications    Medication Sig Start Date End Date Taking? Authorizing Provider   escitalopram oxalate (LEXAPRO) 5 mg tablet Take 5 mg by mouth daily. 12/29/21   Provider, Historical   hydroCHLOROthiazide (HYDRODIURIL) 12.5 mg tablet Take 12.5 mg by mouth daily. 1/26/22   Provider, Historical   diazePAM (VALIUM) 5 mg tablet Take 1 tab by mouth 30 minutes prior to procedure. May repeat x 1 if needed  Indications: inducing of a relaxed easy state 12/21/21   Javier Chin MD   tiotropium bromide (Spiriva Respimat) 1.25 mcg/actuation inhaler Take 2 Puffs by inhalation daily. 11/12/21   Marina Burkett MD   methocarbamoL (Robaxin) 500 mg tablet Take 1 tablet bid - tid as needed  Patient taking differently: Take 500 mg by mouth three (3) times daily as needed.  9/29/21 Abbi Hernandez MD   ibuprofen (MOTRIN) 800 mg tablet Take 800 mg by mouth two (2) times daily as needed. 8/5/21   Provider, Historical   diclofenac EC (VOLTAREN) 75 mg EC tablet Take 1 Tablet by mouth two (2) times daily (with meals). Patient taking differently: Take 75 mg by mouth two (2) times daily as needed. 9/14/21   Abbi Hernandez MD   budesonide-formoteroL [facility]) 160-4.5 mcg/actuation HFAA Take 2 Puffs by inhalation two (2) times a day. Rinse and gargle thoroughly after each use 12/4/20   Jack Burr MD   hyoscyamine (Levsin) 0.125 mg tablet Take 1 Tab by mouth every four (4) hours as needed for Cramping. 11/9/20   Melody Villagomez MD   albuterol 2.5 mg /3 mL (0.083 %) nebu 3 mL, albuterol-ipratropium 2.5 mg-0.5 mg/3 ml nebu 3 mL 1 Dose by Nebulization route every four (4) hours as needed for Wheezing or Shortness of Breath. Provider, Historical   simvastatin (ZOCOR) 40 mg tablet Take 40 mg by mouth nightly. Provider, Historical   aspirin delayed-release 81 mg tablet Take 81 mg by mouth daily. Provider, Historical   montelukast (SINGULAIR) 10 mg tablet Take 10 mg by mouth daily. Provider, Historical   methimazole (TAPAZOLE) 10 mg tablet Take 10 mg by mouth daily. Provider, Historical   albuterol (PROVENTIL HFA, VENTOLIN HFA, PROAIR HFA) 90 mcg/actuation inhaler Take 2 Puffs by inhalation every four (4) hours as needed for Wheezing. Provider, Historical   omeprazole (PRILOSEC) 20 mg capsule Take 20 mg by mouth daily. Provider, Historical   cholecalciferol, vitamin D3, (VITAMIN D3) 2,000 unit tab Take 1 Tab by mouth daily.     Provider, Historical     Immunization History   Administered Date(s) Administered    COVID-19, Pfizer Purple top, DILUTE for use, 12+ yrs, 30mcg/0.3mL dose 03/19/2021, 04/09/2021    Influenza High Dose Vaccine PF 11/06/2019, 11/04/2020, 10/21/2021    Influenza Vaccine (Quad) PF (>6 Mo Flulaval, Fluarix, and [address] Fluzone [id number]) 10/07/2015       Review of Systems:  A complete review of systems was performed as stated in the HPI, all others are negative. Objective:    Physical Exam:  BP (!) 168/66 (BP 1 Location: Left upper arm, BP Patient Position: Sitting, BP Cuff Size: Adult long)   Pulse 78   Temp 98.1 °F (36.7 °C) (Temporal)   Resp 18   Ht 5' 1\" (1.549 m)   Wt 99.2 kg (218 lb 9.6 oz)   SpO2 97%   BMI 41.30 kg/m²   Vitals were personally reviewed  Gen: no acute distress, pleasant and cooperative, sitting up in chair  HEENT: normocephalic/atraumatic, no ocular drainage, EOMI, nasal bridge midline, unable to assess nasal and oral cavities due to patient wearing mask in the setting of COVID-19 pandemic  Neck: supple, trachea midline, no JVD  CVS: regular rate rhythm, S1/S2, no murmurs/rubs/gallops  Lungs: fair air entry B/L, faint scattered wheezes, no rales/rhonchi  Psych: normal memory, thought content, and processing    Labs: I have reviewed the patient's available labs  Lab Results   Component Value Date/Time    WBC 7.6 01/18/2022 08:24 AM    HGB 9.7 (L) 01/18/2022 08:24 AM    HCT 33.6 (L) 01/18/2022 08:24 AM    PLATELET 005 09/42/8278 08:24 AM    MCV 74.8 (L) 01/18/2022 08:24 AM   Peripheral eosinophil count 200 on 1/18/2022  Lab Results   Component Value Date/Time    Sodium 143 01/18/2022 09:13 AM    Potassium 3.8 01/18/2022 09:13 AM    Chloride 109 01/18/2022 09:13 AM    CO2 29 01/18/2022 09:13 AM    Anion gap 5 01/18/2022 09:13 AM    Glucose 97 01/18/2022 09:13 AM    BUN 16 01/18/2022 09:13 AM    Creatinine 1.06 01/18/2022 09:13 AM    BUN/Creatinine ratio 15 01/18/2022 09:13 AM    GFR est AA >60 01/18/2022 09:13 AM    GFR est non-AA 51 (L) 01/18/2022 09:13 AM    Calcium 9.7 01/18/2022 09:13 AM    Bilirubin, total 0.4 01/18/2022 09:13 AM    Alk.  phosphatase 105 01/18/2022 09:13 AM    Protein, total 7.0 01/18/2022 09:13 AM    Albumin 3.7 01/18/2022 09:13 AM    Globulin 3.3 01/18/2022 09:13 AM    A-G Ratio 1.1 01/18/2022 09:13 AM    ALT (SGPT) 14 01/18/2022 09:13 AM    AST (SGOT) 16 01/18/2022 09:13 AM     Lab Results   Component Value Date/Time    Immunoglobulin E 564 (H) 01/18/2022 08:24 AM    Immunoglobulin E 881 (H) 03/13/2015 10:25 AM         Imaging:  I have personally reviewed patient's imaging as follows--no new imaging in the interval  CT Results (most recent):  Results from [place] encounter on 07/19/19    CT ABD PELV W CONT    Narrative  CT abdomen and pelvis with enhancement    INDICATION: Left lower quadrant abdominal pain    TECHNIQUE: CT volumetric imaging obtained from the diaphragm to the level of the  pubic symphysis following the uneventful administration of oral and nonionic  intravenous contrast. Coronal, sagittal and axial reformations obtained. Patient  received 100 mL  Isovue 300 intravenously    All CT scans at this facility are performed using dose optimization technique as  appropriate to the performed exam, to include automated exposure control,  adjustment of the mA and/or kV according to patient's size (Including  appropriate matching for site-specific examinations), or use of iterative  reconstruction technique. COMPARISON: 6/24/2019    FINDINGS:  Lung bases: Minimal dependent atelectasis  Liver: Stable left hepatic subcapsular 9 mm hypodensity  Spleen: Negative  Pancreas: Negative  Adrenal glands: Negative  Gallbladder: Peripherally calcified gallstone. No cholecystitis    Left Kidney: Partially exophytic complex mass anterior left mid kidney 1.2 x 1.2  cm. Right Kidney: Negative  Bladder: Mildly distended and unremarkable    Stomach and bowel:Severe sigmoid diverticulosis with very minimal pericolonic  stranding in the proximal sigmoid colon but overall improved since comparison. No evidence of adjacent fluid or significant peritoneal reflection thickening. Surgical sutures noted in the right hepatic flexure with evidence of right  hemicolectomy.     Peritoneal spaces: No free intraperitoneal fluid or gas. Vessels:Non aneurysmal.  Lymph nodes: No enlargement  Abdominal wall: Supraumbilical fat-containing midline hernia measuring 9 cm TV  by 5 cm CC. There about 3 x 4 cm hernia neck. SKELETAL FINDINGS:  No destructive lesion. Multilevel moderate to advanced degenerative disc and facet joint disease in the  lumbar spine. Impression  IMPRESSION:    1. Severe sigmoid diverticulitis with very minimal pericolonic stranding that  may represent early inflammation or scarring due to prior inflammation. Overall  improved appearance since comparison CT 2019.  2. Redemonstration of left kidney partially exophytic complex mass 1.2 cm which  could represent enhancing mass/renal cell carcinoma versus hemorrhagic cyst with  recent hemorrhage, recommend evaluation with CT or MRI renal protocol with and  without contrast or at least initially with ultrasound to exclude solid mass. 3. Moderate sized periumbilical fat-containing hernia. 4. Cholelithiasis without evidence of acute cholecystitis. PFTs:  2020 as follows, spirometry shows a mild obstructive defect, significant bronchodilator response seen, lung volumes are normal, diffusion capacity is borderline low. 6-minute walk test from 2020, no significant oxygen desaturation seen, lowest SPO2 was 95%, patient ambulated 427 m over 6 minutes on room air.     TTE:  I have reviewed the patient's TTE results  Results for orders placed or performed during the hospital encounter of 10/06/15   2D ECHO COMPLETE ADULT (TTE) W OR WO CONTR     Status: None    [address]  [place], [address] [phone]    Transthoracic Echocardiogram    Patient: Mary Scruggs  MRN: [id number]  ACCT #: [de-identified]  : 10-Brian-1950  Age: 72 years  Gender: Female  Height: 61 in  Weight: 147.6 lb  BSA: 1.66 m squared  BP: 113 / 60 mmHg  Study date: 07-Oct-2015  Status: Routine  Location:  [facility] DMS ACC #: [id number]    Allergies: LATEX, NATURAL RUBBER, HYDROCODONE-ACETAMINOPHEN    Interpreting Group:  [facility]  Interpreting Physician:  Raymona Sever, MD  Technologist:  PANCHO Calix  Referring_Ordering Physician:  Jahaira Altman. Darleen Enrique MD    SUMMARY:  Left ventricle: Size was normal. Systolic function was normal by EF  (biplane method of disks). Ejection fraction was estimated in the range of  65 % to 70 %. No obvious wall motion abnormalities identified in the views  obtained. Wall thickness was normal. Left ventricular diastolic function  parameters were normal for the patient's age. Right ventricle: Systolic function was normal. Systolic pressure was  mildly increased. Estimated peak pressure was 41 mmHg. Left atrium: The atrium was mildly to moderately dilated. LA volume index  was 41 ml/m squared. Inferior vena cava, hepatic veins: The inferior vena cava was upper normal  in size. The respirophasic change in diameter was less than 50%. INDICATIONS: Tachycardia. HISTORY: Prior history: Patient has no history of cardiovascular disease. PROCEDURE: This was a routine study. The study included complete 2D  imaging, M-mode, complete spectral Doppler, and color Doppler. The heart  rate was 100 bpm, at the start of the study. Systolic blood pressure was  113 mmHg, at the start of the study. Diastolic blood pressure was 60 mmHg,  at the start of the study. Images were obtained from the parasternal,  apical, subcostal, and suprasternal notch acoustic windows. Image quality  was adequate. LEFT VENTRICLE: Size was normal. Systolic function was normal by EF  (biplane method of disks). Ejection fraction was estimated in the range of  65 % to 70 %. No obvious wall motion abnormalities identified in the views  obtained. Wall thickness was normal. DOPPLER: Left ventricular diastolic  function parameters were normal for the patient's age.     RIGHT VENTRICLE: The size was normal. Systolic function was normal.  DOPPLER: Systolic pressure was mildly increased. Estimated peak pressure  was 41 mmHg. LEFT ATRIUM: The atrium was mildly to moderately dilated. LA volume index  was 41 ml/m squared. RIGHT ATRIUM: Size was normal.    MITRAL VALVE: There was annular calcification. There was minimal diffuse  thickening. DOPPLER: There was no evidence for stenosis. There was trivial  regurgitation. AORTIC VALVE: The valve was probably trileaflet. Leaflets exhibited good  mobility. DOPPLER: There was no stenosis. There was no significant  regurgitation. TRICUSPID VALVE: Normal valve structure. DOPPLER: There was no evidence  for tricuspid stenosis. There was trivial regurgitation. Right atrial  pressure estimate: 10 mmHg. PULMONIC VALVE: Not well visualized, but normal Doppler findings. DOPPLER:  There was no stenosis. There was no significant regurgitation. AORTA: The root exhibited normal size. SYSTEMIC VEINS: IVC: The inferior vena cava was upper normal in size. The  respirophasic change in diameter was less than 50%. PERICARDIUM: No significant pericardial effusion identified.     SYSTEM MEASUREMENT TABLES    2D  Ao Diam: 2.64 cm  IVSd: 1.02 cm  LVIDd: 4.26 cm  LVIDs: 2.88 cm  LVPWd: 1.02 cm  SV(Teich): 49.64 ml  EDV(Teich): 81.35 ml  ESV(Cube): 23.92 ml  ESV(Teich): 31.71 ml  LAAs A2C: 20.8 cm2  LAAs A4C: 22.39 cm2  LAESV A-L A2C: 63.34 ml  LAESV A-L A4C: 68.04 ml  LAESV Index (A-L): 41.07 ml/m2  LAESV MOD A2C: 61.46 ml  LAESV MOD A4C: 64.14 ml  LAESV(A-L): 68.18 ml  LALs A2C: 5.8 cm  LALs A4C: 6.25 cm  LVEDV MOD A2C: 48.72 ml  LVEDV MOD A4C: 58.35 ml  LVEDV MOD BP: 53.55 ml  LVESV MOD A2C: 13.4 ml  LVESV MOD A4C: 24.36 ml  LVESV MOD BP: 18.26 ml  LVLd A2C: 6.86 cm  LVLd A4C: 6.98 cm  LVLs A2C: 5.61 cm  LVLs A4C: 5.8 cm  LVOT Diam: 1.86 cm  RA Area: 15.44 cm2  RV Minor: 3.89 cm  SV MOD A2C: 35.32 ml  SV MOD A4C: 33.99 ml    CW  TR Vmax: 2.76 m/s  IVRT: 50.75 ms  TR maxP.55 mmHG    PW  LVOT VTI: 30.16 cm  LVOT Vmax: 1.92 m/s  LVOT Vmean: 1.23 m/s  MV A Cy: 1.02 m/s  MV Dec [place]: 8.3 m/s2  MV DecT: 155.56 ms  MV E Cy: 1.29 m/s  MV E/A Ratio: 1.27  HR: 97.42 BPM  LVCI Dopp: 4.8 l/minm2  LVCO Dopp: 7.97 L/min  LVOT maxP.77 mmHG  LVOT meanP mmHG  LVSI Dopp: 49.26 ml/m2  LVSV Dopp: 81.77 ml  P Vein A: 0.28 m/s  P Vein A Dur: 103.81 ms    Prepared and E-signed by    Tristan Naranjo MD  Signed 08-Oct-2015 14:10:31       20   ECHO ADULT COMPLETE 2020    Narrative · LV: Estimated LVEF is 60 - 65%. Visually measured ejection fraction. Normal cavity size, wall thickness and systolic function (ejection   fraction normal). Wall motion: normal. Mild (grade 1) left ventricular   diastolic dysfunction. · LA: Left Atrium volume index is 29.54 mL/m2. · RV: Not well visualized. · TV: Mild tricuspid valve regurgitation is present. · PA: Pulmonary arterial systolic pressure is 30 mmHg. · Pericardium: Pericardial fat pad present. · MV: Mild mitral valve regurgitation is present. Signed by: Vin Jo MD       Unintended Level 3 polysomnography from 3/2/2021, shows no evidence of EREN, AHI of 3.6      Assessment and Plan:  70 y.o. female with:    Impression:  1. Poorly controlled severe persistent asthma with recurrent exacerbations:  Pt had 2 exacerbations requiring corticosteroids in the last 6 months. Pt has hx of elevated IgE, 881 on 3/13/2015 and 564 on 2022. Patient also has elevated IgE to multiple allergens including dust, grasses, molds, trees, ragweed. 2.  Dyspnea on exertion/shortness of breath: Multifactorial, due to severe asthma and deconditioning  3. Deconditioning  4. Morbid obesity: Body mass index is 41.19 kg/m². 5.  Snoring as well as excessive daytime sleepiness, fragmented sleep, and infrequent morning headaches and nocturia in the setting of cardiomyopathy --- level 3 unattended HST on 3/2/21, AHI 3.6, thus no EREN  6.   Mild cardiomyopathy: Seen on transthoracic echo, no intervention required    Plan:  -Spent time discussing pt's poorly controlled asthma and her elevated total IgE levels. We discussed possibility of Xolair - I went over potential benefits of medication as well as potential risks including anaphylaxis, delayed anaphylaxis, and insert warning regarding increased risk of malignancy. I reviewed administration schedule - and the need to be monitored post injection and must have epi-pen. Pt was agreeable and all of her questions were answered. Order placed, and given to ancillary staff for prior authorization.  -Prescription for EpiPen sent. Advised patient to use in setting of anaphylaxis  -Start Xolair 375 mg SQ every 2 weeks  -Continue Symbicort 160/4.5 MCG 2 puffs twice daily with spacer. Counseled patient to rinse mouth thoroughly after each use and wash and dry spacer completely  -Continue Spiriva Respimat 1.25 mcg 2 puffs once daily  -Continue albuterol HFA 1-2puffs q4-6h PRN. Counseled patient that this is their rescue inhaler. Also counseled patient regarding premedication 15-30m prior to exercise or exposure to very cold air  -Counseled patient on proper inhaler technique  -Counseled patient to avoid potential triggers of asthma.   -Immunizations reviewed, influenza and Covid vaccinations up-to-date  -Weight loss  -Advised to remain active and continue exercises from pulmonary rehab  -Counseled patient regarding lifestyle precautions in COVID-19 pandemic including wearing mask in public and confined spaces, social/physical distancing, frequent hand hygiene, etc.  Advised to get COVID-19 booster when possible    Follow-up and Dispositions    · Return in about 4 months (around 6/14/2022).        Orders Placed This Encounter    EPINEPHrine (EPIPEN) 0.3 mg/0.3 mL injection    budesonide-formoteroL (SYMBICORT) 160-4.5 mcg/actuation HFAA         Jeferson Morgan MD/MPH     Pulmonary, [address] [facility]

## 2022-02-14 NOTE — LETTER
2/15/2022    Patient: Sherrill Dwyer   YOB: 1950   Date of Visit: 2/14/2022     Tamir Roque NP  [address] [place] [address]. [facility] [address] [phone]  Via Fax: [phone]    Dear Tamir Roque NP,      Thank you for referring Ms. Sherrill Dwyer to [address] [place] [address] for evaluation. My notes for this consultation are attached. If you have questions, please do not hesitate to call me. I look forward to following your patient along with you.       Sincerely,    Clotilde Colindres MD

## 2022-02-22 PROBLEM — J45.30 MILD PERSISTENT ASTHMA: Status: ACTIVE | Noted: 2022-02-22

## 2022-02-22 RX ORDER — ACETAMINOPHEN 325 MG/1
650 TABLET ORAL AS NEEDED
Status: CANCELLED
Start: 2022-03-01

## 2022-02-22 RX ORDER — ALBUTEROL SULFATE 0.83 MG/ML
2.5 SOLUTION RESPIRATORY (INHALATION) AS NEEDED
Status: CANCELLED
Start: 2022-03-01

## 2022-02-22 RX ORDER — DIPHENHYDRAMINE HYDROCHLORIDE 50 MG/ML
50 INJECTION, SOLUTION INTRAMUSCULAR; INTRAVENOUS AS NEEDED
Status: CANCELLED
Start: 2022-03-01

## 2022-02-22 RX ORDER — HYDROCORTISONE SODIUM SUCCINATE 100 MG/2ML
100 INJECTION, POWDER, FOR SOLUTION INTRAMUSCULAR; INTRAVENOUS AS NEEDED
Status: CANCELLED | OUTPATIENT
Start: 2022-03-01

## 2022-02-22 RX ORDER — DIPHENHYDRAMINE HYDROCHLORIDE 50 MG/ML
25 INJECTION, SOLUTION INTRAMUSCULAR; INTRAVENOUS AS NEEDED
Status: CANCELLED
Start: 2022-03-01

## 2022-02-22 RX ORDER — EPINEPHRINE 1 MG/ML
0.3 INJECTION, SOLUTION, CONCENTRATE INTRAVENOUS AS NEEDED
Status: CANCELLED | OUTPATIENT
Start: 2022-03-01

## 2022-02-22 RX ORDER — ONDANSETRON 2 MG/ML
8 INJECTION INTRAMUSCULAR; INTRAVENOUS AS NEEDED
Status: CANCELLED | OUTPATIENT
Start: 2022-03-01

## 2022-02-23 ENCOUNTER — OFFICE VISIT (OUTPATIENT)
Dept: CARDIOLOGY CLINIC | Age: 72
End: 2022-02-23
Payer: MEDICARE

## 2022-02-23 ENCOUNTER — APPOINTMENT (OUTPATIENT)
Dept: INFUSION THERAPY | Age: 72
End: 2022-02-23

## 2022-02-23 VITALS
SYSTOLIC BLOOD PRESSURE: 144 MMHG | DIASTOLIC BLOOD PRESSURE: 54 MMHG | HEART RATE: 75 BPM | WEIGHT: 218 LBS | HEIGHT: 61 IN | BODY MASS INDEX: 41.16 KG/M2 | OXYGEN SATURATION: 99 %

## 2022-02-23 DIAGNOSIS — I10 ESSENTIAL HYPERTENSION: ICD-10-CM

## 2022-02-23 DIAGNOSIS — E78.00 HYPERCHOLESTEREMIA: ICD-10-CM

## 2022-02-23 DIAGNOSIS — E66.01 SEVERE OBESITY (BMI >= 40) (HCC): ICD-10-CM

## 2022-02-23 DIAGNOSIS — R06.02 SOB (SHORTNESS OF BREATH) ON EXERTION: ICD-10-CM

## 2022-02-23 DIAGNOSIS — E05.90 HYPERTHYROIDISM: ICD-10-CM

## 2022-02-23 DIAGNOSIS — R00.2 PALPITATIONS: Primary | ICD-10-CM

## 2022-02-23 PROCEDURE — G8399 PT W/DXA RESULTS DOCUMENT: HCPCS | Performed by: INTERNAL MEDICINE

## 2022-02-23 PROCEDURE — G9899 SCRN MAM PERF RSLTS DOC: HCPCS | Performed by: INTERNAL MEDICINE

## 2022-02-23 PROCEDURE — G8536 NO DOC ELDER MAL SCRN: HCPCS | Performed by: INTERNAL MEDICINE

## 2022-02-23 PROCEDURE — 1090F PRES/ABSN URINE INCON ASSESS: CPT | Performed by: INTERNAL MEDICINE

## 2022-02-23 PROCEDURE — G9711 PT HX TOT COL OR COLON CA: HCPCS | Performed by: INTERNAL MEDICINE

## 2022-02-23 PROCEDURE — G8752 SYS BP LESS 140: HCPCS | Performed by: INTERNAL MEDICINE

## 2022-02-23 PROCEDURE — 99204 OFFICE O/P NEW MOD 45 MIN: CPT | Performed by: INTERNAL MEDICINE

## 2022-02-23 PROCEDURE — G8427 DOCREV CUR MEDS BY ELIG CLIN: HCPCS | Performed by: INTERNAL MEDICINE

## 2022-02-23 PROCEDURE — 1101F PT FALLS ASSESS-DOCD LE1/YR: CPT | Performed by: INTERNAL MEDICINE

## 2022-02-23 PROCEDURE — G8417 CALC BMI ABV UP PARAM F/U: HCPCS | Performed by: INTERNAL MEDICINE

## 2022-02-23 PROCEDURE — G8510 SCR DEP NEG, NO PLAN REQD: HCPCS | Performed by: INTERNAL MEDICINE

## 2022-02-23 PROCEDURE — G8754 DIAS BP LESS 90: HCPCS | Performed by: INTERNAL MEDICINE

## 2022-02-23 NOTE — PATIENT INSTRUCTIONS
Medications Discontinued During This Encounter   Medication Reason    diazePAM (VALIUM) 5 mg tablet LIST CLEANUP    diclofenac EC (VOLTAREN) 75 mg EC tablet Not A Current Medication    methocarbamoL (Robaxin) 500 mg tablet Therapy Completed          Learning About the Mediterranean Diet  What is the Mediterranean diet? The Mediterranean diet is a style of eating rather than a diet plan. It features foods eaten in [place], [place], [place] and [place], and other countries along the [facility]. It emphasizes eating foods like fish, fruits, vegetables, beans, high-fiber breads and whole grains, nuts, and olive oil. This style of eating includes limited red meat, cheese, and sweets. Why choose the Mediterranean diet? A Mediterranean-style diet may improve heart health. It contains more fat than other heart-healthy diets. But the fats are mainly from nuts, unsaturated oils (such as fish oils and olive oil), and certain nut or seed oils (such as canola, soybean, or flaxseed oil). These fats may help protect the heart and blood vessels. How can you get started on the Mediterranean diet? Here are some things you can do to switch to a more Mediterranean way of eating. What to eat  · Eat a variety of fruits and vegetables each day, such as grapes, blueberries, tomatoes, broccoli, peppers, figs, olives, spinach, eggplant, beans, lentils, and chickpeas. · Eat a variety of whole-grain foods each day, such as oats, brown rice, and whole wheat bread, pasta, and couscous. · Eat fish at least 2 times a week. Try tuna, salmon, mackerel, lake trout, herring, or sardines. · Eat moderate amounts of low-fat dairy products, such as milk, cheese, or yogurt. · Eat moderate amounts of poultry and eggs. · Choose healthy (unsaturated) fats, such as nuts, olive oil, and certain nut or seed oils like canola, soybean, and flaxseed. · Limit unhealthy (saturated) fats, such as butter, palm oil, and coconut oil.  And limit fats found in animal products, such as meat and dairy products made with whole milk. Try to eat red meat only a few times a month in very small amounts. · Limit sweets and desserts to only a few times a week. This includes sugar-sweetened drinks like soda. The Mediterranean diet may also include red wine with your meal1 glass each day for women and up to 2 glasses a day for men. Tips for eating at home  · Use herbs, spices, garlic, lemon zest, and citrus juice instead of salt to add flavor to foods. · Add avocado slices to your sandwich instead of lindsey. · Have fish for lunch or dinner instead of red meat. Brush the fish with olive oil, and broil or grill it. · Sprinkle your salad with seeds or nuts instead of cheese. · Cook with olive or canola oil instead of butter or oils that are high in saturated fat. · Switch from 2% milk or whole milk to 1% or fat-free milk. · Dip raw vegetables in a vinaigrette dressing or hummus instead of dips made from mayonnaise or sour cream.  · Have a piece of fruit for dessert instead of a piece of cake. Try baked apples, or have some dried fruit. Tips for eating out  · Try broiled, grilled, baked, or poached fish instead of having it fried or breaded. · Ask your  to have your meals prepared with olive oil instead of butter. · Order dishes made with marinara sauce or sauces made from olive oil. Avoid sauces made from cream or mayonnaise. · Choose whole-grain breads, whole wheat pasta and pizza crust, brown rice, beans, and lentils. · Cut back on butter or margarine on bread. Instead, you can dip your bread in a small amount of olive oil. · Ask for a side salad or grilled vegetables instead of french fries or chips. Where can you learn more? Go to http://www.[employer].com/  Enter O407 in the search box to learn more about \"Learning About the Mediterranean Diet. \"  Current as of: December 17, 2020               Content Version: 13.0  © 7051-2746 [employer], Incorporated. Care instructions adapted under license by [employer] (which disclaims liability or warranty for this information). If you have questions about a medical condition or this instruction, always ask your healthcare professional. [place] [address] any warranty or liability for your use of this information.

## 2022-02-23 NOTE — PROGRESS NOTES
HISTORY OF PRESENT ILLNESS  Grant Logan is a 70 y.o. female. 2/22 seen after little over 5 years for palpitations and shortness of breath. Has gained about 50 to 60 pounds of weight in the last 1 year    Palpitations   The history is provided by the patient (fast pulse). This is a chronic problem. The current episode started more than 1 week ago (yrs). The problem occurs every several days (2/wk). The problem is associated with exercise (laundry). Associated symptoms include shortness of breath. Pertinent negatives include no diaphoresis, no fever, no claudication, no orthopnea, no PND, no syncope, no nausea, no vomiting, no dizziness, no weakness, no cough, no hemoptysis and no sputum production. Risk factors include no risk factors. Her past medical history is significant for hyperthyroidism and anemia. Shortness of Breath  The history is provided by the patient. This is a recurrent problem. The problem occurs intermittently. The current episode started more than 1 week ago (yrs). Pertinent negatives include no fever, no ear pain, no neck pain, no cough, no sputum production, no hemoptysis, no wheezing, no PND, no orthopnea, no syncope, no vomiting, no rash, no leg swelling and no claudication. The problem's precipitants include exercise (Shopping in [address] [place] [address] for about 20 minutes;). She has had prior hospitalizations. She has had prior ED visits. Associated medical issues include chronic lung disease. Associated medical issues do not include CAD or heart failure. New Patient  Associated symptoms include shortness of breath. Review of Systems   Constitutional: Negative for chills, diaphoresis, fever and weight loss. HENT: Negative for ear pain and hearing loss. Eyes: Negative for blurred vision. Respiratory: Positive for shortness of breath. Negative for cough, hemoptysis, sputum production, wheezing and stridor. Cardiovascular: Positive for palpitations.  Negative for orthopnea, claudication, leg swelling, syncope and PND. Gastrointestinal: Negative for heartburn, nausea and vomiting. Musculoskeletal: Negative for myalgias and neck pain. Skin: Negative for rash. Neurological: Negative for dizziness, tingling, tremors, focal weakness, loss of consciousness and weakness. Psychiatric/Behavioral: Negative for depression and suicidal ideas.      Family History   Problem Relation Age of Onset    Hypertension Mother     Stroke Mother     Cancer Father     Heart Disease Father     Hypertension Father     Asthma Sister     Breast Problems Sister     Cancer Sister     Cancer Brother         lung cancer    Asthma Brother     Breast Cancer Maternal Grandmother 78    Cancer Brother     Breast Cancer Maternal Grandfather     Diabetes Paternal Uncle        Past Medical History:   Diagnosis Date    Alopecia     Arthritis     Asthma     Chronic lung disease     Chronic obstructive pulmonary disease ([facility] [address]     mild    GERD (gastroesophageal reflux disease)     Hypercholesteremia     Hypertension     Hyperthyroidism     Thyroid disease        Past Surgical History:   Procedure Laterality Date    COLONOSCOPY N/A 2/21/2018    COLONOSCOPY/polypectomy/polyloop/ink tatoo  performed by Karen Fink MD at [facility] ENDOSCOPY    COLONOSCOPY N/A 2/24/2021    COLONOSCOPY with Polypectomies performed by Sarai Walls MD at [facility] ENDOSCOPY    HX BUNIONECTOMY      bilateral and \"removed some arthritis in feet\"    HX CATARACT REMOVAL      with implants    HX COLONOSCOPY  08/2015    HX HAMMER TOE REPAIR      right     HX HEMORRHOIDECTOMY      HX HYSTERECTOMY N/A     HX NEPHRECTOMY Left 2019    partial    HX ORTHOPAEDIC      HX OTHER SURGICAL      keiloid removed off chest near shoulder area    HX TOTAL COLECTOMY  10/6/15    lap right hemicolectomy    HX TUBAL LIGATION      US GUIDED CORE BREAST BIOPSY Right 2013    benign       Social History     Tobacco Use    Smoking status: Former Smoker Packs/day: 0.50     Years: 5.00     Pack years: 2.50     Types: Cigarettes     Start date: 3/13/1972     Quit date: 3/13/1977     Years since quittin.9    Smokeless tobacco: Never Used    Tobacco comment: quit smoking in    Substance Use Topics    Alcohol use: Not Currently     Alcohol/week: 0.0 standard drinks       Allergies   Allergen Reactions    Latex Swelling and Contact Dermatitis    Latex, Natural Rubber Itching    Other Food Hives, Diarrhea and Other (comments)     Mushrooms and oranges/OJ Other: throat swelling and mouth sores  Pt denes it's allergy related    Codeine Anaphylaxis     Pt denies    Iodine Povacry-Iso Alcohol Other (comments)     Blisters  20 Pt. denies    Vicodin [Hydrocodone-Acetaminophen] Itching    Tramadol Other (comments)     20 Pt. denies       Outpatient Medications Marked as Taking for the 22 encounter (Office Visit) with Marjan Elizondo MD   Medication Sig Dispense Refill    budesonide-formoteroL (SYMBICORT) 160-4.5 mcg/actuation HFAA Take 2 Puffs by inhalation two (2) times a day. Rinse and gargle thoroughly after each use. Disp 3 inhalers 3 Each 3    escitalopram oxalate (LEXAPRO) 5 mg tablet Take 5 mg by mouth daily.  hydroCHLOROthiazide (HYDRODIURIL) 12.5 mg tablet Take 12.5 mg by mouth daily.  tiotropium bromide (Spiriva Respimat) 1.25 mcg/actuation inhaler Take 2 Puffs by inhalation daily. 12 g 3    hyoscyamine (Levsin) 0.125 mg tablet Take 1 Tab by mouth every four (4) hours as needed for Cramping. 40 Tab 0    albuterol 2.5 mg /3 mL (0.083 %) nebu 3 mL, albuterol-ipratropium 2.5 mg-0.5 mg/3 ml nebu 3 mL 1 Dose by Nebulization route every four (4) hours as needed for Wheezing or Shortness of Breath.  simvastatin (ZOCOR) 40 mg tablet Take 40 mg by mouth nightly.  aspirin delayed-release 81 mg tablet Take 81 mg by mouth daily.  montelukast (SINGULAIR) 10 mg tablet Take 10 mg by mouth daily.       methimazole (TAPAZOLE) 10 mg tablet Take 5 mg by mouth daily.  albuterol (PROVENTIL HFA, VENTOLIN HFA, PROAIR HFA) 90 mcg/actuation inhaler Take 2 Puffs by inhalation every four (4) hours as needed for Wheezing.  omeprazole (PRILOSEC) 20 mg capsule Take 20 mg by mouth daily.  cholecalciferol, vitamin D3, (VITAMIN D3) 2,000 unit tab Take 1 Tab by mouth daily. 9/20 echo  Interpretation Summary    · LV: Estimated LVEF is 60 - 65%. Visually measured ejection fraction. Normal cavity size, wall thickness and systolic function (ejection fraction normal). Wall motion: normal. Mild (grade 1) left ventricular diastolic dysfunction. · LA: Left Atrium volume index is 29.54 mL/m2. · RV: Not well visualized. · TV: Mild tricuspid valve regurgitation is present. · PA: Pulmonary arterial systolic pressure is 30 mmHg. · Pericardium: Pericardial fat pad present. · MV: Mild mitral valve regurgitation is present. Comparison Study Information      Prior Study    There is a prior study available for comparison. Prior study date: 10/7/2015. As compared to the previous study, there are no significant changes. Visit Vitals  BP (!) 139/57 (BP 1 Location: Right arm, BP Patient Position: Sitting)   Pulse 75   Ht 5' 1\" (1.549 m)   Wt 98.9 kg (218 lb)   SpO2 99%   BMI 41.19 kg/m²       Physical Exam  Constitutional:       General: She is not in acute distress. Appearance: She is well-developed. She is obese. She is not diaphoretic. HENT:      Head: Atraumatic. Mouth/Throat:      Pharynx: No oropharyngeal exudate. Eyes:      General: No scleral icterus. Conjunctiva/sclera: Conjunctivae normal.   Neck:      Vascular: No JVD. Comments: Thyroid bruit absent  Cardiovascular:      Rate and Rhythm: Normal rate and regular rhythm. Heart sounds: Murmur (2/6 systolic murmur heard at aortic and mitral area with no radiation) heard. No gallop.     Pulmonary:      Effort: Pulmonary effort is normal.      Breath sounds: Normal breath sounds. No stridor. No wheezing or rales. Abdominal:      Palpations: Abdomen is soft. Tenderness: There is no abdominal tenderness. Musculoskeletal:         General: No tenderness. Normal range of motion. Cervical back: Neck supple. Right lower leg: No edema. Left lower leg: No edema. Skin:     General: Skin is warm. Neurological:      Mental Status: She is alert and oriented to person, place, and time. Motor: No abnormal muscle tone. Psychiatric:         Behavior: Behavior normal.         ASSESSMENT and PLAN    Results for Daniele Carmona (MRN [id number]) as of 2/23/2022 09:10   Ref. Range 8/27/2020 08:21 1/27/2021 09:34 7/14/2021 09:23 1/18/2022 09:13   Triglyceride Latest Ref Range: <150 MG/DL 91  73 129   Cholesterol, total Latest Ref Range: <200 MG/  148 179   HDL Cholesterol Latest Ref Range: 40 - 60 MG/DL 90 (H)  79 (H) 68 (H)   CHOL/HDL Ratio Latest Ref Range: 0 - 5.0   1.7  1.9 2.6   VLDL, calculated Latest Units: MG/DL 18.2  14.6 25.8   LDL, calculated Latest Ref Range: 0 - 100 MG/DL 46.8  54.4 85.2     12/16 Patient seen in hospital for elevated cardiac enzyme- probably secondary to profound anemia and tachycardia from hyperthyroidism. Has mild stable dyspnea. No chest pain. ETT revealed 1-2 mm ST segment depression with no chest pain. Patient remains asymtpomatic. Will continue risk factor modification. Patient had significant thyroid bruit and known thyroid nodules- now has resolved. Continue intense risk factor modification. Diagnoses and all orders for this visit:    1. Palpitations    2. SOB (shortness of breath) on exertion  -     ECHO ADULT COMPLETE; Future  -     NUCLEAR CARDIAC STRESS TEST; Future    3. Essential hypertension    4. Hypercholesteremia    5. Severe obesity (BMI >= 40) (HCC)    6. Hyperthyroidism        Pertinent laboratory and test data reviewed and discussed with patient.   See patient instructions also for other medical advice given    Medications Discontinued During This Encounter   Medication Reason    diazePAM (VALIUM) 5 mg tablet LIST CLEANUP    diclofenac EC (VOLTAREN) 75 mg EC tablet Not A Current Medication    methocarbamoL (Robaxin) 500 mg tablet Therapy Completed       Follow-up and Dispositions    · Return in about 6 weeks (around 4/6/2022), or if symptoms worsen or fail to improve, for post test.       2/23/2022 dyspnea could be due to significant weight gain and underlying hyperthyroidism history but underlying cardiac structural and functional disease and ischemia should be ruled out given her age and risk factors. Check echo and a stress test.  Healthy diet was discussed and Mediterranean diet guidelines given. Would like to increase the dose of methimazole and see how she responds symptomatically given palpitations and dyspnea. Discussed with patient and she will discuss with her endocrinologist, Dr. Nneka Lyon.

## 2022-02-23 NOTE — PROGRESS NOTES
1. Have you been to the ER, urgent care clinic since your last visit? Hospitalized since your last visit?     no    2. Have you seen or consulted any other health care providers outside of the [address] [place] [address] since your last visit? Include any pap smears or colon screening. no    3. Since your last visit, have you had any of the following symptoms? Fast heart rate      Explain:    4. Have you had any blood work, X-rays or cardiac testing? No     Requested: NO     In [facility]: NO    5. Where do you normally have your labs drawn? 6. Do you need any refills today?   no          Patient has not had medications this morning.

## 2022-03-01 ENCOUNTER — HOSPITAL ENCOUNTER (OUTPATIENT)
Dept: INFUSION THERAPY | Age: 72
Discharge: HOME | End: 2022-03-01
Payer: MEDICARE

## 2022-03-01 VITALS
OXYGEN SATURATION: 100 % | SYSTOLIC BLOOD PRESSURE: 138 MMHG | RESPIRATION RATE: 18 BRPM | DIASTOLIC BLOOD PRESSURE: 74 MMHG | TEMPERATURE: 98.2 F | HEART RATE: 71 BPM

## 2022-03-01 DIAGNOSIS — J45.30 MILD PERSISTENT ASTHMA, UNSPECIFIED WHETHER COMPLICATED: Primary | ICD-10-CM

## 2022-03-01 PROCEDURE — 96372 THER/PROPH/DIAG INJ SC/IM: CPT

## 2022-03-01 PROCEDURE — 74011250636 HC RX REV CODE- 250/636: Performed by: INTERNAL MEDICINE

## 2022-03-01 RX ORDER — GLUCOSAMINE SULFATE 1500 MG
POWDER IN PACKET (EA) ORAL DAILY
COMMUNITY
End: 2022-04-20

## 2022-03-01 RX ORDER — CHOLECALCIFEROL TAB 125 MCG (5000 UNIT) 125 MCG
5000 TAB ORAL DAILY
COMMUNITY

## 2022-03-01 RX ORDER — HYDROCORTISONE SODIUM SUCCINATE 100 MG/2ML
100 INJECTION, POWDER, FOR SOLUTION INTRAMUSCULAR; INTRAVENOUS AS NEEDED
Status: CANCELLED | OUTPATIENT
Start: 2022-03-15

## 2022-03-01 RX ORDER — DIPHENHYDRAMINE HYDROCHLORIDE 50 MG/ML
25 INJECTION, SOLUTION INTRAMUSCULAR; INTRAVENOUS AS NEEDED
Status: CANCELLED
Start: 2022-03-15

## 2022-03-01 RX ORDER — EPINEPHRINE 1 MG/ML
0.3 INJECTION, SOLUTION, CONCENTRATE INTRAVENOUS AS NEEDED
Status: CANCELLED | OUTPATIENT
Start: 2022-03-15

## 2022-03-01 RX ORDER — ACETAMINOPHEN 325 MG/1
650 TABLET ORAL AS NEEDED
Status: CANCELLED
Start: 2022-03-15

## 2022-03-01 RX ORDER — DIPHENHYDRAMINE HYDROCHLORIDE 50 MG/ML
50 INJECTION, SOLUTION INTRAMUSCULAR; INTRAVENOUS AS NEEDED
Status: CANCELLED
Start: 2022-03-15

## 2022-03-01 RX ORDER — ONDANSETRON 2 MG/ML
8 INJECTION INTRAMUSCULAR; INTRAVENOUS AS NEEDED
Status: CANCELLED | OUTPATIENT
Start: 2022-03-15

## 2022-03-01 RX ORDER — ALBUTEROL SULFATE 0.83 MG/ML
2.5 SOLUTION RESPIRATORY (INHALATION) AS NEEDED
Status: CANCELLED
Start: 2022-03-15

## 2022-03-01 RX ADMIN — OMALIZUMAB 75 MG: 75 INJECTION, SOLUTION SUBCUTANEOUS at 10:07

## 2022-03-01 RX ADMIN — OMALIZUMAB 150 MG: 150 INJECTION, SOLUTION SUBCUTANEOUS at 10:08

## 2022-03-01 RX ADMIN — OMALIZUMAB 150 MG: 150 INJECTION, SOLUTION SUBCUTANEOUS at 10:06

## 2022-03-07 ENCOUNTER — OFFICE VISIT (OUTPATIENT)
Dept: ORTHOPEDIC SURGERY | Age: 72
End: 2022-03-07
Payer: MEDICARE

## 2022-03-07 VITALS
RESPIRATION RATE: 16 BRPM | HEIGHT: 61 IN | TEMPERATURE: 96.6 F | BODY MASS INDEX: 40.97 KG/M2 | OXYGEN SATURATION: 93 % | WEIGHT: 217 LBS | HEART RATE: 70 BPM

## 2022-03-07 DIAGNOSIS — E66.01 MORBID OBESITY WITH BMI OF 40.0-44.9, ADULT (HCC): ICD-10-CM

## 2022-03-07 DIAGNOSIS — R79.89 LOW VITAMIN D LEVEL: ICD-10-CM

## 2022-03-07 DIAGNOSIS — M54.50 LUMBAR PAIN: ICD-10-CM

## 2022-03-07 DIAGNOSIS — M62.838 MUSCLE SPASM: ICD-10-CM

## 2022-03-07 DIAGNOSIS — R06.02 SOB (SHORTNESS OF BREATH): ICD-10-CM

## 2022-03-07 DIAGNOSIS — M47.816 LUMBAR FACET ARTHROPATHY: Primary | ICD-10-CM

## 2022-03-07 PROCEDURE — G9899 SCRN MAM PERF RSLTS DOC: HCPCS | Performed by: PHYSICAL MEDICINE & REHABILITATION

## 2022-03-07 PROCEDURE — G8432 DEP SCR NOT DOC, RNG: HCPCS | Performed by: PHYSICAL MEDICINE & REHABILITATION

## 2022-03-07 PROCEDURE — G8536 NO DOC ELDER MAL SCRN: HCPCS | Performed by: PHYSICAL MEDICINE & REHABILITATION

## 2022-03-07 PROCEDURE — G9711 PT HX TOT COL OR COLON CA: HCPCS | Performed by: PHYSICAL MEDICINE & REHABILITATION

## 2022-03-07 PROCEDURE — 99214 OFFICE O/P EST MOD 30 MIN: CPT | Performed by: PHYSICAL MEDICINE & REHABILITATION

## 2022-03-07 PROCEDURE — G8417 CALC BMI ABV UP PARAM F/U: HCPCS | Performed by: PHYSICAL MEDICINE & REHABILITATION

## 2022-03-07 PROCEDURE — G8427 DOCREV CUR MEDS BY ELIG CLIN: HCPCS | Performed by: PHYSICAL MEDICINE & REHABILITATION

## 2022-03-07 PROCEDURE — G8756 NO BP MEASURE DOC: HCPCS | Performed by: PHYSICAL MEDICINE & REHABILITATION

## 2022-03-07 PROCEDURE — G8399 PT W/DXA RESULTS DOCUMENT: HCPCS | Performed by: PHYSICAL MEDICINE & REHABILITATION

## 2022-03-07 PROCEDURE — 1090F PRES/ABSN URINE INCON ASSESS: CPT | Performed by: PHYSICAL MEDICINE & REHABILITATION

## 2022-03-07 PROCEDURE — 1101F PT FALLS ASSESS-DOCD LE1/YR: CPT | Performed by: PHYSICAL MEDICINE & REHABILITATION

## 2022-03-07 RX ORDER — ACETAMINOPHEN AND CODEINE PHOSPHATE 300; 30 MG/1; MG/1
1 TABLET ORAL
Qty: 28 TABLET | Refills: 0 | Status: SHIPPED | OUTPATIENT
Start: 2022-03-07 | End: 2022-03-07 | Stop reason: SDUPTHER

## 2022-03-07 RX ORDER — ACETAMINOPHEN AND CODEINE PHOSPHATE 300; 30 MG/1; MG/1
1 TABLET ORAL
Qty: 28 TABLET | Refills: 0 | Status: SHIPPED | OUTPATIENT
Start: 2022-03-07 | End: 2022-03-14

## 2022-03-07 NOTE — PROGRESS NOTES
Chief Complaint   Patient presents with    Follow-up       Pt preferred language for health care discussion is english.     Is someone accompanying this pt? no    Is the patient using any DME equipment during [address]? no    Depression Screening:  3 most recent PHQ Screens 2/23/2022 2/14/2022 9/28/2021 6/30/2021 5/19/2021 12/4/2020 10/15/2019   Little interest or pleasure in doing things Not at all Nearly every day Nearly every day More than half the days Not at all Not at all Not at all   Feeling down, depressed, irritable, or hopeless Not at all Several days Several days Several days Not at all Not at all Not at all   Total Score PHQ 2 0 4 4 3 0 0 0   Trouble falling or staying asleep, or sleeping too much - - Nearly every day More than half the days - - -   Feeling tired or having little energy - - Nearly every day Nearly every day - - -   Poor appetite, weight loss, or overeating - - Nearly every day Nearly every day - - -   Feeling bad about yourself - or that you are a failure or have let yourself or your family down - - Several days Not at all - - -   Trouble concentrating on things such as school, work, reading, or watching TV - - Not at all More than half the days - - -   Moving or speaking so slowly that other people could have noticed; or the opposite being so fidgety that others notice - - Nearly every day Not at all - - -   Thoughts of being better off dead, or hurting yourself in some way - - Not at all Not at all - - -   PHQ 9 Score - - 17 13 - - -       Learning Assessment:  Learning Assessment 5/19/2021 6/2/2017 9/21/2015   PRIMARY LEARNER Patient Patient Patient   BARRIERS PRIMARY LEARNER - NONE NONE   PRIMARY LANGUAGE ENGLISH ENGLISH ENGLISH   LEARNER PREFERENCE PRIMARY READING READING DEMONSTRATION     DEMONSTRATION DEMONSTRATION LISTENING     - - VIDEOS   ANSWERED BY Patient patient Lima Mcdowell   RELATIONSHIP SELF SELF SELF       Abuse Screening:  Abuse Screening Questionnaire 2/14/2022 5/19/2021 12/4/2020 10/15/2019   Do you ever feel afraid of your partner? N N N N   Are you in a relationship with someone who physically or mentally threatens you? N N N N   Is it safe for you to go home? Stan BRANTLEY       Fall Risk  Fall Risk Assessment, last 12 mths 2/23/2022   Able to walk? Yes   Fall in past 12 months? 0   Do you feel unsteady? 0   Are you worried about falling 0   Is the gait abnormal? -           Advance Directive:  1. Do you have an advance directive in place? Patient Reply:no    2. If not, would you like material regarding how to put one in place? Patient Reply: no    2. Per patient no changes to their ACP contact no. Coordination of Care:  1. Have you been to the ER, urgent care clinic since your last visit? Hospitalized since your last visit? no    2. Have you seen or consulted any other health care providers outside of the [address] [place] [address] since your last visit? Include any pap smears or colon screening.  no

## 2022-03-07 NOTE — PROGRESS NOTES
[facility]  [address] [place], [facility] [address]  Phone: [phone]  Fax: [phone]    Pt's YOB: 1950    ASSESSMENT   Diagnoses and all orders for this visit:    1. Lumbar facet arthropathy  -     acetaminophen-codeine (Tylenol-Codeine #3) 300-30 mg per tablet; Take 1 Tablet by mouth every six (6) hours as needed for Pain for up to 7 days. Max Daily Amount: 4 Tablets. Indications: pain    2. Muscle spasm  -     acetaminophen-codeine (Tylenol-Codeine #3) 300-30 mg per tablet; Take 1 Tablet by mouth every six (6) hours as needed for Pain for up to 7 days. Max Daily Amount: 4 Tablets. Indications: pain    3. Low vitamin D level    4. Lumbar pain    5. SOB (shortness of breath)    6. Morbid obesity with BMI of 40.0-44.9, adult ([address]         IMPRESSION AND PLAN:  Robert Collado is a 70 y.o. right hand dominant female with history of lumbar and left shoulder pain. She complains of inferior lumbar pain with standing longer than 10 minutes. Pt is taking Voltaren 75 mg 1 tab as needed without significant relief and supplement with vitamin D 5,000 international units daily. 1) Pt was given information on lumbar and shoulder arthritis exercises. 2) Discussed treatment options with the patient including medications, steroid injections and radiofrequency ablation. Extensively discussed radiofrequency ablation. Pt was given information on radiofrequency ablation. 3) Pt was prescribed a short course of Tylenol #3 every 6 hours. 4) I highly recommended that the patient lose weight and suggested she try intermittent fasting. 5) I recommended the pt try water exercise. 6) I recommended the pt try taking magnesium supplementation with her vitamin D supplement. 7) Ms. Fernanda Carrizales has a reminder for a \"due or due soon\" health maintenance.  I have asked that she contact her primary care provider, Kory Nash NP, for follow-up on this health maintenance. 8)  demonstrated consistency with prescribing. HISTORY OF PRESENT ILLNESS:  Mamadou Amor is a 70 y.o. right hand dominant female with history of lumbar pain and presents to the office today for MRI follow up. Pt complains of inferior lumbar pain with standing longer than 10 minutes. She denies any pain radiating down the legs at this time. She notes that she is working on losing weight and has lost a few pounds. Pt is taking Voltaren 75 mg 1 tab as needed without significant relief and supplement with vitamin D 5,000 international units daily. She denies undergoing any prior lumbar steroid injections. Pt notes a history of renal mass, removed on 2021. She notes that she was diagnosed with hypertension in 2021 and prescribed an injectable medication for her asthma. Labs from 2022 were reviewed and demonstrated a 25-OH-vitamin D level of 27.3 ng/dL. Pt at this time desires to proceed with medication evaluation. More than 32 minutes was spent with pt extensively reviewing her symptoms, medication, diagnosis of SOB, Vit D supplementation and current treatment plan.     Pain Scale: 10 - Worst pain ever/10    PCP: Aleksandr Dean NP     Past Medical History:   Diagnosis Date    Alopecia     Arthritis     Asthma     Chronic lung disease     GERD (gastroesophageal reflux disease)     Hypercholesteremia     Hypertension     Hyperthyroidism     Thyroid disease         Social History     Socioeconomic History    Marital status:      Spouse name: Not on file    Number of children: Not on file    Years of education: Not on file    Highest education level: Not on file   Occupational History    Not on file   Tobacco Use    Smoking status: Former Smoker     Packs/day: 0.50     Years: 5.00     Pack years: 2.50     Types: Cigarettes     Start date: 3/13/1972     Quit date: 3/13/1977     Years since quittin.0    Smokeless tobacco: Never Used    Tobacco comment: quit smoking in 1970's   Vaping Use    Vaping Use: Never used   Substance and Sexual Activity    Alcohol use: Not Currently     Alcohol/week: 0.0 standard drinks     Comment: Once a year    Drug use: Never    Sexual activity: Yes     Partners: Male     Birth control/protection: None   Other Topics Concern    Not on file   Social History Narrative    Worked as  for [address] [place] [address] until 2010. Denies any history of asbestos and or chemical exposure. Social Determinants of Health     Financial Resource Strain:     Difficulty of Paying Living Expenses: Not on file   Food Insecurity:     Worried About Running Out of Food in the Last Year: Not on file    Lennie of Food in the Last Year: Not on file   Transportation Needs:     Lack of Transportation (Medical): Not on file    Lack of Transportation (Non-Medical): Not on file   Physical Activity:     Days of Exercise per Week: Not on file    Minutes of Exercise per Session: Not on file   Stress:     Feeling of Stress : Not on file   Social Connections:     Frequency of Communication with Friends and Family: Not on file    Frequency of Social Gatherings with Friends and Family: Not on file    Attends Hinduism Services: Not on file    Active Member of [address] [place] [address] or Organizations: Not on file    Attends Club or Organization Meetings: Not on file    Marital Status: Not on file   Intimate Partner Violence:     Fear of Current or Ex-Partner: Not on file    Emotionally Abused: Not on file    Physically Abused: Not on file    Sexually Abused: Not on file   Housing Stability:     Unable to Pay for Housing in the Last Year: Not on file    Number of [place] in the Last Year: Not on file    Unstable Housing in the Last Year: Not on file       Current Outpatient Medications   Medication Sig Dispense Refill    OMALIZUMAB SC by SubCUTAneous route.       acetaminophen-codeine (Tylenol-Codeine #3) 300-30 mg per tablet Take 1 Tablet by mouth every six (6) hours as needed for Pain for up to 7 days. Max Daily Amount: 4 Tablets. Indications: pain 28 Tablet 0    cholecalciferol (Vitamin D3) 25 mcg (1,000 unit) cap Take  by mouth daily.  cholecalciferol (Vitamin D3) 25 mcg (1,000 unit) cap Take  by mouth daily.  cholecalciferol (VITAMIN D3) (5000 Units/125 mcg) tab tablet Take 5,000 Units by mouth daily.  budesonide-formoteroL (SYMBICORT) 160-4.5 mcg/actuation HFAA Take 2 Puffs by inhalation two (2) times a day. Rinse and gargle thoroughly after each use. Disp 3 inhalers 3 Each 3    escitalopram oxalate (LEXAPRO) 5 mg tablet Take 5 mg by mouth daily.  hydroCHLOROthiazide (HYDRODIURIL) 12.5 mg tablet Take 12.5 mg by mouth daily.  tiotropium bromide (Spiriva Respimat) 1.25 mcg/actuation inhaler Take 2 Puffs by inhalation daily. 12 g 3    hyoscyamine (Levsin) 0.125 mg tablet Take 1 Tab by mouth every four (4) hours as needed for Cramping. 40 Tab 0    albuterol 2.5 mg /3 mL (0.083 %) nebu 3 mL, albuterol-ipratropium 2.5 mg-0.5 mg/3 ml nebu 3 mL 1 Dose by Nebulization route every four (4) hours as needed for Wheezing or Shortness of Breath.  simvastatin (ZOCOR) 40 mg tablet Take 40 mg by mouth nightly.  aspirin delayed-release 81 mg tablet Take 81 mg by mouth daily.  montelukast (SINGULAIR) 10 mg tablet Take 10 mg by mouth daily.  methimazole (TAPAZOLE) 10 mg tablet Take 5 mg by mouth daily.  albuterol (PROVENTIL HFA, VENTOLIN HFA, PROAIR HFA) 90 mcg/actuation inhaler Take 2 Puffs by inhalation every four (4) hours as needed for Wheezing.  omeprazole (PRILOSEC) 20 mg capsule Take 20 mg by mouth daily.  cholecalciferol, vitamin D3, (VITAMIN D3) 2,000 unit tab Take 1 Tablet by mouth daily.          Allergies   Allergen Reactions    Latex Swelling and Contact Dermatitis    Latex, Natural Rubber Itching    Other Food Hives, Diarrhea and Other (comments)     Mushrooms and oranges/OJ Other: throat swelling and mouth 9/16/209sores  Pt denes it's allergy related    Iodine Povacry-Iso Alcohol Other (comments)     Blisters  9/16/20 Pt. denies    Vicodin [Hydrocodone-Acetaminophen] Itching    Tramadol Other (comments)     9/16/20 Pt. denies         REVIEW OF SYSTEMS    Constitutional: Negative for fever, chills, or weight change. Respiratory: Negative for cough or shortness of breath. Cardiovascular: Negative for chest pain or palpitations. Gastrointestinal: Negative for acid reflux, change in bowel habits, or constipation. Genitourinary: Negative for dysuria and flank pain. Musculoskeletal: Positive for lumbar pain. Skin: Negative for rash. Neurological: Negative for headaches, dizziness, or numbness. Endo/Heme/Allergies: Negative for increased bruising. Psychiatric/Behavioral: Negative for difficulty with sleep. As per HPI    PHYSICAL EXAMINATION  Visit Vitals  Pulse 70   Temp (!) 96.6 °F (35.9 °C) (Tympanic)   Resp 16   Ht 5' 1\" (1.549 m)   Wt 217 lb (98.4 kg)   SpO2 93%   BMI 41.00 kg/m²       Constitutional: Awake, alert, and in no acute distress. Neurological: Sensation to light touch is intact. .  Skin: warm, dry, and intact. Musculoskeletal:  Pain with extension and axial loading. Improvement with forward flexion. No pain with internal or external rotation of her hips. Negative straight leg raise bilaterally. Hip Flex  Quads Hamstrings Ankle DF EHL Ankle PF   Right +4/5 +4/5 +4/5 +4/5 +4/5 +4/5   Left +4/5 +4/5 +4/5 +4/5 +4/5 +4/5     IMAGING:    Lumbar spine MRI from 02/04/2022 was personally reviewed with the patient and demonstrated:    FINDINGS:      Alignment: A slight right convex lumbar curvature. Minimal degenerative  anterolisthesis of L5. Slight retrolisthesis of L1. Vertebral body height: Normal  Marrow signal: No focal concerning marrow signal change. Several small caliber  Schmorl's nodes with minor discogenic endplate edema.   Conus: At L1, normal morphology     Axial imaging correlation:     T12-L1: Mild bilobed disc bulge and facet arthropathy. Patent canal and  foramina.     L1-2: Mild disc space narrowing. Broad-based disc osteophyte complex. Facet  arthropathy. Patent canal and foramina.     L2-3: Broad-based disc osteophyte complex formation. Facet arthropathy. No  central spinal stenosis. Mild narrowing left lateral recess. Moderate left  foraminal stenosis.     L3-4: Mild bilobed disc osteophyte complex formation. Bilateral facet  arthropathy. Patent canal and foramina.     L4-5: Marked disc space narrowing. Broad-based disc osteophyte complex formation  with focal midline prominence. Facet arthropathy. No central spinal stenosis. Mild narrowing at the lateral recesses. Moderate foraminal stenoses, right more  than left.     L5-S1: Mild disc bulge. Bilateral facet arthropathy. No central spinal stenosis. Patent canal. Mild foraminal stenosis.     Other structures: Unremarkable.        IMPRESSION     1. Multilevel degenerative findings along the lumbar spine  -Overall mild to moderate  -There is no high-grade spinal stenosis at any level  -Mild to moderate foraminal stenoses without overt nerve impingement  -As outlined above           Lumbar spine 4V x-rays from 9/14/2021 were personally reviewed with the patient and demonstrated:  Dextro scoliosis. Multilevel degenerative facets. Degenerative discs at L2-3, L3-4, and L4-5. Degenerative discs in the lower thoracic (T8-T10) with anterior osteophyte formation.       Left shoulder x-rays from 7/26/2021 were personally reviewed with the patient and demonstrated:  FINDINGS: The glenohumeral joint is intact. No fracture or dislocation  appreciated.  The joint space is preserved. No erosions or periostitis  appreciated. No lytic or blastic focus identified. The acromioclavicular joint  is without evidence of separation or step-off. The joint space is preserved.  The  visualized left lung is unremarkable.     IMPRESSION  1.  No acute pathology appreciated in the left shoulder.     Cervical spine MRI from 5/16/2013 was personally reviewed with the patient and demonstrated:     5/16/2013 09:04) Provider Status: Open   Study Result     Narrative   Procedure: MRI of the cervical spine without contrast.     CPT code: 37248     Indications:  Neck pain extending into the left arm     Comparisons: None. Technique: T1 weighted, T2 FSE with fat saturation, FSE inversion recovery   sagittal images are supplemented by T2 weighted with fat saturation axial   images. Findings: There is reversal of the normal cervical lordosis centered at C5-C6 because of   degenerative disc disease below. Sagittal images reveal normal vertebral body morphology    . No fractures   noted. No suspicious lesions No abnormal marrow signal present. Alignments are anatomic, no evidence for subluxation. Visualized prevertebral soft tissues are unremarkable. Atlanto-dens interval normal.   No evidence for Chiari 1 malformation. Cord morphology and signal intensity are unremarkable throughout. Correlation of axial and sagittal images reveals the following: At C2-C3: Mild prominence of the posterior longitudinal ligament. Mild central   canal stenosis. No foraminal stenosis. No facet arthropathy. At C3-C4: Mild circumferential disc osteophyte complex. Mild central canal   stenosis. Mild right foraminal stenosis. Mild facet arthropathy. At C4-C5: Mild circumferential disc osteophyte complex and prominence of the   PLL area borderline central canal stenosis.] The left mild to moderate facet   arthropathy. Moderate right and mild left foraminal stenosis. At C5-C6: Loss of disc height. Moderate circumferential disc osteophyte   complex. Moderate central canal stenosis at 8.6 mm. No facet arthropathy. Mild   foraminal stenosis. At C6-C7: Severe loss of disc height.  Moderate circumferential disc osteophyte   complex. Mild central canal stenosis. No facet arthropathy. Mild foraminal   stenosis. At C7-T1: Mild to moderate circumferential disc osteophyte complex. No central   canal stenosis. No facet arthropathy. No foraminal stenosis.         Impression   Impression:     Degenerative changes as above.            Written by Palmer Yan, as dictated by Jennifer Terrazas MD.  I, Dr. Jennifer Terrazas confirm that all documentation is accurate.

## 2022-03-07 NOTE — PATIENT INSTRUCTIONS
Low Back Arthritis: Exercises  Introduction  Here are some examples of typical rehabilitation exercises for your condition. Start each exercise slowly. Ease off the exercise if you start to have pain. Your doctor or physical therapist will tell you when you can start these exercises and which ones will work best for you. When you are not being active, find a comfortable position for rest. Some people are comfortable on the floor or a medium-firm bed with a small pillow under their head and another under their knees. Some people prefer to lie on their side with a pillow between their knees. Don't stay in one position for too long. Take short walks (10 to 20 minutes) every 2 to 3 hours. Avoid slopes, hills, and stairs until you feel better. Walk only distances you can manage without pain, especially leg pain. How to do the exercises  Pelvic tilt    1. Lie on your back with your knees bent. 2. \"Brace\" your stomach--tighten your muscles by pulling in and imagining your belly button moving toward your spine. 3. Press your lower back into the floor. You should feel your hips and pelvis rock back. 4. Hold for 6 seconds while breathing smoothly. 5. Relax and allow your pelvis and hips to rock forward. 6. Repeat 8 to 12 times. Back stretches    1. Get down on your hands and knees on the floor. 2. Relax your head and allow it to droop. Round your back up toward the ceiling until you feel a nice stretch in your upper, middle, and lower back. Hold this stretch for as long as it feels comfortable, or about 15 to 30 seconds. 3. Return to the starting position with a flat back while you are on your hands and knees. 4. Let your back sway by pressing your stomach toward the floor. Lift your buttocks toward the ceiling. 5. Hold this position for 15 to 30 seconds. 6. Repeat 2 to 4 times. Follow-up care is a key part of your treatment and safety.  Be sure to make and go to all appointments, and call your doctor if you are having problems. It's also a good idea to know your test results and keep a list of the medicines you take. Where can you learn more? Go to http://www.UeeeU.com.com/  Enter T094 in the search box to learn more about \"Low Back Arthritis: Exercises. \"  Current as of: July 1, 2021               Content Version: 13.0  © 2006-2021 [employer]. Care instructions adapted under license by [employer] (which disclaims liability or warranty for this information). If you have questions about a medical condition or this instruction, always ask your healthcare professional. [place] [address] any warranty or liability for your use of this information. Shoulder Arthritis: Exercises  Introduction  Here are some examples of exercises for you to try. The exercises may be suggested for a condition or for rehabilitation. Start each exercise slowly. Ease off the exercises if you start to have pain. You will be told when to start these exercises and which ones will work best for you. How to do the exercises  Shoulder flexion (lying down)    To make a wand for this exercise, use a piece of PVC pipe or a broom handle with the broom removed. Make the wand about a foot wider than your shoulders. 1. Lie on your back, holding a wand with both hands. Your palms should face down as you hold the wand. 2. Keeping your elbows straight, slowly raise your arms over your head. Raise them until you feel a stretch in your shoulders, upper back, and chest.  3. Hold for 15 to 30 seconds. 4. Repeat 2 to 4 times. Shoulder rotation (lying down)    To make a wand for this exercise, use a piece of PVC pipe or a broom handle with the broom removed. Make the wand about a foot wider than your shoulders. 1. Lie on your back. Hold a wand with both hands with your elbows bent and palms up.   2. Keep your elbows close to your body, and move the wand across your body toward the sore arm. 3. Hold for 8 to 12 seconds. 4. Repeat 2 to 4 times. Shoulder internal rotation with towel    1. Hold a towel above and behind your head with the arm that is not sore. 2. With your sore arm, reach behind your back and grasp the towel. 3. With the arm above your head, pull the towel upward. Do this until you feel a stretch on the front and outside of your sore shoulder. 4. Hold 15 to 30 seconds. 5. Repeat 2 to 4 times. Shoulder blade squeeze    1. Stand with your arms at your sides, and squeeze your shoulder blades together. Do not raise your shoulders up as you squeeze. 2. Hold 6 seconds. 3. Repeat 8 to 12 times. Resisted rows    For this exercise, you will need elastic exercise material, such as surgical tubing or Thera-Band. 1. Put the band around a solid object at about waist level. (A bedpost will work well.) Each hand should hold an end of the band. 2. With your elbows at your sides and bent to 90 degrees, pull the band back. Your shoulder blades should move toward each other. Return to the starting position. 3. Repeat 8 to 12 times. External rotator strengthening exercise    1. Start by tying a piece of elastic exercise material to a doorknob. You can use surgical tubing or Thera-Band. (You may also hold one end of the band in each hand.)  2. Stand or sit with your shoulder relaxed and your elbow bent 90 degrees. Your upper arm should rest comfortably against your side. Squeeze a rolled towel between your elbow and your body for comfort. This will help keep your arm at your side. 3. Hold one end of the elastic band with the hand of the painful arm. 4. Start with your forearm across your belly. Slowly rotate the forearm out away from your body. Keep your elbow and upper arm tucked against the towel roll or the side of your body until you begin to feel tightness in your shoulder. Slowly move your arm back to where you started. 5. Repeat 8 to 12 times.   Internal rotator strengthening exercise    1. Start by tying a piece of elastic exercise material to a doorknob. You can use surgical tubing or Thera-Band. 2. Stand or sit with your shoulder relaxed and your elbow bent 90 degrees. Your upper arm should rest comfortably against your side. Squeeze a rolled towel between your elbow and your body for comfort. This will help keep your arm at your side. 3. Hold one end of the elastic band in the hand of the painful arm. 4. Slowly rotate your forearm toward your body until it touches your belly. Slowly move it back to where you started. 5. Keep your elbow and upper arm firmly tucked against the towel roll or at your side. 6. Repeat 8 to 12 times. Pendulum swing    If you have pain in your back, do not do this exercise. 1. Hold on to a table or the back of a chair with your good arm. Then bend forward a little and let your sore arm hang straight down. This exercise does not use the arm muscles. Rather, use your legs and your hips to create movement that makes your arm swing freely. 2. Use the movement from your hips and legs to guide the slightly swinging arm back and forth like a pendulum (or elephant trunk). Then guide it in circles that start small (about the size of a dinner plate). Make the circles a bit larger each day, as your pain allows. 3. Do this exercise for 5 minutes, 5 to 7 times each day. 4. As you have less pain, try bending over a little farther to do this exercise. This will increase the amount of movement at your shoulder. Follow-up care is a key part of your treatment and safety. Be sure to make and go to all appointments, and call your doctor if you are having problems. It's also a good idea to know your test results and keep a list of the medicines you take. Where can you learn more? Go to http://www.gray.com/  Enter H562 in the search box to learn more about \"Shoulder Arthritis: Exercises. \"  Current as of: July 1, 2021               Content Version: 13.0  © 4136-8245 [employer]. Care instructions adapted under license by [employer] (which disclaims liability or warranty for this information). If you have questions about a medical condition or this instruction, always ask your healthcare professional. [address] any warranty or liability for your use of this information. Learning About Medial Branch Block and Neurotomy  What are medial branch block and neurotomy? Facet joints connect your vertebrae to each other. Problems in these joints can cause chronic (long-term) pain in the neck or back. Medial branch nerves are the nerves that carry many of the pain messages from your facet joints. Radiofrequency medial branch neurotomy is a type of medial branch neurotomy that is used to relieve arthritis pain. It uses radio waves to damage nerves in your neck or back so that they can no longer send pain messages to your brain. Before your doctor knows if a neurotomy will help you, you will get a medial branch block to find out if certain nerves are the ones that are a source of your pain. You will need two separate visits to the outpatient center or hospital to have both procedures. You will need someone to drive you home. How is a medial branch block done? The doctor will use a tiny needle to numb the skin where you will get the block. Then the doctor puts the block needle into the numbed area. You may feel some pressure, but you should not feel pain. Using fluoroscopy (live X-ray) to guide the needle, the doctor injects medicine onto one or more nerves to make them numb. If you get relief from your pain in the next 4 to 6 hours, it's a sign that those nerves may be contributing to your pain. The relief will last only a short time. You may then have a medial branch neurotomy at a later visit to try to get longer relief. How is a medial branch neurotomy done?   The doctor will use a tiny needle to numb the skin where you will get the neurotomy. Then the doctor puts the neurotomy needle into the numbed area. You may feel some pressure. Using fluoroscopy (live X-ray) to guide the needle, the doctor sends radio waves through the needle to the nerve for 60 to 90 seconds. The radio waves heat the nerve, which damages it. The doctor may do this several times. And more than one nerve may be treated. How long do medial branch block and neurotomy take? It takes 20 to 30 minutes to get the block. You can go home after the doctor watches you for about an hour. It takes 45 to 90 minutes to get a neurotomy, depending on how many nerves are heated. You will probably go home 30 to 60 minutes after the procedure. What can you expect after a neurotomy? You will get instructions on how to report how much pain you have when you are at home. You may feel a little sore or tender at the injection site at first. But after a successful neurotomy, most people have pain relief right away. It often lasts for several months, but your pain may come back. If your pain does come back, it may mean that the damaged nerve has healed and can send pain messages again. Or it can mean that a different nerve is causing pain. Your doctor will discuss your options with you. Follow-up care is a key part of your treatment and safety. Be sure to make and go to all appointments, and call your doctor if you are having problems. It's also a good idea to know your test results and keep a list of the medicines you take. Where can you learn more? Go to http://www.gray.com/  Enter T494 in the search box to learn more about \"Learning About Medial Branch Block and Neurotomy. \"  Current as of: April 8, 2021               Content Version: 13.0  © 1462-6502 [employer]. Care instructions adapted under license by [employer] (which disclaims liability or warranty for this information).  If you have questions about a medical condition or this instruction, always ask your healthcare professional. [place] [address] any warranty or liability for your use of this information.

## 2022-03-11 ENCOUNTER — TELEPHONE (OUTPATIENT)
Dept: CARDIOLOGY CLINIC | Age: 72
End: 2022-03-11

## 2022-03-15 ENCOUNTER — HOSPITAL ENCOUNTER (OUTPATIENT)
Dept: INFUSION THERAPY | Age: 72
Discharge: HOME | End: 2022-03-15
Payer: MEDICARE

## 2022-03-15 VITALS
RESPIRATION RATE: 20 BRPM | TEMPERATURE: 97.3 F | OXYGEN SATURATION: 99 % | DIASTOLIC BLOOD PRESSURE: 60 MMHG | HEART RATE: 64 BPM | SYSTOLIC BLOOD PRESSURE: 140 MMHG

## 2022-03-15 DIAGNOSIS — J45.30 MILD PERSISTENT ASTHMA, UNSPECIFIED WHETHER COMPLICATED: Primary | ICD-10-CM

## 2022-03-15 PROCEDURE — 96372 THER/PROPH/DIAG INJ SC/IM: CPT

## 2022-03-15 PROCEDURE — 74011250636 HC RX REV CODE- 250/636: Performed by: INTERNAL MEDICINE

## 2022-03-15 RX ORDER — EPINEPHRINE 1 MG/ML
0.3 INJECTION, SOLUTION, CONCENTRATE INTRAVENOUS AS NEEDED
Status: CANCELLED | OUTPATIENT
Start: 2022-03-29

## 2022-03-15 RX ORDER — DIPHENHYDRAMINE HYDROCHLORIDE 50 MG/ML
25 INJECTION, SOLUTION INTRAMUSCULAR; INTRAVENOUS AS NEEDED
Status: CANCELLED
Start: 2022-03-29

## 2022-03-15 RX ORDER — HYDROCORTISONE SODIUM SUCCINATE 100 MG/2ML
100 INJECTION, POWDER, FOR SOLUTION INTRAMUSCULAR; INTRAVENOUS AS NEEDED
Status: CANCELLED | OUTPATIENT
Start: 2022-03-29

## 2022-03-15 RX ORDER — ALBUTEROL SULFATE 0.83 MG/ML
2.5 SOLUTION RESPIRATORY (INHALATION) AS NEEDED
Status: CANCELLED
Start: 2022-03-29

## 2022-03-15 RX ORDER — ACETAMINOPHEN 325 MG/1
650 TABLET ORAL AS NEEDED
Status: CANCELLED
Start: 2022-03-29

## 2022-03-15 RX ORDER — DIPHENHYDRAMINE HYDROCHLORIDE 50 MG/ML
50 INJECTION, SOLUTION INTRAMUSCULAR; INTRAVENOUS AS NEEDED
Status: CANCELLED
Start: 2022-03-29

## 2022-03-15 RX ORDER — ONDANSETRON 2 MG/ML
8 INJECTION INTRAMUSCULAR; INTRAVENOUS AS NEEDED
Status: CANCELLED | OUTPATIENT
Start: 2022-03-29

## 2022-03-15 RX ADMIN — OMALIZUMAB 150 MG: 150 INJECTION, SOLUTION SUBCUTANEOUS at 09:14

## 2022-03-15 RX ADMIN — OMALIZUMAB 150 MG: 150 INJECTION, SOLUTION SUBCUTANEOUS at 09:13

## 2022-03-15 RX ADMIN — OMALIZUMAB 75 MG: 75 INJECTION, SOLUTION SUBCUTANEOUS at 09:13

## 2022-03-15 NOTE — PROGRESS NOTES
[facility] Progress Note    Date: March 15, 2022    Name: Guillaume Sharma    MRN: [id number]         : 1950       Xolair (Q2 weeks)    Ms. Socorro Ralph arrived to [facility] ambulatory at 0900. Ms. Socorro Ralph was assessed and education was provided. Pt states she tolerated first time injections 2 weeks ago without any s/s of reaction or side effects. Ms. Sandee Gentile vitals were reviewed. Visit Vitals  BP (!) 140/60 (BP 1 Location: Right upper arm, BP Patient Position: Sitting)   Pulse 64   Temp 97.3 °F (36.3 °C)   Resp 20   SpO2 99%   Breastfeeding No       Omalizumab (Xolair) 375 mg was administered as 3 divided doses as ordered SQ:  (75mg to RIGHT upper arm, 150mg to LEFT upper arm, and 150mg to LEFT lower abdomen). Band-aids to site. Ms. Socorro Ralph tolerated well without complaints. Discharge/ follow-up instructions discussed w/ pt, including instructions to go to ED if pt starts to experience symptoms such as sob, chest pain, severe itching. Pt verbalized understanding. Pt armband removed & shredded. Ms. Socorro Ralph was discharged from [place] [address] in stabl5 mg condition at 1040. She is to return on 3/30/22 at 0830 for her next Xolair injection appointment.     Aaron Paul RN  March 15, 2022

## 2022-03-19 PROBLEM — N28.89 RENAL MASS: Status: ACTIVE | Noted: 2019-12-30

## 2022-03-19 PROBLEM — E66.01 SEVERE OBESITY (BMI >= 40) (HCC): Status: ACTIVE | Noted: 2020-11-09

## 2022-03-19 PROBLEM — I10 ESSENTIAL HYPERTENSION: Status: ACTIVE | Noted: 2022-02-23

## 2022-03-19 PROBLEM — E78.00 HYPERCHOLESTEREMIA: Status: ACTIVE | Noted: 2022-02-23

## 2022-03-20 PROBLEM — J45.30 MILD PERSISTENT ASTHMA: Status: ACTIVE | Noted: 2022-02-22

## 2022-03-30 ENCOUNTER — HOSPITAL ENCOUNTER (OUTPATIENT)
Dept: INFUSION THERAPY | Age: 72
Discharge: HOME | End: 2022-03-30
Payer: MEDICARE

## 2022-03-30 VITALS
SYSTOLIC BLOOD PRESSURE: 156 MMHG | TEMPERATURE: 98.4 F | OXYGEN SATURATION: 95 % | DIASTOLIC BLOOD PRESSURE: 66 MMHG | HEART RATE: 82 BPM | RESPIRATION RATE: 20 BRPM

## 2022-03-30 DIAGNOSIS — J45.30 MILD PERSISTENT ASTHMA, UNSPECIFIED WHETHER COMPLICATED: Primary | ICD-10-CM

## 2022-03-30 PROCEDURE — 96372 THER/PROPH/DIAG INJ SC/IM: CPT

## 2022-03-30 PROCEDURE — 74011250636 HC RX REV CODE- 250/636: Performed by: INTERNAL MEDICINE

## 2022-03-30 RX ORDER — ONDANSETRON 2 MG/ML
8 INJECTION INTRAMUSCULAR; INTRAVENOUS AS NEEDED
Status: CANCELLED | OUTPATIENT
Start: 2022-04-12

## 2022-03-30 RX ORDER — DIPHENHYDRAMINE HYDROCHLORIDE 50 MG/ML
25 INJECTION, SOLUTION INTRAMUSCULAR; INTRAVENOUS AS NEEDED
Status: CANCELLED
Start: 2022-04-12

## 2022-03-30 RX ORDER — ACETAMINOPHEN 325 MG/1
650 TABLET ORAL AS NEEDED
Status: CANCELLED
Start: 2022-04-12

## 2022-03-30 RX ORDER — EPINEPHRINE 1 MG/ML
0.3 INJECTION, SOLUTION, CONCENTRATE INTRAVENOUS AS NEEDED
Status: CANCELLED | OUTPATIENT
Start: 2022-04-12

## 2022-03-30 RX ORDER — HYDROCORTISONE SODIUM SUCCINATE 100 MG/2ML
100 INJECTION, POWDER, FOR SOLUTION INTRAMUSCULAR; INTRAVENOUS AS NEEDED
Status: CANCELLED | OUTPATIENT
Start: 2022-04-12

## 2022-03-30 RX ORDER — DIPHENHYDRAMINE HYDROCHLORIDE 50 MG/ML
50 INJECTION, SOLUTION INTRAMUSCULAR; INTRAVENOUS AS NEEDED
Status: CANCELLED
Start: 2022-04-12

## 2022-03-30 RX ORDER — ALBUTEROL SULFATE 0.83 MG/ML
2.5 SOLUTION RESPIRATORY (INHALATION) AS NEEDED
Status: CANCELLED
Start: 2022-04-12

## 2022-03-30 RX ADMIN — OMALIZUMAB 150 MG: 150 INJECTION, SOLUTION SUBCUTANEOUS at 08:46

## 2022-03-30 RX ADMIN — OMALIZUMAB 75 MG: 75 INJECTION, SOLUTION SUBCUTANEOUS at 08:46

## 2022-03-30 NOTE — PROGRESS NOTES
[facility] Progress Note    Date: 2022    Name: Dinora Cali    MRN: [id number]         : 1950    Ms. Florentino [facility] the [facility] today at 0830, in stable condition, here for her XOLAIR Injections (Every 2 Weeks). She was assessed and education was provided. Ms. Sadie Jerez vitals were reviewed. Visit Vitals  BP (!) 156/66 (BP 1 Location: Right upper arm, BP Patient Position: Sitting)   Pulse 82   Temp 98.4 °F (36.9 °C)   Resp 20   SpO2 95%           Ms. Florentino stated that she has been tolerating the XOLAIR injections well and without any side effects. Omalizumab (XOLAIR) 375 mg TOTAL DOSE, was administered in 3 divided SQ injections at 0846 as follows: 150 mg SQ in her RIGHT arm, 150 mg SQ in her LEFT arm, and 75 mg SQ in her LEFT abdomen. Ms. Chris Levy tolerated well and voiced no complaints. Ms. Chris Levy was discharged from [place] [address] in stable condition at [phone]. She is to return in 2 weeks, on Tuesday, 22 at 0930, for her next appointment, for her next XOLAIR injections.      José Miguel Woodard RN  2022  8:44 AM

## 2022-04-12 ENCOUNTER — HOSPITAL ENCOUNTER (OUTPATIENT)
Dept: INFUSION THERAPY | Age: 72
End: 2022-04-12

## 2022-04-13 ENCOUNTER — TELEPHONE (OUTPATIENT)
Dept: CARDIOLOGY CLINIC | Age: 72
End: 2022-04-13

## 2022-04-14 ENCOUNTER — HOSPITAL ENCOUNTER (OUTPATIENT)
Dept: INFUSION THERAPY | Age: 72
Discharge: HOME | End: 2022-04-14
Payer: MEDICARE

## 2022-04-14 VITALS
RESPIRATION RATE: 20 BRPM | TEMPERATURE: 98.5 F | SYSTOLIC BLOOD PRESSURE: 148 MMHG | HEART RATE: 72 BPM | OXYGEN SATURATION: 98 % | DIASTOLIC BLOOD PRESSURE: 75 MMHG

## 2022-04-14 DIAGNOSIS — J45.30 MILD PERSISTENT ASTHMA, UNSPECIFIED WHETHER COMPLICATED: Primary | ICD-10-CM

## 2022-04-14 PROCEDURE — 96372 THER/PROPH/DIAG INJ SC/IM: CPT

## 2022-04-14 PROCEDURE — 74011250636 HC RX REV CODE- 250/636: Performed by: INTERNAL MEDICINE

## 2022-04-14 RX ORDER — ALBUTEROL SULFATE 0.83 MG/ML
2.5 SOLUTION RESPIRATORY (INHALATION) AS NEEDED
Status: CANCELLED
Start: 2022-04-27

## 2022-04-14 RX ORDER — DIPHENHYDRAMINE HYDROCHLORIDE 50 MG/ML
50 INJECTION, SOLUTION INTRAMUSCULAR; INTRAVENOUS AS NEEDED
Status: CANCELLED
Start: 2022-04-27

## 2022-04-14 RX ORDER — EPINEPHRINE 1 MG/ML
0.3 INJECTION, SOLUTION, CONCENTRATE INTRAVENOUS AS NEEDED
Status: CANCELLED | OUTPATIENT
Start: 2022-04-27

## 2022-04-14 RX ORDER — HYDROCORTISONE SODIUM SUCCINATE 100 MG/2ML
100 INJECTION, POWDER, FOR SOLUTION INTRAMUSCULAR; INTRAVENOUS AS NEEDED
Status: CANCELLED | OUTPATIENT
Start: 2022-04-27

## 2022-04-14 RX ORDER — DIPHENHYDRAMINE HYDROCHLORIDE 50 MG/ML
25 INJECTION, SOLUTION INTRAMUSCULAR; INTRAVENOUS AS NEEDED
Status: CANCELLED
Start: 2022-04-27

## 2022-04-14 RX ORDER — ONDANSETRON 2 MG/ML
8 INJECTION INTRAMUSCULAR; INTRAVENOUS AS NEEDED
Status: CANCELLED | OUTPATIENT
Start: 2022-04-27

## 2022-04-14 RX ORDER — ACETAMINOPHEN 325 MG/1
650 TABLET ORAL AS NEEDED
Status: CANCELLED
Start: 2022-04-27

## 2022-04-14 RX ADMIN — OMALIZUMAB 150 MG: 150 INJECTION, SOLUTION SUBCUTANEOUS at 09:45

## 2022-04-14 RX ADMIN — OMALIZUMAB 75 MG: 75 INJECTION, SOLUTION SUBCUTANEOUS at 09:45

## 2022-04-14 RX ADMIN — OMALIZUMAB 150 MG: 150 INJECTION, SOLUTION SUBCUTANEOUS at 09:46

## 2022-04-14 NOTE — PROGRESS NOTES
[facility] Progress Note    Date: 2022    Name: Roma Litten    MRN: [id number]         : 1950    Ms. Florentino arrived in the [facility] today at [address] [place] [address] Dr, here for her XOLAIR Injections (Every 2 Weeks). She was assessed and education was provided. Ms. Merline Stanley vitals were reviewed. Visit Vitals  BP (!) 148/75 (BP 1 Location: Left upper arm, BP Patient Position: Sitting)   Pulse 72   Temp 98.5 °F (36.9 °C)   Resp 20   SpO2 98%   Breastfeeding No     Omalizumab (XOLAIR) 375 mg TOTAL DOSE, was administered in 3 divided SQ injections as follows: 150 mg SQ in her RIGHT arm, 150 mg SQ in her LEFT arm, and 75 mg SQ in her RIGHT abdomen. Ms. Alejandra Diana tolerated well and voiced no complaints. Ms. Alejandra Diana was discharged from [place] [address] in stable condition at [phone]. She is to return on 22 at 0930, for her next appointment, for her next XOLAIR injections.      Neema Busby RN  2022

## 2022-04-20 ENCOUNTER — OFFICE VISIT (OUTPATIENT)
Dept: CARDIOLOGY CLINIC | Age: 72
End: 2022-04-20
Payer: MEDICARE

## 2022-04-20 VITALS
WEIGHT: 223 LBS | DIASTOLIC BLOOD PRESSURE: 70 MMHG | HEART RATE: 96 BPM | BODY MASS INDEX: 42.1 KG/M2 | SYSTOLIC BLOOD PRESSURE: 136 MMHG | OXYGEN SATURATION: 97 % | HEIGHT: 61 IN

## 2022-04-20 DIAGNOSIS — E78.00 HYPERCHOLESTEREMIA: ICD-10-CM

## 2022-04-20 DIAGNOSIS — E66.01 SEVERE OBESITY (BMI >= 40) (HCC): ICD-10-CM

## 2022-04-20 DIAGNOSIS — I10 ESSENTIAL HYPERTENSION: ICD-10-CM

## 2022-04-20 DIAGNOSIS — I50.32 CHRONIC DIASTOLIC CONGESTIVE HEART FAILURE (HCC): Primary | ICD-10-CM

## 2022-04-20 PROCEDURE — G8427 DOCREV CUR MEDS BY ELIG CLIN: HCPCS | Performed by: INTERNAL MEDICINE

## 2022-04-20 PROCEDURE — G8510 SCR DEP NEG, NO PLAN REQD: HCPCS | Performed by: INTERNAL MEDICINE

## 2022-04-20 PROCEDURE — 99214 OFFICE O/P EST MOD 30 MIN: CPT | Performed by: INTERNAL MEDICINE

## 2022-04-20 PROCEDURE — 1101F PT FALLS ASSESS-DOCD LE1/YR: CPT | Performed by: INTERNAL MEDICINE

## 2022-04-20 PROCEDURE — G8754 DIAS BP LESS 90: HCPCS | Performed by: INTERNAL MEDICINE

## 2022-04-20 PROCEDURE — 1090F PRES/ABSN URINE INCON ASSESS: CPT | Performed by: INTERNAL MEDICINE

## 2022-04-20 PROCEDURE — G8417 CALC BMI ABV UP PARAM F/U: HCPCS | Performed by: INTERNAL MEDICINE

## 2022-04-20 PROCEDURE — G8399 PT W/DXA RESULTS DOCUMENT: HCPCS | Performed by: INTERNAL MEDICINE

## 2022-04-20 PROCEDURE — G8536 NO DOC ELDER MAL SCRN: HCPCS | Performed by: INTERNAL MEDICINE

## 2022-04-20 PROCEDURE — G8752 SYS BP LESS 140: HCPCS | Performed by: INTERNAL MEDICINE

## 2022-04-20 PROCEDURE — G9711 PT HX TOT COL OR COLON CA: HCPCS | Performed by: INTERNAL MEDICINE

## 2022-04-20 PROCEDURE — G9899 SCRN MAM PERF RSLTS DOC: HCPCS | Performed by: INTERNAL MEDICINE

## 2022-04-20 RX ORDER — METOPROLOL SUCCINATE 25 MG/1
25 TABLET, EXTENDED RELEASE ORAL DAILY
Qty: 90 TABLET | Refills: 1 | Status: SHIPPED | OUTPATIENT
Start: 2022-04-20 | End: 2022-04-26 | Stop reason: SDUPTHER

## 2022-04-20 NOTE — PROGRESS NOTES
Latesha Forrest presents today for   Chief Complaint   Patient presents with    Follow-up     6 week follow up       Emilycortney Adriane preferred language for health care discussion is english/other. Is someone accompanying this pt? no    Is the patient using any DME equipment during [address]? no    Depression Screening:  3 most recent PHQ Screens 4/20/2022   Little interest or pleasure in doing things Not at all   Feeling down, depressed, irritable, or hopeless Not at all   Total Score PHQ 2 0   Trouble falling or staying asleep, or sleeping too much -   Feeling tired or having little energy -   Poor appetite, weight loss, or overeating -   Feeling bad about yourself - or that you are a failure or have let yourself or your family down -   Trouble concentrating on things such as school, work, reading, or watching TV -   Moving or speaking so slowly that other people could have noticed; or the opposite being so fidgety that others notice -   Thoughts of being better off dead, or hurting yourself in some way -   PHQ 9 Score -       Learning Assessment:  Learning Assessment 4/20/2022   PRIMARY LEARNER Patient   BARRIERS PRIMARY LEARNER -   PRIMARY LANGUAGE ENGLISH   LEARNER PREFERENCE PRIMARY DEMONSTRATION     -     -   ANSWERED BY patient   RELATIONSHIP SELF       Abuse Screening:  Abuse Screening Questionnaire 4/20/2022   Do you ever feel afraid of your partner? N   Are you in a relationship with someone who physically or mentally threatens you? N   Is it safe for you to go home? Y       Fall Risk  Fall Risk Assessment, last 12 mths 4/20/2022   Able to walk? Yes   Fall in past 12 months? 0   Do you feel unsteady? 0   Are you worried about falling 0   Is the gait abnormal? -           Pt currently taking Anticoagulant therapy? no    Pt currently taking Antiplatelet therapy ? ASA 81 mg once a day      Coordination of Care:  1. Have you been to the ER, urgent care clinic since your last visit? Hospitalized since your last visit? no    2. Have you seen or consulted any other health care providers outside of the [address] [place] [address] since your last visit? Include any pap smears or colon screening.  no

## 2022-04-20 NOTE — PATIENT INSTRUCTIONS
Medications Discontinued During This Encounter   Medication Reason    cholecalciferol (Vitamin D3) 25 mcg (1,000 unit) cap Not A Current Medication    cholecalciferol (Vitamin D3) 25 mcg (1,000 unit) cap Not A Current Medication    cholecalciferol, vitamin D3, (VITAMIN D3) 2,000 unit tab Not A Current Medication     After the recommended changes have been made in blood pressure medicines, patient advised to keep BP/HR(pulse rate) chart twice daily and bring us results in next 4 to 5 days. Patient may send the results via \"My Chart\" if desired. Please rest for 5-10 minutes before checking blood pressure. Sit on a comfortable chair without crossing the legs and put your arm on a table. We recommend that you use an upper arm cuff. Check the blood pressure 3 times each time you check the blood pressure and record the lowest reading. If you check the blood pressure in both arms, use the higher reading. A Healthy Heart: Care Instructions  Your Care Instructions     Coronary artery disease, also called heart disease, occurs when a substance called plaque builds up in the vessels that supply oxygen-rich blood to your heart muscle. This can narrow the blood vessels and reduce blood flow. A heart attack happens when blood flow is completely blocked. A high-fat diet, smoking, and other factors increase the risk of heart disease. Your doctor has found that you have a chance of having heart disease. You can do lots of things to keep your heart healthy. It may not be easy, but you can change your diet, exercise more, and quit smoking. These steps really work to lower your chance of heart disease. Follow-up care is a key part of your treatment and safety. Be sure to make and go to all appointments, and call your doctor if you are having problems. It's also a good idea to know your test results and keep a list of the medicines you take. How can you care for yourself at home?   Diet    · Use less salt when you cook and eat. This helps lower your blood pressure. Taste food before salting. Add only a little salt when you think you need it. With time, your taste buds will adjust to less salt.     · Eat fewer snack items, fast foods, canned soups, and other high-salt, high-fat, processed foods.     · Read food labels and try to avoid saturated and trans fats. They increase your risk of heart disease by raising cholesterol levels.     · Limit the amount of solid fat-butter, margarine, and shortening-you eat. Use olive, peanut, or canola oil when you cook. Bake, broil, and steam foods instead of frying them.     · Eat a variety of fruit and vegetables every day. Dark green, deep orange, red, or yellow fruits and vegetables are especially good for you. Examples include spinach, carrots, peaches, and berries.     · Foods high in fiber can reduce your cholesterol and provide important vitamins and minerals. High-fiber foods include whole-grain cereals and breads, oatmeal, beans, brown rice, citrus fruits, and apples.     · Eat lean proteins. Heart-healthy proteins include seafood, lean meats and poultry, eggs, beans, peas, nuts, seeds, and soy products.     · Limit drinks and foods with added sugar. These include candy, desserts, and soda pop. Lifestyle changes    · If your doctor recommends it, get more exercise. Walking is a good choice. Bit by bit, increase the amount you walk every day. Try for at least 30 minutes on most days of the week. You also may want to swim, bike, or do other activities.     · Do not smoke. If you need help quitting, talk to your doctor about stop-smoking programs and medicines. These can increase your chances of quitting for good. Quitting smoking may be the most important step you can take to protect your heart. It is never too late to quit.     · Limit alcohol to 2 drinks a day for men and 1 drink a day for women.  Too much alcohol can cause health problems.     · Manage other health problems such as diabetes, high blood pressure, and high cholesterol. If you think you may have a problem with alcohol or drug use, talk to your doctor. Medicines    · Take your medicines exactly as prescribed. Call your doctor if you think you are having a problem with your medicine.     · If your doctor recommends aspirin, take the amount directed each day. Make sure you take aspirin and not another kind of pain reliever, such as acetaminophen (Tylenol). When should you call for help? Call 911 if you have symptoms of a heart attack. These may include:    · Chest pain or pressure, or a strange feeling in the chest.     · Sweating.     · Shortness of breath.     · Pain, pressure, or a strange feeling in the back, neck, jaw, or upper belly or in one or both shoulders or arms.     · Lightheadedness or sudden weakness.     · A fast or irregular heartbeat. After you call 911, the  may tell you to chew 1 adult-strength or 2 to 4 low-dose aspirin. Wait for an ambulance. Do not try to drive yourself. Watch closely for changes in your health, and be sure to contact your doctor if you have any problems. Where can you learn more? Go to http://www.Saberr.com/  Enter F075 in the search box to learn more about \"A Healthy Heart: Care Instructions. \"  Current as of: January 10, 2022               Content Version: 13.2  © 1814-6498 [employer]. Care instructions adapted under license by [employer] (which disclaims liability or warranty for this information). If you have questions about a medical condition or this instruction, always ask your healthcare professional. [place] [address] any warranty or liability for your use of this information.

## 2022-04-20 NOTE — PROGRESS NOTES
HISTORY OF PRESENT ILLNESS  Jennifer Solano is a 70 y.o. female. 2/22 seen after little over 5 years for palpitations and shortness of breath. Has gained about 50 to 60 pounds of weight in the last 1 year  4/22 symptoms are not improving so far and the results of echo and nuclear stress test will be discussed. Palpitations   The history is provided by the patient (fast pulse). This is a chronic problem. The current episode started more than 1 week ago (yrs). The problem has not changed since onset. The problem occurs every several days (2/wk). The problem is associated with exercise (laundry). Associated symptoms include shortness of breath. Pertinent negatives include no diaphoresis, no fever, no claudication, no orthopnea, no PND, no syncope, no nausea, no vomiting, no dizziness, no weakness, no cough, no hemoptysis and no sputum production. Risk factors include no risk factors. Her past medical history is significant for hyperthyroidism and anemia. Shortness of Breath  The history is provided by the patient. This is a recurrent problem. The problem occurs intermittently. The current episode started more than 1 week ago (yrs). The problem has not changed since onset. Pertinent negatives include no fever, no ear pain, no neck pain, no cough, no sputum production, no hemoptysis, no wheezing, no PND, no orthopnea, no syncope, no vomiting, no rash, no leg swelling and no claudication. The problem's precipitants include exercise (Shopping in [employer] for about 20 minutes;). She has had prior hospitalizations. She has had prior ED visits. Associated medical issues include chronic lung disease. Associated medical issues do not include CAD or heart failure. Follow-up  Associated symptoms include shortness of breath. Review of Systems   Constitutional: Negative for chills, diaphoresis, fever and weight loss. HENT: Negative for ear pain and hearing loss. Eyes: Negative for blurred vision.    Respiratory: Positive for shortness of breath. Negative for cough, hemoptysis, sputum production, wheezing and stridor. Cardiovascular: Positive for palpitations. Negative for orthopnea, claudication, leg swelling, syncope and PND. Gastrointestinal: Negative for heartburn, nausea and vomiting. Musculoskeletal: Negative for myalgias and neck pain. Skin: Negative for rash. Neurological: Negative for dizziness, tingling, tremors, focal weakness, loss of consciousness and weakness. Psychiatric/Behavioral: Negative for depression and suicidal ideas.      Family History   Problem Relation Age of Onset    Hypertension Mother     Stroke Mother 80    Cancer Father     Heart Disease Father     Hypertension Father     Asthma Sister     Breast Problems Sister     Cancer Sister     Cancer Brother         lung cancer    Asthma Brother     Breast Cancer Maternal Grandmother 78    Cancer Brother     Breast Cancer Maternal Grandfather     Diabetes Paternal Uncle     Heart Attack Neg Hx        Past Medical History:   Diagnosis Date    Alopecia     Arthritis     Asthma     Chronic lung disease     GERD (gastroesophageal reflux disease)     Hypercholesteremia     Hypertension     Hyperthyroidism     Thyroid disease        Past Surgical History:   Procedure Laterality Date    COLONOSCOPY N/A 2/21/2018    COLONOSCOPY/polypectomy/polyloop/ink tatoo  performed by Shirley Bullock MD at [facility] ENDOSCOPY    COLONOSCOPY N/A 2/24/2021    COLONOSCOPY with Polypectomies performed by Thais Taylor MD at [facility] ENDOSCOPY    HX BUNIONECTOMY      bilateral and \"removed some arthritis in feet\"    HX CATARACT REMOVAL      with implants    HX COLONOSCOPY  08/2015    HX HAMMER TOE REPAIR      right     HX HEMORRHOIDECTOMY      HX HYSTERECTOMY N/A     HX NEPHRECTOMY Left 2019    partial    HX ORTHOPAEDIC      HX OTHER SURGICAL      keiloid removed off chest near shoulder area    HX TOTAL COLECTOMY  10/06/2015    lap right hemicolectomy due to polyp-nonmalignant    HX TUBAL LIGATION      US GUIDED CORE BREAST BIOPSY Right 2013    benign       Social History     Tobacco Use    Smoking status: Former Smoker     Packs/day: 0.50     Years: 5.00     Pack years: 2.50     Types: Cigarettes     Start date: 3/13/1972     Quit date: 3/13/1977     Years since quittin.1    Smokeless tobacco: Never Used    Tobacco comment: quit smoking in    Substance Use Topics    Alcohol use: Not Currently     Alcohol/week: 0.0 standard drinks     Comment: Once a year       Allergies   Allergen Reactions    Latex Swelling and Contact Dermatitis    Latex, Natural Rubber Itching    Other Food Hives, Diarrhea and Other (comments)     Mushrooms and oranges/OJ Other: throat swelling and mouth sores  Pt denes it's allergy related    Iodine Povacry-Iso Alcohol Other (comments)     Blisters  20 Pt. denies    Vicodin [Hydrocodone-Acetaminophen] Itching    Tramadol Other (comments)     20 Pt. denies       Outpatient Medications Marked as Taking for the 22 encounter (Office Visit) with Zoë Benson MD   Medication Sig Dispense Refill    OMALIZUMAB  mg by SubCUTAneous route Once every 2 weeks.  cholecalciferol (VITAMIN D3) (5000 Units/125 mcg) tab tablet Take 5,000 Units by mouth daily.  budesonide-formoteroL (SYMBICORT) 160-4.5 mcg/actuation HFAA Take 2 Puffs by inhalation two (2) times a day. Rinse and gargle thoroughly after each use. Disp 3 inhalers 3 Each 3    escitalopram oxalate (LEXAPRO) 5 mg tablet Take 5 mg by mouth daily.  hydroCHLOROthiazide (HYDRODIURIL) 12.5 mg tablet Take 12.5 mg by mouth daily.  tiotropium bromide (Spiriva Respimat) 1.25 mcg/actuation inhaler Take 2 Puffs by inhalation daily. 12 g 3    hyoscyamine (Levsin) 0.125 mg tablet Take 1 Tab by mouth every four (4) hours as needed for Cramping.  40 Tab 0    albuterol 2.5 mg /3 mL (0.083 %) nebu 3 mL, albuterol-ipratropium 2.5 mg-0.5 mg/3 ml nebu 3 mL 1 Dose by Nebulization route every four (4) hours as needed for Wheezing or Shortness of Breath.  simvastatin (ZOCOR) 40 mg tablet Take 40 mg by mouth nightly.  aspirin delayed-release 81 mg tablet Take 81 mg by mouth daily.  montelukast (SINGULAIR) 10 mg tablet Take 10 mg by mouth daily.  methimazole (TAPAZOLE) 10 mg tablet Take 5 mg by mouth daily.  albuterol (PROVENTIL HFA, VENTOLIN HFA, PROAIR HFA) 90 mcg/actuation inhaler Take 2 Puffs by inhalation every four (4) hours as needed for Wheezing.  omeprazole (PRILOSEC) 20 mg capsule Take 20 mg by mouth daily. 9/20 echo  Interpretation Summary    · LV: Estimated LVEF is 60 - 65%. Visually measured ejection fraction. Normal cavity size, wall thickness and systolic function (ejection fraction normal). Wall motion: normal. Mild (grade 1) left ventricular diastolic dysfunction. · LA: Left Atrium volume index is 29.54 mL/m2. · RV: Not well visualized. · TV: Mild tricuspid valve regurgitation is present. · PA: Pulmonary arterial systolic pressure is 30 mmHg. · Pericardium: Pericardial fat pad present. · MV: Mild mitral valve regurgitation is present. Comparison Study Information      Prior Study    There is a prior study available for comparison. Prior study date: 10/7/2015. As compared to the previous study, there are no significant changes. 04/14/22    ECHO ADULT COMPLETE 04/14/2022 4/14/2022    Interpretation Summary    Left Ventricle: Left ventricle size is normal. Mildly increased wall thickness. Normal wall motion. Normal left ventricular systolic function with a visually estimated EF of 55 - 60%. Normal diastolic function.   Tricuspid Valve: Unable to assess RVSP due to inadequate or insignificant tricuspid regurgitation.   Left Atrium: Left atrial volume index is normal (16-34 mL/m2). LA Vol Index A/L is 29 mL/m2.     Signed by: Eddie Haywood DO on 4/14/2022  3:55 PM  03/14/22    NUCLEAR CARDIAC STRESS TEST 03/14/2022 3/14/2022    Interpretation Summary    Nuclear Findings: LVEF measures 76%. TID ratio is 1.06.    Nuclear Findings: LV perfusion is normal. There is no evidence of inducible ischemia.   Nuclear Findings: Normal left ventricular systolic function post-stress. LVEF measures 76%.   Nuclear Findings: Findings suggest a low risk of myocardial ischemia.   ECG: Resting ECG demonstrates normal sinus rhythm.   ECG: Stress ECG was not diagnostic due to resting ST-T abnormalities.   Stress Test: A pharmacological stress test was performed using lexiscan. Blood pressure demonstrated a normal response and heart rate demonstrated a normal response to stress. The patient's heart rate recovery was normal. The patient reported no symptoms during the stress test.    Signed by: Sharan Del Angel MD on 3/14/2022 12:56 PM      Visit Vitals  /70 (BP 1 Location: Left upper arm, BP Patient Position: Sitting, BP Cuff Size: Large adult)   Pulse 96   Ht 5' 1\" (1.549 m)   Wt 101.2 kg (223 lb)   SpO2 97%   BMI 42.14 kg/m²       Physical Exam  Constitutional:       General: She is not in acute distress. Appearance: She is well-developed. She is obese. She is not diaphoretic. HENT:      Head: Atraumatic. Mouth/Throat:      Pharynx: No oropharyngeal exudate. Eyes:      General: No scleral icterus. Conjunctiva/sclera: Conjunctivae normal.   Neck:      Vascular: No JVD. Comments: Thyroid bruit absent  Cardiovascular:      Rate and Rhythm: Normal rate and regular rhythm. Heart sounds: Murmur (2/6 systolic murmur heard at aortic and mitral area with no radiation) heard. No gallop. Pulmonary:      Effort: Pulmonary effort is normal.      Breath sounds: Normal breath sounds. No stridor. No wheezing or rales. Abdominal:      Palpations: Abdomen is soft. Tenderness: There is no abdominal tenderness.    Musculoskeletal: General: No tenderness. Normal range of motion. Cervical back: Neck supple. Right lower leg: No edema. Left lower leg: No edema. Skin:     General: Skin is warm. Neurological:      Mental Status: She is alert and oriented to person, place, and time. Motor: No abnormal muscle tone. Psychiatric:         Behavior: Behavior normal.         ASSESSMENT and PLAN    Results for David Ibrahim (MRN [id number]) as of 2/23/2022 09:10   Ref. Range 8/27/2020 08:21 1/27/2021 09:34 7/14/2021 09:23 1/18/2022 09:13   Triglyceride Latest Ref Range: <150 MG/DL 91  73 129   Cholesterol, total Latest Ref Range: <200 MG/  148 179   HDL Cholesterol Latest Ref Range: 40 - 60 MG/DL 90 (H)  79 (H) 68 (H)   CHOL/HDL Ratio Latest Ref Range: 0 - 5.0   1.7  1.9 2.6   VLDL, calculated Latest Units: MG/DL 18.2  14.6 25.8   LDL, calculated Latest Ref Range: 0 - 100 MG/DL 46.8  54.4 85.2     12/16 Patient seen in hospital for elevated cardiac enzyme- probably secondary to profound anemia and tachycardia from hyperthyroidism. Has mild stable dyspnea. No chest pain. ETT revealed 1-2 mm ST segment depression with no chest pain. Patient remains asymtpomatic. Will continue risk factor modification. Patient had significant thyroid bruit and known thyroid nodules- now has resolved. Continue intense risk factor modification. 2/23/2022 dyspnea could be due to significant weight gain and underlying hyperthyroidism history but underlying cardiac structural and functional disease and ischemia should be ruled out given her age and risk factors. Check echo and a stress test.  Healthy diet was discussed and Mediterranean diet guidelines given. Would like to increase the dose of methimazole and see how she responds symptomatically given palpitations and dyspnea. Discussed with patient and she will discuss with her endocrinologist, Dr. Amrit Bryant. Diagnoses and all orders for this visit:    1.  Chronic diastolic congestive heart failure (HCC)  -     metoprolol succinate (TOPROL-XL) 25 mg XL tablet; Take 1 Tablet by mouth daily. 2. Essential hypertension    3. Hypercholesteremia    4. Severe obesity (BMI >= 40) (HCC)        Pertinent laboratory and test data reviewed and discussed with patient. See patient instructions also for other medical advice given    Medications Discontinued During This Encounter   Medication Reason    cholecalciferol (Vitamin D3) 25 mcg (1,000 unit) cap Not A Current Medication    cholecalciferol (Vitamin D3) 25 mcg (1,000 unit) cap Not A Current Medication    cholecalciferol, vitamin D3, (VITAMIN D3) 2,000 unit tab Not A Current Medication       Follow-up and Dispositions    · Return in about 3 months (around 7/20/2022), or if symptoms worsen or fail to improve, for BP log x 4-5 days post med changes. 4/20/2022 cardiac testing has not shown any ischemia and systolic function is normal.  Patient has chronic diastolic CHF most likely. Since heart rate is high add low-dose beta-blocker and increase as tolerated. Discussed with patient and explained. Might exacerbate her asthma if it is due to bronchial asthma. She will watch out and let us know. Follow home BP and heart rate chart in 2 weeks or so. Diet weight and exercise reinforced. She has been reading about the diet but not changing yet but will try now. Discussed with her pulmonologist on phone Dr. Trupti Leach regarding the use of beta-blockers and Xolair. He was okay to use both the medications together without any significant concern.

## 2022-04-26 ENCOUNTER — TELEPHONE (OUTPATIENT)
Dept: CARDIOLOGY CLINIC | Age: 72
End: 2022-04-26

## 2022-04-26 ENCOUNTER — HOSPITAL ENCOUNTER (OUTPATIENT)
Dept: INFUSION THERAPY | Age: 72
End: 2022-04-26

## 2022-04-26 DIAGNOSIS — I50.32 CHRONIC DIASTOLIC CONGESTIVE HEART FAILURE (HCC): ICD-10-CM

## 2022-04-26 RX ORDER — METOPROLOL SUCCINATE 25 MG/1
25 TABLET, EXTENDED RELEASE ORAL DAILY
Qty: 10 TABLET | Refills: 1 | Status: SHIPPED | OUTPATIENT
Start: 2022-04-26

## 2022-04-26 NOTE — TELEPHONE ENCOUNTER
Patient wants to know since you started her on Metoprolol, should she still take her Xolair injections for her asthma ?

## 2022-04-26 NOTE — TELEPHONE ENCOUNTER
Requested Prescriptions     Pending Prescriptions Disp Refills    metoprolol succinate (TOPROL-XL) 25 mg XL tablet 10 Tablet 1     Sig: Take 1 Tablet by mouth daily.

## 2022-04-27 NOTE — TELEPHONE ENCOUNTER
Spoke with patient advised pt can continue with Xolair injections with metoprolol Rx per Dr. Shahla Pardo.

## 2022-05-03 ENCOUNTER — HOSPITAL ENCOUNTER (OUTPATIENT)
Dept: INFUSION THERAPY | Age: 72
Discharge: HOME | End: 2022-05-03
Payer: MEDICARE

## 2022-05-03 VITALS
SYSTOLIC BLOOD PRESSURE: 149 MMHG | TEMPERATURE: 98.5 F | HEART RATE: 72 BPM | RESPIRATION RATE: 20 BRPM | OXYGEN SATURATION: 99 % | DIASTOLIC BLOOD PRESSURE: 73 MMHG

## 2022-05-03 DIAGNOSIS — J45.30 MILD PERSISTENT ASTHMA, UNSPECIFIED WHETHER COMPLICATED: Primary | ICD-10-CM

## 2022-05-03 PROCEDURE — 74011250636 HC RX REV CODE- 250/636: Performed by: INTERNAL MEDICINE

## 2022-05-03 PROCEDURE — 96372 THER/PROPH/DIAG INJ SC/IM: CPT

## 2022-05-03 RX ORDER — ALBUTEROL SULFATE 0.83 MG/ML
2.5 SOLUTION RESPIRATORY (INHALATION) AS NEEDED
Status: CANCELLED
Start: 2022-05-17

## 2022-05-03 RX ORDER — HYDROCORTISONE SODIUM SUCCINATE 100 MG/2ML
100 INJECTION, POWDER, FOR SOLUTION INTRAMUSCULAR; INTRAVENOUS AS NEEDED
Status: CANCELLED | OUTPATIENT
Start: 2022-05-17

## 2022-05-03 RX ORDER — DIPHENHYDRAMINE HYDROCHLORIDE 50 MG/ML
25 INJECTION, SOLUTION INTRAMUSCULAR; INTRAVENOUS AS NEEDED
Status: CANCELLED
Start: 2022-05-17

## 2022-05-03 RX ORDER — EPINEPHRINE 1 MG/ML
0.3 INJECTION, SOLUTION, CONCENTRATE INTRAVENOUS AS NEEDED
Status: CANCELLED | OUTPATIENT
Start: 2022-05-17

## 2022-05-03 RX ORDER — ONDANSETRON 2 MG/ML
8 INJECTION INTRAMUSCULAR; INTRAVENOUS AS NEEDED
Status: CANCELLED | OUTPATIENT
Start: 2022-05-17

## 2022-05-03 RX ORDER — DIPHENHYDRAMINE HYDROCHLORIDE 50 MG/ML
50 INJECTION, SOLUTION INTRAMUSCULAR; INTRAVENOUS AS NEEDED
Status: CANCELLED
Start: 2022-05-17

## 2022-05-03 RX ORDER — ACETAMINOPHEN 325 MG/1
650 TABLET ORAL AS NEEDED
Status: CANCELLED
Start: 2022-05-17

## 2022-05-03 RX ADMIN — OMALIZUMAB 75 MG: 75 INJECTION, SOLUTION SUBCUTANEOUS at 10:52

## 2022-05-03 RX ADMIN — OMALIZUMAB 150 MG: 150 INJECTION, SOLUTION SUBCUTANEOUS at 10:52

## 2022-05-03 NOTE — PROGRESS NOTES
[facility] Progress Note    Date: May 3, 2022    Name: Maryjane Lanes    MRN: [id number]         : 1950    Ms. Florentino arrived in the [facility] today at 1035, in stable condition, here for her XOLAIR Injections (Every 2 Weeks). She was assessed and education was provided. Ms. Myla Dick vitals were reviewed. Visit Vitals  BP (!) 149/73 (BP 1 Location: Right upper arm, BP Patient Position: Sitting)   Pulse 72   Temp 98.5 °F (36.9 °C)   Resp 20   SpO2 99%   Breastfeeding No           Ms. Florentino stated that she has been tolerating the XOLAIR injections well and without any side effects. Omalizumab (XOLAIR) 375 mg TOTAL DOSE, was administered in 3 divided SQ injections at 1052 as follows: 150 mg SQ in her RIGHT arm, 150 mg SQ in her LEFT arm, and 75 mg SQ in her LEFT abdomen. Ms. Gerri Kim tolerated well and voiced no complaints. Ms. Gerri Kim was discharged from [place] [address] in stable condition at 1055. She is to return in 2 weeks, on 22 at 0930, for her next appointment, for her next XOLAIR injections.      Licha Moralez RN  May 3, 2022  10:44 AM

## 2022-05-10 ENCOUNTER — APPOINTMENT (OUTPATIENT)
Dept: INFUSION THERAPY | Age: 72
End: 2022-05-10
Payer: MEDICARE

## 2022-05-17 ENCOUNTER — HOSPITAL ENCOUNTER (OUTPATIENT)
Dept: INFUSION THERAPY | Age: 72
Discharge: HOME | End: 2022-05-17
Payer: MEDICARE

## 2022-05-17 VITALS
SYSTOLIC BLOOD PRESSURE: 135 MMHG | RESPIRATION RATE: 18 BRPM | TEMPERATURE: 98.5 F | OXYGEN SATURATION: 98 % | DIASTOLIC BLOOD PRESSURE: 76 MMHG | HEART RATE: 75 BPM

## 2022-05-17 DIAGNOSIS — J45.30 MILD PERSISTENT ASTHMA, UNSPECIFIED WHETHER COMPLICATED: Primary | ICD-10-CM

## 2022-05-17 PROCEDURE — 96372 THER/PROPH/DIAG INJ SC/IM: CPT

## 2022-05-17 PROCEDURE — 74011250636 HC RX REV CODE- 250/636: Performed by: INTERNAL MEDICINE

## 2022-05-17 RX ORDER — ACETAMINOPHEN 325 MG/1
650 TABLET ORAL AS NEEDED
Status: CANCELLED
Start: 2022-05-31

## 2022-05-17 RX ORDER — DIPHENHYDRAMINE HYDROCHLORIDE 50 MG/ML
50 INJECTION, SOLUTION INTRAMUSCULAR; INTRAVENOUS AS NEEDED
Status: CANCELLED
Start: 2022-05-31

## 2022-05-17 RX ORDER — DIPHENHYDRAMINE HYDROCHLORIDE 50 MG/ML
25 INJECTION, SOLUTION INTRAMUSCULAR; INTRAVENOUS AS NEEDED
Status: CANCELLED
Start: 2022-05-31

## 2022-05-17 RX ORDER — HYDROCORTISONE SODIUM SUCCINATE 100 MG/2ML
100 INJECTION, POWDER, FOR SOLUTION INTRAMUSCULAR; INTRAVENOUS AS NEEDED
Status: CANCELLED | OUTPATIENT
Start: 2022-05-31

## 2022-05-17 RX ORDER — ALBUTEROL SULFATE 0.83 MG/ML
2.5 SOLUTION RESPIRATORY (INHALATION) AS NEEDED
Status: CANCELLED
Start: 2022-05-31

## 2022-05-17 RX ORDER — EPINEPHRINE 1 MG/ML
0.3 INJECTION, SOLUTION, CONCENTRATE INTRAVENOUS AS NEEDED
Status: CANCELLED | OUTPATIENT
Start: 2022-05-31

## 2022-05-17 RX ORDER — ONDANSETRON 2 MG/ML
8 INJECTION INTRAMUSCULAR; INTRAVENOUS AS NEEDED
Status: CANCELLED | OUTPATIENT
Start: 2022-05-31

## 2022-05-17 RX ADMIN — OMALIZUMAB 150 MG: 150 INJECTION, SOLUTION SUBCUTANEOUS at 09:47

## 2022-05-17 RX ADMIN — OMALIZUMAB 75 MG: 75 INJECTION, SOLUTION SUBCUTANEOUS at 09:47

## 2022-05-17 NOTE — PROGRESS NOTES
[facility] Progress Note    Date: May 17, 2022    Name: Joaquin Moore    MRN: [id number]         : 1950      Milla Florentino arrived in the [facility] today at [address], ambulatory w/ steady gait & no assist.    Ms. Florentino's vitals were reviewed. Visit Vitals  /76 (BP 1 Location: Left upper arm, BP Patient Position: Sitting)   Pulse 75   Temp 98.5 °F (36.9 °C)   Resp 18   SpO2 98%       Pt reports that she has been tolerating the Xolair injections well and without any side effects. Xolair 375 mg administered as ordered SQ in 3 divided injections: 150mg R upper arm, 150mg L upper arm, and 75mg RLQ abdomen. Ms. Renata Mcdaniel tolerated well without complaints. Discharge/ follow-up instructions discussed w/ pt. Pt verbalized understanding. Pt armband removed & shredded. Ms. Renata Mcdaniel was discharged from [place] [address] in stable condition at [phone]. She is scheduled for her next appointment on 22 at 1030.      Eleni Bernal RN  May 17, 2022

## 2022-05-24 ENCOUNTER — APPOINTMENT (OUTPATIENT)
Dept: INFUSION THERAPY | Age: 72
End: 2022-05-24
Payer: MEDICARE

## 2022-06-02 ENCOUNTER — HOSPITAL ENCOUNTER (OUTPATIENT)
Dept: INFUSION THERAPY | Age: 72
Discharge: HOME | End: 2022-06-02
Payer: MEDICARE

## 2022-06-02 VITALS
TEMPERATURE: 98.2 F | RESPIRATION RATE: 20 BRPM | HEART RATE: 62 BPM | DIASTOLIC BLOOD PRESSURE: 77 MMHG | SYSTOLIC BLOOD PRESSURE: 151 MMHG | OXYGEN SATURATION: 95 %

## 2022-06-02 DIAGNOSIS — J45.30 MILD PERSISTENT ASTHMA, UNSPECIFIED WHETHER COMPLICATED: Primary | ICD-10-CM

## 2022-06-02 PROCEDURE — 74011250636 HC RX REV CODE- 250/636: Performed by: INTERNAL MEDICINE

## 2022-06-02 PROCEDURE — 96372 THER/PROPH/DIAG INJ SC/IM: CPT

## 2022-06-02 RX ORDER — HYDROCORTISONE SODIUM SUCCINATE 100 MG/2ML
100 INJECTION, POWDER, FOR SOLUTION INTRAMUSCULAR; INTRAVENOUS AS NEEDED
Status: CANCELLED | OUTPATIENT
Start: 2022-06-14

## 2022-06-02 RX ORDER — DIPHENHYDRAMINE HYDROCHLORIDE 50 MG/ML
50 INJECTION, SOLUTION INTRAMUSCULAR; INTRAVENOUS AS NEEDED
Status: CANCELLED
Start: 2022-06-14

## 2022-06-02 RX ORDER — DIPHENHYDRAMINE HYDROCHLORIDE 50 MG/ML
25 INJECTION, SOLUTION INTRAMUSCULAR; INTRAVENOUS AS NEEDED
Status: CANCELLED
Start: 2022-06-14

## 2022-06-02 RX ORDER — EPINEPHRINE 1 MG/ML
0.3 INJECTION, SOLUTION, CONCENTRATE INTRAVENOUS AS NEEDED
Status: CANCELLED | OUTPATIENT
Start: 2022-06-14

## 2022-06-02 RX ORDER — ONDANSETRON 2 MG/ML
8 INJECTION INTRAMUSCULAR; INTRAVENOUS AS NEEDED
Status: CANCELLED | OUTPATIENT
Start: 2022-06-14

## 2022-06-02 RX ORDER — ACETAMINOPHEN 325 MG/1
650 TABLET ORAL AS NEEDED
Status: CANCELLED
Start: 2022-06-14

## 2022-06-02 RX ORDER — ALBUTEROL SULFATE 0.83 MG/ML
2.5 SOLUTION RESPIRATORY (INHALATION) AS NEEDED
Status: CANCELLED
Start: 2022-06-14

## 2022-06-02 RX ADMIN — OMALIZUMAB 150 MG: 150 INJECTION, SOLUTION SUBCUTANEOUS at 10:45

## 2022-06-02 RX ADMIN — OMALIZUMAB 75 MG: 75 INJECTION, SOLUTION SUBCUTANEOUS at 10:45

## 2022-06-02 NOTE — PROGRESS NOTES
[facility] Progress Note    Date: 2022    Name: Ken Nance    MRN: [id number]         : 1950       Xolair (Q2 weeks)    Ms. Ashley Chaparro arrived to [facility] ambulatory at [phone]. Ms. Ashley Chaparro was assessed and education was provided. Pt states she is tolerating injections without any side effects. Ms. Gil Amanda vitals were reviewed. Visit Vitals  BP (!) 151/77 (BP 1 Location: Left upper arm, BP Patient Position: Sitting)   Pulse 62   Temp 98.2 °F (36.8 °C)   Resp 20   SpO2 95%       Omalizumab (Xolair) 375 mg was administered as 3 divided doses as ordered SQ:  150mg to RIGHT upper arm, 150mg to LEFT upper arm, and 75mg to LEFT lower abdomen per order. Ms. Ashley Chaparro tolerated well without complaints. Discharge/ follow-up instructions discussed w/ pt, including instructions to go to ED if pt starts to experience symptoms such as sob, chest pain, severe itching. Pt verbalized understanding. Pt armband removed & shredded. Ms. Ashley Chaparro was discharged from [place] [address] in stable condition at 1050. She is to return on 6/15/22 at 0830 for her next Xolair injection appointment.     Nupur Naqvi RN  2022

## 2022-06-15 ENCOUNTER — HOSPITAL ENCOUNTER (OUTPATIENT)
Dept: INFUSION THERAPY | Age: 72
Discharge: HOME | End: 2022-06-15
Payer: MEDICARE

## 2022-06-15 VITALS
SYSTOLIC BLOOD PRESSURE: 143 MMHG | RESPIRATION RATE: 18 BRPM | TEMPERATURE: 98.1 F | OXYGEN SATURATION: 100 % | HEART RATE: 63 BPM | DIASTOLIC BLOOD PRESSURE: 74 MMHG

## 2022-06-15 DIAGNOSIS — J45.30 MILD PERSISTENT ASTHMA, UNSPECIFIED WHETHER COMPLICATED: Primary | ICD-10-CM

## 2022-06-15 PROCEDURE — 96372 THER/PROPH/DIAG INJ SC/IM: CPT

## 2022-06-15 PROCEDURE — 74011250636 HC RX REV CODE- 250/636: Performed by: INTERNAL MEDICINE

## 2022-06-15 RX ORDER — ACETAMINOPHEN 325 MG/1
650 TABLET ORAL AS NEEDED
Status: CANCELLED
Start: 2022-06-29

## 2022-06-15 RX ORDER — HYDROCORTISONE SODIUM SUCCINATE 100 MG/2ML
100 INJECTION, POWDER, FOR SOLUTION INTRAMUSCULAR; INTRAVENOUS AS NEEDED
Status: CANCELLED | OUTPATIENT
Start: 2022-06-29

## 2022-06-15 RX ORDER — DIPHENHYDRAMINE HYDROCHLORIDE 50 MG/ML
50 INJECTION, SOLUTION INTRAMUSCULAR; INTRAVENOUS AS NEEDED
Status: CANCELLED
Start: 2022-06-29

## 2022-06-15 RX ORDER — DIPHENHYDRAMINE HYDROCHLORIDE 50 MG/ML
25 INJECTION, SOLUTION INTRAMUSCULAR; INTRAVENOUS AS NEEDED
Status: CANCELLED
Start: 2022-06-29

## 2022-06-15 RX ORDER — ALBUTEROL SULFATE 0.83 MG/ML
2.5 SOLUTION RESPIRATORY (INHALATION) AS NEEDED
Status: CANCELLED
Start: 2022-06-29

## 2022-06-15 RX ORDER — ONDANSETRON 2 MG/ML
8 INJECTION INTRAMUSCULAR; INTRAVENOUS AS NEEDED
Status: CANCELLED | OUTPATIENT
Start: 2022-06-29

## 2022-06-15 RX ORDER — EPINEPHRINE 1 MG/ML
0.3 INJECTION, SOLUTION, CONCENTRATE INTRAVENOUS AS NEEDED
Status: CANCELLED | OUTPATIENT
Start: 2022-06-29

## 2022-06-15 RX ADMIN — OMALIZUMAB 150 MG: 150 INJECTION, SOLUTION SUBCUTANEOUS at 09:48

## 2022-06-15 RX ADMIN — OMALIZUMAB 75 MG: 75 INJECTION, SOLUTION SUBCUTANEOUS at 09:48

## 2022-06-15 NOTE — PROGRESS NOTES
[facility] Progress Note    Date: Jovana 15, 2022    Name: Jenn Blake    MRN: [id number]         : 1950      [place]      Ms. Florentino arrived in the [address] [place] [address] today at [address], ambulatory w/ steady gait & no assist.    Ms. Florentino's vitals were reviewed. Visit Vitals  /76 (BP 1 Location: Left upper arm, BP Patient Position: Sitting)   Pulse 75   Temp 98.5 °F (36.9 °C)   Resp 18   SpO2 98%       Xolair 375 mg administered as ordered SQ in 3 divided injections: 150mg R upper arm, 150mg L upper arm, and 75mg RLQ abdomen. Declined band-aids. Ms. Alexandria Ching tolerated well without complaints. Discharge/ follow-up instructions discussed w/ pt. Pt verbalized understanding. Pt armband removed & shredded. Ms. Alexandria Ching was discharged from [place] [address] in stable condition at [phone]. She is scheduled for her next appointment on 22 at 0900.      Adele Leyden, RN  Jovana 15, 2022

## 2022-06-27 NOTE — PROGRESS NOTES
A (Probe Esph 12 Intgr Snsr Radopq Circa S-cath Temp)  Kailee Verduzco 70y.o. year old female with Dyspnea on exertion [R06.00]  Shortness of breath [R06.02]  Physical deconditioning [R53.81]  Poorly controlled severe persistent asthma without complication [X94.42]  Severe obesity (BMI 35.0-39. 9) with comorbidity ([address] [E66.01]  Other cardiomyopathy ([address] [I42.8]        Social  History & Family Component    Living Situation:      LIVES:  With spouse      Does patient have family/friends able to provide support  [x]  Yes  []  No     What type of home does patient live in? [x] one level   []   one level with basement      []   2 story    # of stairs to enter home:  Yes# of stairs in home:   3[]  other:    Does patient have a yard? []  No   [x] Yes    If yes, can patient care for yard? []  Yes [x]  No   If no, does patient require assistance to care for yard? [x]  Yes   []  No     Transportation Resources:  Can patient drive themselves? [x]  Yes   []   No   If no, who is principle ? Does patient need referral for community resources (i.e., [facility], etc.)      [x]  No  []  Yes, which option?  Services/[place]/Visual Impaired: []  Yes   [x]   No   If yes, describe:    Occupation: Bank/Customer Service   Education/Grade Level: B.S.    Occupational exposures: No    detention/disability date: 2012  Psychosocial Assessment Tools    COPD Assessment Test (CAT) Score: 20  Date of Test: 6/30/2021  Impact Level: Medium    CAT measures COPD health status and quantifies the impact of COPD on patients life  and how this changes over time  Score and Impact Level: >30 = very high; > 20 = high; 10 - 20 = medium; <10 = low    PHQ-9 Score: 13 (medium)  Date of Test: 6/30/2021    Scale includes 9 depression questions including 1 suicidal ideation and a 10th question asking about work, home, and socialization  Each item on the questionnaire is scored from 0-3 and this means that a person can score between 0 and 27 for depression.    Results5-9 minimal symptoms, 10-14 minor depression, 15-19 major depression, moderately severe, >20 major depression, severe. Assessment:    Limited participation in family and community activities [x] No [x] Yes      Limited coping strategies [x] No [] Yes      Inappropriate over dependence on family and friends [] No [x] Yes      Stressors [x] No [] Yes  If yes, please describe:     Able to relax or perform leisure activities [] No [x] Yes   Describe leisure activities: Reading    Able to participate in former interests/hobbies: [x] No [x] Yes   Describe hobbies: Scrap booking    Able to do yard/household activities: [] No [x] Yes   Describe activities: Laundry, Making Bed  What is most difficult household chore?: Vacuuming/Sweeping/Bathroom  What is easiest household chore?:Dishes    Current mood assessed: [] No [x] Yes   Description of present mood: []  Worried  []   Sad []    []  Depressed []   Impatient  []   Frustrated  []   Anxious  [x]   Contented  []   Cheerful  [x]   Happy []   Other:    Needs referral to referring MD for follow up: [] No [] Yes       Aspects of family & home situation that may impact progress in Pulmonary Rehabilitation:      Barrier(s) to learning:  None     [x]  Family & home situation will enhance Pulmonary Rehabilitation, patient is a good candidate     []  Family & home situation will hinder Pulmonary Rehabilitation but patient will be accepted into the program.     ______________________________________________________________________          PLAN:    See ITP for Plan and Interventions during the comprehensive pulmonary rehabilitation program    Patient name: Ken Nance : 1950         Goals Comments   1. To be  able to do laundry without becoming MARIE within the next 30 days [x] initial  [] met                  [] not met  [] progressing     2.  Getting Dressed without becoming SOB or having to stop and take break within the next 30 days   [x] initial  [] met                  [] not met  [] progressing    3. Hobbies; Patient would like to be feel like she has enough energy to enjoy her hobby of scrap booking again in the coming 30-60 days [x] initial  [] met                  [] not met  [] progressing    4.  Weight loss  Over the course of the Pulmonary Rehab program patient would like to reduce her bodyweight by 5-10% [x] initial  [] met                  [] not met  [] progressing        Frequency / Duration: Patient to be seen 2  times per week for 18 weeks:          Shamika Naranjo RT 6/30/2021 9:18 AM

## 2022-06-30 ENCOUNTER — HOSPITAL ENCOUNTER (OUTPATIENT)
Dept: INFUSION THERAPY | Age: 72
Discharge: HOME | End: 2022-06-30
Payer: MEDICARE

## 2022-06-30 VITALS
TEMPERATURE: 98.2 F | SYSTOLIC BLOOD PRESSURE: 142 MMHG | OXYGEN SATURATION: 98 % | RESPIRATION RATE: 18 BRPM | DIASTOLIC BLOOD PRESSURE: 76 MMHG | HEART RATE: 66 BPM

## 2022-06-30 DIAGNOSIS — J45.30 MILD PERSISTENT ASTHMA, UNSPECIFIED WHETHER COMPLICATED: Primary | ICD-10-CM

## 2022-06-30 PROCEDURE — 74011250636 HC RX REV CODE- 250/636: Performed by: INTERNAL MEDICINE

## 2022-06-30 PROCEDURE — 96372 THER/PROPH/DIAG INJ SC/IM: CPT

## 2022-06-30 RX ORDER — DIPHENHYDRAMINE HYDROCHLORIDE 50 MG/ML
50 INJECTION, SOLUTION INTRAMUSCULAR; INTRAVENOUS AS NEEDED
Status: CANCELLED
Start: 2022-07-14

## 2022-06-30 RX ORDER — ONDANSETRON 2 MG/ML
8 INJECTION INTRAMUSCULAR; INTRAVENOUS AS NEEDED
Status: CANCELLED | OUTPATIENT
Start: 2022-07-14

## 2022-06-30 RX ORDER — ALBUTEROL SULFATE 0.83 MG/ML
2.5 SOLUTION RESPIRATORY (INHALATION) AS NEEDED
Status: CANCELLED
Start: 2022-07-14

## 2022-06-30 RX ORDER — ACETAMINOPHEN 325 MG/1
650 TABLET ORAL AS NEEDED
Status: CANCELLED
Start: 2022-07-14

## 2022-06-30 RX ORDER — EPINEPHRINE 1 MG/ML
0.3 INJECTION, SOLUTION, CONCENTRATE INTRAVENOUS AS NEEDED
Status: CANCELLED | OUTPATIENT
Start: 2022-07-14

## 2022-06-30 RX ORDER — HYDROCORTISONE SODIUM SUCCINATE 100 MG/2ML
100 INJECTION, POWDER, FOR SOLUTION INTRAMUSCULAR; INTRAVENOUS AS NEEDED
Status: CANCELLED | OUTPATIENT
Start: 2022-07-14

## 2022-06-30 RX ORDER — DIPHENHYDRAMINE HYDROCHLORIDE 50 MG/ML
25 INJECTION, SOLUTION INTRAMUSCULAR; INTRAVENOUS AS NEEDED
Status: CANCELLED
Start: 2022-07-14

## 2022-06-30 RX ADMIN — OMALIZUMAB 150 MG: 150 INJECTION, SOLUTION SUBCUTANEOUS at 09:09

## 2022-06-30 RX ADMIN — OMALIZUMAB 75 MG: 75 INJECTION, SOLUTION SUBCUTANEOUS at 09:10

## 2022-06-30 NOTE — PROGRESS NOTES
[facility] Progress Note    Date: 2022    Name: Jennifer Ballesteros    MRN: [id number]         : 1950    Joel Im INJECTION    Ms. Florentino arrived to [facility] at [phone], ambulatory. Pt denies pain. Ms. Chris Holloway was assessed and education was provided. Ms. Jolene Flores vitals were reviewed. Visit Vitals  BP (!) 142/76 (BP 1 Location: Left upper arm, BP Patient Position: Sitting)   Pulse 66   Temp 98.2 °F (36.8 °C)   Resp 18   SpO2 98%       Omalizumab (XOLAIR) 375 mg TOTAL DOSE, was administered in 3 divided SQ injections as follows: 150 mg SQ in her RIGHT arm, 150 mg SQ in her LEFT arm, and 75 mg SQ in her LEFT abdomen. Band-Aids Applied. Ms. Chris Holloway tolerated well without complaints. Discharge/ follow-up instructions discussed w/ pt. Pt verbalized understanding. Pt armband removed & shredded. Ms. Chris Holloway was discharged from [place] [address] in stable condition at 5. She is to return on 2022 at 0930 for her next appointment.     Yovany Romero RN  2022

## 2022-07-11 ENCOUNTER — OFFICE VISIT (OUTPATIENT)
Dept: ORTHOPEDIC SURGERY | Age: 72
End: 2022-07-11
Payer: MEDICARE

## 2022-07-11 VITALS
WEIGHT: 217 LBS | HEIGHT: 61 IN | DIASTOLIC BLOOD PRESSURE: 62 MMHG | SYSTOLIC BLOOD PRESSURE: 137 MMHG | HEART RATE: 64 BPM | OXYGEN SATURATION: 99 % | RESPIRATION RATE: 18 BRPM | TEMPERATURE: 97.4 F | BODY MASS INDEX: 40.97 KG/M2

## 2022-07-11 DIAGNOSIS — M47.816 LUMBAR FACET ARTHROPATHY: Primary | ICD-10-CM

## 2022-07-11 DIAGNOSIS — J45.909 ASTHMA, UNSPECIFIED ASTHMA SEVERITY, UNSPECIFIED WHETHER COMPLICATED, UNSPECIFIED WHETHER PERSISTENT: ICD-10-CM

## 2022-07-11 PROCEDURE — G8432 DEP SCR NOT DOC, RNG: HCPCS | Performed by: PHYSICAL MEDICINE & REHABILITATION

## 2022-07-11 PROCEDURE — 1123F ACP DISCUSS/DSCN MKR DOCD: CPT | Performed by: PHYSICAL MEDICINE & REHABILITATION

## 2022-07-11 PROCEDURE — G8399 PT W/DXA RESULTS DOCUMENT: HCPCS | Performed by: PHYSICAL MEDICINE & REHABILITATION

## 2022-07-11 PROCEDURE — G8427 DOCREV CUR MEDS BY ELIG CLIN: HCPCS | Performed by: PHYSICAL MEDICINE & REHABILITATION

## 2022-07-11 PROCEDURE — 1090F PRES/ABSN URINE INCON ASSESS: CPT | Performed by: PHYSICAL MEDICINE & REHABILITATION

## 2022-07-11 PROCEDURE — G8754 DIAS BP LESS 90: HCPCS | Performed by: PHYSICAL MEDICINE & REHABILITATION

## 2022-07-11 PROCEDURE — G9899 SCRN MAM PERF RSLTS DOC: HCPCS | Performed by: PHYSICAL MEDICINE & REHABILITATION

## 2022-07-11 PROCEDURE — G8752 SYS BP LESS 140: HCPCS | Performed by: PHYSICAL MEDICINE & REHABILITATION

## 2022-07-11 PROCEDURE — G9711 PT HX TOT COL OR COLON CA: HCPCS | Performed by: PHYSICAL MEDICINE & REHABILITATION

## 2022-07-11 PROCEDURE — 99213 OFFICE O/P EST LOW 20 MIN: CPT | Performed by: PHYSICAL MEDICINE & REHABILITATION

## 2022-07-11 PROCEDURE — G8536 NO DOC ELDER MAL SCRN: HCPCS | Performed by: PHYSICAL MEDICINE & REHABILITATION

## 2022-07-11 PROCEDURE — 1101F PT FALLS ASSESS-DOCD LE1/YR: CPT | Performed by: PHYSICAL MEDICINE & REHABILITATION

## 2022-07-11 PROCEDURE — G8417 CALC BMI ABV UP PARAM F/U: HCPCS | Performed by: PHYSICAL MEDICINE & REHABILITATION

## 2022-07-11 NOTE — LETTER
7/12/2022    Patient: Jayna Huerta   YOB: 1950   Date of Visit: 7/11/2022     Caity Garsia NP  [address] [place], [place] [address] [id number]  Via Fax: [phone]    Dear Caity Garsia NP,      Thank you for referring Ms. Kajal Florentino to [facility] for evaluation. My notes for this consultation are attached. If you have questions, please do not hesitate to call me. I look forward to following your patient along with you.       Sincerely,    Tomasa Coffey MD

## 2022-07-11 NOTE — PROGRESS NOTES
[facility] [address]  [address], [address]  [place]  Phone: [phone]  Fax: [phone]  : 1950  PCP: Sophia Sands NP    PROGRESS NOTE      ASSESSMENT AND PLAN    Diagnoses and all orders for this visit:    1. Lumbar facet arthropathy  -     SCHEDULE SURGERY  -     REFERRAL TO PHYSICAL THERAPY  -     AMB SUPPLY ORDER    2. Asthma, unspecified asthma severity, unspecified whether complicated, unspecified whether persistent        1. Lilo Huerta is a 67 y.o. female with history of asthma, CHF, partial nephrectomy with progressive focal mechanical low back pain. She has global facet arthropathy on imaging and DDD L4/L5. Unable to use NSAIDs due to her other medical issues. Exam consistent with facet and disc as pain generators. 2. Patient may take up to 2g (2,000 mg) of Tylenol every 24 hours for routine use, or 3g (3,000 mg) for short-term use. 3. Risks, benefits, alternatives, and limitations of RFA discussed with patient. Patient wishes to proceed. 4. Schedule right side only MBB right L4-5, L5-S1 x 2  5. Referral to Physical Therapy for right-sided low back pain eval and treat  6. DME order for LSO for PRN use for prolonged standing and walking, medically necessary to offload painful structures          HISTORY OF PRESENT ILLNESS      Lilo Huerta is a 67 y.o. female presents for follow up of back pain/RFA consultation. Patient of Dr. Francois Henderson. Pt reports progressively worsening low back pain x 1 year. R > L. Her pain is exacerbated with standing and walking. She notes a 10-15 standing tolerance, pt cannot stand long enough to cook. She tries to exercise but very limited due to shortness of breath. Denies sciatica. She takes extra strength Tylenol with some benefit. Denies side effects. She is unable to do her HEP due to pain.      Denies red flags including persistent fevers, chills, weight changes, neurogenic bowel or bladder symptoms. Pain Assessment  3/7/2022   Location of Pain Back   Location Modifiers -   Severity of Pain 10   Quality of Pain (No Data)   Quality of Pain Comment feel like back breaking in half   Duration of Pain A few minutes   Frequency of Pain Several times daily   Date Pain First Started (No Data)   Date Pain First Started Comment months   Aggravating Factors Standing;Walking;Stairs   Limiting Behavior Yes   Relieving Factors Rest   Relieving Factors Comment medicine   Result of Injury No     Onset: years, worse since 2021    Investigations:   L MRI 2/2022: DDD L4-5 with foraminal stenosis.  FA L1-S1  Spine surgery consult: none    Treatments:  Physical therapy: years ago  Spinal injections: no  Spinal surgery- no  Beneficial medications: extra strength Tylenol   Failed medications: Voltaren 75 mg, NSAID (partial nephrectomy, CHF)    Work Status: retired sedentary worker  Pertinent PMHx:  Anemia, HTN, CHF, asthma, GERD, left partial nephrectomy (non cancerous mass), colectomy (nonmalignant polyps)      PHYSICAL EXAMINATION    Visit Vitals  /62 (BP 1 Location: Right arm, BP Patient Position: Sitting, BP Cuff Size: Large adult)   Pulse 64   Temp 97.4 °F (36.3 °C) (Temporal)   Resp 18   Ht 5' 1\" (1.549 m)   Wt 217 lb (98.4 kg)   SpO2 99% Comment: RA   BMI 41.00 kg/m²       TTP right L3 to sacrum  Mild pelvic obliquity  Lumbar flexion to upper shin, reports pain  Pain with right lateral bending and extension  Lower extremity strength intact  Straight leg raise negative              Written by Little Lagunas, as dictated by Angel Duncan MD.

## 2022-07-11 NOTE — PROGRESS NOTES
Leola Cruz presents today for   Chief Complaint   Patient presents with    Follow-up    Back Pain      Lower right worse       Is someone accompanying this pt? no    Is the patient using any DME equipment during OV? no    Depression Screening:  3 most recent PHQ Screens 4/20/2022   Little interest or pleasure in doing things Not at all   Feeling down, depressed, irritable, or hopeless Not at all   Total Score PHQ 2 0   Trouble falling or staying asleep, or sleeping too much -   Feeling tired or having little energy -   Poor appetite, weight loss, or overeating -   Feeling bad about yourself - or that you are a failure or have let yourself or your family down -   Trouble concentrating on things such as school, work, reading, or watching TV -   Moving or speaking so slowly that other people could have noticed; or the opposite being so fidgety that others notice -   Thoughts of being better off dead, or hurting yourself in some way -   PHQ 9 Score -       Learning Assessment:  Learning Assessment 4/20/2022   PRIMARY LEARNER Patient   BARRIERS PRIMARY LEARNER -   PRIMARY LANGUAGE ENGLISH   LEARNER PREFERENCE PRIMARY DEMONSTRATION     -     -   ANSWERED BY patient   RELATIONSHIP SELF       Abuse Screening:  Abuse Screening Questionnaire 4/20/2022   Do you ever feel afraid of your partner? N   Are you in a relationship with someone who physically or mentally threatens you? N   Is it safe for you to go home? Y       Fall Risk  Fall Risk Assessment, last 12 mths 4/20/2022   Able to walk? Yes   Fall in past 12 months? 0   Do you feel unsteady? 0   Are you worried about falling 0   Is the gait abnormal? -       OPIOID RISK TOOL  No flowsheet data found. Coordination of Care:  1. Have you been to the ER, urgent care clinic since your last visit? no  Hospitalized since your last visit? no    2. Have you seen or consulted any other health care providers outside of the [address] [place] [address] since your last visit?  Yes eye Include any pap smears or colon screening.  Yes colon

## 2022-07-11 NOTE — H&P (VIEW-ONLY)
[facility] [address].  [address], [address]  [place] [address]  Phone: [phone]  Fax: [phone]  : 1950  PCP: mAelia Moya NP    PROGRESS NOTE      ASSESSMENT AND PLAN    Diagnoses and all orders for this visit:    1. Lumbar facet arthropathy  -     SCHEDULE SURGERY  -     REFERRAL TO PHYSICAL THERAPY  -     AMB SUPPLY ORDER    2. Asthma, unspecified asthma severity, unspecified whether complicated, unspecified whether persistent        1. Jagdish Swenson is a 67 y.o. female with history of asthma, CHF, partial nephrectomy with progressive focal mechanical low back pain. She has global facet arthropathy on imaging and DDD L4/L5. Unable to use NSAIDs due to her other medical issues. Exam consistent with facet and disc as pain generators. 2. Patient may take up to 2g (2,000 mg) of Tylenol every 24 hours for routine use, or 3g (3,000 mg) for short-term use. 3. Risks, benefits, alternatives, and limitations of RFA discussed with patient. Patient wishes to proceed. 4. Schedule right side only MBB right L4-5, L5-S1 x 2  5. Referral to Physical Therapy for right-sided low back pain eval and treat  6. DME order for LSO for PRN use for prolonged standing and walking, medically necessary to offload painful structures          HISTORY OF PRESENT ILLNESS      Jagdish Swenson is a 67 y.o. female presents for follow up of back pain/RFA consultation. Patient of Dr. Moore. Pt reports progressively worsening low back pain x 1 year. R > L. Her pain is exacerbated with standing and walking. She notes a 10-15 standing tolerance, pt cannot stand long enough to cook. She tries to exercise but very limited due to shortness of breath. Denies sciatica. She takes extra strength Tylenol with some benefit. Denies side effects. She is unable to do her HEP due to pain.      Denies red flags including persistent fevers, chills, weight changes, neurogenic bowel or bladder symptoms. Pain Assessment  3/7/2022   Location of Pain Back   Location Modifiers -   Severity of Pain 10   Quality of Pain (No Data)   Quality of Pain Comment feel like back breaking in half   Duration of Pain A few minutes   Frequency of Pain Several times daily   Date Pain First Started (No Data)   Date Pain First Started Comment months   Aggravating Factors Standing;Walking;Stairs   Limiting Behavior Yes   Relieving Factors Rest   Relieving Factors Comment medicine   Result of Injury No     Onset: years, worse since 2021    Investigations:   L MRI 2/2022: DDD L4-5 with foraminal stenosis.  FA L1-S1  Spine surgery consult: none    Treatments:  Physical therapy: years ago  Spinal injections: no  Spinal surgery- no  Beneficial medications: extra strength Tylenol   Failed medications: Voltaren 75 mg, NSAID (partial nephrectomy, CHF)    Work Status: retired sedentary worker  Pertinent PMHx:  Anemia, HTN, CHF, asthma, GERD, left partial nephrectomy (non cancerous mass), colectomy (nonmalignant polyps)      PHYSICAL EXAMINATION    Visit Vitals  /62 (BP 1 Location: Right arm, BP Patient Position: Sitting, BP Cuff Size: Large adult)   Pulse 64   Temp 97.4 °F (36.3 °C) (Temporal)   Resp 18   Ht 5' 1\" (1.549 m)   Wt 217 lb (98.4 kg)   SpO2 99% Comment: RA   BMI 41.00 kg/m²       TTP right L3 to sacrum  Mild pelvic obliquity  Lumbar flexion to upper shin, reports pain  Pain with right lateral bending and extension  Lower extremity strength intact  Straight leg raise negative              Written by Little Deleon, as dictated by Pallavi Sims MD.

## 2022-07-13 ENCOUNTER — HOSPITAL ENCOUNTER (OUTPATIENT)
Dept: INFUSION THERAPY | Age: 72
Discharge: HOME | End: 2022-07-13
Payer: MEDICARE

## 2022-07-13 VITALS
TEMPERATURE: 98.1 F | OXYGEN SATURATION: 99 % | DIASTOLIC BLOOD PRESSURE: 69 MMHG | HEART RATE: 62 BPM | SYSTOLIC BLOOD PRESSURE: 144 MMHG | RESPIRATION RATE: 20 BRPM

## 2022-07-13 DIAGNOSIS — J45.30 MILD PERSISTENT ASTHMA, UNSPECIFIED WHETHER COMPLICATED: Primary | ICD-10-CM

## 2022-07-13 PROCEDURE — 96372 THER/PROPH/DIAG INJ SC/IM: CPT

## 2022-07-13 PROCEDURE — 74011250636 HC RX REV CODE- 250/636: Performed by: INTERNAL MEDICINE

## 2022-07-13 RX ORDER — ALBUTEROL SULFATE 0.83 MG/ML
2.5 SOLUTION RESPIRATORY (INHALATION) AS NEEDED
Status: CANCELLED
Start: 2022-07-27

## 2022-07-13 RX ORDER — HYDROCORTISONE SODIUM SUCCINATE 100 MG/2ML
100 INJECTION, POWDER, FOR SOLUTION INTRAMUSCULAR; INTRAVENOUS AS NEEDED
Status: CANCELLED | OUTPATIENT
Start: 2022-07-27

## 2022-07-13 RX ORDER — ONDANSETRON 2 MG/ML
8 INJECTION INTRAMUSCULAR; INTRAVENOUS AS NEEDED
Status: CANCELLED | OUTPATIENT
Start: 2022-07-27

## 2022-07-13 RX ORDER — EPINEPHRINE 1 MG/ML
0.3 INJECTION, SOLUTION, CONCENTRATE INTRAVENOUS AS NEEDED
Status: CANCELLED | OUTPATIENT
Start: 2022-07-27

## 2022-07-13 RX ORDER — ACETAMINOPHEN 325 MG/1
650 TABLET ORAL AS NEEDED
Status: CANCELLED
Start: 2022-07-27

## 2022-07-13 RX ORDER — DIPHENHYDRAMINE HYDROCHLORIDE 50 MG/ML
50 INJECTION, SOLUTION INTRAMUSCULAR; INTRAVENOUS AS NEEDED
Status: CANCELLED
Start: 2022-07-27

## 2022-07-13 RX ORDER — DIPHENHYDRAMINE HYDROCHLORIDE 50 MG/ML
25 INJECTION, SOLUTION INTRAMUSCULAR; INTRAVENOUS AS NEEDED
Status: CANCELLED
Start: 2022-07-27

## 2022-07-13 RX ADMIN — OMALIZUMAB 150 MG: 150 INJECTION, SOLUTION SUBCUTANEOUS at 09:39

## 2022-07-13 RX ADMIN — OMALIZUMAB 75 MG: 75 INJECTION, SOLUTION SUBCUTANEOUS at 09:38

## 2022-07-13 NOTE — PROGRESS NOTES
[facility] OPI Progress Note    Date: 2022    Name: Gene Gomez    MRN: [id number]         : 1950    Orene Cordia INJECTION Q2 weeks    Ms. Florentino arrived to [facility] at [address] [facility]. Ms. Wali Lovell was assessed and education was provided. Pt states she does not feel like the Xolair is working and has an appointment on Friday with her pulmonologist to discuss this. Ms. Jose Pacheco vitals were reviewed. Visit Vitals  BP (!) 144/69 (BP 1 Location: Left upper arm, BP Patient Position: Sitting)   Pulse 62   Temp 98.1 °F (36.7 °C)   Resp 20   SpO2 99%   Breastfeeding No       Omalizumab (XOLAIR) 375 mg TOTAL DOSE, was administered in 3 divided SQ injections as follows: 150 mg SQ in her RIGHT arm, 150 mg SQ in her LEFT arm, and 75 mg SQ in her RIGH abdomen. Ms. Wali Lovell tolerated well without complaints. Discharge/ follow-up instructions discussed w/ pt. Pt verbalized understanding. Pt armband removed & shredded. Ms. Wali Lovell was discharged from [place] [address] in stable condition at [phone]. She is to return on 22 at 0930 for her next appointment.     Elizabet Alcaraz RN  2022

## 2022-07-15 ENCOUNTER — OFFICE VISIT (OUTPATIENT)
Dept: PULMONOLOGY | Age: 72
End: 2022-07-15
Payer: MEDICARE

## 2022-07-15 VITALS
TEMPERATURE: 97.4 F | DIASTOLIC BLOOD PRESSURE: 60 MMHG | BODY MASS INDEX: 41.5 KG/M2 | HEART RATE: 67 BPM | OXYGEN SATURATION: 97 % | HEIGHT: 61 IN | RESPIRATION RATE: 16 BRPM | SYSTOLIC BLOOD PRESSURE: 161 MMHG | WEIGHT: 219.8 LBS

## 2022-07-15 DIAGNOSIS — E66.01 MORBID OBESITY (HCC): ICD-10-CM

## 2022-07-15 DIAGNOSIS — R06.02 SHORTNESS OF BREATH: ICD-10-CM

## 2022-07-15 DIAGNOSIS — J45.50 SEVERE PERSISTENT ASTHMA WITHOUT COMPLICATION: Primary | ICD-10-CM

## 2022-07-15 DIAGNOSIS — R53.81 PHYSICAL DECONDITIONING: ICD-10-CM

## 2022-07-15 DIAGNOSIS — R06.09 DYSPNEA ON EXERTION: ICD-10-CM

## 2022-07-15 PROCEDURE — G8432 DEP SCR NOT DOC, RNG: HCPCS | Performed by: INTERNAL MEDICINE

## 2022-07-15 PROCEDURE — G8536 NO DOC ELDER MAL SCRN: HCPCS | Performed by: INTERNAL MEDICINE

## 2022-07-15 PROCEDURE — G8754 DIAS BP LESS 90: HCPCS | Performed by: INTERNAL MEDICINE

## 2022-07-15 PROCEDURE — G8427 DOCREV CUR MEDS BY ELIG CLIN: HCPCS | Performed by: INTERNAL MEDICINE

## 2022-07-15 PROCEDURE — 99214 OFFICE O/P EST MOD 30 MIN: CPT | Performed by: INTERNAL MEDICINE

## 2022-07-15 PROCEDURE — 1101F PT FALLS ASSESS-DOCD LE1/YR: CPT | Performed by: INTERNAL MEDICINE

## 2022-07-15 PROCEDURE — G9899 SCRN MAM PERF RSLTS DOC: HCPCS | Performed by: INTERNAL MEDICINE

## 2022-07-15 PROCEDURE — 1123F ACP DISCUSS/DSCN MKR DOCD: CPT | Performed by: INTERNAL MEDICINE

## 2022-07-15 PROCEDURE — G8417 CALC BMI ABV UP PARAM F/U: HCPCS | Performed by: INTERNAL MEDICINE

## 2022-07-15 PROCEDURE — G8753 SYS BP > OR = 140: HCPCS | Performed by: INTERNAL MEDICINE

## 2022-07-15 PROCEDURE — G9711 PT HX TOT COL OR COLON CA: HCPCS | Performed by: INTERNAL MEDICINE

## 2022-07-15 PROCEDURE — 1090F PRES/ABSN URINE INCON ASSESS: CPT | Performed by: INTERNAL MEDICINE

## 2022-07-15 PROCEDURE — G8399 PT W/DXA RESULTS DOCUMENT: HCPCS | Performed by: INTERNAL MEDICINE

## 2022-07-15 NOTE — PROGRESS NOTES
Lin Tyson presents today for   Chief Complaint   Patient presents with    Asthma     follow up from 2/14/2022    Breathing Problem     MARIE    Results     Echo 4/14/2022       Is someone accompanying this pt? No    Is the patient using any DME equipment during OV? Yes. nebulizer    -DME Company N/A    Depression Screening:  3 most recent PHQ Screens 4/20/2022   Little interest or pleasure in doing things Not at all   Feeling down, depressed, irritable, or hopeless Not at all   Total Score PHQ 2 0   Trouble falling or staying asleep, or sleeping too much -   Feeling tired or having little energy -   Poor appetite, weight loss, or overeating -   Feeling bad about yourself - or that you are a failure or have let yourself or your family down -   Trouble concentrating on things such as school, work, reading, or watching TV -   Moving or speaking so slowly that other people could have noticed; or the opposite being so fidgety that others notice -   Thoughts of being better off dead, or hurting yourself in some way -   PHQ 9 Score -       Learning Assessment:  Learning Assessment 4/20/2022   PRIMARY LEARNER Patient   BARRIERS PRIMARY LEARNER -   PRIMARY LANGUAGE ENGLISH   LEARNER PREFERENCE PRIMARY DEMONSTRATION     -     -   ANSWERED BY patient   RELATIONSHIP SELF       Abuse Screening:  Abuse Screening Questionnaire 4/20/2022   Do you ever feel afraid of your partner? N   Are you in a relationship with someone who physically or mentally threatens you? N   Is it safe for you to go home? Y       Fall Risk  Fall Risk Assessment, last 12 mths 4/20/2022   Able to walk? Yes   Fall in past 12 months? 0   Do you feel unsteady? 0   Are you worried about falling 0   Is the gait abnormal? -         Coordination of Care:  1. Have you been to the ER, urgent care clinic since your last visit? Hospitalized since your last visit? No    2.  Have you seen or consulted any other health care providers outside of the [facility] since your last visit? Include any pap smears or colon screening. Yes.  NP Araseli Javed, PCP, Dr. Callum Okeefe, endocrinologist, Dr. Deondre Gutiérrez, ophthalmologist

## 2022-07-15 NOTE — PROGRESS NOTES
[address] [place] [address] [facility]  Pulmonary, Critical Care, and Sleep Medicine    Pulmonary Office F/U  Name: Rudy Hernandez 67 y.o. female  MRN: [id number]  : 1950  Service Date: 07/15/22  Chief Complaint:   Chief Complaint   Patient presents with    Asthma     follow up from 2022    Breathing Problem     MARIE    Results     Echo 2022       History of Present Illness:  Rudy Hernandez is a 67 y.o. female, who presents to Pulmonary clinic for follow-up of shortness of breath. Patient was last seen on 22. Pt reports starting Xolair at the end of March. Pt reports significant SOB with mild exertion -- mMRC of 3  No exacerbations  No nocturnal awakening  Using PRN albuterol about once a day -- using when SOB with exertion  Feels xolair isn't working for dyspnea. Wheezing has significantly improved  Pt attempted pulmonary rehab and had to stop due to bronchospasm.   Pt starting PT for her lower back prescribed by PM&R      Past Medical History:   Diagnosis Date    Alopecia     Arthritis     Asthma     Chronic lung disease     GERD (gastroesophageal reflux disease)     Hypercholesteremia     Hypertension     Hyperthyroidism     Thyroid disease      Past Surgical History:   Procedure Laterality Date    COLONOSCOPY N/A 2018    COLONOSCOPY/polypectomy/polyloop/ink tatoo  performed by Liudmila Gonzalez MD at [facility] ENDOSCOPY    COLONOSCOPY N/A 2021    COLONOSCOPY with Polypectomies performed by Xena Piper MD at [facility] ENDOSCOPY    HX BUNIONECTOMY      bilateral and \"removed some arthritis in feet\"    HX CATARACT REMOVAL      with implants    HX COLONOSCOPY  2015    HX HAMMER TOE REPAIR      right     HX HEMORRHOIDECTOMY      HX HYSTERECTOMY N/A     HX NEPHRECTOMY Left 2019    partial    HX ORTHOPAEDIC      HX OTHER SURGICAL      keiloid removed off chest near shoulder area    HX TOTAL COLECTOMY 10/06/2015    lap right hemicolectomy due to polyp-nonmalignant    HX TUBAL LIGATION      US GUIDED CORE BREAST BIOPSY Right 2013    benign     Family History   Problem Relation Age of Onset    Hypertension Mother     Stroke Mother 80    Cancer Father     Heart Disease Father     Hypertension Father     Asthma Sister     Breast Problems Sister     Cancer Sister     Cancer Brother         lung cancer    Asthma Brother     Breast Cancer Maternal Grandmother 78    Cancer Brother     Breast Cancer Maternal Grandfather     Diabetes Paternal Uncle     Heart Attack Neg Hx      Social History     Socioeconomic History    Marital status:      Spouse name: Not on file    Number of children: Not on file    Years of education: Not on file    Highest education level: Not on file   Occupational History    Not on file   Tobacco Use    Smoking status: Former Smoker     Packs/day: 0.50     Years: 5.00     Pack years: 2.50     Types: Cigarettes     Start date: 3/13/1972     Quit date: 3/13/1977     Years since quittin.3    Smokeless tobacco: Never Used    Tobacco comment: quit smoking in    Vaping Use    Vaping Use: Never used   Substance and Sexual Activity    Alcohol use: Not Currently     Alcohol/week: 0.0 standard drinks     Comment: Once a year    Drug use: Never    Sexual activity: Yes     Partners: Male     Birth control/protection: None   Other Topics Concern    Not on file   Social History Narrative    Worked as  for [address] [place] [address] until . Denies any history of asbestos and or chemical exposure. Social Determinants of Health     Financial Resource Strain:     Difficulty of Paying Living Expenses: Not on file   Food Insecurity:     Worried About Running Out of Food in the Last Year: Not on file    Lennie of Food in the Last Year: Not on file   Transportation Needs:     Lack of Transportation (Medical):  Not on file    Lack of Transportation (Non-Medical): Not on file   Physical Activity:     Days of Exercise per Week: Not on file    Minutes of Exercise per Session: Not on file   Stress:     Feeling of Stress : Not on file   Social Connections:     Frequency of Communication with Friends and Family: Not on file    Frequency of Social Gatherings with Friends and Family: Not on file    Attends [employer]ism Services: Not on file    Active Member of [address] [place] [address] or Organizations: Not on file    Attends Club or Organization Meetings: Not on file    Marital Status: Not on file   Intimate Partner Violence:     Fear of Current or Ex-Partner: Not on file    Emotionally Abused: Not on file    Physically Abused: Not on file    Sexually Abused: Not on file   Housing Stability:     Unable to Pay for Housing in the Last Year: Not on file    Number of [place] in the Last Year: Not on file    Unstable Housing in the Last Year: Not on file     Allergies   Allergen Reactions    Latex Swelling and Contact Dermatitis    Latex, Natural Rubber Itching    Vicodin [Hydrocodone-Acetaminophen] Itching     Prior to Admission medications    Medication Sig Start Date End Date Taking? Authorizing Provider   metoprolol succinate (TOPROL-XL) 25 mg XL tablet Take 1 Tablet by mouth daily. 4/26/22  Yes Bryanna Alan NP   OMALIZUMAB  mg by SubCUTAneous route Once every 2 weeks. Yes Provider, Historical   cholecalciferol (VITAMIN D3) (5000 Units/125 mcg) tab tablet Take 5,000 Units by mouth daily. Yes Provider, Historical   budesonide-formoteroL (SYMBICORT) 160-4.5 mcg/actuation HFAA Take 2 Puffs by inhalation two (2) times a day. Rinse and gargle thoroughly after each use. Disp 3 inhalers 2/14/22  Yes Jaron Gonzalez MD   hydroCHLOROthiazide (HYDRODIURIL) 12.5 mg tablet Take 12.5 mg by mouth daily. 1/26/22  Yes Provider, Historical   tiotropium bromide (Spiriva Respimat) 1.25 mcg/actuation inhaler Take 2 Puffs by inhalation daily.  11/12/21  Yes Jaron Gonzalez MD hyoscyamine (Levsin) 0.125 mg tablet Take 1 Tab by mouth every four (4) hours as needed for Cramping. 11/9/20  Yes Luz Elena Rodriguez MD   albuterol 2.5 mg /3 mL (0.083 %) nebu 3 mL, albuterol-ipratropium 2.5 mg-0.5 mg/3 ml nebu 3 mL 1 Dose by Nebulization route every four (4) hours as needed for Wheezing or Shortness of Breath. Yes Provider, Historical   simvastatin (ZOCOR) 40 mg tablet Take 40 mg by mouth nightly. Yes Provider, Historical   aspirin delayed-release 81 mg tablet Take 81 mg by mouth daily. Yes Provider, Historical   montelukast (SINGULAIR) 10 mg tablet Take 10 mg by mouth daily. Yes Provider, Historical   methimazole (TAPAZOLE) 10 mg tablet Take 5 mg by mouth daily. Yes Provider, Historical   albuterol (PROVENTIL HFA, VENTOLIN HFA, PROAIR HFA) 90 mcg/actuation inhaler Take 2 Puffs by inhalation every four (4) hours as needed for Wheezing. Yes Provider, Historical   omeprazole (PRILOSEC) 20 mg capsule Take 20 mg by mouth daily. Yes Provider, Historical     Immunization History   Administered Date(s) Administered    COVID-19, PFIZER PURPLE top, DILUTE for use, (age 15 y+), IM, 30mcg/0.3mL 03/19/2021, 04/09/2021    Influenza High Dose Vaccine PF 11/06/2019, 11/04/2020, 10/21/2021    Influenza Vaccine (Quad) PF (>6 Mo Flulaval, Fluarix, and >3 Yrs Afluria, Fluzone 24912) 10/07/2015       Review of Systems:  A complete review of systems was performed as stated in the HPI, all others are negative.       Objective:    Physical Exam:  BP (!) 168/54 (BP 1 Location: Left upper arm, BP Patient Position: Sitting, BP Cuff Size: Adult long)   Pulse 67   Temp 97.4 °F (36.3 °C) (Temporal)   Resp 16   Ht 5' 1\" (1.549 m)   Wt 99.7 kg (219 lb 12.8 oz)   SpO2 97%   BMI 41.53 kg/m²   Vitals were personally reviewed  Gen: no acute distress, pleasant and cooperative, sitting up in chair, ambulates without difficulty  HEENT: normocephalic/atraumatic, no ocular drainage, EOMI, nasal bridge midline, unable to assess nasal and oral cavities due to patient wearing mask in the setting of COVID-19 pandemic  Neck: supple, trachea midline, no JVD  CVS: regular rate rhythm, S1/S2, no murmurs/rubs/gallops  Lungs: fair air entry B/L, CTABL, no wheezes/rales/rhonchi throughout all lung fields  Psych: normal memory, thought content, and processing    Labs: I have reviewed the patient's available labs  Lab Results   Component Value Date/Time    WBC 7.6 01/18/2022 08:24 AM    HGB 9.7 (L) 01/18/2022 08:24 AM    HCT 33.6 (L) 01/18/2022 08:24 AM    PLATELET 054 47/88/9966 08:24 AM    MCV 74.8 (L) 01/18/2022 08:24 AM   Peripheral eosinophil count 200 on 1/18/2022  Lab Results   Component Value Date/Time    Sodium 143 01/18/2022 09:13 AM    Potassium 3.8 01/18/2022 09:13 AM    Chloride 109 01/18/2022 09:13 AM    CO2 29 01/18/2022 09:13 AM    Anion gap 5 01/18/2022 09:13 AM    Glucose 97 01/18/2022 09:13 AM    BUN 16 01/18/2022 09:13 AM    Creatinine 1.06 01/18/2022 09:13 AM    BUN/Creatinine ratio 15 01/18/2022 09:13 AM    GFR est AA >60 01/18/2022 09:13 AM    GFR est non-AA 51 (L) 01/18/2022 09:13 AM    Calcium 9.7 01/18/2022 09:13 AM    Bilirubin, total 0.4 01/18/2022 09:13 AM    Alk.  phosphatase 105 01/18/2022 09:13 AM    Protein, total 7.0 01/18/2022 09:13 AM    Albumin 3.7 01/18/2022 09:13 AM    Globulin 3.3 01/18/2022 09:13 AM    A-G Ratio 1.1 01/18/2022 09:13 AM    ALT (SGPT) 14 01/18/2022 09:13 AM    AST (SGOT) 16 01/18/2022 09:13 AM     Lab Results   Component Value Date/Time    Immunoglobulin E 564 (H) 01/18/2022 08:24 AM    Immunoglobulin E 881 (H) 03/13/2015 10:25 AM         Imaging:  I have personally reviewed patient's imaging as follows--no new imaging in the interval  CT Results (most recent):  Results from [place] encounter on 07/19/19    CT ABD PELV W CONT    Narrative  CT abdomen and pelvis with enhancement    INDICATION: Left lower quadrant abdominal pain    TECHNIQUE: CT volumetric imaging obtained from the diaphragm to the level of the  pubic symphysis following the uneventful administration of oral and nonionic  intravenous contrast. Coronal, sagittal and axial reformations obtained. Patient  received 100 mL  Isovue 300 intravenously    All CT scans at this facility are performed using dose optimization technique as  appropriate to the performed exam, to include automated exposure control,  adjustment of the mA and/or kV according to patient's size (Including  appropriate matching for site-specific examinations), or use of iterative  reconstruction technique. COMPARISON: 6/24/2019    FINDINGS:  Lung bases: Minimal dependent atelectasis  Liver: Stable left hepatic subcapsular 9 mm hypodensity  Spleen: Negative  Pancreas: Negative  Adrenal glands: Negative  Gallbladder: Peripherally calcified gallstone. No cholecystitis    Left Kidney: Partially exophytic complex mass anterior left mid kidney 1.2 x 1.2  cm. Right Kidney: Negative  Bladder: Mildly distended and unremarkable    Stomach and bowel:Severe sigmoid diverticulosis with very minimal pericolonic  stranding in the proximal sigmoid colon but overall improved since comparison. No evidence of adjacent fluid or significant peritoneal reflection thickening. Surgical sutures noted in the right hepatic flexure with evidence of right  hemicolectomy. Peritoneal spaces: No free intraperitoneal fluid or gas. Vessels:Non aneurysmal.  Lymph nodes: No enlargement  Abdominal wall: Supraumbilical fat-containing midline hernia measuring 9 cm TV  by 5 cm CC. There about 3 x 4 cm hernia neck. SKELETAL FINDINGS:  No destructive lesion. Multilevel moderate to advanced degenerative disc and facet joint disease in the  lumbar spine. Impression  IMPRESSION:    1. Severe sigmoid diverticulitis with very minimal pericolonic stranding that  may represent early inflammation or scarring due to prior inflammation.  Overall  improved appearance since comparison CT 2019.  2. Redemonstration of left kidney partially exophytic complex mass 1.2 cm which  could represent enhancing mass/renal cell carcinoma versus hemorrhagic cyst with  recent hemorrhage, recommend evaluation with CT or MRI renal protocol with and  without contrast or at least initially with ultrasound to exclude solid mass. 3. Moderate sized periumbilical fat-containing hernia. 4. Cholelithiasis without evidence of acute cholecystitis. PFTs:  2020: spirometry shows a mild obstructive defect, significant bronchodilator response seen, lung volumes are normal, diffusion capacity is borderline low. 6-minute walk test from 2020, no significant oxygen desaturation seen, lowest SPO2 was 95%, patient ambulated 427 m over 6 minutes on room air. TTE:  I have reviewed the patient's TTE results  Results for orders placed or performed during the hospital encounter of 10/06/15   2D ECHO COMPLETE ADULT (TTE) W OR WO CONTR     Status: None    [facility], [address]  [phone]    Transthoracic Echocardiogram    Patient: Kaylee Bruno  MRN: [id number]  [address] #: [de-identified]  : 10-Brian-1950  Age: 72 years  Gender: Female  Height: 61 in  Weight: 147.6 lb  BSA: 1.66 m squared  BP: 113 / 60 mmHg  Study date: 07-Oct-2015  Status: Routine  Location: [facility] #: [id number]    Allergies: LATEX, NATURAL RUBBER, HYDROCODONE-ACETAMINOPHEN    Interpreting Group:  [facility] GROUP  Interpreting Physician:  Curtis Gonzalez MD  Technologist:  PANCHO Patel  Referring_Ordering Physician:  Stalin Shipman. Shiva Baig MD    SUMMARY:  Left ventricle: Size was normal. Systolic function was normal by EF  (biplane method of disks). Ejection fraction was estimated in the range of  65 % to 70 %. No obvious wall motion abnormalities identified in the views  obtained.  Wall thickness was normal. Left ventricular diastolic function  parameters were normal for the patient's age. Right ventricle: Systolic function was normal. Systolic pressure was  mildly increased. Estimated peak pressure was 41 mmHg. Left atrium: The atrium was mildly to moderately dilated. LA volume index  was 41 ml/m squared. Inferior vena cava, hepatic veins: The inferior vena cava was upper normal  in size. The respirophasic change in diameter was less than 50%. INDICATIONS: Tachycardia. HISTORY: Prior history: Patient has no history of cardiovascular disease. PROCEDURE: This was a routine study. The study included complete 2D  imaging, M-mode, complete spectral Doppler, and color Doppler. The heart  rate was 100 bpm, at the start of the study. Systolic blood pressure was  113 mmHg, at the start of the study. Diastolic blood pressure was 60 mmHg,  at the start of the study. Images were obtained from the parasternal,  apical, subcostal, and suprasternal notch acoustic windows. Image quality  was adequate. LEFT VENTRICLE: Size was normal. Systolic function was normal by EF  (biplane method of disks). Ejection fraction was estimated in the range of  65 % to 70 %. No obvious wall motion abnormalities identified in the views  obtained. Wall thickness was normal. DOPPLER: Left ventricular diastolic  function parameters were normal for the patient's age. RIGHT VENTRICLE: The size was normal. Systolic function was normal.  DOPPLER: Systolic pressure was mildly increased. Estimated peak pressure  was 41 mmHg. LEFT ATRIUM: The atrium was mildly to moderately dilated. LA volume index  was 41 ml/m squared. RIGHT ATRIUM: Size was normal.    MITRAL VALVE: There was annular calcification. There was minimal diffuse  thickening. DOPPLER: There was no evidence for stenosis. There was trivial  regurgitation. AORTIC VALVE: The valve was probably trileaflet. Leaflets exhibited good  mobility. DOPPLER: There was no stenosis. There was no significant  regurgitation.     TRICUSPID VALVE: Normal valve structure. DOPPLER: There was no evidence  for tricuspid stenosis. There was trivial regurgitation. Right atrial  pressure estimate: 10 mmHg. PULMONIC VALVE: Not well visualized, but normal Doppler findings. DOPPLER:  There was no stenosis. There was no significant regurgitation. AORTA: The root exhibited normal size. SYSTEMIC VEINS: IVC: The inferior vena cava was upper normal in size. The  respirophasic change in diameter was less than 50%. PERICARDIUM: No significant pericardial effusion identified. SYSTEM MEASUREMENT TABLES    2D  Ao Diam: 2.64 cm  IVSd: 1.02 cm  LVIDd: 4.26 cm  LVIDs: 2.88 cm  LVPWd: 1.02 cm  SV(Teich): 49.64 ml  EDV(Teich): 81.35 ml  ESV(Cube): 23.92 ml  ESV(Teich): 31.71 ml  LAAs A2C: 20.8 cm2  LAAs A4C: 22.39 cm2  LAESV A-L A2C: 63.34 ml  LAESV A-L A4C: 68.04 ml  LAESV Index (A-L): 41.07 ml/m2  LAESV MOD A2C: 61.46 ml  LAESV MOD A4C: 64.14 ml  LAESV(A-L): 68.18 ml  LALs A2C: 5.8 cm  LALs A4C: 6.25 cm  LVEDV MOD A2C: 48.72 ml  LVEDV MOD A4C: 58.35 ml  LVEDV MOD BP: 53.55 ml  LVESV MOD A2C: 13.4 ml  LVESV MOD A4C: 24.36 ml  LVESV MOD BP: 18.26 ml  LVLd A2C: 6.86 cm  LVLd A4C: 6.98 cm  LVLs A2C: 5.61 cm  LVLs A4C: 5.8 cm  LVOT Diam: 1.86 cm  RA Area: 15.44 cm2  RV Minor: 3.89 cm  SV MOD A2C: 35.32 ml  SV MOD A4C: 33.99 ml    CW  TR Vmax: 2.76 m/s  IVRT: 50.75 ms  TR maxP.55 mmHG    PW  LVOT VTI: 30.16 cm  LVOT Vmax: 1.92 m/s  LVOT Vmean: 1.23 m/s  MV A Cy: 1.02 m/s  MV Dec [place]: 8.3 m/s2  MV DecT: 155.56 ms  MV E Cy: 1.29 m/s  MV E/A Ratio: 1.27  HR: 97.42 BPM  LVCI Dopp: 4.8 l/minm2  LVCO Dopp: 7.97 L/min  LVOT maxP.77 mmHG  LVOT meanP mmHG  LVSI Dopp: 49.26 ml/m2  LVSV Dopp: 81.77 ml  P Vein A: 0.28 m/s  P Vein A Dur: 103.81 ms    Prepared and E-signed by    Chely White MD  Signed 08-Oct-2015 14:10:31       20   ECHO ADULT COMPLETE 2020    Narrative · LV: Estimated LVEF is 60 - 65%.  Visually measured ejection fraction. Normal cavity size, wall thickness and systolic function (ejection   fraction normal). Wall motion: normal. Mild (grade 1) left ventricular   diastolic dysfunction. · LA: Left Atrium volume index is 29.54 mL/m2. · RV: Not well visualized. · TV: Mild tricuspid valve regurgitation is present. · PA: Pulmonary arterial systolic pressure is 30 mmHg. · Pericardium: Pericardial fat pad present. · MV: Mild mitral valve regurgitation is present. Signed by: Davey Green MD     04/14/22    ECHO ADULT COMPLETE 04/14/2022 4/14/2022    Interpretation Summary    Left Ventricle: Left ventricle size is normal. Mildly increased wall thickness. Normal wall motion. Normal left ventricular systolic function with a visually estimated EF of 55 - 60%. Normal diastolic function.   Tricuspid Valve: Unable to assess RVSP due to inadequate or insignificant tricuspid regurgitation.   Left Atrium: Left atrial volume index is normal (16-34 mL/m2). LA Vol Index A/L is 29 mL/m2. Signed by: Suman Esquivel DO on 4/14/2022  3:55 PM        Unintended Level 3 polysomnography from 3/2/2021, shows no evidence of EREN, AHI of 3.6      Assessment and Plan:  67 y.o. female with:    Impression:  1. Dyspnea on exertion/shortness of breath: Multifactorial, due to deconditioning and morbid obesity, also has contribution of asthma but is better controlled with Xolair. 2.  Severe persistent asthma better controlled:  No exacerbations since starting Xolair at end of March 2022. Pt has hx of elevated IgE, 881 on 3/13/2015 and 564 on 1/18/2022. Patient also has elevated IgE to multiple allergens including dust, grasses, molds, trees, ragweed. 3.  Deconditioning  4. Morbid obesity: Body mass index is 41.53 kg/m².   5.  Snoring as well as excessive daytime sleepiness, fragmented sleep, and infrequent morning headaches and nocturia in the setting of cardiomyopathy --- level 3 unattended HST on 3/2/21, AHI 3.6, thus no EREN  6.  Mild cardiomyopathy: Seen on transthoracic echo, no intervention required    Plan:  -Continue Xolair 375mg SQ [id number]  -EpiPen PRN  -Continue Symbicort 160/4.5 MCG 2 puffs twice daily with spacer. Counseled patient to rinse mouth thoroughly after each use and wash and dry spacer completely  -Continue Spiriva Respimat 1.25 mcg 2 puffs once daily  -Continue albuterol HFA 1-2puffs q4-6h PRN. Counseled patient that this is their rescue inhaler. Also counseled patient regarding premedication 15-30m prior to exercise or exposure to very cold air  -Counseled patient on proper inhaler technique  -Counseled patient to avoid potential triggers of asthma.   -Weight loss -- advised to discuss with PCP with regards to considering medical treatments  -Advised to increase activity with graded exercise and continue exercises from pulmonary rehab. Advised to start PT for lower back --- will reassess returning to pulmonary rehab after completion of PT (cannot be done simultaneously)  -Immunizations reviewed, influenza and Covid vaccinations up-to-date  -Counseled patient regarding lifestyle precautions in COVID-19 pandemic including wearing mask in public and confined spaces, social/physical distancing, frequent hand hygiene, etc    Follow-up and Dispositions    · Return in about 3 months (around 10/15/2022).          Deja Song MD/MPH     Pulmonary, Critical Care Medicine  [facility]

## 2022-07-15 NOTE — LETTER
7/15/2022    Patient: Helen Grant   YOB: 1950   Date of Visit: 7/15/2022     Curly Veliz NP  [address] [place], [place] [address]. [facility] [phone]  Via Fax: [phone]    Dear Curly Veliz NP,      Thank you for referring Ms. Helen Grant to [address] [place] [address] for evaluation. My notes for this consultation are attached. If you have questions, please do not hesitate to call me. I look forward to following your patient along with you.       Sincerely,    Kimberly Hammond MD

## 2022-07-26 ENCOUNTER — HOSPITAL ENCOUNTER (OUTPATIENT)
Dept: INFUSION THERAPY | Age: 72
Discharge: HOME | End: 2022-07-26
Payer: MEDICARE

## 2022-07-26 VITALS
RESPIRATION RATE: 20 BRPM | DIASTOLIC BLOOD PRESSURE: 75 MMHG | SYSTOLIC BLOOD PRESSURE: 145 MMHG | OXYGEN SATURATION: 99 % | HEART RATE: 61 BPM | TEMPERATURE: 98.1 F

## 2022-07-26 DIAGNOSIS — J45.30 MILD PERSISTENT ASTHMA, UNSPECIFIED WHETHER COMPLICATED: Primary | ICD-10-CM

## 2022-07-26 PROCEDURE — 74011250636 HC RX REV CODE- 250/636: Performed by: INTERNAL MEDICINE

## 2022-07-26 PROCEDURE — 96372 THER/PROPH/DIAG INJ SC/IM: CPT

## 2022-07-26 RX ORDER — ACETAMINOPHEN 325 MG/1
650 TABLET ORAL AS NEEDED
Status: CANCELLED
Start: 2022-08-09

## 2022-07-26 RX ORDER — ALBUTEROL SULFATE 0.83 MG/ML
2.5 SOLUTION RESPIRATORY (INHALATION) AS NEEDED
Status: CANCELLED
Start: 2022-08-09

## 2022-07-26 RX ORDER — EPINEPHRINE 1 MG/ML
0.3 INJECTION, SOLUTION, CONCENTRATE INTRAVENOUS AS NEEDED
Status: CANCELLED | OUTPATIENT
Start: 2022-08-09

## 2022-07-26 RX ORDER — ONDANSETRON 2 MG/ML
8 INJECTION INTRAMUSCULAR; INTRAVENOUS AS NEEDED
Status: CANCELLED | OUTPATIENT
Start: 2022-08-09

## 2022-07-26 RX ORDER — HYDROCORTISONE SODIUM SUCCINATE 100 MG/2ML
100 INJECTION, POWDER, FOR SOLUTION INTRAMUSCULAR; INTRAVENOUS AS NEEDED
Status: CANCELLED | OUTPATIENT
Start: 2022-08-09

## 2022-07-26 RX ORDER — DIPHENHYDRAMINE HYDROCHLORIDE 50 MG/ML
50 INJECTION, SOLUTION INTRAMUSCULAR; INTRAVENOUS AS NEEDED
Status: CANCELLED
Start: 2022-08-09

## 2022-07-26 RX ORDER — DIPHENHYDRAMINE HYDROCHLORIDE 50 MG/ML
25 INJECTION, SOLUTION INTRAMUSCULAR; INTRAVENOUS AS NEEDED
Status: CANCELLED
Start: 2022-08-09

## 2022-07-26 RX ADMIN — OMALIZUMAB 150 MG: 150 INJECTION, SOLUTION SUBCUTANEOUS at 09:47

## 2022-07-26 RX ADMIN — OMALIZUMAB 150 MG: 150 INJECTION, SOLUTION SUBCUTANEOUS at 09:46

## 2022-07-26 RX ADMIN — OMALIZUMAB 75 MG: 75 INJECTION, SOLUTION SUBCUTANEOUS at 09:47

## 2022-07-26 NOTE — PROGRESS NOTES
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 Florentino arrived to [address] [place] [address] at [address] Ms. Lorenzo Fleming vitals were reviewed. Visit Vitals  BP (!) 145/75 (BP 1 Location: Left upper arm, BP Patient Position: Sitting)   Pulse 61   Temp 98.1 °F (36.7 °C)   Resp 20   SpO2 99%       Omalizumab (XOLAIR) 375 mg TOTAL DOSE, was administered in 3 divided SQ injections as follows: 150 mg SQ in her RIGHT arm, 150 mg SQ in her LEFT arm, and 75 mg SQ in her RIGH abdomen. Ms. Ho Jane tolerated well without complaints. Discharge/ follow-up instructions discussed w/ patient. Patient verbalized understanding. Patient armband removed & shredded. Ms. Ho Jane was discharged from [place] [address] in stable condition at [phone]. She is to return on 08/10/22 at 0930 for her next appointment.     Michael Palm RN  2022

## 2022-07-27 ENCOUNTER — OFFICE VISIT (OUTPATIENT)
Dept: CARDIOLOGY CLINIC | Age: 72
End: 2022-07-27
Payer: MEDICARE

## 2022-07-27 VITALS
DIASTOLIC BLOOD PRESSURE: 47 MMHG | HEART RATE: 66 BPM | WEIGHT: 215 LBS | BODY MASS INDEX: 40.59 KG/M2 | SYSTOLIC BLOOD PRESSURE: 137 MMHG | OXYGEN SATURATION: 99 % | HEIGHT: 61 IN

## 2022-07-27 DIAGNOSIS — E66.01 SEVERE OBESITY (BMI >= 40) (HCC): ICD-10-CM

## 2022-07-27 DIAGNOSIS — I10 ESSENTIAL HYPERTENSION: ICD-10-CM

## 2022-07-27 DIAGNOSIS — E78.00 HYPERCHOLESTEREMIA: ICD-10-CM

## 2022-07-27 DIAGNOSIS — I50.32 CHRONIC DIASTOLIC CONGESTIVE HEART FAILURE (HCC): Primary | ICD-10-CM

## 2022-07-27 PROCEDURE — G8399 PT W/DXA RESULTS DOCUMENT: HCPCS | Performed by: INTERNAL MEDICINE

## 2022-07-27 PROCEDURE — 1101F PT FALLS ASSESS-DOCD LE1/YR: CPT | Performed by: INTERNAL MEDICINE

## 2022-07-27 PROCEDURE — 1090F PRES/ABSN URINE INCON ASSESS: CPT | Performed by: INTERNAL MEDICINE

## 2022-07-27 PROCEDURE — G8427 DOCREV CUR MEDS BY ELIG CLIN: HCPCS | Performed by: INTERNAL MEDICINE

## 2022-07-27 PROCEDURE — G9711 PT HX TOT COL OR COLON CA: HCPCS | Performed by: INTERNAL MEDICINE

## 2022-07-27 PROCEDURE — G8432 DEP SCR NOT DOC, RNG: HCPCS | Performed by: INTERNAL MEDICINE

## 2022-07-27 PROCEDURE — 99214 OFFICE O/P EST MOD 30 MIN: CPT | Performed by: INTERNAL MEDICINE

## 2022-07-27 PROCEDURE — 1123F ACP DISCUSS/DSCN MKR DOCD: CPT | Performed by: INTERNAL MEDICINE

## 2022-07-27 PROCEDURE — G9899 SCRN MAM PERF RSLTS DOC: HCPCS | Performed by: INTERNAL MEDICINE

## 2022-07-27 PROCEDURE — G8417 CALC BMI ABV UP PARAM F/U: HCPCS | Performed by: INTERNAL MEDICINE

## 2022-07-27 PROCEDURE — G8536 NO DOC ELDER MAL SCRN: HCPCS | Performed by: INTERNAL MEDICINE

## 2022-07-27 PROCEDURE — G8752 SYS BP LESS 140: HCPCS | Performed by: INTERNAL MEDICINE

## 2022-07-27 PROCEDURE — G8754 DIAS BP LESS 90: HCPCS | Performed by: INTERNAL MEDICINE

## 2022-07-27 NOTE — PROGRESS NOTES
1. Have you been to the ER, urgent care clinic since your last visit? Hospitalized since your last visit?     no  2. Have you seen or consulted any other health care providers outside of the [address] [place] [address] since your last visit? Include any pap smears or colon screening. Yes Dr. Isadora Ortiz    3. Since your last visit, have you had any of the following symptoms? shortness of breath. Explain:    4. Have you had any blood work, X-rays or cardiac testing? Yes Where: Dr. Isadora Ortiz       5. Where do you normally have your labs drawn? 6. Do you need any refills today?    no

## 2022-07-27 NOTE — PATIENT INSTRUCTIONS
There are no discontinued medications. A Healthy Heart: Care Instructions  Your Care Instructions     Coronary artery disease, also called heart disease, occurs when a substance called plaque builds up in the vessels that supply oxygen-rich blood to your heart muscle. This can narrow the blood vessels and reduce blood flow. A heart attack happens when blood flow is completely blocked. A high-fat diet, smoking, and other factors increase the risk of heart disease. Your doctor has found that you have a chance of having heart disease. You can do lots of things to keep your heart healthy. It may not be easy, but you can change your diet, exercise more, and quit smoking. These steps really work to lower your chance of heart disease. Follow-up care is a key part of your treatment and safety. Be sure to make and go to all appointments, and call your doctor if you are having problems. It's also a good idea to know your test results and keep a list of the medicines you take. How can you care for yourself at home? Diet    Use less salt when you cook and eat. This helps lower your blood pressure. Taste food before salting. Add only a little salt when you think you need it. With time, your taste buds will adjust to less salt. Eat fewer snack items, fast foods, canned soups, and other high-salt, high-fat, processed foods. Read food labels and try to avoid saturated and trans fats. They increase your risk of heart disease by raising cholesterol levels. Limit the amount of solid fat-butter, margarine, and shortening-you eat. Use olive, peanut, or canola oil when you cook. Bake, broil, and steam foods instead of frying them. Eat a variety of fruit and vegetables every day. Dark green, deep orange, red, or yellow fruits and vegetables are especially good for you. Examples include spinach, carrots, peaches, and berries.      Foods high in fiber can reduce your cholesterol and provide important vitamins and minerals. High-fiber foods include whole-grain cereals and breads, oatmeal, beans, brown rice, citrus fruits, and apples. Eat lean proteins. Heart-healthy proteins include seafood, lean meats and poultry, eggs, beans, peas, nuts, seeds, and soy products. Limit drinks and foods with added sugar. These include candy, desserts, and soda pop. Lifestyle changes    If your doctor recommends it, get more exercise. Walking is a good choice. Bit by bit, increase the amount you walk every day. Try for at least 30 minutes on most days of the week. You also may want to swim, bike, or do other activities. Do not smoke. If you need help quitting, talk to your doctor about stop-smoking programs and medicines. These can increase your chances of quitting for good. Quitting smoking may be the most important step you can take to protect your heart. It is never too late to quit. Limit alcohol to 2 drinks a day for men and 1 drink a day for women. Too much alcohol can cause health problems. Manage other health problems such as diabetes, high blood pressure, and high cholesterol. If you think you may have a problem with alcohol or drug use, talk to your doctor. Medicines    Take your medicines exactly as prescribed. Call your doctor if you think you are having a problem with your medicine. If your doctor recommends aspirin, take the amount directed each day. Make sure you take aspirin and not another kind of pain reliever, such as acetaminophen (Tylenol). When should you call for help? Call 911 if you have symptoms of a heart attack. These may include:    Chest pain or pressure, or a strange feeling in the chest.     Sweating. Shortness of breath. Pain, pressure, or a strange feeling in the back, neck, jaw, or upper belly or in one or both shoulders or arms. Lightheadedness or sudden weakness. A fast or irregular heartbeat.    After you call 911, the  may tell you to chew 1 adult-strength or 2 to 4 low-dose aspirin. Wait for an ambulance. Do not try to drive yourself. Watch closely for changes in your health, and be sure to contact your doctor if you have any problems. Where can you learn more? Go to http://www.durán.com/  Enter F075 in the search box to learn more about \"A Healthy Heart: Care Instructions. \"  Current as of: January 10, 2022               Content Version: 13.2  © 2006-2022 [employer]. Care instructions adapted under license by [employer] (which disclaims liability or warranty for this information). If you have questions about a medical condition or this instruction, always ask your healthcare professional. [address] any warranty or liability for your use of this information.

## 2022-07-27 NOTE — PROGRESS NOTES
HISTORY OF PRESENT ILLNESS  Emma Bowens is a 67 y.o. female. 2/22 seen after little over 5 years for palpitations and shortness of breath. Has gained about 50 to 60 pounds of weight in the last 1 year  4/22 symptoms are not improving so far and the results of echo and nuclear stress test will be discussed. Follow-up  Associated symptoms include shortness of breath. Palpitations   The history is provided by the Patient (fast pulse). This is a chronic problem. The current episode started more than 1 week ago (yrs). The problem has been gradually improving. The problem occurs rarely (2/wk). On average, each episode lasts 5 seconds. The problem is associated with nothing. Associated symptoms include shortness of breath. Pertinent negatives include no diaphoresis, no fever, no claudication, no orthopnea, no PND, no syncope, no nausea, no vomiting, no dizziness, no weakness, no cough, no hemoptysis and no sputum production. Risk factors include no risk factors. Her past medical history is significant for hyperthyroidism and anemia. Shortness of Breath  The history is provided by the Patient. This is a recurrent problem. The average episode lasts 5 minutes. The problem occurs intermittently. The current episode started more than 1 week ago (yrs). The problem has not changed since onset. Pertinent negatives include no fever, no ear pain, no neck pain, no cough, no sputum production, no hemoptysis, no wheezing, no PND, no orthopnea, no syncope, no vomiting, no rash, no leg swelling and no claudication. The problem's precipitants include exercise (Shopping in [facility] for about 20 minutes; 7/22 in house). She has had Prior hospitalizations. She has had Prior ED visits. Associated medical issues include chronic lung disease. Associated medical issues do not include CAD or heart failure. Review of Systems   Constitutional:  Negative for chills, diaphoresis, fever and weight loss.    HENT:  Negative for ear pain and hearing loss. Eyes:  Negative for blurred vision. Respiratory:  Positive for shortness of breath. Negative for cough, hemoptysis, sputum production, wheezing and stridor. Cardiovascular:  Positive for palpitations. Negative for orthopnea, claudication, leg swelling, syncope and PND. Gastrointestinal:  Negative for heartburn, nausea and vomiting. Musculoskeletal:  Negative for myalgias and neck pain. Skin:  Negative for rash. Neurological:  Negative for dizziness, tingling, tremors, focal weakness, loss of consciousness and weakness. Psychiatric/Behavioral:  Negative for depression and suicidal ideas.     Family History   Problem Relation Age of Onset    Hypertension Mother     Stroke Mother 80    Cancer Father     Heart Disease Father     Hypertension Father     Asthma Sister     Breast Problems Sister     Cancer Sister     Cancer Brother         lung cancer    Asthma Brother     Breast Cancer Maternal Grandmother 78    Cancer Brother     Breast Cancer Maternal Grandfather     Diabetes Paternal Uncle     Heart Attack Neg Hx        Past Medical History:   Diagnosis Date    Alopecia     Arthritis     Asthma     Chronic lung disease     GERD (gastroesophageal reflux disease)     Hypercholesteremia     Hypertension     Hyperthyroidism     Thyroid disease        Past Surgical History:   Procedure Laterality Date    COLONOSCOPY N/A 2/21/2018    COLONOSCOPY/polypectomy/polyloop/ink tatoo  performed by Graciela Jim MD at [facility] ENDOSCOPY    COLONOSCOPY N/A 2/24/2021    COLONOSCOPY with Polypectomies performed by Kristian Harris MD at [facility] ENDOSCOPY    HX BUNIONECTOMY      bilateral and \"removed some arthritis in feet\"    HX CATARACT REMOVAL      with implants    HX COLONOSCOPY  08/2015    HX HAMMER TOE REPAIR      right     HX HEMORRHOIDECTOMY      HX HYSTERECTOMY N/A     HX NEPHRECTOMY Left 2019    partial    HX ORTHOPAEDIC      HX OTHER SURGICAL      keiloid removed off chest near shoulder area    HX TOTAL COLECTOMY  10/06/2015    lap right hemicolectomy due to polyp-nonmalignant    HX TUBAL LIGATION      US GUIDED CORE BREAST BIOPSY Right     benign       Social History     Tobacco Use    Smoking status: Former     Packs/day: 0.50     Years: 5.00     Pack years: 2.50     Types: Cigarettes     Start date: 3/13/1972     Quit date: 3/13/1977     Years since quittin.4    Smokeless tobacco: Never    Tobacco comments:     quit smoking in    Substance Use Topics    Alcohol use: Not Currently     Alcohol/week: 0.0 standard drinks     Comment: Once a year       Allergies   Allergen Reactions    Latex Swelling and Contact Dermatitis    Latex, Natural Rubber Itching    Vicodin [Hydrocodone-Acetaminophen] Itching       Outpatient Medications Marked as Taking for the 22 encounter (Office Visit) with Vero Holland MD   Medication Sig Dispense Refill    metoprolol succinate (TOPROL-XL) 25 mg XL tablet Take 1 Tablet by mouth daily. 10 Tablet 1    OMALIZUMAB  mg by SubCUTAneous route Once every 2 weeks.  cholecalciferol (VITAMIN D3) (5000 Units/125 mcg) tab tablet Take 5,000 Units by mouth daily.  budesonide-formoteroL (SYMBICORT) 160-4.5 mcg/actuation HFAA Take 2 Puffs by inhalation two (2) times a day. Rinse and gargle thoroughly after each use. Disp 3 inhalers 3 Each 3    hydroCHLOROthiazide (HYDRODIURIL) 12.5 mg tablet Take 12.5 mg by mouth daily.  tiotropium bromide (Spiriva Respimat) 1.25 mcg/actuation inhaler Take 2 Puffs by inhalation daily. 12 g 3    hyoscyamine (Levsin) 0.125 mg tablet Take 1 Tab by mouth every four (4) hours as needed for Cramping. 40 Tab 0    albuterol 2.5 mg /3 mL (0.083 %) nebu 3 mL, albuterol-ipratropium 2.5 mg-0.5 mg/3 ml nebu 3 mL 1 Dose by Nebulization route every four (4) hours as needed for Wheezing or Shortness of Breath.  simvastatin (ZOCOR) 40 mg tablet Take 40 mg by mouth nightly.       aspirin delayed-release 81 mg tablet Take 81 mg by mouth daily.  montelukast (SINGULAIR) 10 mg tablet Take 10 mg by mouth daily.  methimazole (TAPAZOLE) 10 mg tablet Take 5 mg by mouth daily.  albuterol (PROVENTIL HFA, VENTOLIN HFA, PROAIR HFA) 90 mcg/actuation inhaler Take 2 Puffs by inhalation every four (4) hours as needed for Wheezing.  omeprazole (PRILOSEC) 20 mg capsule Take 20 mg by mouth daily. 9/20 echo  Interpretation Summary    LV: Estimated LVEF is 60 - 65%. Visually measured ejection fraction. Normal cavity size, wall thickness and systolic function (ejection fraction normal). Wall motion: normal. Mild (grade 1) left ventricular diastolic dysfunction. LA: Left Atrium volume index is 29.54 mL/m2. RV: Not well visualized. TV: Mild tricuspid valve regurgitation is present. PA: Pulmonary arterial systolic pressure is 30 mmHg. Pericardium: Pericardial fat pad present. MV: Mild mitral valve regurgitation is present. Comparison Study Information      Prior Study    There is a prior study available for comparison. Prior study date: 10/7/2015. As compared to the previous study, there are no significant changes. 04/14/22    ECHO ADULT COMPLETE 04/14/2022 4/14/2022    Interpretation Summary    Left Ventricle: Left ventricle size is normal. Mildly increased wall thickness. Normal wall motion. Normal left ventricular systolic function with a visually estimated EF of 55 - 60%. Normal diastolic function.   Tricuspid Valve: Unable to assess RVSP due to inadequate or insignificant tricuspid regurgitation.   Left Atrium: Left atrial volume index is normal (16-34 mL/m2). LA Vol Index A/L is 29 mL/m2. Signed by: Radha Jung DO on 4/14/2022  3:55 PM  03/14/22    NUCLEAR CARDIAC STRESS TEST 03/14/2022 3/14/2022    Interpretation Summary    Nuclear Findings: LVEF measures 76%.  TID ratio is 1.06.    Nuclear Findings: LV perfusion is normal. There is no evidence of inducible ischemia.   Nuclear Findings: Normal left ventricular systolic function post-stress. LVEF measures 76%.   Nuclear Findings: Findings suggest a low risk of myocardial ischemia.   ECG: Resting ECG demonstrates normal sinus rhythm.   ECG: Stress ECG was not diagnostic due to resting ST-T abnormalities.   Stress Test: A pharmacological stress test was performed using lexiscan. Blood pressure demonstrated a normal response and heart rate demonstrated a normal response to stress. The patient's heart rate recovery was normal. The patient reported no symptoms during the stress test.    Signed by: Aron Aquino MD on 3/14/2022 12:56 PM      Visit Vitals  BP (!) 137/47   Pulse 66   Ht 5' 1\" (1.549 m)   Wt 97.5 kg (215 lb)   SpO2 99%   BMI 40.62 kg/m²       Physical Exam  Constitutional:       General: She is not in acute distress. Appearance: She is well-developed. She is obese. She is not diaphoretic. HENT:      Head: Atraumatic. Mouth/Throat:      Pharynx: No oropharyngeal exudate. Eyes:      General: No scleral icterus. Conjunctiva/sclera: Conjunctivae normal.   Neck:      Vascular: No JVD. Comments: Thyroid bruit absent  Cardiovascular:      Rate and Rhythm: Normal rate and regular rhythm. Heart sounds: Murmur (2/6 systolic murmur heard at aortic and mitral area with no radiation) heard. No gallop. Pulmonary:      Effort: Pulmonary effort is normal.      Breath sounds: Normal breath sounds. No stridor. No wheezing or rales. Abdominal:      Palpations: Abdomen is soft. Tenderness: There is no abdominal tenderness. Musculoskeletal:         General: No tenderness. Normal range of motion. Cervical back: Neck supple. Right lower leg: No edema. Left lower leg: No edema. Skin:     General: Skin is warm. Neurological:      Mental Status: She is alert and oriented to person, place, and time. Motor: No abnormal muscle tone.    Psychiatric: Behavior: Behavior normal.       ASSESSMENT and PLAN    Results for Melissa Beck (MRN [id number]) as of 2/23/2022 09:10   Ref. Range 8/27/2020 08:21 1/27/2021 09:34 7/14/2021 09:23 1/18/2022 09:13   Triglyceride Latest Ref Range: <150 MG/DL 91  73 129   Cholesterol, total Latest Ref Range: <200 MG/  148 179   HDL Cholesterol Latest Ref Range: 40 - 60 MG/DL 90 (H)  79 (H) 68 (H)   CHOL/HDL Ratio Latest Ref Range: 0 - 5.0   1.7  1.9 2.6   VLDL, calculated Latest Units: MG/DL 18.2  14.6 25.8   LDL, calculated Latest Ref Range: 0 - 100 MG/DL 46.8  54.4 85.2     12/16 Patient seen in hospital for elevated cardiac enzyme- probably secondary to profound anemia and tachycardia from hyperthyroidism. Has mild stable dyspnea. No chest pain. ETT revealed 1-2 mm ST segment depression with no chest pain. Patient remains asymtpomatic. Will continue risk factor modification. Patient had significant thyroid bruit and known thyroid nodules- now has resolved. Continue intense risk factor modification. 2/23/2022 dyspnea could be due to significant weight gain and underlying hyperthyroidism history but underlying cardiac structural and functional disease and ischemia should be ruled out given her age and risk factors. Check echo and a stress test.  Healthy diet was discussed and Mediterranean diet guidelines given. Would like to increase the dose of methimazole and see how she responds symptomatically given palpitations and dyspnea. Discussed with patient and she will discuss with her endocrinologist, Dr. Sharee Butler. 4/20/2022 cardiac testing has not shown any ischemia and systolic function is normal.  Patient has chronic diastolic CHF most likely. Since heart rate is high add low-dose beta-blocker and increase as tolerated. Discussed with patient and explained. Might exacerbate her asthma if it is due to bronchial asthma. She will watch out and let us know.   Follow home BP and heart rate chart in 2 weeks or so.  Diet weight and exercise reinforced. She has been reading about the diet but not changing yet but will try now. Discussed with her pulmonologist on phone Dr. Alcira Magdaleno regarding the use of beta-blockers and Xolair. He was okay to use both the medications together without any significant concern. Diagnoses and all orders for this visit:    1. Chronic diastolic congestive heart failure (HCC)  -     METABOLIC PANEL, BASIC; Future  -     HEPATIC FUNCTION PANEL; Future    2. Essential hypertension  -     METABOLIC PANEL, BASIC; Future  -     HEPATIC FUNCTION PANEL; Future    3. Hypercholesteremia    4. Severe obesity (BMI >= 40) (HCC)        Pertinent laboratory and test data reviewed and discussed with patient. See patient instructions also for other medical advice given    There are no discontinued medications. Follow-up and Dispositions    Return in about 6 months (around 1/27/2023), or if symptoms worsen or fail to improve, for Get recent thyroid functions from Dr. Alan George office. 7/27/2022 CHF is clinically compensated but NYHA class III. No clinical fluid overload. Blood pressure is controlled with low diastolic but no symptoms. Continue the same medications. Periodic follow-up of electrolytes renal and liver function. Recent thyroid function normal per patient. We will try to get those copies from Dr. Alan George. Detailed discussion regarding diet weight and exercise. Recommended that she walk more and cut down her portion sizes.

## 2022-08-04 ENCOUNTER — APPOINTMENT (OUTPATIENT)
Dept: PHYSICAL THERAPY | Age: 72
End: 2022-08-04
Attending: PHYSICAL MEDICINE & REHABILITATION

## 2022-08-04 ENCOUNTER — TELEPHONE (OUTPATIENT)
Dept: ORTHOPEDIC SURGERY | Age: 72
End: 2022-08-04

## 2022-08-04 NOTE — TELEPHONE ENCOUNTER
LSO custom fitting form was signed by provider and faxed back to [facility]. A fax confirmation was received. Document sent to scanning.

## 2022-08-08 RX ORDER — DIAZEPAM 5 MG/1
5-20 TABLET ORAL ONCE
Status: CANCELLED | OUTPATIENT
Start: 2022-08-08 | End: 2022-08-08

## 2022-08-09 ENCOUNTER — HOSPITAL ENCOUNTER (OUTPATIENT)
Age: 72
Setting detail: OUTPATIENT SURGERY
Discharge: HOME | End: 2022-08-09
Attending: PHYSICAL MEDICINE & REHABILITATION | Admitting: PHYSICAL MEDICINE & REHABILITATION
Payer: MEDICARE

## 2022-08-09 ENCOUNTER — APPOINTMENT (OUTPATIENT)
Dept: GENERAL RADIOLOGY | Age: 72
End: 2022-08-09
Attending: PHYSICAL MEDICINE & REHABILITATION
Payer: MEDICARE

## 2022-08-09 VITALS
HEART RATE: 77 BPM | OXYGEN SATURATION: 94 % | SYSTOLIC BLOOD PRESSURE: 172 MMHG | RESPIRATION RATE: 16 BRPM | DIASTOLIC BLOOD PRESSURE: 82 MMHG | TEMPERATURE: 98.3 F

## 2022-08-09 PROCEDURE — 64494 INJ PARAVERT F JNT L/S 2 LEV: CPT | Performed by: PHYSICAL MEDICINE & REHABILITATION

## 2022-08-09 PROCEDURE — 2709999900 HC NON-CHARGEABLE SUPPLY: Performed by: PHYSICAL MEDICINE & REHABILITATION

## 2022-08-09 PROCEDURE — 76010000009 HC PAIN MGT 0 TO 30 MIN PROC: Performed by: PHYSICAL MEDICINE & REHABILITATION

## 2022-08-09 PROCEDURE — 64493 INJ PARAVERT F JNT L/S 1 LEV: CPT | Performed by: PHYSICAL MEDICINE & REHABILITATION

## 2022-08-09 PROCEDURE — 74011250636 HC RX REV CODE- 250/636: Performed by: PHYSICAL MEDICINE & REHABILITATION

## 2022-08-09 PROCEDURE — 77030003672 HC NDL SPN HALY -A: Performed by: PHYSICAL MEDICINE & REHABILITATION

## 2022-08-09 RX ORDER — ROPIVACAINE HYDROCHLORIDE 2 MG/ML
INJECTION, SOLUTION EPIDURAL; INFILTRATION; PERINEURAL AS NEEDED
Status: DISCONTINUED | OUTPATIENT
Start: 2022-08-09 | End: 2022-08-09 | Stop reason: HOSPADM

## 2022-08-09 NOTE — INTERVAL H&P NOTE
Update History & Physical    The Patient's History and Physical of July 11, 2022 was reviewed. There was no change. The surgical site was confirmed by the patient and me. Plan:  The risk, benefits, expected outcome, and alternative to the recommended procedure have been discussed with the patient. Patient understands and wants to proceed with the procedure.     Electronically signed by Noe Schulz MD on 8/9/2022 at 10:32 AM

## 2022-08-09 NOTE — PROCEDURES
[facility]    DIAGNOTIC LUMBAR MEDIAL BRANCH BLOCKS  PROCEDURE REPORT      PATIENT:  Josh Hoang  YOB: 1950  DATE OF SERVICE:  8/9/2022  SITE:  [facility] Special [facility]    PRE-PROCEDURE DIAGNOSIS:  Lumbar Facet Arthopathy    POST-PROCEDURE DIAGNOSIS:  Same                PROCEDURE:    right  diagnostic lumbar medial branch blocks via L4, L5, and L5 dorsal ramus block for suspected facet mediated pain from L4/5 and L5/S1. PRE-PROCEDURE NOTE:  The details of the procedure have been discussed with the patient, including the risks, benefits and alternative options and an informed consent was obtained. The availability of a responsible adult to escort the patient following the procedure were confirmed. PROCEDURE NOTE:  The patient was brought to the procedure suite and positioned on the fluoroscopy table in the prone position. Physiologic monitors were applied. The skin was prepped in the standard surgical fashion and sterile drapes were applied over the procedure site. Under AP fluoroscopic guidance a 25-gauge,#5 x2 and a  3-1/2 inch short bevel spinal needle was advanced to the junction of the superior articular process and transverse process of L4 and L5. A needle was also placed along the sacral ala to block the L5 dorsal ramus. After negative vascular aspiration at each site, then  0.5 mL of 0.2% ropivacaine was injected at each location. The needles were removed intact. The area was thoroughly cleaned and sterile bandages applied as necessary. The patient tolerated the procedure well. Patient will be called to assess the degree of pain relief and the ability to do functional activities. Patient reported gini-procedural pain on Visual Analog Scale:  pre-2; post-0.       Angel Cevallos MD 8/9/2022 11:25 AM

## 2022-08-10 ENCOUNTER — HOSPITAL ENCOUNTER (OUTPATIENT)
Dept: INFUSION THERAPY | Age: 72
Discharge: HOME | End: 2022-08-10
Payer: MEDICARE

## 2022-08-10 ENCOUNTER — DOCUMENTATION ONLY (OUTPATIENT)
Dept: PHARMACY | Age: 72
End: 2022-08-10

## 2022-08-10 VITALS
SYSTOLIC BLOOD PRESSURE: 120 MMHG | RESPIRATION RATE: 18 BRPM | DIASTOLIC BLOOD PRESSURE: 74 MMHG | OXYGEN SATURATION: 97 % | HEART RATE: 76 BPM | TEMPERATURE: 97.6 F

## 2022-08-10 DIAGNOSIS — J45.30 MILD PERSISTENT ASTHMA, UNSPECIFIED WHETHER COMPLICATED: Primary | ICD-10-CM

## 2022-08-10 PROCEDURE — 96372 THER/PROPH/DIAG INJ SC/IM: CPT

## 2022-08-10 PROCEDURE — 74011250636 HC RX REV CODE- 250/636: Performed by: INTERNAL MEDICINE

## 2022-08-10 RX ORDER — ACETAMINOPHEN 325 MG/1
650 TABLET ORAL AS NEEDED
Status: CANCELLED
Start: 2022-08-24

## 2022-08-10 RX ORDER — ALBUTEROL SULFATE 0.83 MG/ML
2.5 SOLUTION RESPIRATORY (INHALATION) AS NEEDED
Status: CANCELLED
Start: 2022-08-24

## 2022-08-10 RX ORDER — HYDROCORTISONE SODIUM SUCCINATE 100 MG/2ML
100 INJECTION, POWDER, FOR SOLUTION INTRAMUSCULAR; INTRAVENOUS AS NEEDED
Status: CANCELLED | OUTPATIENT
Start: 2022-08-24

## 2022-08-10 RX ORDER — ONDANSETRON 2 MG/ML
8 INJECTION INTRAMUSCULAR; INTRAVENOUS AS NEEDED
Status: CANCELLED | OUTPATIENT
Start: 2022-08-24

## 2022-08-10 RX ORDER — DIPHENHYDRAMINE HYDROCHLORIDE 50 MG/ML
50 INJECTION, SOLUTION INTRAMUSCULAR; INTRAVENOUS AS NEEDED
Status: CANCELLED
Start: 2022-08-24

## 2022-08-10 RX ORDER — EPINEPHRINE 1 MG/ML
0.3 INJECTION, SOLUTION, CONCENTRATE INTRAVENOUS AS NEEDED
Status: CANCELLED | OUTPATIENT
Start: 2022-08-24

## 2022-08-10 RX ORDER — DIPHENHYDRAMINE HYDROCHLORIDE 50 MG/ML
25 INJECTION, SOLUTION INTRAMUSCULAR; INTRAVENOUS AS NEEDED
Status: CANCELLED
Start: 2022-08-24

## 2022-08-10 RX ADMIN — OMALIZUMAB 75 MG: 75 INJECTION, SOLUTION SUBCUTANEOUS at 09:34

## 2022-08-10 RX ADMIN — OMALIZUMAB 150 MG: 150 INJECTION, SOLUTION SUBCUTANEOUS at 09:35

## 2022-08-10 NOTE — PROGRESS NOTES
[facility] Progress Note    Date: August 10, 2022    Name: Melvi Kaplan    MRN: [id number]         : 1950    Gerlean Kalata INJECTION Q2 weeks    Ms. Florentino arrived to [facility] at 6970. Ms. Danny Byers vitals were reviewed. Visit Vitals  /74 (BP 1 Location: Left upper arm, BP Patient Position: Sitting)   Pulse 76   Temp 97.6 °F (36.4 °C)   Resp 18   SpO2 97%   Breastfeeding No       Omalizumab (XOLAIR) 375 mg TOTAL DOSE, was administered in 3 divided SQ injections as follows: 150 mg SQ in her RIGHT arm, 150 mg SQ in her LEFT arm, and 75 mg SQ in her RIGHT upper quadrant abdomen. Ms. Thao Baez tolerated well without complaints. Discharge/ follow-up instructions discussed w/ patient. Patient verbalized understanding. Patient armband removed & shredded. Ms. Thao Baez was discharged from [place] [address] in stable condition at [address] She is to return on  at 0900 for her next appointment.     Dia Welch RN  August 10, 2022

## 2022-08-10 NOTE — PROGRESS NOTES
Patient receiving free medication Xolair 375mg from [facility] patient [facility] from 06/10/2022 - until therapy is no longer needed or financial or insurance status change.  [address]

## 2022-08-18 ENCOUNTER — DOCUMENTATION ONLY (OUTPATIENT)
Dept: ORTHOPEDIC SURGERY | Age: 72
End: 2022-08-18

## 2022-08-18 NOTE — PROGRESS NOTES
Rod Cordero [place]      Trial #1 8/9/2022  Medial Branch Block Bilateral L3/4, L4/5, L5/S1    8/8/22 Pre-Procedure Pain Level: 10  8/10/22 Post-Procedure Pain Level 0   The percent of pain relief 100%    Activities Performed: Activities Performed:  Washing dishes, Kitchen chores, Sweeping    Trial #2  08/23/22 8/22/22 Pre-Procedure Pain Level: 10  8/24/22 Post Procedure Pain Level 4   The Percent of pain relief 60%     Activities Performed ; reaching up,reaching down, standing, bending, kitchen chores    Follow Up Appointment   Patient has appointment with you on  9/7/22 @ 9:30 am    RFA, patient wishes to proceed with RFA.

## 2022-08-23 ENCOUNTER — APPOINTMENT (OUTPATIENT)
Dept: GENERAL RADIOLOGY | Age: 72
End: 2022-08-23
Attending: PHYSICAL MEDICINE & REHABILITATION
Payer: MEDICARE

## 2022-08-23 ENCOUNTER — HOSPITAL ENCOUNTER (OUTPATIENT)
Age: 72
Setting detail: OUTPATIENT SURGERY
Discharge: HOME | End: 2022-08-23
Attending: PHYSICAL MEDICINE & REHABILITATION | Admitting: PHYSICAL MEDICINE & REHABILITATION
Payer: MEDICARE

## 2022-08-23 VITALS
OXYGEN SATURATION: 97 % | RESPIRATION RATE: 16 BRPM | HEART RATE: 82 BPM | DIASTOLIC BLOOD PRESSURE: 80 MMHG | TEMPERATURE: 98.5 F | SYSTOLIC BLOOD PRESSURE: 150 MMHG

## 2022-08-23 PROCEDURE — 64493 INJ PARAVERT F JNT L/S 1 LEV: CPT | Performed by: PHYSICAL MEDICINE & REHABILITATION

## 2022-08-23 PROCEDURE — 74011250636 HC RX REV CODE- 250/636: Performed by: PHYSICAL MEDICINE & REHABILITATION

## 2022-08-23 PROCEDURE — 64494 INJ PARAVERT F JNT L/S 2 LEV: CPT | Performed by: PHYSICAL MEDICINE & REHABILITATION

## 2022-08-23 PROCEDURE — 77030003672 HC NDL SPN HALY -A: Performed by: PHYSICAL MEDICINE & REHABILITATION

## 2022-08-23 PROCEDURE — 76010000009 HC PAIN MGT 0 TO 30 MIN PROC: Performed by: PHYSICAL MEDICINE & REHABILITATION

## 2022-08-23 PROCEDURE — 2709999900 HC NON-CHARGEABLE SUPPLY: Performed by: PHYSICAL MEDICINE & REHABILITATION

## 2022-08-23 RX ORDER — ROPIVACAINE HYDROCHLORIDE 2 MG/ML
INJECTION, SOLUTION EPIDURAL; INFILTRATION; PERINEURAL AS NEEDED
Status: DISCONTINUED | OUTPATIENT
Start: 2022-08-23 | End: 2022-08-23 | Stop reason: HOSPADM

## 2022-08-23 NOTE — INTERVAL H&P NOTE
Update History & Physical    The Patient's History and Physical of August 9, 2022 was reviewed. There was no change. The surgical site was confirmed by the patient and me. For second MBB trial.    Plan:  The risk, benefits, expected outcome, and alternative to the recommended procedure have been discussed with the patient. Patient understands and wants to proceed with the procedure.     Electronically signed by Dennis Sanderson MD on 8/23/2022 at 11:25 AM

## 2022-08-23 NOTE — PROCEDURES
[facility]    DIAGNOTIC LUMBAR MEDIAL BRANCH BLOCKS  PROCEDURE Katelin Norigea #2      PATIENT:  William Gandhi  YOB: 1950  DATE OF SERVICE:  8/23/2022  SITE:  [facility]    PRE-PROCEDURE DIAGNOSIS:  Lumbar Facet Arthopathy    POST-PROCEDURE DIAGNOSIS:  Same                PROCEDURE:    right  diagnostic lumbar medial branch blocks via L4, L5, and L5 dorsal ramus block for suspected facet mediated pain from L4/5 and L5/S1. PRE-PROCEDURE NOTE:  The details of the procedure have been discussed with the patient, including the risks, benefits and alternative options and an informed consent was obtained. The availability of a responsible adult to escort the patient following the procedure were confirmed. PROCEDURE NOTE:  The patient was brought to the procedure suite and positioned on the fluoroscopy table in the prone position. Physiologic monitors were applied. The skin was prepped in the standard surgical fashion and sterile drapes were applied over the procedure site. Under AP fluoroscopic guidance a 25-gauge,#5 x2 and a  3-1/2 inch short bevel spinal needle was advanced to the junction of the superior articular process and transverse process of L4 and L5. A needle was also placed along the sacral ala to block the L5 dorsal ramus. After negative vascular aspiration at each site, then  0.5 mL of 0.2% ropivacaine was injected at each location. The needles were removed intact. The area was thoroughly cleaned and sterile bandages applied as necessary. The patient tolerated the procedure well. Patient will be called to assess the degree of pain relief and the ability to do functional activities. Patient reported gini-procedural pain on Visual Analog Scale:  pre-6; post-0.       Mauricio Mckeon MD 8/23/2022 12:21 PM

## 2022-08-23 NOTE — H&P
Admission  Discharged   8/9/2022  [facility]  Procedures  Rodney Mireles MD (Physician)   Physical Medicine and Rehabilitation  [facility]     DIAGNOTIC LUMBAR MEDIAL BRANCH BLOCKS  PROCEDURE REPORT        PATIENT:  Nilsa Anderson  YOB: 1950  DATE OF SERVICE:  8/9/2022  [facility]     PRE-PROCEDURE DIAGNOSIS:  Lumbar Facet Arthopathy     POST-PROCEDURE DIAGNOSIS:  Same                                                                                                                                                    PROCEDURE:     right  diagnostic lumbar medial branch blocks via L4, L5, and L5 dorsal ramus block for suspected facet mediated pain from L4/5 and L5/S1. PRE-PROCEDURE NOTE:  The details of the procedure have been discussed with the patient, including the risks, benefits and alternative options and an informed consent was obtained. The availability of a responsible adult to escort the patient following the procedure were confirmed. PROCEDURE NOTE:  The patient was brought to the procedure suite and positioned on the fluoroscopy table in the prone position. Physiologic monitors were applied. The skin was prepped in the standard surgical fashion and sterile drapes were applied over the procedure site. Under AP fluoroscopic guidance a 25-gauge,#5 x2 and a  3-1/2 inch short bevel spinal needle was advanced to the junction of the superior articular process and transverse process of L4 and L5. A needle was also placed along the sacral ala to block the L5 dorsal ramus. After negative vascular aspiration at each site, then  0.5 mL of 0.2% ropivacaine was injected at each location. The needles were removed intact. The area was thoroughly cleaned and sterile bandages applied as necessary. The patient tolerated the procedure well.      Patient will be called to assess the degree of pain relief and the ability to do functional activities. Patient reported gini-procedural pain on Visual Analog Scale:  pre-2; post-0. Noe Schulz MD 2022 11:25 AM         Interval H&P Note  Kris Andrade MD (Physician)   Physical Medicine and Rehabilitation                                   Update History & Physical     The Patient's History and Physical of 2022 was reviewed. There was no change. The surgical site was confirmed by the patient and me. Plan:  The risk, benefits, expected outcome, and alternative to the recommended procedure have been discussed with the patient. Patient understands and wants to proceed with the procedure. Electronically signed by Noe Schulz MD on 2022 at 10:32 AM      Source Note                                                                                                               H&P Sergio Sabot)  Kris Andrade MD (Physician)   Physical Medicine and Rehabilitation Physician         [address] [address]  [place] [address] [address]  Phone: [phone]  Fax: [phone]  : 1950  PCP: Wu Santo NP     PROGRESS NOTE        ASSESSMENT AND PLAN     Diagnoses and all orders for this visit:     1. Lumbar facet arthropathy  -     SCHEDULE SURGERY  -     REFERRAL TO PHYSICAL THERAPY  -     AMB SUPPLY ORDER     2. Asthma, unspecified asthma severity, unspecified whether complicated, unspecified whether persistent           Audrey Lara is a 67 y.o. female with history of asthma, CHF, partial nephrectomy with progressive focal mechanical low back pain. She has global facet arthropathy on imaging and DDD L4/L5. Unable to use NSAIDs due to her other medical issues. Exam consistent with facet and disc as pain generators. Patient may take up to 2g (2,000 mg) of Tylenol every 24 hours for routine use, or 3g (3,000 mg) for short-term use.   Risks, benefits, alternatives, and limitations of RFA discussed with patient. Patient wishes to proceed. Schedule right side only MBB right L4-5, L5-S1 x 2  Referral to Physical Therapy for right-sided low back pain eval and treat  DME order for LSO for PRN use for prolonged standing and walking, medically necessary to offload painful structures          HISTORY OF PRESENT ILLNESS        Reynaldo Ward is a 67 y.o. female presents for follow up of back pain/RFA consultation. Patient of Dr. Moore. Pt reports progressively worsening low back pain x 1 year. R > L. Her pain is exacerbated with standing and walking. She notes a 10-15 standing tolerance, pt cannot stand long enough to cook. She tries to exercise but very limited due to shortness of breath. Denies sciatica. She takes extra strength Tylenol with some benefit. Denies side effects. She is unable to do her HEP due to pain. Denies red flags including persistent fevers, chills, weight changes, neurogenic bowel or bladder symptoms. Pain Assessment  3/7/2022   Location of Pain Back   Location Modifiers -   Severity of Pain 10   Quality of Pain (No Data)   Quality of Pain Comment feel like back breaking in half   Duration of Pain A few minutes   Frequency of Pain Several times daily   Date Pain First Started (No Data)   Date Pain First Started Comment months   Aggravating Factors Standing;Walking;Stairs   Limiting Behavior Yes   Relieving Factors Rest   Relieving Factors Comment medicine   Result of Injury No      Onset: years, worse since 2021     Investigations:   L MRI 2/2022: DDD L4-5 with foraminal stenosis.  FA L1-S1  Spine surgery consult: none     Treatments:  Physical therapy: years ago  Spinal injections: no  Spinal surgery- no  Beneficial medications: extra strength Tylenol   Failed medications: Voltaren 75 mg, NSAID (partial nephrectomy, CHF)     Work Status: retired sedentary worker  Pertinent PMHx:  Anemia, HTN, CHF, asthma, GERD, left partial nephrectomy (non cancerous mass), colectomy (nonmalignant polyps)        PHYSICAL EXAMINATION     Visit Vitals       /62 (BP 1 Location: Right arm, BP Patient Position: Sitting, BP Cuff Size: Large adult)   Pulse 64   Temp 97.4 °F (36.3 °C) (Temporal)   Resp 18   Ht 5' 1\" (1.549 m)   Wt 217 lb (98.4 kg)   SpO2 99% Comment: RA   BMI 41.00 kg/m²         TTP right L3 to sacrum  Mild pelvic obliquity  Lumbar flexion to upper shin, reports pain  Pain with right lateral bending and extension  Lower extremity strength intact  Straight leg raise negative                    Written by Mike Allison, as dictated by Kesha Naranjo MD.           Other Notes  All notes    Periop Notes from Karey May RN    Periop Notes from Karey May RN    Periop Notes from Callie Kee RN (NURSING)  Additional Documentation    Vitals:  /82 Important   (BP 1 Location: Left upper arm, BP Patient Position: Prone)  Pulse 77  Temp 98.3 °F (36.8 °C)  Resp 16  SpO2 94%    More Vitals   Flowsheets:  C-SSRS Suicide Screening,  Pre-Procedure Checklist,  DISCHARGE CHECKLIST,  Pain Vitals,  Vitals Reassessment,  Procedure Verification  . Lorean Snare .(5 more)   Encounter Info:  Billing Info,  History,  Allergies,  Detailed Report       Communications    View Encounter Conversation Summary  Media  From this encounter  Scan on 8/10/2022 1321 by Rosy Darnell: Consents/Legal   Scan on 8/10/2022 1321 by Rosy Darnell: Orders     Orders Placed      NC XR TECHNOLOGIST SERVICE ONE TIME  Medication List at Discharge      albuterol 2.5 mg /3 mL (0.083 %) nebu 3 mL,... 1 Dose Nebulization EVERY 4 HOURS AS NEEDED    albuterol sulfate 90 mcg/actuation 2 Puffs Inhalation EVERY 4 HOURS AS NEEDED    aspirin 81 mg Oral DAILY    budesonide/formoterol fumarate 160-4.5 mcg/actuation 2 Puffs Inhalation 2 TIMES DAILY, Rinse and gargle thoroughly after each use.   Disp 3 inhalers    cholecalciferol (vitamin D3) 5,000 Units Oral DAILY    hydrochlorothiazide 12.5 mg Oral DAILY    hyoscyamine sulfate 0.125 mg Oral EVERY 4 HOURS AS NEEDED   Patient not taking:  Reported on 8/9/2022    methimazole 5 mg Oral DAILY    metoprolol succinate 25 mg Oral DAILY    montelukast sodium 10 mg Oral DAILY    omalizumab 375 mg SubCUTAneous EVERY 2 WEEKS    omeprazole 20 mg Oral DAILY    simvastatin 40 mg Oral EVERY BEDTIME    tiotropium bromide 1.25 mcg/actuation 2 Puffs Inhalation DAILY  Visit Diagnoses      None     Problem List

## 2022-08-23 NOTE — PERIOP NOTES
Patient verbalized understanding of discharge instructions and verbally consented to HIPAA. Signature pad not working. No

## 2022-08-24 ENCOUNTER — HOSPITAL ENCOUNTER (OUTPATIENT)
Dept: INFUSION THERAPY | Age: 72
Discharge: HOME | End: 2022-08-24
Payer: MEDICARE

## 2022-08-24 VITALS
TEMPERATURE: 98.1 F | RESPIRATION RATE: 18 BRPM | HEART RATE: 74 BPM | SYSTOLIC BLOOD PRESSURE: 147 MMHG | DIASTOLIC BLOOD PRESSURE: 51 MMHG | OXYGEN SATURATION: 98 %

## 2022-08-24 DIAGNOSIS — J45.30 MILD PERSISTENT ASTHMA, UNSPECIFIED WHETHER COMPLICATED: Primary | ICD-10-CM

## 2022-08-24 PROCEDURE — 96372 THER/PROPH/DIAG INJ SC/IM: CPT

## 2022-08-24 PROCEDURE — 74011250636 HC RX REV CODE- 250/636: Performed by: INTERNAL MEDICINE

## 2022-08-24 RX ORDER — ONDANSETRON 2 MG/ML
8 INJECTION INTRAMUSCULAR; INTRAVENOUS AS NEEDED
Status: CANCELLED | OUTPATIENT
Start: 2022-09-07

## 2022-08-24 RX ORDER — ACETAMINOPHEN 325 MG/1
650 TABLET ORAL AS NEEDED
Status: CANCELLED
Start: 2022-09-07

## 2022-08-24 RX ORDER — DIPHENHYDRAMINE HYDROCHLORIDE 50 MG/ML
50 INJECTION, SOLUTION INTRAMUSCULAR; INTRAVENOUS AS NEEDED
Status: CANCELLED
Start: 2022-09-07

## 2022-08-24 RX ORDER — HYDROCORTISONE SODIUM SUCCINATE 100 MG/2ML
100 INJECTION, POWDER, FOR SOLUTION INTRAMUSCULAR; INTRAVENOUS AS NEEDED
Status: CANCELLED | OUTPATIENT
Start: 2022-09-07

## 2022-08-24 RX ORDER — EPINEPHRINE 1 MG/ML
0.3 INJECTION, SOLUTION, CONCENTRATE INTRAVENOUS AS NEEDED
Status: CANCELLED | OUTPATIENT
Start: 2022-09-07

## 2022-08-24 RX ORDER — DIPHENHYDRAMINE HYDROCHLORIDE 50 MG/ML
25 INJECTION, SOLUTION INTRAMUSCULAR; INTRAVENOUS AS NEEDED
Status: CANCELLED
Start: 2022-09-07

## 2022-08-24 RX ORDER — ALBUTEROL SULFATE 0.83 MG/ML
2.5 SOLUTION RESPIRATORY (INHALATION) AS NEEDED
Status: CANCELLED
Start: 2022-09-07

## 2022-08-24 RX ADMIN — OMALIZUMAB 150 MG: 150 INJECTION, SOLUTION SUBCUTANEOUS at 09:21

## 2022-08-24 RX ADMIN — OMALIZUMAB 75 MG: 75 INJECTION, SOLUTION SUBCUTANEOUS at 09:21

## 2022-08-24 NOTE — PROGRESS NOTES
[facility] Progress Note    Date: 2022    Name: Rudy Hernandez    MRN: [id number]         : 1950    Loral Barthel INJECTION Q2 weeks    Ms. Florentino arrived to [facility] ambulatory and in no acute distress at 0910. Ms. Quyen Moore vitals were reviewed. Visit Vitals  BP (!) 147/51 (BP 1 Location: Left upper arm, BP Patient Position: Sitting)   Pulse 74   Temp 98.1 °F (36.7 °C)   Resp 18   SpO2 98%   Breastfeeding No       Omalizumab (XOLAIR) 375 mg TOTAL DOSE, was administered in 3 divided SQ injections as follows: 150 mg SQ in her RIGHT arm, 150 mg SQ in her LEFT arm, and 75 mg SQ in her LEFT upper quadrant abdomen. Ms. Jian Sanchez tolerated well without complaints. Discharge/ follow-up instructions discussed w/ patient. Patient verbalized understanding. Patient armband removed & shredded. Ms. Jian Sanchez was discharged from [place] [address] in stable condition at 0930. She is to return on 22 at 0930 for her next appointment.     Carolyne Galarza RN  2022 Cephalexin Pregnancy And Lactation Text: This medication is Pregnancy Category B and considered safe during pregnancy.  It is also excreted in breast milk but can be used safely for shorter doses.

## 2022-09-02 ENCOUNTER — TELEPHONE (OUTPATIENT)
Dept: ORTHOPEDIC SURGERY | Age: 72
End: 2022-09-02

## 2022-09-02 NOTE — TELEPHONE ENCOUNTER
Called patient to get her ain levels after her 2nd MBB . Patient stated that she was concerned because this one didn't seem to have helped as much as the first one. She was at a 0 the day after the first one  and today she was about a 4 at the start of the day and it increased by the end of the day to a 7 or 8. Patient did say that she did a lot of extra things  like [employer] with a lot of bending.  She wanted me to put a note in for her fu visit

## 2022-09-07 ENCOUNTER — OFFICE VISIT (OUTPATIENT)
Dept: ORTHOPEDIC SURGERY | Age: 72
End: 2022-09-07
Payer: MEDICARE

## 2022-09-07 VITALS — HEART RATE: 69 BPM | BODY MASS INDEX: 41.38 KG/M2 | OXYGEN SATURATION: 99 % | WEIGHT: 219 LBS | TEMPERATURE: 96.3 F

## 2022-09-07 DIAGNOSIS — M51.36 DDD (DEGENERATIVE DISC DISEASE), LUMBAR: ICD-10-CM

## 2022-09-07 DIAGNOSIS — M47.816 LUMBAR FACET ARTHROPATHY: Primary | ICD-10-CM

## 2022-09-07 PROCEDURE — 1101F PT FALLS ASSESS-DOCD LE1/YR: CPT | Performed by: PHYSICAL MEDICINE & REHABILITATION

## 2022-09-07 PROCEDURE — G9899 SCRN MAM PERF RSLTS DOC: HCPCS | Performed by: PHYSICAL MEDICINE & REHABILITATION

## 2022-09-07 PROCEDURE — G9711 PT HX TOT COL OR COLON CA: HCPCS | Performed by: PHYSICAL MEDICINE & REHABILITATION

## 2022-09-07 PROCEDURE — 99214 OFFICE O/P EST MOD 30 MIN: CPT | Performed by: PHYSICAL MEDICINE & REHABILITATION

## 2022-09-07 PROCEDURE — G8536 NO DOC ELDER MAL SCRN: HCPCS | Performed by: PHYSICAL MEDICINE & REHABILITATION

## 2022-09-07 PROCEDURE — 1123F ACP DISCUSS/DSCN MKR DOCD: CPT | Performed by: PHYSICAL MEDICINE & REHABILITATION

## 2022-09-07 PROCEDURE — G8399 PT W/DXA RESULTS DOCUMENT: HCPCS | Performed by: PHYSICAL MEDICINE & REHABILITATION

## 2022-09-07 PROCEDURE — G8417 CALC BMI ABV UP PARAM F/U: HCPCS | Performed by: PHYSICAL MEDICINE & REHABILITATION

## 2022-09-07 PROCEDURE — G8756 NO BP MEASURE DOC: HCPCS | Performed by: PHYSICAL MEDICINE & REHABILITATION

## 2022-09-07 PROCEDURE — G8432 DEP SCR NOT DOC, RNG: HCPCS | Performed by: PHYSICAL MEDICINE & REHABILITATION

## 2022-09-07 PROCEDURE — 1090F PRES/ABSN URINE INCON ASSESS: CPT | Performed by: PHYSICAL MEDICINE & REHABILITATION

## 2022-09-07 PROCEDURE — G8427 DOCREV CUR MEDS BY ELIG CLIN: HCPCS | Performed by: PHYSICAL MEDICINE & REHABILITATION

## 2022-09-07 RX ORDER — BACLOFEN 10 MG/1
5-10 TABLET ORAL
Qty: 60 TABLET | Refills: 0 | Status: SHIPPED | OUTPATIENT
Start: 2022-09-07 | End: 2022-10-24

## 2022-09-07 NOTE — PATIENT INSTRUCTIONS
Low Back Pain: Exercises  Introduction  Here are some examples of exercises for you to try. The exercises may be suggested for a condition or for rehabilitation. Start each exercise slowly. Ease off the exercises if you start to have pain. You will be told when to start these exercises and which ones will work best for you. How to do the exercises  Press-up    Lie on your stomach, supporting your body with your forearms. Press your elbows down into the floor to raise your upper back. As you do this, relax your stomach muscles and allow your back to arch without using your back muscles. As your press up, do not let your hips or pelvis come off the floor. Hold for 15 to 30 seconds, then relax. Repeat 2 to 4 times. Alternate arm and leg (bird dog) exercise    Do this exercise slowly. Try to keep your body straight at all times, and do not let one hip drop lower than the other. Start on the floor, on your hands and knees. Tighten your belly muscles. Raise one leg off the floor, and hold it straight out behind you. Be careful not to let your hip drop down, because that will twist your trunk. Hold for about 6 seconds, then lower your leg and switch to the other leg. Repeat 8 to 12 times on each leg. Over time, work up to holding for 10 to 30 seconds each time. If you feel stable and secure with your leg raised, try raising the opposite arm straight out in front of you at the same time. Knee-to-chest exercise    Lie on your back with your knees bent and your feet flat on the floor. Bring one knee to your chest, keeping the other foot flat on the floor (or keeping the other leg straight, whichever feels better on your lower back). Keep your lower back pressed to the floor. Hold for at least 15 to 30 seconds. Relax, and lower the knee to the starting position. Repeat with the other leg. Repeat 2 to 4 times with each leg.   To get more stretch, put your other leg flat on the floor while pulling your knee to your chest.  Curl-ups    Lie on the floor on your back with your knees bent at a 90-degree angle. Your feet should be flat on the floor, about 12 inches from your buttocks. Cross your arms over your chest. If this bothers your neck, try putting your hands behind your neck (not your head), with your elbows spread apart. Slowly tighten your belly muscles and raise your shoulder blades off the floor. Keep your head in line with your body, and do not press your chin to your chest.  Hold this position for 1 or 2 seconds, then slowly lower yourself back down to the floor. Repeat 8 to 12 times. Pelvic tilt exercise    Lie on your back with your knees bent. \"Brace\" your stomach. This means to tighten your muscles by pulling in and imagining your belly button moving toward your spine. You should feel like your back is pressing to the floor and your hips and pelvis are rocking back. Hold for about 6 seconds while you breathe smoothly. Repeat 8 to 12 times. Heel dig bridging    Lie on your back with both knees bent and your ankles bent so that only your heels are digging into the floor. Your knees should be bent about 90 degrees. Then push your heels into the floor, squeeze your buttocks, and lift your hips off the floor until your shoulders, hips, and knees are all in a straight line. Hold for about 6 seconds as you continue to breathe normally, and then slowly lower your hips back down to the floor and rest for up to 10 seconds. Do 8 to 12 repetitions. Hamstring stretch in doorway    Lie on your back in a doorway, with one leg through the open door. Slide your leg up the wall to straighten your knee. You should feel a gentle stretch down the back of your leg. Hold the stretch for at least 15 to 30 seconds. Do not arch your back, point your toes, or bend either knee. Keep one heel touching the floor and the other heel touching the wall. Repeat with your other leg. Do 2 to 4 times for each leg.   Hip flexor stretch    Kneel on the floor with one knee bent and one leg behind you. Place your forward knee over your foot. Keep your other knee touching the floor. Slowly push your hips forward until you feel a stretch in the upper thigh of your rear leg. Hold the stretch for at least 15 to 30 seconds. Repeat with your other leg. Do 2 to 4 times on each side. Wall sit    Stand with your back 10 to 12 inches away from a wall. Lean into the wall until your back is flat against it. Slowly slide down until your knees are slightly bent, pressing your lower back into the wall. Hold for about 6 seconds, then slide back up the wall. Repeat 8 to 12 times. Follow-up care is a key part of your treatment and safety. Be sure to make and go to all appointments, and call your doctor if you are having problems. It's also a good idea to know your test results and keep a list of the medicines you take. Where can you learn more? Go to http://www.gray.com/  Enter M750 in the search box to learn more about \"Low Back Pain: Exercises. \"  Current as of: July 1, 2021               Content Version: 13.2  © 2006-2022 [employer]. Care instructions adapted under license by [employer] (which disclaims liability or warranty for this information). If you have questions about a medical condition or this instruction, always ask your healthcare professional. [address] any warranty or liability for your use of this information.

## 2022-09-07 NOTE — PROGRESS NOTES
[address]  [address]  [place] [address]  Phone: [phone]  Fax: [phone]  : 1950  PCP: Renaldo Germain NP    PROGRESS NOTE      ASSESSMENT AND PLAN    Diagnoses and all orders for this visit:    1. Lumbar facet arthropathy  -     AMB SUPPLY ORDER  -     baclofen (LIORESAL) 10 mg tablet; Take 0.5-1 Tablets by mouth three (3) times daily as needed for Pain. Indications: for acute/episodic pain  -     SCHEDULE SURGERY      Giuliana Díaz is a 67 y.o. female with CHF and chronic dyspnea. She feels limited in her ADLs due to the low back pain. She is unable to do any meaningful physical therapy due to her pulmonary status. She has had mixed results with medial branch blocks. We discussed options of aqua PT when able, medications, RF. Trial of Baclofen 5-10 mg TID PRN  DME for seated rollator  Risks, benefits, alternatives, and limitations of RFA discussed with patient. Patient wishes to proceed. Schedule RFA L4-5, L5-S1. Right side first.  If patient decides not proceed with RFA, she will follow up with Dr. Cornel Carcamo for medication management. Given instructions on low back exercises. Perform as tolerated. Advised to use LSO for periods of prolonged standing, walking, or flares. She will be able to get a good fit if used with sitting. Follow-up and Dispositions    Return in about 6 weeks (around 10/19/2022) for after Injections. HISTORY OF PRESENT ILLNESS      Giuliana Díaz is a 67 y.o. female presents for follow up of back pain. LV referred to PT, ordered LSO. Pt received right MBB L4-5, L5-S1 x 2 2022. She notes 100% relief with the first MBB and 60% with the second MBB. After her second MBB, she saw her physician for an asthma exacerbation, was given medication and took a nap. She felt stiffness upon awakening. She reports constant low back pain exacerbated with movement.  Pt notes attempting to clean her kitchen after the second MBB and had severe pain. She had a 5 minute standing tolerance while washing dishes. Positive shopping cart sign. Denies sciatica. She continues to take extra strength Tylenol PRN. Denies side effects. Pt reports no benefit with LSO. Pain Assessment  9/7/2022   Location of Pain Back   Location Modifiers Inferior   Severity of Pain 3   Quality of Pain Aching   Quality of Pain Comment -   Duration of Pain A few minutes   Frequency of Pain Intermittent   Date Pain First Started -   Date Pain First Started Comment -   Aggravating Factors (No Data)   Aggravating Factors Comment moving around   Limiting Behavior Yes   Relieving Factors Rest   Relieving Factors Comment -   Result of Injury -      Onset: years, worse since 2021     Investigations:   L MRI 2/2022: DDD L4-5 with foraminal stenosis.  FA L1-S1  Spine surgery consult: none     Treatments:  Physical therapy: years ago  Spinal injections: right MBB L4-5, L5-S1 x 2 8/2022 with 100% and 60% benefit  Spinal surgery- no  Beneficial medications: extra strength Tylenol   Failed medications: Voltaren 75 mg, NSAID (partial nephrectomy, CHF)     Work Status: retired sedentary worker  Pertinent PMHx:  Anemia, HTN, CHF, asthma, GERD, left partial nephrectomy (non cancerous mass), colectomy (nonmalignant polyps)    PHYSICAL EXAMINATION    Visit Vitals  Pulse 69   Temp (!) 96.3 °F (35.7 °C) (Temporal)   Wt 219 lb (99.3 kg)   SpO2 99%   BMI 41.38 kg/m²       TTP B/L L3-4, L4-5  Lumbar flexion to knees, pulling sensation with extension and lateral bending  LE strength intact  SLR negative  No edema                Written by Julieta Cruz, as dictated by Shahnaz Overton MD.

## 2022-09-07 NOTE — H&P (VIEW-ONLY)
[address]  [address]  [place], [address] [place] [address] Street  Phone: [phone]  Fax: [phone]  : 1950  PCP: Harry Salinas NP    PROGRESS NOTE      ASSESSMENT AND PLAN    Diagnoses and all orders for this visit:    1. Lumbar facet arthropathy  -     AMB SUPPLY ORDER  -     baclofen (LIORESAL) 10 mg tablet; Take 0.5-1 Tablets by mouth three (3) times daily as needed for Pain. Indications: for acute/episodic pain  -     SCHEDULE SURGERY      Noa Arana is a 67 y.o. female with CHF and chronic dyspnea. She feels limited in her ADLs due to the low back pain. She is unable to do any meaningful physical therapy due to her pulmonary status. She has had mixed results with medial branch blocks. We discussed options of aqua PT when able, medications, RF. Trial of Baclofen 5-10 mg TID PRN  DME for seated rollator  Risks, benefits, alternatives, and limitations of RFA discussed with patient. Patient wishes to proceed. Schedule RFA L4-5, L5-S1. Right side first.  If patient decides not proceed with RFA, she will follow up with Dr. Choco Hernandez for medication management. Given instructions on low back exercises. Perform as tolerated. Advised to use LSO for periods of prolonged standing, walking, or flares. She will be able to get a good fit if used with sitting. Follow-up and Dispositions    Return in about 6 weeks (around 10/19/2022) for after Injections. HISTORY OF PRESENT ILLNESS      Noa Arana is a 67 y.o. female presents for follow up of back pain. LV referred to PT, ordered LSO. Pt received right MBB L4-5, L5-S1 x 2 2022. She notes 100% relief with the first MBB and 60% with the second MBB. After her second MBB, she saw her physician for an asthma exacerbation, was given medication and took a nap. She felt stiffness upon awakening. She reports constant low back pain exacerbated with movement.  Pt notes attempting to clean her kitchen after the second MBB and had severe pain. She had a 5 minute standing tolerance while washing dishes. Positive shopping cart sign. Denies sciatica. She continues to take extra strength Tylenol PRN. Denies side effects. Pt reports no benefit with LSO. Pain Assessment  9/7/2022   Location of Pain Back   Location Modifiers Inferior   Severity of Pain 3   Quality of Pain Aching   Quality of Pain Comment -   Duration of Pain A few minutes   Frequency of Pain Intermittent   Date Pain First Started -   Date Pain First Started Comment -   Aggravating Factors (No Data)   Aggravating Factors Comment moving around   Limiting Behavior Yes   Relieving Factors Rest   Relieving Factors Comment -   Result of Injury -      Onset: years, worse since 2021     Investigations:   L MRI 2/2022: DDD L4-5 with foraminal stenosis.  FA L1-S1  Spine surgery consult: none     Treatments:  Physical therapy: years ago  Spinal injections: right MBB L4-5, L5-S1 x 2 8/2022 with 100% and 60% benefit  Spinal surgery- no  Beneficial medications: extra strength Tylenol   Failed medications: Voltaren 75 mg, NSAID (partial nephrectomy, CHF)     Work Status: retired sedentary worker  Pertinent PMHx:  Anemia, HTN, CHF, asthma, GERD, left partial nephrectomy (non cancerous mass), colectomy (nonmalignant polyps)    PHYSICAL EXAMINATION    Visit Vitals  Pulse 69   Temp (!) 96.3 °F (35.7 °C) (Temporal)   Wt 219 lb (99.3 kg)   SpO2 99%   BMI 41.38 kg/m²       TTP B/L L3-4, L4-5  Lumbar flexion to knees, pulling sensation with extension and lateral bending  LE strength intact  SLR negative  No edema                Written by Nile Moreland, as dictated by Laurell Closs, MD.

## 2022-09-07 NOTE — LETTER
9/8/2022    Patient: Josh Hoang   YOB: 1950   Date of Visit: 9/7/2022     Jayden Lara NP  [facility] [address] [phone]  Via Fax: [phone]    Dear Jayden Lara NP,      Thank you for referring Ms. Saira Florentino to [address] [place] [address] [facility] for evaluation. My notes for this consultation are attached. If you have questions, please do not hesitate to call me. I look forward to following your patient along with you.       Sincerely,    Angel Cevallos MD

## 2022-09-08 PROBLEM — M51.36 DDD (DEGENERATIVE DISC DISEASE), LUMBAR: Status: ACTIVE | Noted: 2022-09-08

## 2022-09-08 PROBLEM — M47.816 LUMBAR FACET ARTHROPATHY: Status: ACTIVE | Noted: 2022-09-08

## 2022-09-08 PROBLEM — M51.369 DDD (DEGENERATIVE DISC DISEASE), LUMBAR: Status: ACTIVE | Noted: 2022-09-08

## 2022-09-09 ENCOUNTER — HOSPITAL ENCOUNTER (OUTPATIENT)
Dept: INFUSION THERAPY | Age: 72
Discharge: HOME | End: 2022-09-09
Payer: MEDICARE

## 2022-09-09 VITALS
DIASTOLIC BLOOD PRESSURE: 77 MMHG | TEMPERATURE: 98.2 F | SYSTOLIC BLOOD PRESSURE: 145 MMHG | RESPIRATION RATE: 18 BRPM | OXYGEN SATURATION: 99 % | HEART RATE: 69 BPM

## 2022-09-09 DIAGNOSIS — J45.30 MILD PERSISTENT ASTHMA, UNSPECIFIED WHETHER COMPLICATED: Primary | ICD-10-CM

## 2022-09-09 PROCEDURE — 74011250636 HC RX REV CODE- 250/636: Performed by: INTERNAL MEDICINE

## 2022-09-09 PROCEDURE — 96372 THER/PROPH/DIAG INJ SC/IM: CPT

## 2022-09-09 RX ORDER — EPINEPHRINE 1 MG/ML
0.3 INJECTION, SOLUTION, CONCENTRATE INTRAVENOUS AS NEEDED
Status: CANCELLED | OUTPATIENT
Start: 2022-09-21

## 2022-09-09 RX ORDER — DIPHENHYDRAMINE HYDROCHLORIDE 50 MG/ML
25 INJECTION, SOLUTION INTRAMUSCULAR; INTRAVENOUS AS NEEDED
Status: CANCELLED
Start: 2022-09-21

## 2022-09-09 RX ORDER — HYDROCORTISONE SODIUM SUCCINATE 100 MG/2ML
100 INJECTION, POWDER, FOR SOLUTION INTRAMUSCULAR; INTRAVENOUS AS NEEDED
Status: CANCELLED | OUTPATIENT
Start: 2022-09-21

## 2022-09-09 RX ORDER — ACETAMINOPHEN 325 MG/1
650 TABLET ORAL AS NEEDED
Status: CANCELLED
Start: 2022-09-21

## 2022-09-09 RX ORDER — ALBUTEROL SULFATE 0.83 MG/ML
2.5 SOLUTION RESPIRATORY (INHALATION) AS NEEDED
Status: CANCELLED
Start: 2022-09-21

## 2022-09-09 RX ORDER — DIPHENHYDRAMINE HYDROCHLORIDE 50 MG/ML
50 INJECTION, SOLUTION INTRAMUSCULAR; INTRAVENOUS AS NEEDED
Status: CANCELLED
Start: 2022-09-21

## 2022-09-09 RX ORDER — ONDANSETRON 2 MG/ML
8 INJECTION INTRAMUSCULAR; INTRAVENOUS AS NEEDED
Status: CANCELLED | OUTPATIENT
Start: 2022-09-21

## 2022-09-09 RX ADMIN — OMALIZUMAB 150 MG: 150 INJECTION, SOLUTION SUBCUTANEOUS at 09:39

## 2022-09-09 RX ADMIN — OMALIZUMAB 75 MG: 75 INJECTION, SOLUTION SUBCUTANEOUS at 09:39

## 2022-09-09 NOTE — PROGRESS NOTES
[facility] OPI Progress Note    Date: 2022    Name: Glachristianstine Level    MRN: [id number]         : 1950    Lalla Nadya INJECTION Q2 weeks    Ms. Florentino arrived to [facility] at [address] [facility]. Ms. Grover Camarena was assessed and education was provided. Ms. Antonina Luther vitals were reviewed. Visit Vitals  BP (!) 145/77 (BP 1 Location: Left upper arm, BP Patient Position: Sitting)   Pulse 69   Temp 98.2 °F (36.8 °C)   Resp 18   SpO2 99%   Breastfeeding No       Omalizumab (XOLAIR) 375 mg TOTAL DOSE, was administered in 3 divided SQ injections as follows: 150 mg SQ in her RIGHT arm, 150 mg SQ in her LEFT arm, and 75 mg SQ in her RIGH abdomen. Ms. Grover Camarena tolerated well without complaints. Discharge/ follow-up instructions discussed w/ pt. Pt verbalized understanding. Pt armband removed & shredded. Ms. Grover Camarena was discharged from [place] [address] in stable condition at [address] [facility]. She is to return on 22 at 0930 for her next appointment.     Laith Salmon RN  2022

## 2022-09-13 ENCOUNTER — APPOINTMENT (OUTPATIENT)
Dept: GENERAL RADIOLOGY | Age: 72
End: 2022-09-13
Attending: PHYSICAL MEDICINE & REHABILITATION
Payer: MEDICARE

## 2022-09-13 ENCOUNTER — HOSPITAL ENCOUNTER (OUTPATIENT)
Age: 72
Setting detail: OUTPATIENT SURGERY
Discharge: HOME | End: 2022-09-13
Attending: PHYSICAL MEDICINE & REHABILITATION | Admitting: PHYSICAL MEDICINE & REHABILITATION
Payer: MEDICARE

## 2022-09-13 VITALS
OXYGEN SATURATION: 97 % | DIASTOLIC BLOOD PRESSURE: 84 MMHG | TEMPERATURE: 98.6 F | SYSTOLIC BLOOD PRESSURE: 168 MMHG | HEART RATE: 78 BPM | RESPIRATION RATE: 16 BRPM

## 2022-09-13 PROCEDURE — 2709999900 HC NON-CHARGEABLE SUPPLY: Performed by: PHYSICAL MEDICINE & REHABILITATION

## 2022-09-13 PROCEDURE — 64635 DESTROY LUMB/SAC FACET JNT: CPT | Performed by: PHYSICAL MEDICINE & REHABILITATION

## 2022-09-13 PROCEDURE — 74011000250 HC RX REV CODE- 250: Performed by: PHYSICAL MEDICINE & REHABILITATION

## 2022-09-13 PROCEDURE — 64636 DESTROY L/S FACET JNT ADDL: CPT | Performed by: PHYSICAL MEDICINE & REHABILITATION

## 2022-09-13 PROCEDURE — 74011250637 HC RX REV CODE- 250/637: Performed by: PHYSICAL MEDICINE & REHABILITATION

## 2022-09-13 PROCEDURE — 76010000010 HC PAIN MGT 31 TO 60 MIN PROC: Performed by: PHYSICAL MEDICINE & REHABILITATION

## 2022-09-13 PROCEDURE — 74011250636 HC RX REV CODE- 250/636: Performed by: PHYSICAL MEDICINE & REHABILITATION

## 2022-09-13 RX ORDER — DIAZEPAM 5 MG/1
5-20 TABLET ORAL ONCE
Status: COMPLETED | OUTPATIENT
Start: 2022-09-13 | End: 2022-09-13

## 2022-09-13 RX ORDER — DEXAMETHASONE SODIUM PHOSPHATE 100 MG/10ML
INJECTION INTRAMUSCULAR; INTRAVENOUS AS NEEDED
Status: DISCONTINUED | OUTPATIENT
Start: 2022-09-13 | End: 2022-09-13 | Stop reason: HOSPADM

## 2022-09-13 RX ORDER — LIDOCAINE HYDROCHLORIDE 10 MG/ML
INJECTION, SOLUTION EPIDURAL; INFILTRATION; INTRACAUDAL; PERINEURAL AS NEEDED
Status: DISCONTINUED | OUTPATIENT
Start: 2022-09-13 | End: 2022-09-13 | Stop reason: HOSPADM

## 2022-09-13 RX ORDER — LIDOCAINE HYDROCHLORIDE 20 MG/ML
INJECTION, SOLUTION EPIDURAL; INFILTRATION; INTRACAUDAL; PERINEURAL AS NEEDED
Status: DISCONTINUED | OUTPATIENT
Start: 2022-09-13 | End: 2022-09-13 | Stop reason: HOSPADM

## 2022-09-13 RX ADMIN — DIAZEPAM 10 MG: 5 TABLET ORAL at 08:25

## 2022-09-13 NOTE — INTERVAL H&P NOTE
Update History & Physical    The Patient's History and Physical of September 7, 2022 was reviewed. There was no change. The surgical site was confirmed by the patient and me. Plan:  The risk, benefits, expected outcome, and alternative to the recommended procedure have been discussed with the patient. Patient understands and wants to proceed with the procedure.     Electronically signed by Sav Flores MD on 9/13/2022 at 9:04 AM

## 2022-09-13 NOTE — PROCEDURES
[facility]    LUMBAR RADIOFREQUENCY THERMOCOAGULATION   PROCEDURE REPORT      PATIENT:  Lexie Belcher  YOB: 1950  DATE OF SERVICE:  9/13/2022  SITE:  [address] [place] [address]    PRE-PROCEDURE DIAGNOSIS:  Lumbar Facet Arthropathy, Lumbar Spondylosis  POST-PROCEDURE DIAGNOSIS:  Same  PROCEDURE: right radiofrequency thermocoagulation of lumbar medial branch nerves at L4,  L5 and the L5 dorsal ramus  for treatment of L4/5 and L5/S1  presumed lumbar facet joint mediated pain using the [employer] system    ANESTHESIA:   Local with or with out oral sedation. See Medication Administration Record for specific medications and dosage. PHYSICIAN:  Muna Martinez MD    PRE-PROCEDURE NOTE:  Pre-procedural assessment of the patient was performed. The patient has had greater than 50% improvement of pain score and functional abilities with medial branch blocks and is considered an appropriate candidate for RFA. A full description of the procedure was provided including the risks, benefits, possible complications, and alternative options. Informed consent was given and signed. The availability of a responsible adult to escort the patient following the procedure was confirmed. PROCEDURE NOTE:  The patient was brought to the fluoroscopy suite and positioned on the fluoroscopy table in the prone position. A grounding pad was applied to the lower extremity. Physiologic monitors were applied. The lumbar skin  was widely prepped, allowed to air dry, and draped in standard sterile surgical fashion. 1% Lidocaine was utilized via 25g needle for local anesthesia Please refer to the Flowsheet for documentation of the patients medications and vital signs. .    Under ipsilateral oblique fluoroscopic guidance a  17gauge 100mm radiofrequency introducer needle was placed and slowly advanced.  The planned anatomic targets were approached in a perpendicular fashion to  the junction of the superior articular processes and the transverse processes of  right L4 and L5 . For the L5 dorsal ramus, the needle was placed at  the S1 superior articular process at the base of the sacral ala. Needle tip position was verified with additional lateral and AP views. After each individual needle was placed and stylets removed, a radiofrequency probe with a 4mm active tip was inserted. At each site,  motor testing at 2 Hz to a maximum of 2 volts was performed. The patient was awake and responsive during this portion of the procedure. Patient denied any complaints into the buttocks/leg. There was no evidence of motor stimulation in the ipsilateral gluteal muscles or extremity. After the negative aspiration of blood, air or CSF, each target was then anesthetized with 1-2 mL of lidocaine 2% . Each target was lesioned at 80 degrees Celsius for a total cycle of 2 minutes and 30 seconds. Tissue impedence remained below 500 Ohms. A mixture of 2mL lidocaine 1%  with dexamethasone 10mg [10mg/ml] was then injected through each radiofrequency needle . All needles and electrodes were removed intact. The area was thoroughly cleaned and sterile bandages applied as necessary. Fluoroscopic images were digitally archived. The patient tolerated the procedure well without complication and the vital signs remained stable throughout the procedure. POST-PROCEDURE COURSE :  The patient was escorted from the procedure suite in satisfactory condition. The patient did not experience any adverse events and remained hemodynamically stable during the post-procedure period. Patient was observed for at least 10 minutes post-procedure. No increase in back or leg symptoms. Dressing clean, dry, and intact. LE strength intact. Appropriate post-procedure instructions were provided  The patient is aware that their pain may flare and that 4-6 weeks may be required prior to the onset of pain relief. Patient reported gini-procedural pain on Visual Analog Scale:  pre-10; post-0.                   Angel Cevallos MD 9/13/2022 9:04 AM

## 2022-09-13 NOTE — PERIOP NOTES
Patient here for her procedure that she consents to. Patient signed the hospital universal consent. Hippa explained to  patient. Discharge instructions explained and copy given to patient .

## 2022-09-18 ENCOUNTER — APPOINTMENT (OUTPATIENT)
Dept: CT IMAGING | Age: 72
End: 2022-09-18
Attending: EMERGENCY MEDICINE
Payer: MEDICARE

## 2022-09-18 ENCOUNTER — HOSPITAL ENCOUNTER (EMERGENCY)
Age: 72
Discharge: HOME | End: 2022-09-18
Attending: EMERGENCY MEDICINE
Payer: MEDICARE

## 2022-09-18 ENCOUNTER — APPOINTMENT (OUTPATIENT)
Dept: ULTRASOUND IMAGING | Age: 72
End: 2022-09-18
Attending: EMERGENCY MEDICINE
Payer: MEDICARE

## 2022-09-18 VITALS
WEIGHT: 219 LBS | TEMPERATURE: 98 F | HEART RATE: 69 BPM | SYSTOLIC BLOOD PRESSURE: 157 MMHG | RESPIRATION RATE: 16 BRPM | DIASTOLIC BLOOD PRESSURE: 68 MMHG | BODY MASS INDEX: 41.35 KG/M2 | OXYGEN SATURATION: 100 % | HEIGHT: 61 IN

## 2022-09-18 DIAGNOSIS — R11.2 NAUSEA AND VOMITING, UNSPECIFIED VOMITING TYPE: ICD-10-CM

## 2022-09-18 DIAGNOSIS — R10.13 ABDOMINAL PAIN, EPIGASTRIC: Primary | ICD-10-CM

## 2022-09-18 LAB
ALBUMIN SERPL-MCNC: 3.9 G/DL (ref 3.4–5)
ALBUMIN/GLOB SERPL: 1 {RATIO} (ref 0.8–1.7)
ALP SERPL-CCNC: 94 U/L (ref 45–117)
ALT SERPL-CCNC: 19 U/L (ref 13–56)
ANION GAP SERPL CALC-SCNC: 7 MMOL/L (ref 3–18)
APPEARANCE UR: CLEAR
AST SERPL-CCNC: 19 U/L (ref 10–38)
ATRIAL RATE: 62 BPM
BASOPHILS # BLD: 0 K/UL (ref 0–0.1)
BASOPHILS NFR BLD: 0 % (ref 0–2)
BILIRUB DIRECT SERPL-MCNC: <0.1 MG/DL (ref 0–0.2)
BILIRUB SERPL-MCNC: 0.3 MG/DL (ref 0.2–1)
BILIRUB UR QL: NEGATIVE
BUN SERPL-MCNC: 14 MG/DL (ref 7–18)
BUN/CREAT SERPL: 12 (ref 12–20)
CALCIUM SERPL-MCNC: 9.8 MG/DL (ref 8.5–10.1)
CALCULATED P AXIS, ECG09: 56 DEGREES
CALCULATED R AXIS, ECG10: 8 DEGREES
CALCULATED T AXIS, ECG11: 9 DEGREES
CHLORIDE SERPL-SCNC: 110 MMOL/L (ref 100–111)
CO2 SERPL-SCNC: 26 MMOL/L (ref 21–32)
COLOR UR: YELLOW
CREAT SERPL-MCNC: 1.14 MG/DL (ref 0.6–1.3)
DIAGNOSIS, 93000: NORMAL
DIFFERENTIAL METHOD BLD: ABNORMAL
EOSINOPHIL # BLD: 0 K/UL (ref 0–0.4)
EOSINOPHIL NFR BLD: 0 % (ref 0–5)
ERYTHROCYTE [DISTWIDTH] IN BLOOD BY AUTOMATED COUNT: 20.3 % (ref 11.6–14.5)
GLOBULIN SER CALC-MCNC: 3.8 G/DL (ref 2–4)
GLUCOSE SERPL-MCNC: 147 MG/DL (ref 74–99)
GLUCOSE UR STRIP.AUTO-MCNC: NEGATIVE MG/DL
HCT VFR BLD AUTO: 34.6 % (ref 35–45)
HGB BLD-MCNC: 9.7 G/DL (ref 12–16)
HGB UR QL STRIP: NEGATIVE
IMM GRANULOCYTES # BLD AUTO: 0.1 K/UL (ref 0–0.04)
IMM GRANULOCYTES NFR BLD AUTO: 1 % (ref 0–0.5)
KETONES UR QL STRIP.AUTO: NEGATIVE MG/DL
LACTATE SERPL-SCNC: 2 MMOL/L (ref 0.4–2)
LACTATE SERPL-SCNC: 3.2 MMOL/L (ref 0.4–2)
LEUKOCYTE ESTERASE UR QL STRIP.AUTO: NEGATIVE
LIPASE SERPL-CCNC: 84 U/L (ref 73–393)
LYMPHOCYTES # BLD: 1.6 K/UL (ref 0.9–3.6)
LYMPHOCYTES NFR BLD: 14 % (ref 21–52)
MCH RBC QN AUTO: 18.8 PG (ref 24–34)
MCHC RBC AUTO-ENTMCNC: 28 G/DL (ref 31–37)
MCV RBC AUTO: 67.1 FL (ref 78–100)
MONOCYTES # BLD: 0.3 K/UL (ref 0.05–1.2)
MONOCYTES NFR BLD: 3 % (ref 3–10)
NEUTS SEG # BLD: 9.3 K/UL (ref 1.8–8)
NEUTS SEG NFR BLD: 82 % (ref 40–73)
NITRITE UR QL STRIP.AUTO: NEGATIVE
NRBC # BLD: 0 K/UL (ref 0–0.01)
NRBC BLD-RTO: 0 PER 100 WBC
P-R INTERVAL, ECG05: 144 MS
PH UR STRIP: 7.5 [PH] (ref 5–8)
PLATELET # BLD AUTO: 408 K/UL (ref 135–420)
PLATELET COMMENTS,PCOM: ABNORMAL
PMV BLD AUTO: 10.3 FL (ref 9.2–11.8)
POTASSIUM SERPL-SCNC: 3.8 MMOL/L (ref 3.5–5.5)
PROT SERPL-MCNC: 7.7 G/DL (ref 6.4–8.2)
PROT UR STRIP-MCNC: NEGATIVE MG/DL
Q-T INTERVAL, ECG07: 440 MS
QRS DURATION, ECG06: 102 MS
QTC CALCULATION (BEZET), ECG08: 446 MS
RBC # BLD AUTO: 5.16 M/UL (ref 4.2–5.3)
RBC MORPH BLD: ABNORMAL
SODIUM SERPL-SCNC: 143 MMOL/L (ref 136–145)
SP GR UR REFRACTOMETRY: 1.01 (ref 1–1.03)
TROPONIN-HIGH SENSITIVITY: 8 NG/L (ref 0–54)
UROBILINOGEN UR QL STRIP.AUTO: 1 EU/DL (ref 0.2–1)
VENTRICULAR RATE, ECG03: 62 BPM
WBC # BLD AUTO: 11.3 K/UL (ref 4.6–13.2)

## 2022-09-18 PROCEDURE — 99284 EMERGENCY DEPT VISIT MOD MDM: CPT

## 2022-09-18 PROCEDURE — 83605 ASSAY OF LACTIC ACID: CPT

## 2022-09-18 PROCEDURE — 96375 TX/PRO/DX INJ NEW DRUG ADDON: CPT

## 2022-09-18 PROCEDURE — 85025 COMPLETE CBC W/AUTO DIFF WBC: CPT

## 2022-09-18 PROCEDURE — 96374 THER/PROPH/DIAG INJ IV PUSH: CPT

## 2022-09-18 PROCEDURE — 84484 ASSAY OF TROPONIN QUANT: CPT

## 2022-09-18 PROCEDURE — 83690 ASSAY OF LIPASE: CPT

## 2022-09-18 PROCEDURE — 74011250636 HC RX REV CODE- 250/636: Performed by: EMERGENCY MEDICINE

## 2022-09-18 PROCEDURE — 93005 ELECTROCARDIOGRAM TRACING: CPT

## 2022-09-18 PROCEDURE — 74176 CT ABD & PELVIS W/O CONTRAST: CPT

## 2022-09-18 PROCEDURE — 96372 THER/PROPH/DIAG INJ SC/IM: CPT

## 2022-09-18 PROCEDURE — 81003 URINALYSIS AUTO W/O SCOPE: CPT

## 2022-09-18 PROCEDURE — 76705 ECHO EXAM OF ABDOMEN: CPT

## 2022-09-18 PROCEDURE — 80048 BASIC METABOLIC PNL TOTAL CA: CPT

## 2022-09-18 PROCEDURE — 80076 HEPATIC FUNCTION PANEL: CPT

## 2022-09-18 PROCEDURE — 96376 TX/PRO/DX INJ SAME DRUG ADON: CPT

## 2022-09-18 RX ORDER — NAPROXEN 500 MG/1
500 TABLET ORAL 2 TIMES DAILY WITH MEALS
Qty: 20 TABLET | Refills: 0 | Status: SHIPPED | OUTPATIENT
Start: 2022-09-18 | End: 2022-09-28

## 2022-09-18 RX ORDER — OXYCODONE AND ACETAMINOPHEN 5; 325 MG/1; MG/1
1 TABLET ORAL
Qty: 12 TABLET | Refills: 0 | Status: SHIPPED | OUTPATIENT
Start: 2022-09-18 | End: 2022-09-22

## 2022-09-18 RX ORDER — KETOROLAC TROMETHAMINE 30 MG/ML
30 INJECTION, SOLUTION INTRAMUSCULAR; INTRAVENOUS
Status: DISCONTINUED | OUTPATIENT
Start: 2022-09-18 | End: 2022-09-18

## 2022-09-18 RX ORDER — ONDANSETRON 2 MG/ML
4 INJECTION INTRAMUSCULAR; INTRAVENOUS ONCE
Status: COMPLETED | OUTPATIENT
Start: 2022-09-18 | End: 2022-09-18

## 2022-09-18 RX ORDER — MORPHINE SULFATE 4 MG/ML
4 INJECTION INTRAVENOUS ONCE
Status: COMPLETED | OUTPATIENT
Start: 2022-09-18 | End: 2022-09-18

## 2022-09-18 RX ORDER — PHENOL/SODIUM PHENOLATE
20 AEROSOL, SPRAY (ML) MUCOUS MEMBRANE DAILY
Qty: 30 TABLET | Refills: 0 | Status: SHIPPED | OUTPATIENT
Start: 2022-09-18 | End: 2022-10-18

## 2022-09-18 RX ORDER — KETOROLAC TROMETHAMINE 30 MG/ML
15 INJECTION, SOLUTION INTRAMUSCULAR; INTRAVENOUS ONCE
Status: DISCONTINUED | OUTPATIENT
Start: 2022-09-18 | End: 2022-09-18

## 2022-09-18 RX ORDER — KETOROLAC TROMETHAMINE 15 MG/ML
15 INJECTION, SOLUTION INTRAMUSCULAR; INTRAVENOUS ONCE
Status: DISCONTINUED | OUTPATIENT
Start: 2022-09-18 | End: 2022-09-18 | Stop reason: HOSPADM

## 2022-09-18 RX ORDER — KETOROLAC TROMETHAMINE 15 MG/ML
INJECTION, SOLUTION INTRAMUSCULAR; INTRAVENOUS
Status: DISCONTINUED
Start: 2022-09-18 | End: 2022-09-18 | Stop reason: HOSPADM

## 2022-09-18 RX ORDER — ONDANSETRON 4 MG/1
4 TABLET, FILM COATED ORAL
Qty: 20 TABLET | Refills: 0 | Status: SHIPPED | OUTPATIENT
Start: 2022-09-18 | End: 2022-10-24

## 2022-09-18 RX ADMIN — KETOROLAC TROMETHAMINE 30 MG: 30 INJECTION, SOLUTION INTRAMUSCULAR; INTRAVENOUS at 05:49

## 2022-09-18 RX ADMIN — SODIUM CHLORIDE 1000 ML: 9 INJECTION, SOLUTION INTRAVENOUS at 02:50

## 2022-09-18 RX ADMIN — MORPHINE SULFATE 4 MG: 4 INJECTION INTRAVENOUS at 03:53

## 2022-09-18 RX ADMIN — MORPHINE SULFATE 4 MG: 4 INJECTION INTRAVENOUS at 02:50

## 2022-09-18 RX ADMIN — ONDANSETRON 4 MG: 2 INJECTION INTRAMUSCULAR; INTRAVENOUS at 02:44

## 2022-09-18 NOTE — ED PROVIDER NOTES
EMERGENCY DEPARTMENT HISTORY AND PHYSICAL EXAM    2:32 AM      Date: 9/18/2022  Patient Name: Naga Thibodeaux    History of Presenting Illness     Chief Complaint   Patient presents with    Abdominal Pain    Back Pain         History Provided By: Patient    Additional History (Context): Naga Thibodeaux is a 67 y.o. female, history of hypertension, dyslipidemia, presenting with back and abdominal pain. Patient states that she had taken a nap and woke up around 11 PM.  Started having sharp pain in her middle lower back that radiated to the right side and then came around to her abdomen more to her epigastric area. States that the pain has been constant since then. Has been associate with nausea and vomiting. She denies any dysuria or constipation. Denies any urinary complaints. No fevers. She denies any chest pain or shortness of breath. No recent illnesses. Patient does have a history of chronic lower back pain and follows with pain management but states that this pain is different from her usual.    PCP: Orion Patino NP    Current Facility-Administered Medications   Medication Dose Route Frequency Provider Last Rate Last Admin    ketorolac (TORADOL) injection 30 mg  30 mg IntraMUSCular NOW Danitza Santa MD         Current Outpatient Medications   Medication Sig Dispense Refill    baclofen (LIORESAL) 10 mg tablet Take 0.5-1 Tablets by mouth three (3) times daily as needed for Pain. Indications: for acute/episodic pain 60 Tablet 0    metoprolol succinate (TOPROL-XL) 25 mg XL tablet Take 1 Tablet by mouth daily. 10 Tablet 1    OMALIZUMAB  mg by SubCUTAneous route Once every 2 weeks. cholecalciferol (VITAMIN D3) (5000 Units/125 mcg) tab tablet Take 5,000 Units by mouth daily. budesonide-formoteroL (SYMBICORT) 160-4.5 mcg/actuation HFAA Take 2 Puffs by inhalation two (2) times a day. Rinse and gargle thoroughly after each use.   Disp 3 inhalers 3 Each 3    hydroCHLOROthiazide (HYDRODIURIL) 12.5 mg tablet Take 12.5 mg by mouth daily. tiotropium bromide (Spiriva Respimat) 1.25 mcg/actuation inhaler Take 2 Puffs by inhalation daily. 12 g 3    albuterol 2.5 mg /3 mL (0.083 %) nebu 3 mL, albuterol-ipratropium 2.5 mg-0.5 mg/3 ml nebu 3 mL 1 Dose by Nebulization route every four (4) hours as needed for Wheezing or Shortness of Breath. simvastatin (ZOCOR) 40 mg tablet Take 40 mg by mouth nightly. aspirin delayed-release 81 mg tablet Take 81 mg by mouth daily. montelukast (SINGULAIR) 10 mg tablet Take 10 mg by mouth daily. methimazole (TAPAZOLE) 10 mg tablet Take 5 mg by mouth daily. albuterol (PROVENTIL HFA, VENTOLIN HFA, PROAIR HFA) 90 mcg/actuation inhaler Take 2 Puffs by inhalation every four (4) hours as needed for Wheezing. omeprazole (PRILOSEC) 20 mg capsule Take 20 mg by mouth daily.          Past History     Past Medical History:  Past Medical History:   Diagnosis Date    Alopecia     Arthritis     Asthma     Chronic lung disease     GERD (gastroesophageal reflux disease)     Hypercholesteremia     Hypertension     Hyperthyroidism     Thyroid disease        Past Surgical History:  Past Surgical History:   Procedure Laterality Date    COLONOSCOPY N/A 2/21/2018    COLONOSCOPY/polypectomy/polyloop/ink tatoo  performed by Livan Cheng MD at [facility] ENDOSCOPY    COLONOSCOPY N/A 2/24/2021    COLONOSCOPY with Polypectomies performed by Aj Moore MD at [facility] ENDOSCOPY    HX BUNIONECTOMY      bilateral and \"removed some arthritis in feet\"    HX CATARACT REMOVAL      with implants    HX COLONOSCOPY  08/2015    HX HAMMER TOE REPAIR      right     HX HEMORRHOIDECTOMY      HX HYSTERECTOMY N/A     HX NEPHRECTOMY Left 2019    partial    HX ORTHOPAEDIC      HX OTHER SURGICAL      keiloid removed off chest near shoulder area    HX TOTAL COLECTOMY  10/06/2015    lap right hemicolectomy due to polyp-nonmalignant    [facility] Right 2013    benign Family History:  Family History   Problem Relation Age of Onset    Hypertension Mother     Stroke Mother 80    Cancer Father     Heart Disease Father     Hypertension Father     Asthma Sister     Breast Problems Sister     Cancer Sister     Cancer Brother         lung cancer    Asthma Brother     Breast Cancer Maternal Grandmother 78    Cancer Brother     Breast Cancer Maternal Grandfather     Diabetes Paternal Uncle     Heart Attack Neg Hx        Social History:  Social History     Tobacco Use    Smoking status: Former     Packs/day: 0.50     Years: 5.00     Pack years: 2.50     Types: Cigarettes     Start date: 3/13/1972     Quit date: 3/13/1977     Years since quittin.5    Smokeless tobacco: Never    Tobacco comments:     quit smoking in    Vaping Use    Vaping Use: Never used   Substance Use Topics    Alcohol use: Not Currently     Alcohol/week: 0.0 standard drinks     Comment: Once a year    Drug use: Never       Allergies:   Allergies   Allergen Reactions    Latex Swelling and Contact Dermatitis    Latex, Natural Rubber Itching    Vicodin [Hydrocodone-Acetaminophen] Itching         Review of Systems       Review of Systems  Constitutional: No fevers  Eyes: No discharge  ENT: No dental pain  Cardiovascular: No chest pain  Respiratory: No shortness of breath  Gastrointestinal: (+) nausea, (+) vomiting  Musculoskeletal: No joint pain  Skin: No rash  Neurologic: No headache  Genitourinary: No dysuria    Physical Exam   Visit Vitals  BP (!) 202/72   Pulse 70   Temp 97.4 °F (36.3 °C)   Resp 16   Ht 5' 1\" (1.549 m)   Wt 99.3 kg (219 lb)   SpO2 100%   BMI 41.38 kg/m²         Physical Exam  Vital Signs: Reviewed  Constitutional: uncomfortable  Head: Normocephalic, atraumatic  Eyes: No scleral injection, no scleral icterus, no conjunctival erythema  ENT: Oropharynx clear, MMM  Neck: Supple, trachea midline  Cardiovascular: RRR, no m/r/g  Pulmonary: No evidence of labored breathing, clear to auscultation bilaterally  Abdominal: Soft, minimal discomfort with palpation in epigastric area, nondistended, no cva tenderness  Extremities: Warm, well perfused, no edema  Neurological: AAO x 3, nonfocal  Skin: Warm, dry, no rash  Psych: No suicidal ideation    Diagnostic Study Results     Labs -  Recent Results (from the past 12 hour(s))   URINALYSIS W/ RFLX MICROSCOPIC    Collection Time: 09/18/22  2:39 AM   Result Value Ref Range    Color YELLOW      Appearance CLEAR      Specific gravity 1.015 1.005 - 1.030      pH (UA) 7.5 5.0 - 8.0      Protein Negative NEG mg/dL    Glucose Negative NEG mg/dL    Ketone Negative NEG mg/dL    Bilirubin Negative NEG      Blood Negative NEG      Urobilinogen 1.0 0.2 - 1.0 EU/dL    Nitrites Negative NEG      Leukocyte Esterase Negative NEG     CBC WITH AUTOMATED DIFF    Collection Time: 09/18/22  2:50 AM   Result Value Ref Range    WBC 11.3 4.6 - 13.2 K/uL    RBC 5.16 4.20 - 5.30 M/uL    HGB 9.7 (L) 12.0 - 16.0 g/dL    HCT 34.6 (L) 35.0 - 45.0 %    MCV 67.1 (L) 78.0 - 100.0 FL    MCH 18.8 (L) 24.0 - 34.0 PG    MCHC 28.0 (L) 31.0 - 37.0 g/dL    RDW 20.3 (H) 11.6 - 14.5 %    PLATELET 472 933 - 853 K/uL    MPV 10.3 9.2 - 11.8 FL    NRBC 0.0 0  WBC    ABSOLUTE NRBC 0.00 0.00 - 0.01 K/uL    NEUTROPHILS 82 (H) 40 - 73 %    LYMPHOCYTES 14 (L) 21 - 52 %    MONOCYTES 3 3 - 10 %    EOSINOPHILS 0 0 - 5 %    BASOPHILS 0 0 - 2 %    IMMATURE GRANULOCYTES 1 (H) 0.0 - 0.5 %    ABS. NEUTROPHILS 9.3 (H) 1.8 - 8.0 K/UL    ABS. LYMPHOCYTES 1.6 0.9 - 3.6 K/UL    ABS. MONOCYTES 0.3 0.05 - 1.2 K/UL    ABS. EOSINOPHILS 0.0 0.0 - 0.4 K/UL    ABS. BASOPHILS 0.0 0.0 - 0.1 K/UL    ABS. IMM.  GRANS. 0.1 (H) 0.00 - 0.04 K/UL    DF SMEAR SCANNED      PLATELET COMMENTS ADEQUATE PLATELETS      RBC COMMENTS ANISOCYTOSIS  3+        RBC COMMENTS OVALOCYTES  1+        RBC COMMENTS POLYCHROMASIA  3+        RBC COMMENTS HYPOCHROMIA  2+        RBC COMMENTS TARGET CELLS  FEW       METABOLIC PANEL, BASIC    Collection Time: 09/18/22  2:50 AM   Result Value Ref Range    Sodium 143 136 - 145 mmol/L    Potassium 3.8 3.5 - 5.5 mmol/L    Chloride 110 100 - 111 mmol/L    CO2 26 21 - 32 mmol/L    Anion gap 7 3.0 - 18 mmol/L    Glucose 147 (H) 74 - 99 mg/dL    BUN 14 7.0 - 18 MG/DL    Creatinine 1.14 0.6 - 1.3 MG/DL    BUN/Creatinine ratio 12 12 - 20      GFR est AA 57 (L) >60 ml/min/1.73m2    GFR est non-AA 47 (L) >60 ml/min/1.73m2    Calcium 9.8 8.5 - 10.1 MG/DL   TROPONIN-HIGH SENSITIVITY    Collection Time: 09/18/22  2:50 AM   Result Value Ref Range    Troponin-High Sensitivity 8 0 - 54 ng/L   HEPATIC FUNCTION PANEL    Collection Time: 09/18/22  2:50 AM   Result Value Ref Range    Protein, total 7.7 6.4 - 8.2 g/dL    Albumin 3.9 3.4 - 5.0 g/dL    Globulin 3.8 2.0 - 4.0 g/dL    A-G Ratio 1.0 0.8 - 1.7      Bilirubin, total 0.3 0.2 - 1.0 MG/DL    Bilirubin, direct <0.1 0.0 - 0.2 MG/DL    Alk. phosphatase 94 45 - 117 U/L    AST (SGOT) 19 10 - 38 U/L    ALT (SGPT) 19 13 - 56 U/L   LIPASE    Collection Time: 09/18/22  2:50 AM   Result Value Ref Range    Lipase 84 73 - 393 U/L   LACTIC ACID    Collection Time: 09/18/22  2:50 AM   Result Value Ref Range    Lactic acid 3.2 (HH) 0.4 - 2.0 MMOL/L   LACTIC ACID    Collection Time: 09/18/22  5:00 AM   Result Value Ref Range    Lactic acid 2.0 0.4 - 2.0 MMOL/L       Radiologic Studies -   CT ABD PELV WO CONT   Final Result   1. Cholelithiasis with no findings of acute cholecystitis. 2. Small hiatal hernia. 3. Diverticulosis in descending sigmoid colon with no focal diverticulitis. No   bowel obstruction. 4. Large ventral hernia containing fat only, stable. 5. Degenerative disc and joint disease in lumbar spine      US ABD LTD    (Results Pending)         Medical Decision Making   I am the first provider for this patient. I reviewed the vital signs, available nursing notes, past medical history, past surgical history, family history and social history.     Vital Signs-Reviewed the patient's vital signs. EKG: Interpreted by the EP. EKG (9/18/22, 03:18): Rate: 62, Rhythm: Sinus arrhythmia, Axis: Normal, Intervals: , , QTc 445, Interpretation: No acute ischemic changes, similar to prior, Old: 12/18/19    Records Reviewed: Old Medical Records (Time of Review: 2:32 AM)    ED Course: Progress Notes, Reevaluation, and Consults:  04:35 Patient updated on results. Does report significant improvement in symptoms. 05:47 Pain starting to return. Repeat abdominal exam still with no reproducible tenderness on abdominal exam.  She is pointing at her right lower back again as the initial starting area of the pain before radiation. Toradol ordered as well as an ultrasound of the right upper quadrant. Provider Notes (Medical Decision Making): Patient presenting with right-sided back pain rating to the epigastric area. Abdominal exam itself was quite benign. Lab work did show an elevated lactate of 3.2. This did improve to 2.0 on repeat. Patient initially was having improvement of her symptoms. On reassessment pain was starting to return. Ultrasound of the right upper quadrant was ordered. Signed out to oncoming physician pending this result. Diagnosis     Clinical Impression:   1. Abdominal pain, epigastric    2. Nausea and vomiting, unspecified vomiting type        Disposition: TBD    Follow-up Information    None          Patient's Medications   Start Taking    No medications on file   Continue Taking    ALBUTEROL (PROVENTIL HFA, VENTOLIN HFA, PROAIR HFA) 90 MCG/ACTUATION INHALER    Take 2 Puffs by inhalation every four (4) hours as needed for Wheezing. ALBUTEROL 2.5 MG /3 ML (0.083 %) NEBU 3 ML, ALBUTEROL-IPRATROPIUM 2.5 MG-0.5 MG/3 ML NEBU 3 ML    1 Dose by Nebulization route every four (4) hours as needed for Wheezing or Shortness of Breath. ASPIRIN DELAYED-RELEASE 81 MG TABLET    Take 81 mg by mouth daily.     BACLOFEN (LIORESAL) 10 MG TABLET    Take 0.5-1 Tablets by mouth three (3) times daily as needed for Pain. Indications: for acute/episodic pain    BUDESONIDE-FORMOTEROL (SYMBICORT) 160-4.5 MCG/ACTUATION HFAA    Take 2 Puffs by inhalation two (2) times a day. Rinse and gargle thoroughly after each use. Disp 3 inhalers    CHOLECALCIFEROL (VITAMIN D3) (5000 UNITS/125 MCG) TAB TABLET    Take 5,000 Units by mouth daily. HYDROCHLOROTHIAZIDE (HYDRODIURIL) 12.5 MG TABLET    Take 12.5 mg by mouth daily. METHIMAZOLE (TAPAZOLE) 10 MG TABLET    Take 5 mg by mouth daily. METOPROLOL SUCCINATE (TOPROL-XL) 25 MG XL TABLET    Take 1 Tablet by mouth daily. MONTELUKAST (SINGULAIR) 10 MG TABLET    Take 10 mg by mouth daily. OMALIZUMAB SC    375 mg by SubCUTAneous route Once every 2 weeks. OMEPRAZOLE (PRILOSEC) 20 MG CAPSULE    Take 20 mg by mouth daily. SIMVASTATIN (ZOCOR) 40 MG TABLET    Take 40 mg by mouth nightly. TIOTROPIUM BROMIDE (SPIRIVA RESPIMAT) 1.25 MCG/ACTUATION INHALER    Take 2 Puffs by inhalation daily. These Medications have changed    No medications on file   Stop Taking    No medications on file       Dictation disclaimer:  Please note that this dictation was completed with [employer], the [employer] voice recognition software. Quite often unanticipated grammatical, syntax, homophones, and other interpretive errors are inadvertently transcribed by the computer software. Please disregard these errors. Please excuse any errors that have escaped final proofreading.

## 2022-09-18 NOTE — Clinical Note
[address] [place] [address] [facility] EMERGENCY DEPT  [id number] [address] [phone]    Work/School Note    Date: 9/18/2022    To Whom It May concern:    Melvi Kaplan was seen and treated today in the emergency room by the following provider(s):  Attending Provider: Lawrence Prabhakar MD.      Melvi Kaplan is excused from work/school on 09/18/22 and 09/19/22. She is medically clear to return to work/school on 9/20/2022.        Sincerely,          Kristi Flores MD

## 2022-09-18 NOTE — ED PROVIDER NOTES
RecordEmergency Department Encounter  Location: [facility] EMERGENCY DEPT    Patient: Lexie Belcher  MRN: [id number]  : 1950  Date of evaluation: 2022  ED Provider: [unfilled]    06:00AM  Lexie Belcher was checked out to me by Dr. Cobian. Please see his/her initial documentation for details of the patient's initial ED presentation, physical exam and completed studies. From HPI:  In brief, Lexie Belcher is a 67 y.o. female that presented to the emergency department for evaluation of right upper quadrant abdominal pain. Physical Exam:  General: Well-appearing well-nourished in no acute distress  HEENT: Pupils equal round reactive light, moist mucous membranes.   Extraocular movements intact  Neck: Supple, no meningismus  Chest: Regular rate rhythm no murmurs rubs or gallops  Abdomen: Soft nontender nondistended no tenderness in right upper quadrant no pulsatile masses to deep palpation  Lungs: Clear to auscultation bilaterally no wheeze rales rhonchi or stridor  Extremities: No unilateral leg swelling no obvious deformities or dislocations  Integument: No apparent rashes erythema contusions or petechiae  Neurologic: Cranial nerves II through XII grossly intact  Psychiatric: Alert oriented x3    I have reviewed and interpreted all of the currently available lab results and diagnostics from this visit:  Results for orders placed or performed during the hospital encounter of 22   CBC WITH AUTOMATED DIFF   Result Value Ref Range    WBC 11.3 4.6 - 13.2 K/uL    RBC 5.16 4.20 - 5.30 M/uL    HGB 9.7 (L) 12.0 - 16.0 g/dL    HCT 34.6 (L) 35.0 - 45.0 %    MCV 67.1 (L) 78.0 - 100.0 FL    MCH 18.8 (L) 24.0 - 34.0 PG    MCHC 28.0 (L) 31.0 - 37.0 g/dL    RDW 20.3 (H) 11.6 - 14.5 %    PLATELET 766 844 - 131 K/uL    MPV 10.3 9.2 - 11.8 FL    NRBC 0.0 0  WBC    ABSOLUTE NRBC 0.00 0.00 - 0.01 K/uL    NEUTROPHILS 82 (H) 40 - 73 %    LYMPHOCYTES 14 (L) 21 - 52 %    MONOCYTES 3 3 - 10 %    EOSINOPHILS 0 0 - 5 %    BASOPHILS 0 0 - 2 %    IMMATURE GRANULOCYTES 1 (H) 0.0 - 0.5 %    ABS. NEUTROPHILS 9.3 (H) 1.8 - 8.0 K/UL    ABS. LYMPHOCYTES 1.6 0.9 - 3.6 K/UL    ABS. MONOCYTES 0.3 0.05 - 1.2 K/UL    ABS. EOSINOPHILS 0.0 0.0 - 0.4 K/UL    ABS. BASOPHILS 0.0 0.0 - 0.1 K/UL    ABS. IMM. GRANS. 0.1 (H) 0.00 - 0.04 K/UL    DF SMEAR SCANNED      PLATELET COMMENTS ADEQUATE PLATELETS      RBC COMMENTS ANISOCYTOSIS  3+        RBC COMMENTS OVALOCYTES  1+        RBC COMMENTS POLYCHROMASIA  3+        RBC COMMENTS HYPOCHROMIA  2+        RBC COMMENTS TARGET CELLS  FEW       METABOLIC PANEL, BASIC   Result Value Ref Range    Sodium 143 136 - 145 mmol/L    Potassium 3.8 3.5 - 5.5 mmol/L    Chloride 110 100 - 111 mmol/L    CO2 26 21 - 32 mmol/L    Anion gap 7 3.0 - 18 mmol/L    Glucose 147 (H) 74 - 99 mg/dL    BUN 14 7.0 - 18 MG/DL    Creatinine 1.14 0.6 - 1.3 MG/DL    BUN/Creatinine ratio 12 12 - 20      GFR est AA 57 (L) >60 ml/min/1.73m2    GFR est non-AA 47 (L) >60 ml/min/1.73m2    Calcium 9.8 8.5 - 10.1 MG/DL   TROPONIN-HIGH SENSITIVITY   Result Value Ref Range    Troponin-High Sensitivity 8 0 - 54 ng/L   HEPATIC FUNCTION PANEL   Result Value Ref Range    Protein, total 7.7 6.4 - 8.2 g/dL    Albumin 3.9 3.4 - 5.0 g/dL    Globulin 3.8 2.0 - 4.0 g/dL    A-G Ratio 1.0 0.8 - 1.7      Bilirubin, total 0.3 0.2 - 1.0 MG/DL    Bilirubin, direct <0.1 0.0 - 0.2 MG/DL    Alk.  phosphatase 94 45 - 117 U/L    AST (SGOT) 19 10 - 38 U/L    ALT (SGPT) 19 13 - 56 U/L   LIPASE   Result Value Ref Range    Lipase 84 73 - 393 U/L   LACTIC ACID   Result Value Ref Range    Lactic acid 3.2 (HH) 0.4 - 2.0 MMOL/L   URINALYSIS W/ RFLX MICROSCOPIC   Result Value Ref Range    Color YELLOW      Appearance CLEAR      Specific gravity 1.015 1.005 - 1.030      pH (UA) 7.5 5.0 - 8.0      Protein Negative NEG mg/dL    Glucose Negative NEG mg/dL    Ketone Negative NEG mg/dL    Bilirubin Negative NEG      Blood Negative NEG      Urobilinogen 1.0 0.2 - 1.0 EU/dL    Nitrites Negative NEG Leukocyte Esterase Negative NEG     LACTIC ACID   Result Value Ref Range    Lactic acid 2.0 0.4 - 2.0 MMOL/L   EKG, 12 LEAD, INITIAL   Result Value Ref Range    Ventricular Rate 62 BPM    Atrial Rate 62 BPM    P-R Interval 144 ms    QRS Duration 102 ms    Q-T Interval 440 ms    QTC Calculation (Bezet) 446 ms    Calculated P Axis 56 degrees    Calculated R Axis 8 degrees    Calculated T Axis 9 degrees    Diagnosis       Sinus rhythm with marked sinus arrhythmia  Minimal voltage criteria for LVH, may be normal variant  Nonspecific ST and T wave abnormality  Abnormal ECG  When compared with ECG of 14-MAR-2022 09:36,  Non-specific change in ST segment in Anterior leads  T wave inversion no longer evident in Inferior leads  T wave inversion no longer evident in Lateral leads       Radiology Results CT ABD PELV WO CONT    Result Date: 9/18/2022  1. Cholelithiasis with no findings of acute cholecystitis. 2. Small hiatal hernia. 3. Diverticulosis in descending sigmoid colon with no focal diverticulitis. No bowel obstruction. 4. Large ventral hernia containing fat only, stable. 5. 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 Cholelithiasis without evidence of cholecystitis. 2.  Fatty infiltration of the liver. Final ED Course and MDM:  Supriya Molina is a 67 y.o. female whose care was signed out to me by the outgoing provider. In brief, this is a 60-year-old female brought to the emergency department for evaluation of right upper quadrant abdominal pain and pain in her back. Based on patient's history and physical would be concern for possible biliary colic. The possibility patient symptoms to include urinary tract infections or pancreatitis. I did review patient's imaging studies and laboratory work as noted above. On reevaluation patient was resting more comfortably at this time.   She says she still has intermittent discomfort in her abdomen but is no longer radiates and has no other further symptoms. Advised we do not have exact cause of her symptoms at this time but it would be concern about biliary colic and gallstones. I recommended a clear liquid diet for the next 24 hours followed by low-fat low-cholesterol diet. Return precautions were discussed with her including but not limited to increasing pain, fevers, inability tolerating by mouth, any other concerning symptoms. Final Impression  1. Abdominal pain, epigastric    2.  Nausea and vomiting, unspecified vomiting type        Discharged  (Please note that portions of this note may have been completed with a voice recognition program. Efforts were made to edit the dictations but occasionally words are mis-transcribed.)    Summer De La Cruz MD  [address] [place] [address]

## 2022-09-18 NOTE — ED TRIAGE NOTES
Patient comes in stating that she had a block done on Tuesday to her back. Patient has pain to her back and her abdomen. Patient states that she has been vomiting.

## 2022-09-18 NOTE — PROGRESS NOTES
Pharmacy Note - Therapeutic Interchange    30 mg Ketorolac has been therapeutically interchanged to 15 mg per [employer] Maximum Ketorolac Dose     CHINO RamiresD, Pharmacist  9/18/2022 5:56 AM

## 2022-09-18 NOTE — DISCHARGE INSTRUCTIONS
Clear liquid diet for next 24 hours followed by low-fat low-cholesterol diet. Please follow-up with primary care physician or referral physician in the next 24 hours call to schedule appointment. Return to the emergency department for increased pain, fevers, inability to tolerate intake by mouth, any other concerning symptoms.
No

## 2022-09-19 ENCOUNTER — TELEPHONE (OUTPATIENT)
Dept: SURGERY | Age: 72
End: 2022-09-19

## 2022-09-19 NOTE — TELEPHONE ENCOUNTER
Pt called stating she was in the ER yesterday with gallbladder issues and they told her to contact her DrChacha As soon as possible. I read her note and informed her she needed to contact her PCP per ER note at this time. Pt verbalized understanding.

## 2022-09-23 ENCOUNTER — HOSPITAL ENCOUNTER (OUTPATIENT)
Dept: INFUSION THERAPY | Age: 72
End: 2022-09-23
Payer: MEDICARE

## 2022-09-28 ENCOUNTER — HOSPITAL ENCOUNTER (OUTPATIENT)
Dept: INFUSION THERAPY | Age: 72
Discharge: HOME | End: 2022-09-28
Payer: MEDICARE

## 2022-09-28 VITALS
TEMPERATURE: 98.4 F | SYSTOLIC BLOOD PRESSURE: 136 MMHG | OXYGEN SATURATION: 99 % | HEART RATE: 85 BPM | DIASTOLIC BLOOD PRESSURE: 74 MMHG | RESPIRATION RATE: 18 BRPM

## 2022-09-28 DIAGNOSIS — J45.30 MILD PERSISTENT ASTHMA, UNSPECIFIED WHETHER COMPLICATED: Primary | ICD-10-CM

## 2022-09-28 PROCEDURE — 74011250636 HC RX REV CODE- 250/636: Performed by: INTERNAL MEDICINE

## 2022-09-28 PROCEDURE — 96372 THER/PROPH/DIAG INJ SC/IM: CPT

## 2022-09-28 RX ORDER — EPINEPHRINE 1 MG/ML
0.3 INJECTION, SOLUTION, CONCENTRATE INTRAVENOUS AS NEEDED
Status: CANCELLED | OUTPATIENT
Start: 2022-10-12

## 2022-09-28 RX ORDER — HYDROCORTISONE SODIUM SUCCINATE 100 MG/2ML
100 INJECTION, POWDER, FOR SOLUTION INTRAMUSCULAR; INTRAVENOUS AS NEEDED
Status: CANCELLED | OUTPATIENT
Start: 2022-10-12

## 2022-09-28 RX ORDER — DIPHENHYDRAMINE HYDROCHLORIDE 50 MG/ML
50 INJECTION, SOLUTION INTRAMUSCULAR; INTRAVENOUS AS NEEDED
Status: CANCELLED
Start: 2022-10-12

## 2022-09-28 RX ORDER — ALBUTEROL SULFATE 0.83 MG/ML
2.5 SOLUTION RESPIRATORY (INHALATION) AS NEEDED
Status: CANCELLED
Start: 2022-10-12

## 2022-09-28 RX ORDER — DIPHENHYDRAMINE HYDROCHLORIDE 50 MG/ML
25 INJECTION, SOLUTION INTRAMUSCULAR; INTRAVENOUS AS NEEDED
Status: CANCELLED
Start: 2022-10-12

## 2022-09-28 RX ORDER — ACETAMINOPHEN 325 MG/1
650 TABLET ORAL AS NEEDED
Status: CANCELLED
Start: 2022-10-12

## 2022-09-28 RX ORDER — ONDANSETRON 2 MG/ML
8 INJECTION INTRAMUSCULAR; INTRAVENOUS AS NEEDED
Status: CANCELLED | OUTPATIENT
Start: 2022-10-12

## 2022-09-28 RX ADMIN — OMALIZUMAB 150 MG: 150 INJECTION, SOLUTION SUBCUTANEOUS at 09:53

## 2022-09-28 RX ADMIN — OMALIZUMAB 75 MG: 75 INJECTION, SOLUTION SUBCUTANEOUS at 09:53

## 2022-09-28 NOTE — PROGRESS NOTES
[facility] Progress Note    Date: 2022    Name: Jeffry Martinez    MRN: [id number]         : 1950       Xolair injection (Q2 weeks)    Ms. Gonzalo Potts arrived to [facility] ambulatory at [address] Ms. Gonzalo Potts was assessed and education was provided. Pt states she is tolerating injections without any side effects. Ms. Byron Biswas vitals were reviewed. Visit Vitals  /74 (BP 1 Location: Left upper arm, BP Patient Position: Sitting)   Pulse 85   Temp 98.4 °F (36.9 °C)   Resp 18   SpO2 99%       Omalizumab (Xolair) 375 mg was administered as 3 divided doses as ordered SQ:  150mg to RIGHT upper arm, 150mg to LEFT upper arm, and 75mg to LEFT lower abdomen per order. Ms. Gonzalo Potts tolerated well without complaints. Discharge/ follow-up instructions discussed w/ pt, including instructions to go to ED if pt starts to experience symptoms such as sob, chest pain, severe itching. Pt verbalized understanding. Pt armband removed & shredded. Ms. Gonzalo Potts was discharged from [place] [address] in stable condition at 1000. She is to return on 10/13/22 at 1000 for her next Xolair injection appointment.     Katelynn De La Torre RN  2022

## 2022-10-06 ENCOUNTER — APPOINTMENT (OUTPATIENT)
Dept: INFUSION THERAPY | Age: 72
End: 2022-10-06
Payer: MEDICARE

## 2022-10-13 ENCOUNTER — HOSPITAL ENCOUNTER (OUTPATIENT)
Dept: INFUSION THERAPY | Age: 72
Discharge: HOME | End: 2022-10-13
Payer: MEDICARE

## 2022-10-13 VITALS
HEART RATE: 72 BPM | OXYGEN SATURATION: 97 % | SYSTOLIC BLOOD PRESSURE: 155 MMHG | DIASTOLIC BLOOD PRESSURE: 72 MMHG | RESPIRATION RATE: 18 BRPM | TEMPERATURE: 98 F

## 2022-10-13 DIAGNOSIS — J45.30 MILD PERSISTENT ASTHMA, UNSPECIFIED WHETHER COMPLICATED: Primary | ICD-10-CM

## 2022-10-13 PROCEDURE — 96372 THER/PROPH/DIAG INJ SC/IM: CPT

## 2022-10-13 PROCEDURE — 74011250636 HC RX REV CODE- 250/636: Performed by: INTERNAL MEDICINE

## 2022-10-13 RX ORDER — ONDANSETRON 2 MG/ML
8 INJECTION INTRAMUSCULAR; INTRAVENOUS AS NEEDED
OUTPATIENT
Start: 2022-10-26

## 2022-10-13 RX ORDER — DIPHENHYDRAMINE HYDROCHLORIDE 50 MG/ML
25 INJECTION, SOLUTION INTRAMUSCULAR; INTRAVENOUS AS NEEDED
Start: 2022-10-26

## 2022-10-13 RX ORDER — ACETAMINOPHEN 325 MG/1
650 TABLET ORAL AS NEEDED
Start: 2022-10-26

## 2022-10-13 RX ORDER — ALBUTEROL SULFATE 0.83 MG/ML
2.5 SOLUTION RESPIRATORY (INHALATION) AS NEEDED
Start: 2022-10-26

## 2022-10-13 RX ORDER — HYDROCORTISONE SODIUM SUCCINATE 100 MG/2ML
100 INJECTION, POWDER, FOR SOLUTION INTRAMUSCULAR; INTRAVENOUS AS NEEDED
OUTPATIENT
Start: 2022-10-26

## 2022-10-13 RX ORDER — DIPHENHYDRAMINE HYDROCHLORIDE 50 MG/ML
50 INJECTION, SOLUTION INTRAMUSCULAR; INTRAVENOUS AS NEEDED
Start: 2022-10-26

## 2022-10-13 RX ORDER — EPINEPHRINE 1 MG/ML
0.3 INJECTION, SOLUTION, CONCENTRATE INTRAVENOUS AS NEEDED
OUTPATIENT
Start: 2022-10-26

## 2022-10-13 RX ADMIN — OMALIZUMAB 150 MG: 150 INJECTION, SOLUTION SUBCUTANEOUS at 10:03

## 2022-10-13 RX ADMIN — OMALIZUMAB 75 MG: 75 INJECTION, SOLUTION SUBCUTANEOUS at 10:03

## 2022-10-13 NOTE — PROGRESS NOTES
[facility] Progress Note    Date: 2022    Name: Grant Logan    MRN: [id number]         : 1950       Xolair injection (Q2 weeks)    Ms. Laila Phillips arrived to [facility] ambulatory at [id number]. Ms. Laila Phillips was assessed and education was provided. Pt states she is tolerating injections without any side effects. Ms. Narayan Memory vitals were reviewed. Visit Vitals  BP (!) 155/72 (BP 1 Location: Left upper arm, BP Patient Position: Sitting)   Pulse 72   Temp 98 °F (36.7 °C)   Resp 18   SpO2 97%   Breastfeeding No       Omalizumab (Xolair) 375 mg was administered as 3 divided doses as ordered SQ:  150mg to RIGHT upper arm, 150mg to LEFT upper arm, and 75mg to RIGHT lower abdomen per order. Ms. Laila Phillips tolerated well without complaints. Discharge/ follow-up instructions discussed w/ pt, including instructions to go to ED if pt starts to experience symptoms such as sob, chest pain, severe itching. Pt verbalized understanding. Pt armband removed & shredded. Ms. Laila Phillips was discharged from [place] [address] in stable condition at 1010. She is to return on 10/27/22 at 0900 for her next Xolair injection appointment.       Geovanny Tracy  2022

## 2022-10-19 ENCOUNTER — TELEPHONE (OUTPATIENT)
Dept: ORTHOPEDIC SURGERY | Age: 72
End: 2022-10-19

## 2022-10-19 PROBLEM — M48.02 FORAMINAL STENOSIS OF CERVICAL REGION: Status: ACTIVE | Noted: 2022-10-19

## 2022-10-19 PROBLEM — M17.11 PRIMARY OSTEOARTHRITIS OF RIGHT KNEE: Status: ACTIVE | Noted: 2022-10-19

## 2022-10-19 PROBLEM — R63.5 WEIGHT GAIN: Status: ACTIVE | Noted: 2022-10-19

## 2022-10-19 PROBLEM — M25.512 LEFT SHOULDER PAIN: Status: ACTIVE | Noted: 2022-10-19

## 2022-10-19 RX ORDER — ACETAMINOPHEN 500 MG
2 TABLET ORAL
COMMUNITY
Start: 2022-07-20

## 2022-10-19 RX ORDER — IPRATROPIUM BROMIDE AND ALBUTEROL SULFATE 2.5; .5 MG/3ML; MG/3ML
3 SOLUTION RESPIRATORY (INHALATION)
COMMUNITY
Start: 2021-10-18 | End: 2022-10-26

## 2022-10-19 NOTE — TELEPHONE ENCOUNTER
Good Day Dr Jeanie Scott,                                        I was going through  and checking up on the patients that I scheduled for procedures and I noticed that in your order from 9/7  for this patient, that you ordered Imtiaz RFA Procedures,but the patient only had the MBB's on the Right . Annita Kay will not cover the MBB's if an diagnostic MBB has not been done on the left ( she had her Right RFA on 9/13 and her follow up with you is on 10/21) Was this an error?

## 2022-10-19 NOTE — TELEPHONE ENCOUNTER
Thank you for picking up on this. She initially had only right sided pain, hence MBBs done on right only. Then she started reporting BL pain at last appt. I will see her in fu for her right RFA. I agree that if she now wants her left facets done, we may have to repeat MBBs on left.

## 2022-10-20 NOTE — PROGRESS NOTES
CC:   Chief Complaint   Patient presents with    Follow-up     Cholelithiasis without cholecystis        Assessment:    ICD-10-CM ICD-9-CM    1. Gallstones  K80.20 574.20       2. Morbid obesity with BMI of 40.0-44.9, adult ([facility])  E66.01 278.01     Z68.41 V85.41           Plan: I personally reviewed her right upper quadrant ultrasound demonstrating stones but no evidence of acute cholecystitis. Patient had only had 1 episode of abdominal pain that did promptly resolved in the emergency room. She has had no symptoms from her gallstones prior to this or since that emergency room visit. We discussed the signs and symptoms of acute cholecystitis to monitor for and that I would recommend not undergoing surgery at this time as this was only one episode and she does not appear to be having any recurring symptoms. Should anything change in the future I would recommend laparoscopic cholecystectomy. The risks and benefits of that procedure were reviewed with the patient. She feels comfortable with this plan. She knows after hours to report to the emergency room should any abdominal pain persist longer than 1 hour. HPI:  Vanessa Snow is a 67 y.o. female who is here today for evaluation of gallstones. She presented on 9/18/2022 to the ER with history of hypertension, dyslipidemia, presenting with back and abdominal pain. Patient states that she had taken a nap and woke up around 11 PM.  Started having sharp pain in her middle lower back that radiated to the right side and then came around to her abdomen more to her epigastric area. States that the pain has been constant since then. She states pain went away completely after a dose of pain medication. She has not experienced any abdominal pain, bloating, discomfort, nausea, vomiting, change in bowel or bladder habits since then. She states prior to this episode she also has not had any similar problems.   She states she has known that she had gallbladder stones for many years and that in the past a nurse practitioner had signed her up for surgery but when the doctor saw her and found out she had no symptoms from the gallstones they did not recommend surgery. Allergies: Allergies   Allergen Reactions    Latex Swelling and Contact Dermatitis    Latex, Natural Rubber Itching    Vicodin [Hydrocodone-Acetaminophen] Itching       Medication Review:  Current Outpatient Medications on File Prior to Visit   Medication Sig Dispense Refill    ondansetron hcl (Zofran) 4 mg tablet Take 1 Tablet by mouth every eight (8) hours as needed for Nausea or Vomiting. 20 Tablet 0    baclofen (LIORESAL) 10 mg tablet Take 0.5-1 Tablets by mouth three (3) times daily as needed for Pain. Indications: for acute/episodic pain 60 Tablet 0    metoprolol succinate (TOPROL-XL) 25 mg XL tablet Take 1 Tablet by mouth daily. 10 Tablet 1    OMALIZUMAB  mg by SubCUTAneous route Once every 2 weeks. cholecalciferol (VITAMIN D3) (5000 Units/125 mcg) tab tablet Take 5,000 Units by mouth daily. budesonide-formoteroL (SYMBICORT) 160-4.5 mcg/actuation HFAA Take 2 Puffs by inhalation two (2) times a day. Rinse and gargle thoroughly after each use. Disp 3 inhalers 3 Each 3    hydroCHLOROthiazide (HYDRODIURIL) 12.5 mg tablet Take 12.5 mg by mouth daily. tiotropium bromide (Spiriva Respimat) 1.25 mcg/actuation inhaler Take 2 Puffs by inhalation daily. 12 g 3    albuterol 2.5 mg /3 mL (0.083 %) nebu 3 mL, albuterol-ipratropium 2.5 mg-0.5 mg/3 ml nebu 3 mL 1 Dose by Nebulization route every four (4) hours as needed for Wheezing or Shortness of Breath. simvastatin (ZOCOR) 40 mg tablet Take 40 mg by mouth nightly. aspirin delayed-release 81 mg tablet Take 81 mg by mouth daily. montelukast (SINGULAIR) 10 mg tablet Take 10 mg by mouth daily.       albuterol (PROVENTIL HFA, VENTOLIN HFA, PROAIR HFA) 90 mcg/actuation inhaler Take 2 Puffs by inhalation every four (4) hours as needed for Wheezing. omeprazole (PRILOSEC) 20 mg capsule Take 20 mg by mouth daily. methimazole (TAPAZOLE) 10 mg tablet Take 5 mg by mouth daily. (Patient not taking: No sig reported)       No current facility-administered medications on file prior to visit. Systems Review:  Review of Systems   Constitutional:  Negative for fatigue, fever and unexpected weight change. Respiratory:  Negative for chest tightness and shortness of breath. Cardiovascular:  Negative for chest pain and palpitations. Gastrointestinal:  Negative for abdominal distention, abdominal pain, anal bleeding, blood in stool, constipation, diarrhea and nausea. Skin:  Negative for pallor, rash and wound. Hematological:  Negative for adenopathy. Does not bruise/bleed easily.      PMH:  Past Medical History:   Diagnosis Date    Alopecia     Arthritis     Asthma     Chronic lung disease     GERD (gastroesophageal reflux disease)     Hypercholesteremia     Hypertension     Hyperthyroidism     Thyroid disease        Surgical History:  Past Surgical History:   Procedure Laterality Date    COLONOSCOPY N/A 2/21/2018    COLONOSCOPY/polypectomy/polyloop/ink tatoo  performed by Joaquim Hi MD at [facility] ENDOSCOPY    COLONOSCOPY N/A 2/24/2021    COLONOSCOPY with Polypectomies performed by Kimmy Painting MD at [facility] ENDOSCOPY    HX BUNIONECTOMY      bilateral and \"removed some arthritis in feet\"    HX CATARACT REMOVAL      with implants    HX COLONOSCOPY  08/2015    HX HAMMER TOE REPAIR      right     HX HEMORRHOIDECTOMY      HX HYSTERECTOMY N/A     HX NEPHRECTOMY Left 2019    partial    HX ORTHOPAEDIC      HX OTHER SURGICAL      keiloid removed off chest near shoulder area    HX TOTAL COLECTOMY  10/06/2015    lap right hemicolectomy due to [address] Right 2013    benign       Social History:  Social History     Socioeconomic History    Marital status:    Tobacco Use Smoking status: Former     Packs/day: 0.50     Years: 5.00     Pack years: 2.50     Types: Cigarettes     Start date: 3/13/1972     Quit date: 3/13/1977     Years since quittin.6    Smokeless tobacco: Never    Tobacco comments:     quit smoking in s   Vaping Use    Vaping Use: Never used   Substance and Sexual Activity    Alcohol use: Not Currently     Alcohol/week: 0.0 standard drinks     Comment: Once a year    Drug use: Never    Sexual activity: Yes     Partners: Male     Birth control/protection: None   Social History Narrative    Worked as  for [employer] until . Denies any history of asbestos and or chemical exposure.          Family History:  Family History   Problem Relation Age of Onset    Hypertension Mother     Stroke Mother 80    Cancer Father     Heart Disease Father     Hypertension Father     Asthma Sister     Breast Problems Sister     Cancer Sister     Cancer Brother         lung cancer    Asthma Brother     Breast Cancer Maternal Grandmother 78    Cancer Brother     Breast Cancer Maternal Grandfather     Diabetes Paternal Uncle     Heart Attack Neg Hx        Admission on 2022, Discharged on 2022   Component Date Value Ref Range Status    WBC 2022 11.3  4.6 - 13.2 K/uL Final    RBC 2022 5.16  4.20 - 5.30 M/uL Final    HGB 2022 9.7 (A)  12.0 - 16.0 g/dL Final    HCT 2022 34.6 (A)  35.0 - 45.0 % Final    MCV 2022 67.1 (A)  78.0 - 100.0 FL Final    MCH 2022 18.8 (A)  24.0 - 34.0 PG Final    MCHC 2022 28.0 (A)  31.0 - 37.0 g/dL Final    RDW 2022 20.3 (A)  11.6 - 14.5 % Final    PLATELET  699  135 - 420 K/uL Final    MPV 2022 10.3  9.2 - 11.8 FL Final    NRBC 2022 0.0  0  WBC Final    ABSOLUTE NRBC 2022 0.00  0.00 - 0.01 K/uL Final    NEUTROPHILS 2022 82 (A)  40 - 73 % Final    LYMPHOCYTES 2022 14 (A)  21 - 52 % Final    MONOCYTES 2022 3  3 - 10 % Final EOSINOPHILS 09/18/2022 0  0 - 5 % Final    BASOPHILS 09/18/2022 0  0 - 2 % Final    IMMATURE GRANULOCYTES 09/18/2022 1 (A)  0.0 - 0.5 % Final    ABS. NEUTROPHILS 09/18/2022 9.3 (A)  1.8 - 8.0 K/UL Final    ABS. LYMPHOCYTES 09/18/2022 1.6  0.9 - 3.6 K/UL Final    ABS. MONOCYTES 09/18/2022 0.3  0.05 - 1.2 K/UL Final    ABS. EOSINOPHILS 09/18/2022 0.0  0.0 - 0.4 K/UL Final    ABS. BASOPHILS 09/18/2022 0.0  0.0 - 0.1 K/UL Final    ABS. IMM. GRANS. 09/18/2022 0.1 (A)  0.00 - 0.04 K/UL Final    DF 09/18/2022 SMEAR SCANNED    Final    PLATELET COMMENTS 03/02/4046 ADEQUATE PLATELETS    Final    RBC COMMENTS 09/18/2022     Final                    Value:ANISOCYTOSIS  3+      RBC COMMENTS 09/18/2022     Final                    Value:OVALOCYTES  1+      RBC COMMENTS 09/18/2022     Final                    Value:POLYCHROMASIA  3+      RBC COMMENTS 09/18/2022     Final                    Value:HYPOCHROMIA  2+      RBC COMMENTS 09/18/2022     Final                    Value:TARGET CELLS  FEW      Sodium 09/18/2022 143  136 - 145 mmol/L Final    Potassium 09/18/2022 3.8  3.5 - 5.5 mmol/L Final    SLIGHTLY HEMOLYZED SPECIMEN    Chloride 09/18/2022 110  100 - 111 mmol/L Final    CO2 09/18/2022 26  21 - 32 mmol/L Final    Anion gap 09/18/2022 7  3.0 - 18 mmol/L Final    Glucose 09/18/2022 147 (A)  74 - 99 mg/dL Final    BUN 09/18/2022 14  7.0 - 18 MG/DL Final    Creatinine 09/18/2022 1.14  0.6 - 1.3 MG/DL Final    BUN/Creatinine ratio 09/18/2022 12  12 - 20   Final    GFR est AA 09/18/2022 57 (A)  >60 ml/min/1.73m2 Final    GFR est non-AA 09/18/2022 47 (A)  >60 ml/min/1.73m2 Final    Comment: (NOTE)  Estimated GFR is calculated using the Modification of Diet in Renal   Disease (MDRD) Study equation, reported for both  Americans   (GFRAA) and non- Americans (GFRNA), and normalized to 1.73m2   body surface area. The physician must decide which value applies to   the patient.  The MDRD study equation should only be used in individuals age 25 or older. It has not been validated for the   following: pregnant women, patients with serious comorbid conditions,   or on certain medications, or persons with extremes of body size,   muscle mass, or nutritional status. Calcium 09/18/2022 9.8  8.5 - 10.1 MG/DL Final    Troponin-High Sensitivity 09/18/2022 8  0 - 54 ng/L Final    Comment: A HS troponin value change of (+ or -) 50% or more below the 99th percentile, in a 1/2/3 hr interval represents a significant change. Clinical correcation is recommended. A HS troponin value change of (+ or -) 20% or above the 99th percentile, in a 1/2/3 hr interval represents a significant change. Clinical correlation is recommended. 99th Percentile:    Women:  0-54 ng/L                                                               Men:  0-78 ng/L      Protein, total 09/18/2022 7.7  6.4 - 8.2 g/dL Final    Albumin 09/18/2022 3.9  3.4 - 5.0 g/dL Final    Globulin 09/18/2022 3.8  2.0 - 4.0 g/dL Final    A-G Ratio 09/18/2022 1.0  0.8 - 1.7   Final    Bilirubin, total 09/18/2022 0.3  0.2 - 1.0 MG/DL Final    Bilirubin, direct 09/18/2022 <0.1  0.0 - 0.2 MG/DL Final    Alk.  phosphatase 09/18/2022 94  45 - 117 U/L Final    AST (SGOT) 09/18/2022 19  10 - 38 U/L Final    SLIGHTLY HEMOLYZED SPECIMEN    ALT (SGPT) 09/18/2022 19  13 - 56 U/L Final    Lipase 09/18/2022 84  73 - 393 U/L Final    Lactic acid 09/18/2022 3.2 (A)  0.4 - 2.0 MMOL/L Final    Comment: CALLED TO AND CORRECTLY REPEATED BY:  LILIANE MAKI RN IN ED [id number] TO ALEXA       Color 09/18/2022 YELLOW    Final    Appearance 09/18/2022 CLEAR    Final    Specific gravity 09/18/2022 1.015  1.005 - 1.030   Final    pH (UA) 09/18/2022 7.5  5.0 - 8.0   Final    Protein 09/18/2022 Negative  NEG mg/dL Final    Glucose 09/18/2022 Negative  NEG mg/dL Final    Ketone 09/18/2022 Negative  NEG mg/dL Final    Bilirubin 09/18/2022 Negative  NEG   Final    Blood 09/18/2022 Negative  NEG   Final    Urobilinogen 09/18/2022 1.0  0.2 - 1.0 EU/dL Final    Nitrites 09/18/2022 Negative  NEG   Final    Leukocyte Esterase 09/18/2022 Negative  NEG   Final    Ventricular Rate 09/18/2022 62  BPM Final    Atrial Rate 09/18/2022 62  BPM Final    P-R Interval 09/18/2022 144  ms Final    QRS Duration 09/18/2022 102  ms Final    Q-T Interval 09/18/2022 440  ms Final    QTC Calculation (Bezet) 09/18/2022 446  ms Final    Calculated P Axis 09/18/2022 56  degrees Final    Calculated R Axis 09/18/2022 8  degrees Final    Calculated T Axis 09/18/2022 9  degrees Final    Diagnosis 09/18/2022    Final                    Value:Sinus rhythm with marked sinus arrhythmia premature atrial complexes  Minimal voltage criteria for LVH, may be normal variant  Nonspecific ST and T wave abnormality  Abnormal ECG  When compared with ECG of 14-MAR-2022 09:36,  Non-specific change in ST segment in Anterior leads  T wave inversion no longer evident in Inferior leads  T wave inversion no longer evident in Lateral leads  Confirmed by Rayray Clifton MD, ----- ([id number]) on 9/18/2022 4:53:37 PM      Lactic acid 09/18/2022 2.0  0.4 - 2.0 MMOL/L Final       NC XR TECHNOLOGIST SERVICE  Narrative: Fluoroscopy was provided for a bundled exam for documentation purposes. Impression: Please see above. FLUORO TIME: 00.45    AJB         Physical Exam:  Visit Vitals  /78 (BP 1 Location: Left lower arm, BP Patient Position: Sitting)   Pulse 62   Temp 97.9 °F (36.6 °C) (Temporal)   Resp 18   Ht 5' 1\" (1.549 m)   Wt 97.5 kg (215 lb)   SpO2 99%   BMI 40.62 kg/m²    BMI: Body mass index is 40.62 kg/m². Physical Exam  Constitutional:       Appearance: Normal appearance. She is obese. Eyes:      Extraocular Movements: Extraocular movements intact. Conjunctiva/sclera: Conjunctivae normal.      Pupils: Pupils are equal, round, and reactive to light. Cardiovascular:      Rate and Rhythm: Normal rate.    Pulmonary:      Effort: Pulmonary effort is normal. Abdominal:      General: Abdomen is flat. Palpations: Abdomen is soft. Tenderness: There is no abdominal tenderness. Skin:     General: Skin is warm and dry. Neurological:      General: No focal deficit present. Mental Status: She is alert and oriented to person, place, and time. Mental status is at baseline. Psychiatric:         Mood and Affect: Mood normal.         Behavior: Behavior normal.         Thought Content: Thought content normal.         Judgment: Judgment normal.     Study Result    Narrative & Impression   EXAM: Ultrasound of the Right Upper Quadrant     INDICATION: right sided back to epigastric pain     TECHNIQUE: Ultrasound of the right upper quadrant performed with grayscale and  color Doppler. COMPARISON: CT of abdomen and pelvis dated 9/18/2022     FINDINGS:     IVC: Unremarkable. Liver: The echotexture is coarsened. No focal lesion appreciated. The internal  vascularity is unremarkable. The portal vein measures 0.9 cm. The portal vein  demonstrates normal color and Spectral Doppler with hepatopedal flow. Biliary: No intrahepatic or extrahepatic biliary ductal dilatation. The CBD is  0.6 cm. Gallbladder: Stones are present. No wall thickening or pericholecystic fluid  identified. Sonographic Prieto's sign is negative. Pancreas: The head and body are unremarkable. The tail is obscured by bowel gas. Right kidney: There is normal shape and contour. The echotexture is  unremarkable. No mass lesion or hydronephrosis appreciated. The internal  vascularity is unremarkable. Ascites: No ascites identified. IMPRESSION  1. Cholelithiasis without evidence of cholecystitis. 2.  Fatty infiltration of the liver. I have reviewed the information entered by the clinical staff and/or patient and verified it as accurate or edited where necessary.      Electronically signed by:    Corinne Willams DO, MPH

## 2022-10-21 ENCOUNTER — APPOINTMENT (OUTPATIENT)
Dept: INFUSION THERAPY | Age: 72
End: 2022-10-21

## 2022-10-21 ENCOUNTER — OFFICE VISIT (OUTPATIENT)
Dept: SURGERY | Age: 72
End: 2022-10-21
Payer: MEDICARE

## 2022-10-21 VITALS
WEIGHT: 215 LBS | HEART RATE: 62 BPM | BODY MASS INDEX: 40.59 KG/M2 | SYSTOLIC BLOOD PRESSURE: 136 MMHG | HEIGHT: 61 IN | DIASTOLIC BLOOD PRESSURE: 78 MMHG | RESPIRATION RATE: 18 BRPM | OXYGEN SATURATION: 99 % | TEMPERATURE: 97.9 F

## 2022-10-21 DIAGNOSIS — E66.01 MORBID OBESITY WITH BMI OF 40.0-44.9, ADULT (HCC): ICD-10-CM

## 2022-10-21 DIAGNOSIS — K80.20 GALLSTONES: Primary | ICD-10-CM

## 2022-10-21 PROCEDURE — G8427 DOCREV CUR MEDS BY ELIG CLIN: HCPCS | Performed by: SURGERY

## 2022-10-21 PROCEDURE — G8752 SYS BP LESS 140: HCPCS | Performed by: SURGERY

## 2022-10-21 PROCEDURE — G8754 DIAS BP LESS 90: HCPCS | Performed by: SURGERY

## 2022-10-21 PROCEDURE — 1090F PRES/ABSN URINE INCON ASSESS: CPT | Performed by: SURGERY

## 2022-10-21 PROCEDURE — G9899 SCRN MAM PERF RSLTS DOC: HCPCS | Performed by: SURGERY

## 2022-10-21 PROCEDURE — G9711 PT HX TOT COL OR COLON CA: HCPCS | Performed by: SURGERY

## 2022-10-21 PROCEDURE — 1123F ACP DISCUSS/DSCN MKR DOCD: CPT | Performed by: SURGERY

## 2022-10-21 PROCEDURE — 99203 OFFICE O/P NEW LOW 30 MIN: CPT | Performed by: SURGERY

## 2022-10-21 PROCEDURE — G8399 PT W/DXA RESULTS DOCUMENT: HCPCS | Performed by: SURGERY

## 2022-10-21 PROCEDURE — 1101F PT FALLS ASSESS-DOCD LE1/YR: CPT | Performed by: SURGERY

## 2022-10-21 PROCEDURE — G8510 SCR DEP NEG, NO PLAN REQD: HCPCS | Performed by: SURGERY

## 2022-10-21 PROCEDURE — G8536 NO DOC ELDER MAL SCRN: HCPCS | Performed by: SURGERY

## 2022-10-21 PROCEDURE — G8417 CALC BMI ABV UP PARAM F/U: HCPCS | Performed by: SURGERY

## 2022-10-21 NOTE — LETTER
10/21/2022    Patient: Louis Jacobsen   YOB: 1950   Date of Visit: 10/21/2022     Deon Mckenzie NP  [address] [place], [place] [address] [phone]  Via Fax: [phone]    Dear Deon Mckenzie NP,      Thank you for referring Ms. Louis Jacobsen to [address] for evaluation. My notes for this consultation are attached. If you have questions, please do not hesitate to call me. I look forward to following your patient along with you.       Sincerely,    Geovanny Bynum, [address] [facility] [address] Refill - asking for 90 day supply.    Also this pt has BP readings.    7-30 BP was 109-74.

## 2022-10-21 NOTE — PROGRESS NOTES
Kim Michael is a 67 y.o. female  Chief Complaint   Patient presents with    Follow-up     Cholelithiasis without cholecystis     Visit Vitals  /78 (BP 1 Location: Left lower arm, BP Patient Position: Sitting)   Pulse 62   Temp 97.9 °F (36.6 °C) (Temporal)   Resp 18   Ht 5' 1\" (1.549 m)   Wt 215 lb (97.5 kg)   SpO2 99%   BMI 40.62 kg/m²     1. Have you been to the ER, urgent care clinic since your last visit? Hospitalized since your last visit? No    2. Have you seen or consulted any other health care providers outside of the [address] [place] [address] since your last visit? Include any pap smears or colon screening.  No 0

## 2022-10-24 ENCOUNTER — OFFICE VISIT (OUTPATIENT)
Dept: ORTHOPEDIC SURGERY | Age: 72
End: 2022-10-24
Payer: MEDICARE

## 2022-10-24 VITALS — OXYGEN SATURATION: 99 % | HEART RATE: 80 BPM | TEMPERATURE: 96.4 F | WEIGHT: 218 LBS | BODY MASS INDEX: 41.19 KG/M2

## 2022-10-24 DIAGNOSIS — M47.816 LUMBAR FACET ARTHROPATHY: ICD-10-CM

## 2022-10-24 DIAGNOSIS — M51.36 DDD (DEGENERATIVE DISC DISEASE), LUMBAR: Primary | ICD-10-CM

## 2022-10-24 PROCEDURE — 1123F ACP DISCUSS/DSCN MKR DOCD: CPT | Performed by: PHYSICAL MEDICINE & REHABILITATION

## 2022-10-24 PROCEDURE — G8399 PT W/DXA RESULTS DOCUMENT: HCPCS | Performed by: PHYSICAL MEDICINE & REHABILITATION

## 2022-10-24 PROCEDURE — G8427 DOCREV CUR MEDS BY ELIG CLIN: HCPCS | Performed by: PHYSICAL MEDICINE & REHABILITATION

## 2022-10-24 PROCEDURE — G9899 SCRN MAM PERF RSLTS DOC: HCPCS | Performed by: PHYSICAL MEDICINE & REHABILITATION

## 2022-10-24 PROCEDURE — 99214 OFFICE O/P EST MOD 30 MIN: CPT | Performed by: PHYSICAL MEDICINE & REHABILITATION

## 2022-10-24 PROCEDURE — G8417 CALC BMI ABV UP PARAM F/U: HCPCS | Performed by: PHYSICAL MEDICINE & REHABILITATION

## 2022-10-24 PROCEDURE — 96372 THER/PROPH/DIAG INJ SC/IM: CPT | Performed by: PHYSICAL MEDICINE & REHABILITATION

## 2022-10-24 PROCEDURE — 1090F PRES/ABSN URINE INCON ASSESS: CPT | Performed by: PHYSICAL MEDICINE & REHABILITATION

## 2022-10-24 PROCEDURE — G8756 NO BP MEASURE DOC: HCPCS | Performed by: PHYSICAL MEDICINE & REHABILITATION

## 2022-10-24 PROCEDURE — G9711 PT HX TOT COL OR COLON CA: HCPCS | Performed by: PHYSICAL MEDICINE & REHABILITATION

## 2022-10-24 PROCEDURE — G8432 DEP SCR NOT DOC, RNG: HCPCS | Performed by: PHYSICAL MEDICINE & REHABILITATION

## 2022-10-24 PROCEDURE — 1101F PT FALLS ASSESS-DOCD LE1/YR: CPT | Performed by: PHYSICAL MEDICINE & REHABILITATION

## 2022-10-24 PROCEDURE — G8536 NO DOC ELDER MAL SCRN: HCPCS | Performed by: PHYSICAL MEDICINE & REHABILITATION

## 2022-10-24 RX ORDER — DULOXETIN HYDROCHLORIDE 30 MG/1
30 CAPSULE, DELAYED RELEASE ORAL
Qty: 30 CAPSULE | Refills: 0 | Status: SHIPPED | OUTPATIENT
Start: 2022-10-24 | End: 2022-11-21

## 2022-10-24 RX ORDER — DULOXETIN HYDROCHLORIDE 30 MG/1
30 CAPSULE, DELAYED RELEASE ORAL DAILY
Qty: 90 CAPSULE | Refills: 0 | Status: SHIPPED | OUTPATIENT
Start: 2022-10-24 | End: 2022-10-26 | Stop reason: SDUPTHER

## 2022-10-24 RX ORDER — KETOROLAC TROMETHAMINE 30 MG/ML
15 INJECTION, SOLUTION INTRAMUSCULAR; INTRAVENOUS ONCE
Status: COMPLETED | OUTPATIENT
Start: 2022-10-24 | End: 2022-10-24

## 2022-10-24 RX ADMIN — KETOROLAC TROMETHAMINE 15 MG: 30 INJECTION, SOLUTION INTRAMUSCULAR; INTRAVENOUS at 11:41

## 2022-10-24 NOTE — PROGRESS NOTES
Consent was explained to the pt and signed. No questions or concerns voiced at this time. Pt given 15mg/0.5ml of toradol IM in left gluteus. No sign or symptoms of infection noted at injection site. There was no bleeding, swelling or leaking noted after injection. Pt handed injection information sheet to take home. Mrs Florentino tolerated the injection well. She declined to wait the ten minutes. She ambulated to self check out without any issues.

## 2022-10-24 NOTE — LETTER
10/24/2022    Patient: Laine Mccullough   YOB: 1950   Date of Visit: 10/24/2022     Anali Judge NP  [address] [place], [place] [address] [phone]  Via Fax: [phone]    Dear Anali Judge NP,      Thank you for referring Ms. Timo Florentino to [facility] for evaluation. My notes for this consultation are attached. If you have questions, please do not hesitate to call me. I look forward to following your patient along with you.       Sincerely,    Dylan Reich MD

## 2022-10-24 NOTE — PROGRESS NOTES
[facility] [address]  [address], [address]  [place], [address] [place] [address] Street  Phone: [phone]  Fax: [phone]        Marce Nunez  : 1950  PCP: Phuong Fragoso NP    PROGRESS NOTE      ASSESSMENT AND PLAN    Diagnoses and all orders for this visit:    1. DDD (degenerative disc disease), lumbar  -     DULoxetine (CYMBALTA) 30 mg capsule; Take 1 Capsule by mouth daily (with dinner). -     DULoxetine (CYMBALTA) 30 mg capsule; Take 1 Capsule by mouth daily. -     ketorolac tromethamine (TORADOL) 60 mg/2 mL injection 15 mg    2. Lumbar facet arthropathy  -     DULoxetine (CYMBALTA) 30 mg capsule; Take 1 Capsule by mouth daily (with dinner). -     DULoxetine (CYMBALTA) 30 mg capsule; Take 1 Capsule by mouth daily. -     ketorolac tromethamine (TORADOL) 60 mg/2 mL injection 15 mg      Meghan Fuller is a 67 y.o. female with ongoing activity limiting low back pain. Unfortunately she had no benefit with lumbar RFA. In the interim she has had a CT abdomen pelvis which showed advanced DDD L4-L5. Discussed treatment options including surgery. She does not want any surgery. .   Discussed PM as a treatment option. Pt given a list of pain management providers. Trial of Cymbalta 30 mg daily with dinner  Continue PRN Tylenol  Toradol 15 mg injection IM x 1 now    Follow-up and Dispositions    Return in about 4 weeks (around 2022) for medication management. Administrations This Visit       ketorolac tromethamine (TORADOL) 60 mg/2 mL injection 15 mg       Admin Date  10/24/2022  11:41 Action  Given Dose  15 mg Route  IntraMUSCular Site  Left Gluteus Arash Administered By  Ashleigh Collins LPN    NDC: 98158-948-18    Patient Supplied?: No                          HISTORY OF PRESENT ILLNESS      Meghan Fuller is a 67 y.o. female presents for follow up of back pain. LV trial of Baclofen 5-10 mg TID PRN, DME for seated rollator.     Patient received RFA right L4-5, L5-S1 9/2022 with no benefit. Denies side effects. Pt reports low back pain exacerbated with sitting on hard surfaces and standing. She has pain when transitioning from sit to stand. Denies sciatic pain. Denies numbness or tingling BLE. She currently takes extra strength Tylenol. She states Baclofen is ineffective and causes somnolence. She is seeing GI now due to gallstones, recent CT abdomen pelvis reviewed. Pain Assessment  10/24/2022   Location of Pain Back   Location Modifiers Inferior   Severity of Pain 1   Quality of Pain Sharp; Aching   Quality of Pain Comment -   Duration of Pain Persistent   Frequency of Pain Constant   Date Pain First Started -   Date Pain First Started Comment -   Aggravating Factors Walking   Aggravating Factors Comment -   Limiting Behavior -   Relieving Factors Rest   Relieving Factors Comment TYLENOL   Result of Injury -         Onset: years, worse since 2021     Investigations:   Ab/Pelv CT 9/2022: gallstone, small hiatal hernia, diverticulosis, DDD lumbar spine  L MRI 2/2022: DDD L4-5 with foraminal stenosis. FA L1-S1  Spine surgery consult: none     Treatments:  Physical therapy: years ago  Spinal injections: RFA right L4-5, L5-S1 9/2022 with no benefit, right MBB L4-5, L5-S1 x 2 8/2022 with 100% and 60% benefit  Spinal surgery- no  Beneficial medications: extra strength Tylenol   Failed medications: Voltaren 75 mg, NSAID (partial nephrectomy, CHF), Baclofen - ineffective, somnolence. Work Status: retired sedentary worker  Pertinent PMHx:  Anemia, HTN, CHF, asthma, GERD, left partial nephrectomy (non cancerous mass), colectomy (nonmalignant polyps), GFR 57 9/2022.  is a , had back surgery with ongoing pain. Was previously on fentanyl.     PHYSICAL EXAMINATION    Visit Vitals  Pulse 80   Temp (!) 96.4 °F (35.8 °C) (Temporal)   Wt 218 lb (98.9 kg)   SpO2 99%   BMI 41.19 kg/m²       TTP right lumbar paraspinals R > L  LE strength intact  SLR negative                Written by Barby Watt, as dictated by Jayne Butts MD.

## 2022-10-26 ENCOUNTER — OFFICE VISIT (OUTPATIENT)
Dept: PULMONOLOGY | Age: 72
End: 2022-10-26
Payer: MEDICARE

## 2022-10-26 VITALS
RESPIRATION RATE: 16 BRPM | HEIGHT: 61 IN | OXYGEN SATURATION: 96 % | TEMPERATURE: 98 F | BODY MASS INDEX: 41.46 KG/M2 | SYSTOLIC BLOOD PRESSURE: 158 MMHG | DIASTOLIC BLOOD PRESSURE: 64 MMHG | HEART RATE: 76 BPM | WEIGHT: 219.6 LBS

## 2022-10-26 DIAGNOSIS — E66.01 MORBID OBESITY (HCC): ICD-10-CM

## 2022-10-26 DIAGNOSIS — R06.09 DYSPNEA ON EXERTION: Primary | ICD-10-CM

## 2022-10-26 DIAGNOSIS — J45.50 SEVERE PERSISTENT ASTHMA WITHOUT COMPLICATION: ICD-10-CM

## 2022-10-26 DIAGNOSIS — R53.81 PHYSICAL DECONDITIONING: ICD-10-CM

## 2022-10-26 PROCEDURE — G9711 PT HX TOT COL OR COLON CA: HCPCS | Performed by: INTERNAL MEDICINE

## 2022-10-26 PROCEDURE — G8754 DIAS BP LESS 90: HCPCS | Performed by: INTERNAL MEDICINE

## 2022-10-26 PROCEDURE — 3078F DIAST BP <80 MM HG: CPT | Performed by: INTERNAL MEDICINE

## 2022-10-26 PROCEDURE — G8417 CALC BMI ABV UP PARAM F/U: HCPCS | Performed by: INTERNAL MEDICINE

## 2022-10-26 PROCEDURE — 1101F PT FALLS ASSESS-DOCD LE1/YR: CPT | Performed by: INTERNAL MEDICINE

## 2022-10-26 PROCEDURE — 1123F ACP DISCUSS/DSCN MKR DOCD: CPT | Performed by: INTERNAL MEDICINE

## 2022-10-26 PROCEDURE — 3074F SYST BP LT 130 MM HG: CPT | Performed by: INTERNAL MEDICINE

## 2022-10-26 PROCEDURE — G8753 SYS BP > OR = 140: HCPCS | Performed by: INTERNAL MEDICINE

## 2022-10-26 PROCEDURE — G8536 NO DOC ELDER MAL SCRN: HCPCS | Performed by: INTERNAL MEDICINE

## 2022-10-26 PROCEDURE — G8432 DEP SCR NOT DOC, RNG: HCPCS | Performed by: INTERNAL MEDICINE

## 2022-10-26 PROCEDURE — 99214 OFFICE O/P EST MOD 30 MIN: CPT | Performed by: INTERNAL MEDICINE

## 2022-10-26 PROCEDURE — 1090F PRES/ABSN URINE INCON ASSESS: CPT | Performed by: INTERNAL MEDICINE

## 2022-10-26 PROCEDURE — G9899 SCRN MAM PERF RSLTS DOC: HCPCS | Performed by: INTERNAL MEDICINE

## 2022-10-26 PROCEDURE — G8399 PT W/DXA RESULTS DOCUMENT: HCPCS | Performed by: INTERNAL MEDICINE

## 2022-10-26 PROCEDURE — G8427 DOCREV CUR MEDS BY ELIG CLIN: HCPCS | Performed by: INTERNAL MEDICINE

## 2022-10-26 NOTE — PROGRESS NOTES
[address] [place] [address] [place] [phone]    [address] [place] [address] [facility]  Pulmonary, Critical Care, and Sleep Medicine    Pulmonary Office F/U  Name: Sherrill Dwyer 67 y.o. female  MRN: [id number]  : 1950  Service Date: 10/26/22  Chief Complaint:   Chief Complaint   Patient presents with    Asthma     Follow up from 7/15/2022    Breathing Problem     MARIE, SOB       History of Present Illness:  Sherrill Dwyer is a 67 y.o. female, who presents to Pulmonary clinic for follow-up of shortness of breath. Patient was last seen on 7/15/2022. In the interval, pt remains on Xolair, reports that she does not feel like it is working. Patient does report that wheezing has not recurred. She reports that she feels very SOB, with mild exertion -- mMRC of 3. Patient reports being limited by severe dyspnea. Using PRN albuterol once every few days. Compliant with Symbicort and Spiriva  Pt ended up not doing PT for her lower back - so she ended up get a block for her lower back. Saw PM&R and is planning to go into pain management.       Past Medical History:   Diagnosis Date    Alopecia     Arthritis     Asthma     Chronic lung disease     GERD (gastroesophageal reflux disease)     Hypercholesteremia     Hypertension     Hyperthyroidism     Thyroid disease      Past Surgical History:   Procedure Laterality Date    COLONOSCOPY N/A 2018    COLONOSCOPY/polypectomy/polyloop/ink tatoo  performed by Phong Khalil MD at [facility] ENDOSCOPY    COLONOSCOPY N/A 2021    COLONOSCOPY with Polypectomies performed by Sharlene Oro MD at [facility] ENDOSCOPY    HX BUNIONECTOMY      bilateral and \"removed some arthritis in feet\"    HX CATARACT REMOVAL      with implants    HX COLONOSCOPY  2015    HX HAMMER TOE REPAIR      right     HX HEMORRHOIDECTOMY      HX HYSTERECTOMY N/A     HX NEPHRECTOMY Left 2019    partial    HX ORTHOPAEDIC      HX OTHER SURGICAL      keiloid removed off chest near shoulder area HX TOTAL COLECTOMY  10/06/2015    lap right hemicolectomy due to polyp-nonmalignant    HX TUBAL LIGATION      US GUIDED CORE BREAST BIOPSY Right 2013    benign     Family History   Problem Relation Age of Onset    Hypertension Mother     Stroke Mother 80    Cancer Father     Heart Disease Father     Hypertension Father     Asthma Sister     Breast Problems Sister     Cancer Sister     Cancer Brother         lung cancer    Asthma Brother     Breast Cancer Maternal Grandmother 78    Cancer Brother     Breast Cancer Maternal Grandfather     Diabetes Paternal Uncle     Heart Attack Neg Hx      Social History     Socioeconomic History    Marital status:      Spouse name: Not on file    Number of children: Not on file    Years of education: Not on file    Highest education level: Not on file   Occupational History    Not on file   Tobacco Use    Smoking status: Former     Packs/day: 0.50     Years: 5.00     Pack years: 2.50     Types: Cigarettes     Start date: 3/13/1972     Quit date: 3/13/1977     Years since quittin.6    Smokeless tobacco: Never    Tobacco comments:     quit smoking in    Vaping Use    Vaping Use: Never used   Substance and Sexual Activity    Alcohol use: Not Currently     Alcohol/week: 0.0 standard drinks     Comment: Once a year    Drug use: Never    Sexual activity: Yes     Partners: Male     Birth control/protection: None   Other Topics Concern    Not on file   Social History Narrative    Worked as  for [address] [place] [address] until . Denies any history of asbestos and or chemical exposure.        Social Determinants of Health     Financial Resource Strain: Not on file   Food Insecurity: Not on file   Transportation Needs: Not on file   Physical Activity: Not on file   Stress: Not on file   Social Connections: Not on file   Intimate Partner Violence: Not on file   Housing Stability: Not on file     Allergies   Allergen Reactions    Latex Swelling and Contact Dermatitis    Latex, Natural Rubber Itching    Vicodin [Hydrocodone-Acetaminophen] Itching     Prior to Admission medications    Medication Sig Start Date End Date Taking? Authorizing Provider   DULoxetine (CYMBALTA) 30 mg capsule Take 1 Capsule by mouth daily (with dinner). 10/24/22  Yes Yeimy Horton MD   acetaminophen (TYLENOL) 500 mg tablet Take 2 Tablets by mouth every six (6) hours as needed. 7/20/22  Yes Provider, Historical   albuterol-ipratropium (DUO-NEB) 2.5 mg-0.5 mg/3 ml nebu Take 3 mL by inhalation every four (4) hours as needed. 10/18/21  Yes Provider, Historical   metoprolol succinate (TOPROL-XL) 25 mg XL tablet Take 1 Tablet by mouth daily. 4/26/22  Yes Bryanna Alan NP   OMALIZUMAB  mg by SubCUTAneous route Once every 2 weeks. Yes Provider, Historical   cholecalciferol (VITAMIN D3) (5000 Units/125 mcg) tab tablet Take 5,000 Units by mouth daily. Yes Provider, Historical   budesonide-formoteroL (SYMBICORT) 160-4.5 mcg/actuation HFAA Take 2 Puffs by inhalation two (2) times a day. Rinse and gargle thoroughly after each use. Disp 3 inhalers 2/14/22  Yes Manju Blackburn MD   hydroCHLOROthiazide (HYDRODIURIL) 12.5 mg tablet Take 12.5 mg by mouth daily. 1/26/22  Yes Provider, Historical   tiotropium bromide (Spiriva Respimat) 1.25 mcg/actuation inhaler Take 2 Puffs by inhalation daily. 11/12/21  Yes Manju Blackburn MD   albuterol 2.5 mg /3 mL (0.083 %) nebu 3 mL, albuterol-ipratropium 2.5 mg-0.5 mg/3 ml nebu 3 mL 1 Dose by Nebulization route every four (4) hours as needed for Wheezing or Shortness of Breath. Yes Provider, Historical   simvastatin (ZOCOR) 40 mg tablet Take 40 mg by mouth nightly. Yes Provider, Historical   aspirin delayed-release 81 mg tablet Take 81 mg by mouth daily. Yes Provider, Historical   montelukast (SINGULAIR) 10 mg tablet Take 10 mg by mouth daily.    Yes Provider, Historical   albuterol (PROVENTIL HFA, VENTOLIN HFA, PROAIR HFA) 90 mcg/actuation inhaler Take 2 Puffs by inhalation every four (4) hours as needed for Wheezing. Yes Provider, Historical   omeprazole (PRILOSEC) 20 mg capsule Take 20 mg by mouth daily. Yes Provider, Historical     Immunization History   Administered Date(s) Administered    COVID-19, PFIZER PURPLE top, DILUTE for use, (age 15 y+), IM, 30mcg/0.3mL 03/19/2021, 04/09/2021    Influenza High Dose Vaccine PF 11/06/2019, 11/04/2020, 10/21/2021, 10/04/2022    Influenza, FLUARIX, FLULAVAL, FLUZONE (age 10 mo+) AND AFLURIA, (age 1 y+), PF, 0.5mL 10/07/2015       Review of Systems:  A complete review of systems was performed as stated in the HPI, all others are negative. Objective:    Physical Exam:  BP (!) 158/64 (BP 1 Location: Left upper arm, BP Patient Position: Sitting, BP Cuff Size: Large adult) Comment: taken manually  Pulse 76   Temp 98 °F (36.7 °C) (Temporal)   Resp 16   Ht 5' 1\" (1.549 m)   Wt 99.6 kg (219 lb 9.6 oz)   SpO2 96%   BMI 41.49 kg/m²   Vitals were personally reviewed  Gen: no acute distress, pleasant and cooperative, sitting up in chair, obese, ambulates without difficulty  HEENT: normocephalic/atraumatic, no ocular drainage, EOMI, nasal bridge midline, unable to assess nasal and oral cavities due to patient wearing mask in the setting of COVID-19 pandemic  Neck: supple, trachea midline, no JVD  CVS: regular rate rhythm, S1/S2, no murmurs/rubs/gallops  Lungs: fair air entry B/L, CTABL, no wheezes/rales/rhonchi throughout all lung fields  Psych: normal memory, thought content, and processing    Labs:   I have reviewed the patient's available labs  Lab Results   Component Value Date/Time    WBC 11.3 09/18/2022 02:50 AM    HGB 9.7 (L) 09/18/2022 02:50 AM    HCT 34.6 (L) 09/18/2022 02:50 AM    PLATELET 840 85/42/6499 02:50 AM    MCV 67.1 (L) 09/18/2022 02:50 AM     Lab Results   Component Value Date/Time    Sodium 143 09/18/2022 02:50 AM    Potassium 3.8 09/18/2022 02:50 AM    Chloride 110 09/18/2022 02:50 AM    CO2 26 09/18/2022 02:50 AM    Anion gap 7 09/18/2022 02:50 AM    Glucose 147 (H) 09/18/2022 02:50 AM    BUN 14 09/18/2022 02:50 AM    Creatinine 1.14 09/18/2022 02:50 AM    BUN/Creatinine ratio 12 09/18/2022 02:50 AM    GFR est AA 57 (L) 09/18/2022 02:50 AM    GFR est non-AA 47 (L) 09/18/2022 02:50 AM    Calcium 9.8 09/18/2022 02:50 AM    Bilirubin, total 0.3 09/18/2022 02:50 AM    Alk. phosphatase 94 09/18/2022 02:50 AM    Protein, total 7.7 09/18/2022 02:50 AM    Albumin 3.9 09/18/2022 02:50 AM    Globulin 3.8 09/18/2022 02:50 AM    A-G Ratio 1.0 09/18/2022 02:50 AM    ALT (SGPT) 19 09/18/2022 02:50 AM    AST (SGOT) 19 09/18/2022 02:50 AM     Lab Results   Component Value Date/Time    Immunoglobulin E 564 (H) 01/18/2022 08:24 AM    Immunoglobulin E 881 (H) 03/13/2015 10:25 AM         Imaging:  I have personally reviewed patient's imaging as follows--CT abdomen pelvis without contrast shows clear lung bases without infiltrate, mass, effusion in bases. Official report per radiology:  CT Results (most recent):  Results from Hospital Encounter encounter on 09/18/22    CT ABD PELV WO CONT    Narrative  CT abdomen pelvis without IV contrast    HISTORY: Back pain and epigastric pain. All CT scans at this facility are performed using dose optimization technique as  appropriate to a performed exam, to include automated exposure control,  adjustment of the mA and/or kV according to patient size (including appropriate  matching for site specific examination) or use of iterative reconstruction  technique. Comparison July 19, 2019    Lung bases are clear. No cardiomegaly. Prominent pericardial fat pad. Unenhanced liver is unremarkable. Presumed small left hepatic cyst.  Cholelithiasis with no surrounding inflammation. Spleen and pancreas  unremarkable. No adrenal mass. Unenhanced kidneys unremarkable. No  hydronephrosis or perinephric stranding. Aorta shows normal caliber. Small hiatal hernia.  Small bowel is not distended. Anastomosis in the ascending  colon. Diverticulosis in descending and sigmoid colon with no diverticulitis. Ventral hernia containing fat only. No ascites or free air. Urinary bladder is not distended for optimal evaluation. Hysterectomy with no  pelvic mass or inflammation. No acute bony abnormalities. Advanced degenerative disease throughout the lumbar  spine with facet arthropathy. Impression  1. Cholelithiasis with no findings of acute cholecystitis. 2. Small hiatal hernia. 3. Diverticulosis in descending sigmoid colon with no focal diverticulitis. No  bowel obstruction. 4. Large ventral hernia containing fat only, stable. 5. Degenerative disc and joint disease in lumbar spine        PFTs:  2020: spirometry shows a mild obstructive defect, significant bronchodilator response seen, lung volumes are normal, diffusion capacity is borderline low. 6-minute walk test from 2020, no significant oxygen desaturation seen, lowest SPO2 was 95%, patient ambulated 427 m over 6 minutes on room air. TTE:  I have reviewed the patient's TTE results  Results for orders placed or performed during the hospital encounter of 10/06/15   2D ECHO COMPLETE ADULT (TTE) W OR WO CONTR     Status: None    [facility], [address]  [phone]    Transthoracic Echocardiogram    Patient: Andrea Maza  MRN: [id number]  [address] #: [de-identified]  : 10-Brian-1950  Age: 72 years  Gender: Female  Height: 61 in  Weight: 147.6 lb  BSA: 1.66 m squared  BP: 113 / 60 mmHg  Study date: 07-Oct-2015  Status: Routine  Location: [facility] DMS [address] #: [id number]    Allergies: LATEX, NATURAL RUBBER, HYDROCODONE-ACETAMINOPHEN    Interpreting Group:  [facility]  Interpreting Physician:  Ligia Beckett MD  Technologist:  PANCHO Palafox  Referring_Ordering Physician:  Ld Montes.  Richy Oro MD    SUMMARY:  Left ventricle: Size was normal. Systolic function was normal by EF  (biplane method of disks). Ejection fraction was estimated in the range of  65 % to 70 %. No obvious wall motion abnormalities identified in the views  obtained. Wall thickness was normal. Left ventricular diastolic function  parameters were normal for the patient's age. Right ventricle: Systolic function was normal. Systolic pressure was  mildly increased. Estimated peak pressure was 41 mmHg. Left atrium: The atrium was mildly to moderately dilated. LA volume index  was 41 ml/m squared. Inferior vena cava, hepatic veins: The inferior vena cava was upper normal  in size. The respirophasic change in diameter was less than 50%. INDICATIONS: Tachycardia. HISTORY: Prior history: Patient has no history of cardiovascular disease. PROCEDURE: This was a routine study. The study included complete 2D  imaging, M-mode, complete spectral Doppler, and color Doppler. The heart  rate was 100 bpm, at the start of the study. Systolic blood pressure was  113 mmHg, at the start of the study. Diastolic blood pressure was 60 mmHg,  at the start of the study. Images were obtained from the parasternal,  apical, subcostal, and suprasternal notch acoustic windows. Image quality  was adequate. LEFT VENTRICLE: Size was normal. Systolic function was normal by EF  (biplane method of disks). Ejection fraction was estimated in the range of  65 % to 70 %. No obvious wall motion abnormalities identified in the views  obtained. Wall thickness was normal. DOPPLER: Left ventricular diastolic  function parameters were normal for the patient's age. RIGHT VENTRICLE: The size was normal. Systolic function was normal.  DOPPLER: Systolic pressure was mildly increased. Estimated peak pressure  was 41 mmHg. LEFT ATRIUM: The atrium was mildly to moderately dilated. LA volume index  was 41 ml/m squared. RIGHT ATRIUM: Size was normal.    MITRAL VALVE: There was annular calcification. There was minimal diffuse  thickening. DOPPLER: There was no evidence for stenosis. There was trivial  regurgitation. AORTIC VALVE: The valve was probably trileaflet. Leaflets exhibited good  mobility. DOPPLER: There was no stenosis. There was no significant  regurgitation. TRICUSPID VALVE: Normal valve structure. DOPPLER: There was no evidence  for tricuspid stenosis. There was trivial regurgitation. Right atrial  pressure estimate: 10 mmHg. PULMONIC VALVE: Not well visualized, but normal Doppler findings. DOPPLER:  There was no stenosis. There was no significant regurgitation. AORTA: The root exhibited normal size. SYSTEMIC VEINS: IVC: The inferior vena cava was upper normal in size. The  respirophasic change in diameter was less than 50%. PERICARDIUM: No significant pericardial effusion identified.     SYSTEM MEASUREMENT TABLES    2D  Ao Diam: 2.64 cm  IVSd: 1.02 cm  LVIDd: 4.26 cm  LVIDs: 2.88 cm  LVPWd: 1.02 cm  SV(Teich): 49.64 ml  EDV(Teich): 81.35 ml  ESV(Cube): 23.92 ml  ESV(Teich): 31.71 ml  LAAs A2C: 20.8 cm2  LAAs A4C: 22.39 cm2  LAESV A-L A2C: 63.34 ml  LAESV A-L A4C: 68.04 ml  LAESV Index (A-L): 41.07 ml/m2  LAESV MOD A2C: 61.46 ml  LAESV MOD A4C: 64.14 ml  LAESV(A-L): 68.18 ml  LALs A2C: 5.8 cm  LALs A4C: 6.25 cm  LVEDV MOD A2C: 48.72 ml  LVEDV MOD A4C: 58.35 ml  LVEDV MOD BP: 53.55 ml  LVESV MOD A2C: 13.4 ml  LVESV MOD A4C: 24.36 ml  LVESV MOD BP: 18.26 ml  LVLd A2C: 6.86 cm  LVLd A4C: 6.98 cm  LVLs A2C: 5.61 cm  LVLs A4C: 5.8 cm  LVOT Diam: 1.86 cm  RA Area: 15.44 cm2  RV Minor: 3.89 cm  SV MOD A2C: 35.32 ml  SV MOD A4C: 33.99 ml    CW  TR Vmax: 2.76 m/s  IVRT: 50.75 ms  TR maxP.55 mmHG    PW  LVOT VTI: 30.16 cm  LVOT Vmax: 1.92 m/s  LVOT Vmean: 1.23 m/s  MV A Cy: 1.02 m/s  MV Dec [place]: 8.3 m/s2  MV DecT: 155.56 ms  MV E Cy: 1.29 m/s  MV E/A Ratio: 1.27  HR: 97.42 BPM  LVCI Dopp: 4.8 l/minm2  LVCO Dopp: 7.97 L/min  LVOT maxP.77 mmHG  LVOT meanP mmHG  LVSI Dopp: 49.26 ml/m2  LVSV Dopp: 81.77 ml  P Vein A: 0.28 m/s  P Vein A Dur: 103.81 ms    Prepared and E-signed by    Jenna Cha MD  Signed 08-Oct-2015 14:10:31       09/29/20   ECHO ADULT COMPLETE 09/29/2020 9/29/2020    Narrative · LV: Estimated LVEF is 60 - 65%. Visually measured ejection fraction. Normal cavity size, wall thickness and systolic function (ejection   fraction normal). Wall motion: normal. Mild (grade 1) left ventricular   diastolic dysfunction. · LA: Left Atrium volume index is 29.54 mL/m2. · RV: Not well visualized. · TV: Mild tricuspid valve regurgitation is present. · PA: Pulmonary arterial systolic pressure is 30 mmHg. · Pericardium: Pericardial fat pad present. · MV: Mild mitral valve regurgitation is present. Signed by: Rafita Chun MD     04/14/22    ECHO ADULT COMPLETE 04/14/2022 4/14/2022    Interpretation Summary    Left Ventricle: Left ventricle size is normal. Mildly increased wall thickness. Normal wall motion. Normal left ventricular systolic function with a visually estimated EF of 55 - 60%. Normal diastolic function. Tricuspid Valve: Unable to assess RVSP due to inadequate or insignificant tricuspid regurgitation. Left Atrium: Left atrial volume index is normal (16-34 mL/m2). LA Vol Index A/L is 29 mL/m2. Signed by: Jj Willett DO on 4/14/2022  3:55 PM        Unintended Level 3 polysomnography from 3/2/2021, shows no evidence of EREN, AHI of 3.6      Assessment and Plan:  67 y.o. female with:    Impression:  1. Dyspnea on exertion/shortness of breath: Multifactorial, mainly due to deconditioning and morbid obesity, also has contribution of asthma but is better controlled with Xolair. 2.  Severe persistent asthma better controlled:  No exacerbations since starting Xolair at end of March 2022. Pt has hx of elevated IgE, 881 on 3/13/2015 and 564 on 1/18/2022. Patient also has elevated IgE to multiple allergens including dust, grasses, molds, trees, ragweed.   Pt reports ongoing dyspnea with exertion, but denies any bronchospasm  3. Deconditioning  4. Morbid obesity: Body mass index is 41.49 kg/m². 5.  Snoring as well as excessive daytime sleepiness, fragmented sleep, and infrequent morning headaches and nocturia in the setting of cardiomyopathy --- level 3 unattended HST on 3/2/21, AHI 3.6, thus no EREN  6. Mild cardiomyopathy: Seen on transthoracic echo, no intervention required    Plan:  -Pt wants to discontinue Xolair given her ongoing sx of dyspnea with exertion -- reports no improvement to those sx (despite improvement to wheezing and bronchospasm). Long and thoroughly discussion performed, advised to update OPIC, and we will discontinue care plan. Advised that sx of bronchospasm may start to recur in 6-12 months, but will monitor  -Pt declines pulm rehab until she gets into pain management  -Continue Symbicort 160/4.5 MCG 2 puffs twice daily with spacer. Counseled patient to rinse mouth thoroughly after each use and wash and dry spacer completely  -Continue Spiriva Respimat 1.25 mcg 2 puffs once daily  -Continue albuterol HFA 1-2puffs q4-6h PRN. Counseled patient that this is their rescue inhaler. Also counseled patient regarding premedication 15-30m prior to exercise or exposure to very cold air  -Counseled patient on proper inhaler technique  -Counseled patient to avoid potential triggers of asthma.   -Weight loss -- advised to discuss with PCP with regards to considering medical treatments  -Advised to increase activity with graded exercise  -Immunizations reviewed, influenza and pneumococcal and Covid vaccinations up-to-date      Follow-up and Dispositions    Return in about 6 months (around 4/26/2023).            Dorian Swenson MD/MPH     Pulmonary, Critical Care Medicine  [facility]

## 2022-10-26 NOTE — LETTER
10/26/2022    Patient: Kim Woodward   YOB: 1950   Date of Visit: 10/26/2022     Rasheeda Recinos NP  [address] [place], [place] [address]. [place] [address] [address]  Via Fax: [phone]    Dear Rasheeda Recinos NP,      Thank you for referring Ms. Kim Woodward to [address] [place] [address] for evaluation. My notes for this consultation are attached. If you have questions, please do not hesitate to call me. I look forward to following your patient along with you.       Sincerely,    Chip Guevara MD

## 2022-10-26 NOTE — PROGRESS NOTES
Macey Harley presents today for   Chief Complaint   Patient presents with    Asthma     Follow up from 7/15/2022    Breathing Problem     MARIE, SOB       Is someone accompanying this pt? No    Is the patient using any DME equipment during OV? Yes. Nebulizer    -DME Company N/A    Depression Screening:  3 most recent PHQ Screens 10/21/2022   PHQ Not Done Functional capacity motivation limits accuracy   Little interest or pleasure in doing things Not at all   Feeling down, depressed, irritable, or hopeless Not at all   Total Score PHQ 2 0   Trouble falling or staying asleep, or sleeping too much -   Feeling tired or having little energy -   Poor appetite, weight loss, or overeating -   Feeling bad about yourself - or that you are a failure or have let yourself or your family down -   Trouble concentrating on things such as school, work, reading, or watching TV -   Moving or speaking so slowly that other people could have noticed; or the opposite being so fidgety that others notice -   Thoughts of being better off dead, or hurting yourself in some way -   PHQ 9 Score -       Learning Assessment:  Learning Assessment 4/20/2022   PRIMARY LEARNER Patient   BARRIERS PRIMARY LEARNER -   PRIMARY LANGUAGE ENGLISH   LEARNER PREFERENCE PRIMARY DEMONSTRATION     -     -   ANSWERED BY patient   RELATIONSHIP SELF       Abuse Screening:  Abuse Screening Questionnaire 4/20/2022   Do you ever feel afraid of your partner? N   Are you in a relationship with someone who physically or mentally threatens you? N   Is it safe for you to go home? Y       Fall Risk  Fall Risk Assessment, last 12 mths 10/24/2022   Able to walk? Yes   Fall in past 12 months? 0   Do you feel unsteady? 0   Are you worried about falling 0   Is the gait abnormal? -         Coordination of Care:  1. Have you been to the ER, urgent care clinic since your last visit? Hospitalized since your last visit?  Yes; Where: [address] center & [address] [facility] ED, When: 8/9/2022-[facility] block, 8/23/2022-medial branch block, radiofrequency ablation, 9/18/2022-abdominal pain & nausea & vomiting    2. Have you seen or consulted any other health care providers outside of the [address] [place] [address] since your last visit? Include any pap smears or colon screening. Yes.  VANESA Ramirez, PCP, Dr. Cadena, ophthalmologist, Dr. Bassem Lombardi, endocrinologist

## 2022-10-27 ENCOUNTER — HOSPITAL ENCOUNTER (OUTPATIENT)
Dept: INFUSION THERAPY | Age: 72
End: 2022-10-27

## 2022-11-10 ENCOUNTER — APPOINTMENT (OUTPATIENT)
Dept: INFUSION THERAPY | Age: 72
End: 2022-11-10

## 2022-11-17 ENCOUNTER — TRANSCRIBE ORDER (OUTPATIENT)
Dept: REGISTRATION | Age: 72
End: 2022-11-17

## 2022-11-17 ENCOUNTER — HOSPITAL ENCOUNTER (OUTPATIENT)
Dept: LAB | Age: 72
Discharge: HOME | End: 2022-11-17
Payer: MEDICARE

## 2022-11-17 DIAGNOSIS — E78.00 HYPERCHOLESTEREMIA: ICD-10-CM

## 2022-11-17 DIAGNOSIS — D64.9 ANEMIA: Primary | ICD-10-CM

## 2022-11-17 DIAGNOSIS — D64.9 ANEMIA: ICD-10-CM

## 2022-11-17 LAB
ALBUMIN SERPL-MCNC: 3.3 G/DL (ref 3.4–5)
ALBUMIN/GLOB SERPL: 1 {RATIO} (ref 0.8–1.7)
ALP SERPL-CCNC: 87 U/L (ref 45–117)
ALT SERPL-CCNC: 13 U/L (ref 13–56)
ANION GAP SERPL CALC-SCNC: 5 MMOL/L (ref 3–18)
AST SERPL-CCNC: 8 U/L (ref 10–38)
BASOPHILS # BLD: 0 K/UL (ref 0–0.1)
BASOPHILS NFR BLD: 0 % (ref 0–2)
BILIRUB SERPL-MCNC: 0.5 MG/DL (ref 0.2–1)
BUN SERPL-MCNC: 10 MG/DL (ref 7–18)
BUN/CREAT SERPL: 10 (ref 12–20)
CALCIUM SERPL-MCNC: 8.9 MG/DL (ref 8.5–10.1)
CHLORIDE SERPL-SCNC: 106 MMOL/L (ref 100–111)
CHOLEST SERPL-MCNC: 133 MG/DL
CO2 SERPL-SCNC: 32 MMOL/L (ref 21–32)
CREAT SERPL-MCNC: 1.05 MG/DL (ref 0.6–1.3)
DIFFERENTIAL METHOD BLD: ABNORMAL
EOSINOPHIL # BLD: 0.1 K/UL (ref 0–0.4)
EOSINOPHIL NFR BLD: 2 % (ref 0–5)
ERYTHROCYTE [DISTWIDTH] IN BLOOD BY AUTOMATED COUNT: 20.5 % (ref 11.6–14.5)
FERRITIN SERPL-MCNC: 4 NG/ML (ref 8–388)
FOLATE SERPL-MCNC: 17.1 NG/ML (ref 3.1–17.5)
GLOBULIN SER CALC-MCNC: 3.4 G/DL (ref 2–4)
GLUCOSE SERPL-MCNC: 103 MG/DL (ref 74–99)
HCT VFR BLD AUTO: 29.5 % (ref 35–45)
HDLC SERPL-MCNC: 62 MG/DL (ref 40–60)
HDLC SERPL: 2.1 {RATIO} (ref 0–5)
HGB BLD-MCNC: 8.2 G/DL (ref 12–16)
IMM GRANULOCYTES # BLD AUTO: 0 K/UL (ref 0–0.04)
IMM GRANULOCYTES NFR BLD AUTO: 0 % (ref 0–0.5)
IRON SATN MFR SERPL: 4 % (ref 20–50)
IRON SERPL-MCNC: 18 UG/DL (ref 50–175)
LDLC SERPL CALC-MCNC: 53.6 MG/DL (ref 0–100)
LIPID PROFILE,FLP: ABNORMAL
LYMPHOCYTES # BLD: 2.1 K/UL (ref 0.9–3.6)
LYMPHOCYTES NFR BLD: 29 % (ref 21–52)
MCH RBC QN AUTO: 18.3 PG (ref 24–34)
MCHC RBC AUTO-ENTMCNC: 27.8 G/DL (ref 31–37)
MCV RBC AUTO: 66 FL (ref 78–100)
MONOCYTES # BLD: 0.3 K/UL (ref 0.05–1.2)
MONOCYTES NFR BLD: 4 % (ref 3–10)
NEUTS SEG # BLD: 4.8 K/UL (ref 1.8–8)
NEUTS SEG NFR BLD: 65 % (ref 40–73)
NRBC # BLD: 0 K/UL (ref 0–0.01)
NRBC BLD-RTO: 0 PER 100 WBC
PLATELET # BLD AUTO: 382 K/UL (ref 135–420)
PLATELET COMMENTS,PCOM: ABNORMAL
PMV BLD AUTO: 10.7 FL (ref 9.2–11.8)
POTASSIUM SERPL-SCNC: 3.7 MMOL/L (ref 3.5–5.5)
PROT SERPL-MCNC: 6.7 G/DL (ref 6.4–8.2)
RBC # BLD AUTO: 4.47 M/UL (ref 4.2–5.3)
RBC MORPH BLD: ABNORMAL
RETICS/RBC NFR AUTO: 1.3 % (ref 0.5–2.5)
SODIUM SERPL-SCNC: 143 MMOL/L (ref 136–145)
TIBC SERPL-MCNC: 420 UG/DL (ref 250–450)
TRIGL SERPL-MCNC: 87 MG/DL (ref ?–150)
VIT B12 SERPL-MCNC: 328 PG/ML (ref 211–911)
VLDLC SERPL CALC-MCNC: 17.4 MG/DL
WBC # BLD AUTO: 7.3 K/UL (ref 4.6–13.2)

## 2022-11-17 PROCEDURE — 80053 COMPREHEN METABOLIC PANEL: CPT

## 2022-11-17 PROCEDURE — 80061 LIPID PANEL: CPT

## 2022-11-17 PROCEDURE — 83540 ASSAY OF IRON: CPT

## 2022-11-17 PROCEDURE — 83921 ORGANIC ACID SINGLE QUANT: CPT

## 2022-11-17 PROCEDURE — 85025 COMPLETE CBC W/AUTO DIFF WBC: CPT

## 2022-11-17 PROCEDURE — 82607 VITAMIN B-12: CPT

## 2022-11-17 PROCEDURE — 85045 AUTOMATED RETICULOCYTE COUNT: CPT

## 2022-11-17 PROCEDURE — 36415 COLL VENOUS BLD VENIPUNCTURE: CPT

## 2022-11-17 PROCEDURE — 82728 ASSAY OF FERRITIN: CPT

## 2022-11-21 DIAGNOSIS — M51.36 DDD (DEGENERATIVE DISC DISEASE), LUMBAR: ICD-10-CM

## 2022-11-21 DIAGNOSIS — M47.816 LUMBAR FACET ARTHROPATHY: ICD-10-CM

## 2022-11-21 RX ORDER — DULOXETIN HYDROCHLORIDE 30 MG/1
CAPSULE, DELAYED RELEASE ORAL
Qty: 30 CAPSULE | Refills: 0 | Status: SHIPPED | OUTPATIENT
Start: 2022-11-21

## 2022-11-22 LAB — METHYLMALONATE SERPL-SCNC: 210 NMOL/L (ref 0–378)

## 2022-11-25 ENCOUNTER — APPOINTMENT (OUTPATIENT)
Dept: INFUSION THERAPY | Age: 72
End: 2022-11-25

## 2022-11-30 NOTE — PROGRESS NOTES
11/25, 11/28      Utilization Reviews       Atrial Fibrillation - Care Day 7 (11/28/2022) by Anselmo Moscoso RN       Review Status Review Entered   Completed 11/29/2022 1340       Created By   Anselmo Moscoso RN      Criteria Review      Care Day: 7 Care Date: 11/28/2022 Level of Care: Telemetry    Guideline Day 2    Clinical Status    ( ) * Hemodynamic stability    11/29/2022 13:35:22 EST by Liss Rojo      p 117, 116, 114, 116    ( ) * Sinus rhythm or acceptable ventricular rate    (X) * No evidence of acute myocardial ischemia    (X) * Mental status at baseline    (X) * Tachypnea absent    11/29/2022 13:35:22 EST by Liss Rojo      resp 18    ( ) * Hypoxemia absent    11/29/2022 13:35:22 EST by Liss Rojo      needing 2 lpm via nc    ( ) * Anticoagulants regimen for next level of care established    ( ) * Antiarrhythmic medication absent or no requirement for further inpatient ECG monitoring    ( ) * Discharge plans and education understood    Activity    (X) * Ambulatory or acceptable for next level of care    11/29/2022 13:40:05 EST by Liss Rojo      activity as tolerated with assist    Routes    (X) * Oral hydration    ( ) * Oral medications or regimen acceptable for next level of care    11/29/2022 13:38:17 EST by Liss Rojo      tiotropium bromide (SPIRIVA RESPIMAT) 2.5 mcg /actuation  Dose: 2 Puff  Freq: DAILY Route: IN    11/29/2022 13:38:09 EST by Liss Rojo      pravastatin (PRAVACHOL) tablet 40 mg  Dose: 40 mg  Freq: DAILY Route: PO    11/29/2022 13:37:59 EST by Maylin Platt      pantoprazole (PROTONIX) tablet 40 mg  Dose: 40 mg  Freq: DAILY Route: PO    11/29/2022 13:37:51 EST by Maylin Platt      montelukast (SINGULAIR) tablet 10 mg  Dose: 10 mg  Freq: DAILY Route: PO    11/29/2022 13:37:40 EST by Liss Rojo      methylPREDNISolone (PF) (SOLU-MEDROL) injection 40 mg  Dose: 40 mg  Freq: EVERY 6 HOURS Route: IV    11/29/2022 13:37:32 EST by Liss Rojo      levothyroxine (SYNTHROID) Pulmonary/RT Daily Note     Visit #        Rj Gipson 70 y.o. presented today for pulmonary rehab. She stated that there have been no changes in medication or health since last visit. She states she has tremors after her HHN which she has not taken in a few days, she's still awaiting her x-ray results. Rj Gipson has a target heart rate of 108-123. She tolerated the exercise session well. Patient states RPE for session today was  and RPD was a 3. Her pain level upon finishing exercise session is a 5. She has ongoing pain in her shoulder left.  Today the pt did:    Walkin min/ rest 3 min  Breathing retraining: 15 min  Bodyweight 15 min exercises: seated marches and extensions                 Education: 10 min mdi/spacer       Physician available for emergencies: Nupur Sanchez 2021 8:28 AM tablet 75 mcg  Dose: 75 mcg  Freq: DAILY BEFORE BREAKFAST Route: PO    11/29/2022 13:37:22 EST by Truechristine Loosen      hydrOXYchloroQUINE (PLAQUENIL) tablet 200 mg  Dose: 200 mg  Freq: DAILY WITH BREAKFAST Route: PO    11/29/2022 13:37:09 EST by Truechristine Loosen      gabapentin (NEURONTIN) capsule 400 mg  Dose: 400 mg  Freq: 2 TIMES DAILY Route: PO    11/29/2022 13:36:59 EST by Truechristine Loosen      DULoxetine (CYMBALTA) capsule 60 mg  Dose: 60 mg  Freq: DAILY Route: PO    11/29/2022 13:36:49 EST by Truechristine Loosen      doxycycline (VIBRAMYCIN) 100 mg in 0.9% sodium chloride (MBP/ADV) 100 mL MBP  Dose: 100 mg  Freq: EVERY 12 HOURS Route: IV    11/29/2022 13:36:29 EST by Yessenia He      budesonide-formoteroL (SYMBICORT) 160-4.5 mcg/actuation HFA inhaler 2 Puff  Dose: 2 Puff  Freq: 2 TIMES DAILY Route: IN    11/29/2022 13:35:58 EST by Yessenia He      albuterol-ipratropium (DUO-NEB) 2.5 MG-0.5 MG/3 ML  Dose: 3 mL  Freq: EVERY 6 HOURS AS NEEDED Route: NEBULIZATION x1 (x2/24 hrs)    11/29/2022 13:35:22 EST by Yessenia He      acetaminophen (TYLENOL) tablet 650 mg  Dose: 650 mg  Freq: EVERY 6 HOURS AS NEEDED Route: PO x1 ( x3/24 hrs)    (X) * Oral diet or acceptable for next level of care    11/29/2022 13:35:22 EST by Yessenia He      ADULT DIET Regular; Low Fat/Low Chol/High Fiber/2 gm N    Interventions    (X) Cardiac monitoring    Medications    (X) Anticoagulants    11/29/2022 13:36:20 EST by Truechristine He      apixaban (ELIQUIS) tablet 5 mg  Dose: 5 mg  Freq: 2 TIMES DAILY Route: PO    (X) Possible rate and rhythm control medications    11/29/2022 13:38:39 EST by Maylin Chavira      metoprolol tartrate (LOPRESSOR) tablet 25 mg  Dose: 25 mg  Freq: 2 TIMES DAILY Route: PO    11/29/2022 13:36:39 EST by Maylin Chavira      dilTIAZem ER (CARDIZEM CD) capsule 240 mg  Dose: 240 mg  Freq: 2 TIMES DAILY Route: PO    * Milestone   Additional Notes   11/28/22      CARDIOLOGY:   Assessment/Plan:        1.  Atrial fibrillation with rapid ventricular response. Patient's rate control of atrial fibrillation is better at rest but increases with activities due to underlying advanced COPD. Patient heart rate with out of bed activities goes [id number] making her lightheaded. The patient has maxed out on long-acting diltiazem. Cannot increase metoprolol dose as is likely to make her COPD worse. metoprolol 25 mg twice daily along with anticoagulation by Eliquis. Patient is reluctant to use amiodarone due to her advanced COPD. She will be high risk AV node ablation with permanent pacemaker as she is not likely to tolerate sedation due to her advanced COPD. The options  of optimizing rate control are limited. This was explained to the patient at length. Patient now wants to go home. 2. NSVT. Patient's episodic NSVT appears to be due to her hypoxic respiratory failure. Patient's recent echocardiogram has shown preserved LV systolic function. I will continue current regimen of beta-blocker with observation. 3. History of GERD with GI bleed 5 years ago. Continue Protonix. 4. COPD, on home oxygen. 5. Pulmonary hypertension related to advanced COPD. PA systolic pressure of 47 mmHg. Normal LV function by echo. Visit Vitals   /76 (BP 1 Location: Right upper arm, BP Patient Position: At rest)   Pulse (!) 114   Temp 97.7 °F (36.5 °C)   Resp 18   Ht 5' 4\" (1.626 m)   Wt 60.3 kg (133 lb)   SpO2 94%   Breastfeeding No   BMI 22.83 kg/m²         General Appearance: Well developed, well nourished, alert; no acute distress. Ears/Nose/Mouth/Throat: Hearing grossly normal; moist mucous membranes   Neck: Supple. Chest: Lungs scattered rhonchi to auscultation bilaterally. Cardiovascular: Irregular rate and rhythm, S1S2 normal, no murmur. Abdomen: Soft, non-tender, bowel sounds are active. Extremities: No edema bilaterally. Skin: Warm and dry. PULMONARY:   ASSESSMENT:       1.   Acute on chronic respiratory failure with hypoxia and hypercapnia   2. Acute exacerbation of COPD   3. Paroxysmal atrial fibrillation with rapid ventricular response   4. Left-sided pleuritic chest pain   5. Hypertension   6. Depression      Plan:      Continue nasal cannula oxygen as needed to keep saturation above 92%       Start BiPAP 14/8, rate of 16, effort to 30%   Wwill use continuously for now with brakes for meals   Further changes in BiPAP setting based on clinical response and ABG results      Patient in rapid A. fib   Has been complaining of headache when she stands up. She may need AV node ablation with permanent pacemaker if rate control remains an issue . Left-sided pleuritic chest pain      CT angiogram of chest did not show any pulmonary embolism. Showed cardiomegaly and changes of emphysema. Acute exacerbation of COPD   Continue Solu-Medrol   Continue Rocephin   Added doxycycline for atypical coverage   D/C Nebulizers as it is causing jitteryness. Start Spiriva. Cardizem and atenolol.      placed on Lasix 40 mg IV   Check electrolytes and replace as needed   Intake and output charting   Monitor renal function with fluid changes      Continue blood pressure medications   Cymbalta 60 mg daily   Neurontin 400 mg twice a day       DVT and GI prophylaxis       Questions of patient were answered at bedside in detail   Case discussed in detail with RN, RT, and care team   Thank you for involving me in the care of this patient   I will follow with you closely during hospitalization         INTERNAL MEDICINE:   Assessment:       A. fib with rapid ventricular rate   COPD   Chronic hypoxic respiratory failure on 3 L   Hypertension   Hypothyroidism   Depression   Neuropathy   UTI   Leukocytosis with steroids           Plan:       Cardizem 240 mg twice a day   COPD follow-up with pulmonologist   Continue other home medications   Continue Lasix 40 mg   Continue Solumedrol, nebulizers as needed   Continue doxycycline        Continue to monitor heart rate       Patient seen by the pulmonologist and plan for put on BiPAP but patient refused       Will do PT OT consult if stable possible discharge home tomorrow             11/28/22 10:03   pH: 7.31 (L)   PCO2: 59 (H)   BICARBONATE: 29 (H)   Carboxy-Hgb: 0.3 (L)           Atrial Fibrillation - Care Day 4 (11/25/2022) by Vickie Andrews RN       Review Status Review Entered   Completed 11/28/2022 1524       Created By   Vickie Andrews RN      Criteria Review      Care Day: 4 Care Date: 11/25/2022 Level of Care: Telemetry    Guideline Day 2    Clinical Status    ( ) * Hemodynamic stability    ( ) * Sinus rhythm or acceptable ventricular rate    ( ) * No evidence of acute myocardial ischemia    ( ) * Mental status at baseline    ( ) * Tachypnea absent    ( ) * Hypoxemia absent    ( ) * Anticoagulants regimen for next level of care established    ( ) * Antiarrhythmic medication absent or no requirement for further inpatient ECG monitoring    ( ) * Discharge plans and education understood    Activity    ( ) * Ambulatory or acceptable for next level of care    Routes    (X) * Oral hydration    ( ) * Oral medications or regimen acceptable for next level of care    11/28/2022 15:24:16 EST by Ryann Watson      albuterol-ipratropium (DUO-NEB) 2.5 MG-0.5 MG/3 ML  Dose: 3 mL  Freq: EVERY 6 HOURS RESP Route: NEBULIZATION    11/28/2022 15:24:03 EST by Ryann Watson      cefTRIAXone (ROCEPHIN) 1 g in sterile water (preservative free) 10 mL IV syringe  Dose: 1 g  Freq: EVERY 24 HOURS Route: IV    11/28/2022 15:23:49 EST by Maylin Felix      pravastatin (PRAVACHOL) tablet 40 mg  Dose: 40 mg  Freq: DAILY Route: PO    11/28/2022 15:23:34 EST by Ryann Watson      polyethylene glycol (MIRALAX) packet 17 g  Dose: 17 g  Freq: DAILY Route: PO    11/28/2022 15:23:28 EST by Ryann Watson      pantoprazole (PROTONIX) tablet 40 mg  Dose: 40 mg  Freq: DAILY Route: PO    11/28/2022 15:23:09 EST by Maylin Felix      montelukast (SINGULAIR) tablet 10 mg  Dose: 10 mg  Freq: DAILY Route: PO    11/28/2022 15:22:59 EST by Dorothy Green      metoprolol tartrate (LOPRESSOR) tablet 25 mg  Dose: 25 mg  Freq: 2 TIMES DAILY Route: PO    11/28/2022 15:22:49 EST by Dorothy Green      methylPREDNISolone (PF) (SOLU-MEDROL) injection 40 mg  Dose: 40 mg  Freq: EVERY 6 HOURS Route: IV    11/28/2022 15:22:39 EST by Dorothy Green      levothyroxine (SYNTHROID) tablet 75 mcg  Dose: 75 mcg  Freq: DAILY BEFORE BREAKFAST Route: PO    11/28/2022 15:22:31 EST by Dorothy Green      hydrOXYchloroQUINE (PLAQUENIL) tablet 200 mg  Dose: 200 mg  Freq: DAILY WITH BREAKFAST Route: PO    11/28/2022 15:22:13 EST by Dorothy Green      gabapentin (NEURONTIN) capsule 400 mg  Dose: 400 mg  Freq: 2 TIMES DAILY Route: PO    11/28/2022 15:22:02 EST by Dorothy Green      DULoxetine (CYMBALTA) capsule 60 mg  Dose: 60 mg  Freq: DAILY Route: PO    11/28/2022 15:21:50 EST by Dorothy Green      doxycycline (VIBRAMYCIN) 100 mg in 0.9% sodium chloride (MBP/ADV) 100 mL MBP  Dose: 100 mg  Freq: EVERY 12 HOURS Route: IV    11/28/2022 15:21:40 EST by Dorothy Green      budesonide-formoteroL (SYMBICORT) 160-4.5 mcg/actuation HFA inhaler 2 Puff  Dose: 2 Puff  Freq: 2 TIMES DAILY Route: IN    ( ) * Oral diet or acceptable for next level of care    Interventions    (X) Cardiac monitoring    Medications    (X) Anticoagulants    11/28/2022 15:21:40 EST by Dorothy Green      apixaban (ELIQUIS) tablet 5 mg  Dose: 5 mg  Freq: 2 TIMES DAILY Route: PO    (X) Possible rate and rhythm control medications    11/28/2022 15:24:36 EST by Maylin Mondragon      dilTIAZem ER (CARDIZEM CD) capsule 240 mg  Dose: 240 mg  Freq: DAILY Route: PO  Start: 11/23/22 1400 End: 11/25/22 1324    11/28/2022 15:24:27 EST by Dorothy Green      dilTIAZem (CARDIZEM) 125 mg in 0.9% sodium chloride 125 mL ([id number])  Rate: 0-15 mL/hr Dose: 0-15 mg/hr  Freq: TITRATE Route: IV  Start: 11/22/22 1703 End: 11/25/22 1324    * Milestone   Additional Notes 11/25/22      INTERNAL MEDICINE:   Physical Exam:    General: Alert, cooperative, no distress, appears stated age. Eyes: Conjunctivae/corneas clear. PERRL, EOMs intact. Fundi benign   Ears: Normal TMs and external ear canals both ears. Nose: Nares normal. Septum midline. Mucosa normal. No drainage or sinus tenderness. Mouth/Throat: Lips, mucosa, and tongue normal. Teeth and gums normal.   Neck: Supple, symmetrical, trachea midline, no adenopathy, thyroid: no enlargment/tenderness/nodules, no carotid bruit and no JVD. Back:   Symmetric, no curvature. ROM normal. No CVA tenderness. Lungs:   Clear to auscultation bilaterally. Heart: Regular rate and rhythm, S1, S2 normal, no murmur, click, rub or gallop. Abdomen:   Soft, non-tender. Bowel sounds normal. No masses,  No organomegaly. Extremities: Extremities normal, atraumatic, no cyanosis or edema. Pulses: 2+ and symmetric all extremities. Skin: Skin color, texture, turgor normal. No rashes or lesions   Lymph nodes: Cervical, supraclavicular, and axillary nodes normal.   Neurologic: CNII-XII intact. Normal strength, sensation and reflexes throughout. Assessment:       Active Problems:     Atrial fibrillation with RVR ([address] (11/22/2022)         Atrial fibrillation ([address] (11/22/2022)               Plan: With Care. Cardiology to evaluate for discharge       Signed By: Reyna Álvarez MD      November 25, 2022           Visit Vitals   /76 (BP 1 Location: Right upper arm, BP Patient Position: Sitting)   Pulse (!) 125   Temp 98.7 °F (37.1 °C)   Resp 23   Ht 5' 4\" (1.626 m)   Wt 57.6 kg (127 lb)   SpO2 98%   Breastfeeding No   BMI 21.80 kg/m²         PULMONARY:   ASSESSMENT:       1. Acute on chronic respiratory failure with hypoxia   2. Acute exacerbation of COPD   3. Paroxysmal atrial fibrillation with rapid ventricular response   4. Left-sided pleuritic chest pain   5. Hypertension   6.   Depression      Plan:      Continue nasal cannula oxygen as needed to keep saturation above 92%      Patient in rapid A. fib   Has been complaining of left-sided pleuritic chest pain   Tachycardic therefore might Place back on Cardizem drip   CT angiogram of chest did not show any pulmonary embolism. Showed cardiomegaly and changes of emphysema. Acute exacerbation of COPD   Continue Solu-Medrol   Continue Rocephin   Added doxycycline for atypical coverage   Nebulizers as needed   Symbicort inhaler      Cardizem and atenolol. placed on Lasix 40 mg IV   Check electrolytes and replace as needed   Intake and output charting   Monitor renal function with fluid changes      Continue blood pressure medications   Cymbalta 60 mg daily   Neurontin 400 mg twice a day       DVT and GI prophylaxis            CARDIOLOGY:   Assessment/Plan:        1. Atrial fibrillation with rapid ventricular response. Patient's rate control of atrial fibrillation is better at rest but increases with activities due to underlying advanced COPD. I will increase patient's Cardizem CD from 240 mg daily to 360 mg daily. Continue metoprolol 25 mg twice daily along with anticoagulation by Eliquis. 2. History of GERD with GI bleed 5 years ago. Continue Protonix. 3. COPD, on home oxygen. 4.   Pulmonary hypertension related to advanced COPD. PA systolic pressure of 47 mmHg. Normal LV function by echo.              11/25/22 03:18   WBC: 18.2 (H)   NRBC: 0.1 (H)   RBC: 2.86 (L)   HGB: 8.6 (L)   HCT: 26.2 (L)   ABSOLUTE NRBC: 0.02 (H)      11/25/22 03:18   Sodium: 132 (L)   Anion gap: 4 (L)   Glucose: 170 (H)   BUN/Creatinine ratio: 25 (H)   Protein, total: 6.3 (L)   AST: 42 (H)

## 2022-12-05 ENCOUNTER — OFFICE VISIT (OUTPATIENT)
Dept: ORTHOPEDIC SURGERY | Age: 72
End: 2022-12-05
Payer: MEDICARE

## 2022-12-05 VITALS
HEART RATE: 70 BPM | HEIGHT: 61 IN | OXYGEN SATURATION: 100 % | BODY MASS INDEX: 40.78 KG/M2 | TEMPERATURE: 97.9 F | WEIGHT: 216 LBS

## 2022-12-05 DIAGNOSIS — M51.36 DDD (DEGENERATIVE DISC DISEASE), LUMBAR: ICD-10-CM

## 2022-12-05 DIAGNOSIS — M47.816 LUMBAR FACET ARTHROPATHY: Primary | ICD-10-CM

## 2022-12-05 PROCEDURE — G8432 DEP SCR NOT DOC, RNG: HCPCS | Performed by: PHYSICAL MEDICINE & REHABILITATION

## 2022-12-05 PROCEDURE — G8399 PT W/DXA RESULTS DOCUMENT: HCPCS | Performed by: PHYSICAL MEDICINE & REHABILITATION

## 2022-12-05 PROCEDURE — 1101F PT FALLS ASSESS-DOCD LE1/YR: CPT | Performed by: PHYSICAL MEDICINE & REHABILITATION

## 2022-12-05 PROCEDURE — G8756 NO BP MEASURE DOC: HCPCS | Performed by: PHYSICAL MEDICINE & REHABILITATION

## 2022-12-05 PROCEDURE — G8427 DOCREV CUR MEDS BY ELIG CLIN: HCPCS | Performed by: PHYSICAL MEDICINE & REHABILITATION

## 2022-12-05 PROCEDURE — G9899 SCRN MAM PERF RSLTS DOC: HCPCS | Performed by: PHYSICAL MEDICINE & REHABILITATION

## 2022-12-05 PROCEDURE — G8417 CALC BMI ABV UP PARAM F/U: HCPCS | Performed by: PHYSICAL MEDICINE & REHABILITATION

## 2022-12-05 PROCEDURE — G9711 PT HX TOT COL OR COLON CA: HCPCS | Performed by: PHYSICAL MEDICINE & REHABILITATION

## 2022-12-05 PROCEDURE — 1090F PRES/ABSN URINE INCON ASSESS: CPT | Performed by: PHYSICAL MEDICINE & REHABILITATION

## 2022-12-05 PROCEDURE — 1123F ACP DISCUSS/DSCN MKR DOCD: CPT | Performed by: PHYSICAL MEDICINE & REHABILITATION

## 2022-12-05 PROCEDURE — G8536 NO DOC ELDER MAL SCRN: HCPCS | Performed by: PHYSICAL MEDICINE & REHABILITATION

## 2022-12-05 PROCEDURE — 99213 OFFICE O/P EST LOW 20 MIN: CPT | Performed by: PHYSICAL MEDICINE & REHABILITATION

## 2022-12-05 RX ORDER — FUROSEMIDE 20 MG/1
20 TABLET ORAL DAILY
COMMUNITY
Start: 2022-11-23

## 2022-12-05 RX ORDER — LIDOCAINE 50 MG/G
2 PATCH TOPICAL EVERY 24 HOURS
Qty: 60 EACH | Refills: 0 | Status: SHIPPED | OUTPATIENT
Start: 2022-12-05

## 2022-12-05 NOTE — LETTER
12/5/2022    Patient: Macey Harley   YOB: 1950   Date of Visit: 12/5/2022     Cyn Tovar NP  [address] [facility] [address] [id number]  Via Fax: [phone]    Dear Cyn Tovar NP,      Thank you for referring Ms. Екатерина Florentino to [facility] for evaluation. My notes for this consultation are attached. If you have questions, please do not hesitate to call me. I look forward to following your patient along with you.       Sincerely,    Raymundo Quijano MD

## 2022-12-05 NOTE — PROGRESS NOTES
[address]  [address], [address]  [facility], [address]  Phone: [phone]  Fax: [phone]  : 1950  PCP: Radha Wiggins NP    PROGRESS NOTE      ASSESSMENT AND PLAN    Diagnoses and all orders for this visit:    1. Lumbar facet arthropathy  -     lidocaine (LIDODERM) 5 %; 2 Patches by TransDERmal route every twenty-four (24) hours. Apply patch to the affected area for 12 hours a day and remove for 12 hours a day. 2. DDD (degenerative disc disease), lumbar      Esperanza Hart is a 67 y.o. female with chronic axial low back pain. She has failed physical therapy, lumbar radiofrequency ablation, Gabapentin, baclofen. Unable to take NSAIDs due to history of partial nephrectomy. Dose adjustment. Increase Cymbalta to 30 mg BID or 60 mg qD. Patient may call for refills if BID dosing is effective. Trial of Lidoderm patches. If effective, refills through her mail pharmacy  Advised to continue HEP as tolerated. She is working with her PCP to get her into a pain management program.    Follow-up and Dispositions    Return if symptoms worsen or fail to improve. HISTORY OF PRESENT ILLNESS      Esperanza Hart is a 67 y.o. female presents for follow up of back pain. LV trial of Cymbalta 30 mg daily, given Toradol 15 mg IM injection. Her low back pain has remained unchanged, exacerbated with walking and standing. Denies sciatic pain. Denies numbness or tingling BLE. She tolerates Cymbalta 30 mg without benefit. Pt is compliant with her HEP sometimes    Reporting low iron and worsening shortness of breath. She is due to have infusions. Has history of CHF.     Pain Assessment  2022   Location of Pain Back   Location Modifiers -   Severity of Pain 4   Quality of Pain Aching   Quality of Pain Comment -   Duration of Pain Persistent   Frequency of Pain Constant   Date Pain First Started -   Date Pain First Started Comment - Aggravating Factors Walking;Standing   Aggravating Factors Comment -   Limiting Behavior Yes   Relieving Factors Nothing   Relieving Factors Comment -   Result of Injury No         Onset: years, worse since 2021     Investigations:   Ab/Pelv CT 9/2022: gallstone, small hiatal hernia, diverticulosis, DDD lumbar spine  L MRI 2/2022: DDD L4-5 with foraminal stenosis. FA L1-S1  Spine surgery consult: none     Treatments:  Physical therapy: years ago  Spinal injections: RFA right L4-5, L5-S1 9/2022 with no benefit, right MBB L4-5, L5-S1 x 2 8/2022 with 100% and 60% benefit  Spinal surgery- no  Beneficial medications: extra strength Tylenol   Failed medications: Voltaren 75 mg, NSAID (partial nephrectomy, CHF), Baclofen - ineffective, somnolence. Work Status: retired sedentary worker  Pertinent PMHx:  Anemia, HTN, CHF, asthma, GERD, left partial nephrectomy (non cancerous mass), colectomy (nonmalignant polyps), GFR 57 9/2022.  is a , had back surgery with ongoing pain. Was previously on fentanyl.     PHYSICAL EXAMINATION    Visit Vitals  Pulse 70   Temp 97.9 °F (36.6 °C) (Temporal)   Ht 5' 1\" (1.549 m)   Wt 216 lb (98 kg)   SpO2 100% Comment: RA   BMI 40.81 kg/m²     Tender right lumbar paraspinals  Hamstring tightness on the right  LE strength intact  SLR negative                Written by Mariah Bonner, as dictated by Yadi Ortega MD.

## 2022-12-05 NOTE — PROGRESS NOTES
Meghan Ohceleste presents today for   Chief Complaint   Patient presents with    Back Pain       Is someone accompanying this pt? no    Is the patient using any DME equipment during OV? no    Depression Screening:  3 most recent PHQ Screens 10/21/2022   PHQ Not Done Functional capacity motivation limits accuracy   Little interest or pleasure in doing things Not at all   Feeling down, depressed, irritable, or hopeless Not at all   Total Score PHQ 2 0   Trouble falling or staying asleep, or sleeping too much -   Feeling tired or having little energy -   Poor appetite, weight loss, or overeating -   Feeling bad about yourself - or that you are a failure or have let yourself or your family down -   Trouble concentrating on things such as school, work, reading, or watching TV -   Moving or speaking so slowly that other people could have noticed; or the opposite being so fidgety that others notice -   Thoughts of being better off dead, or hurting yourself in some way -   PHQ 9 Score -       Learning Assessment:  Learning Assessment 4/20/2022   PRIMARY LEARNER Patient   BARRIERS PRIMARY LEARNER -   PRIMARY LANGUAGE ENGLISH   LEARNER PREFERENCE PRIMARY DEMONSTRATION     -     -   ANSWERED BY patient   RELATIONSHIP SELF       Abuse Screening:  Abuse Screening Questionnaire 4/20/2022   Do you ever feel afraid of your partner? N   Are you in a relationship with someone who physically or mentally threatens you? N   Is it safe for you to go home? Y       Fall Risk  Fall Risk Assessment, last 12 mths 10/24/2022   Able to walk? Yes   Fall in past 12 months? 0   Do you feel unsteady? 0   Are you worried about falling 0   Is the gait abnormal? -         Coordination of Care:  1. Have you been to the ER, urgent care clinic since your last visit? no  Hospitalized since your last visit? no    2. Have you seen or consulted any other health care providers outside of the [address] [place] [address] since your last visit?  Yes, pulmonology Include any pap smears or colon screening.  No

## 2022-12-30 DIAGNOSIS — J45.50 POORLY CONTROLLED SEVERE PERSISTENT ASTHMA WITHOUT COMPLICATION: ICD-10-CM

## 2022-12-30 DIAGNOSIS — R06.09 DYSPNEA ON EXERTION: ICD-10-CM

## 2022-12-30 DIAGNOSIS — R53.81 PHYSICAL DECONDITIONING: ICD-10-CM

## 2022-12-30 DIAGNOSIS — E66.01 SEVERE OBESITY (BMI 35.0-35.9 WITH COMORBIDITY) (HCC): ICD-10-CM

## 2022-12-30 RX ORDER — BUDESONIDE AND FORMOTEROL FUMARATE DIHYDRATE 160; 4.5 UG/1; UG/1
2 AEROSOL RESPIRATORY (INHALATION) 2 TIMES DAILY
Qty: 1 EACH | Refills: 5 | Status: SHIPPED | OUTPATIENT
Start: 2022-12-30

## 2022-12-30 NOTE — TELEPHONE ENCOUNTER
Patient requesting Rx refill    budesonide-formoteroL (SYMBICORT) 160-4.5 mcg/actuation HFAA [[id number]}     tiotropium bromide (Spiriva Respimat) 1.25 mcg/actuation inhaler [[id number]]

## 2023-01-16 ENCOUNTER — TRANSCRIBE ORDER (OUTPATIENT)
Dept: SCHEDULING | Age: 73
End: 2023-01-16

## 2023-01-16 DIAGNOSIS — N20.0 URIC ACID NEPHROLITHIASIS: Primary | ICD-10-CM

## 2023-01-25 ENCOUNTER — OFFICE VISIT (OUTPATIENT)
Dept: CARDIOLOGY CLINIC | Age: 73
End: 2023-01-25
Payer: MEDICARE

## 2023-01-25 VITALS
HEART RATE: 65 BPM | DIASTOLIC BLOOD PRESSURE: 53 MMHG | OXYGEN SATURATION: 99 % | WEIGHT: 204 LBS | TEMPERATURE: 62 F | HEIGHT: 61 IN | BODY MASS INDEX: 38.51 KG/M2 | SYSTOLIC BLOOD PRESSURE: 138 MMHG

## 2023-01-25 DIAGNOSIS — E66.01 SEVERE OBESITY (BMI >= 40) (HCC): ICD-10-CM

## 2023-01-25 DIAGNOSIS — E78.00 HYPERCHOLESTEREMIA: ICD-10-CM

## 2023-01-25 DIAGNOSIS — I10 ESSENTIAL HYPERTENSION: ICD-10-CM

## 2023-01-25 DIAGNOSIS — I50.32 CHRONIC DIASTOLIC CONGESTIVE HEART FAILURE (HCC): Primary | ICD-10-CM

## 2023-01-25 PROCEDURE — 3075F SYST BP GE 130 - 139MM HG: CPT | Performed by: INTERNAL MEDICINE

## 2023-01-25 PROCEDURE — G8536 NO DOC ELDER MAL SCRN: HCPCS | Performed by: INTERNAL MEDICINE

## 2023-01-25 PROCEDURE — G8399 PT W/DXA RESULTS DOCUMENT: HCPCS | Performed by: INTERNAL MEDICINE

## 2023-01-25 PROCEDURE — G8417 CALC BMI ABV UP PARAM F/U: HCPCS | Performed by: INTERNAL MEDICINE

## 2023-01-25 PROCEDURE — G9899 SCRN MAM PERF RSLTS DOC: HCPCS | Performed by: INTERNAL MEDICINE

## 2023-01-25 PROCEDURE — 1101F PT FALLS ASSESS-DOCD LE1/YR: CPT | Performed by: INTERNAL MEDICINE

## 2023-01-25 PROCEDURE — G9711 PT HX TOT COL OR COLON CA: HCPCS | Performed by: INTERNAL MEDICINE

## 2023-01-25 PROCEDURE — G8432 DEP SCR NOT DOC, RNG: HCPCS | Performed by: INTERNAL MEDICINE

## 2023-01-25 PROCEDURE — 3078F DIAST BP <80 MM HG: CPT | Performed by: INTERNAL MEDICINE

## 2023-01-25 PROCEDURE — 1123F ACP DISCUSS/DSCN MKR DOCD: CPT | Performed by: INTERNAL MEDICINE

## 2023-01-25 PROCEDURE — G8427 DOCREV CUR MEDS BY ELIG CLIN: HCPCS | Performed by: INTERNAL MEDICINE

## 2023-01-25 PROCEDURE — 1090F PRES/ABSN URINE INCON ASSESS: CPT | Performed by: INTERNAL MEDICINE

## 2023-01-25 PROCEDURE — 99214 OFFICE O/P EST MOD 30 MIN: CPT | Performed by: INTERNAL MEDICINE

## 2023-01-25 RX ORDER — LANOLIN ALCOHOL/MO/W.PET/CERES
CREAM (GRAM) TOPICAL
COMMUNITY

## 2023-01-25 RX ORDER — METOPROLOL SUCCINATE 25 MG/1
25 TABLET, EXTENDED RELEASE ORAL DAILY
Qty: 90 TABLET | Refills: 3 | Status: SHIPPED | OUTPATIENT
Start: 2023-01-25

## 2023-01-25 NOTE — PROGRESS NOTES
HISTORY OF PRESENT ILLNESS  Audrey Lara is a 67 y.o. female. 2/22 seen after little over 5 years for palpitations and shortness of breath. Has gained about 50 to 60 pounds of weight in the last 1 year  4/22 symptoms are not improving so far and the results of echo and nuclear stress test will be discussed. Follow-up  Associated symptoms include shortness of breath. Palpitations   The history is provided by the Patient (fast pulse). This is a chronic problem. The current episode started more than 1 week ago (yrs). The problem has not changed since onset. The problem occurs rarely (2/wk). On average, each episode lasts 5 seconds. The problem is associated with nothing. Associated symptoms include shortness of breath. Pertinent negatives include no diaphoresis, no fever, no claudication, no orthopnea, no PND, no syncope, no nausea, no vomiting, no dizziness, no weakness, no cough, no hemoptysis and no sputum production. Risk factors include no risk factors. Her past medical history is significant for hyperthyroidism and anemia. Shortness of Breath  The history is provided by the Patient. This is a recurrent problem. The average episode lasts 5 minutes. The problem occurs intermittently. The current episode started more than 1 week ago (yrs). The problem has been gradually improving. Pertinent negatives include no fever, no ear pain, no neck pain, no cough, no sputum production, no hemoptysis, no wheezing, no PND, no orthopnea, no syncope, no vomiting, no rash, no leg swelling and no claudication. The problem's precipitants include exercise (Shopping in [employer] for about 20 minutes; 7/22 in house). She has had Prior hospitalizations. She has had Prior ED visits. Associated medical issues include chronic lung disease. Associated medical issues do not include CAD or heart failure. Review of Systems   Constitutional:  Negative for chills, diaphoresis, fever and weight loss.    HENT:  Negative for ear pain and hearing loss. Eyes:  Negative for blurred vision. Respiratory:  Positive for shortness of breath. Negative for cough, hemoptysis, sputum production, wheezing and stridor. Cardiovascular:  Positive for palpitations. Negative for orthopnea, claudication, leg swelling, syncope and PND. Gastrointestinal:  Negative for heartburn, nausea and vomiting. Musculoskeletal:  Negative for myalgias and neck pain. Skin:  Negative for rash. Neurological:  Negative for dizziness, tingling, tremors, focal weakness, loss of consciousness and weakness. Psychiatric/Behavioral:  Negative for depression and suicidal ideas.     Family History   Problem Relation Age of Onset    Hypertension Mother     Stroke Mother 80    Cancer Father     Heart Disease Father     Hypertension Father     Asthma Sister     Breast Problems Sister     Cancer Sister     Cancer Brother         lung cancer    Asthma Brother     Breast Cancer Maternal Grandmother 78    Cancer Brother     Breast Cancer Maternal Grandfather     Diabetes Paternal Uncle     Heart Attack Neg Hx        Past Medical History:   Diagnosis Date    Alopecia     Arthritis     Asthma     Chronic lung disease     GERD (gastroesophageal reflux disease)     Hypercholesteremia     Hypertension     Hyperthyroidism     Thyroid disease        Past Surgical History:   Procedure Laterality Date    COLONOSCOPY N/A 2/21/2018    COLONOSCOPY/polypectomy/polyloop/ink tatoo  performed by Sneha Mccann MD at [facility] ENDOSCOPY    COLONOSCOPY N/A 2/24/2021    COLONOSCOPY with Polypectomies performed by Eduin Coon MD at [facility] ENDOSCOPY    HX BUNIONECTOMY      bilateral and \"removed some arthritis in feet\"    HX CATARACT REMOVAL      with implants    HX COLONOSCOPY  08/2015    HX HAMMER TOE REPAIR      right     HX HEMORRHOIDECTOMY      HX HYSTERECTOMY N/A     HX NEPHRECTOMY Left 2019    partial    HX ORTHOPAEDIC      HX OTHER SURGICAL      keiloid removed off chest near shoulder area    HX TOTAL COLECTOMY  10/06/2015    lap right hemicolectomy due to polyp-nonmalignant    HX [place] BREAST BIOPSY Right 2013    benign       Social History     Tobacco Use    Smoking status: Former     Packs/day: 0.50     Years: 5.00     Pack years: 2.50     Types: Cigarettes     Start date: 3/13/1972     Quit date: 3/13/1977     Years since quittin.9    Smokeless tobacco: Never    Tobacco comments:     quit smoking in    Substance Use Topics    Alcohol use: Not Currently     Alcohol/week: 0.0 standard drinks     Comment: Once a year       Allergies   Allergen Reactions    Latex Swelling and Contact Dermatitis    Latex, Natural Rubber Itching    Vicodin [Hydrocodone-Acetaminophen] Itching       Outpatient Medications Marked as Taking for the 23 encounter (Office Visit) with Marlene Kitchen MD   Medication Sig Dispense Refill    ferrous sulfate (Iron) 325 mg (65 mg iron) tablet Take  by mouth Daily (before breakfast). tiotropium bromide (Spiriva Respimat) 1.25 mcg/actuation inhaler Take 2 Puffs by inhalation daily. 1 Each 5    budesonide-formoteroL (SYMBICORT) 160-4.5 mcg/actuation HFAA Take 2 Puffs by inhalation two (2) times a day. Rinse and gargle thoroughly after each use. Disp 3 inhalers 1 Each 5    lidocaine (LIDODERM) 5 % 2 Patches by TransDERmal route every twenty-four (24) hours. Apply patch to the affected area for 12 hours a day and remove for 12 hours a day. (Patient taking differently: 2 Patches by TransDERmal route as needed. Apply patch to the affected area for 12 hours a day and remove for 12 hours a day.) 60 Each 0    DULoxetine (CYMBALTA) 30 mg capsule TAKE 1 CAPSULE BY MOUTH DAILY WITH DINNER 30 Capsule 0    acetaminophen (TYLENOL) 500 mg tablet Take 2 Tablets by mouth every six (6) hours as needed. metoprolol succinate (TOPROL-XL) 25 mg XL tablet Take 1 Tablet by mouth daily.  10 Tablet 1    cholecalciferol (VITAMIN D3) (5000 Units/125 mcg) tab tablet Take 5,000 Units by mouth daily. hydroCHLOROthiazide (HYDRODIURIL) 12.5 mg tablet Take 12.5 mg by mouth daily. albuterol 2.5 mg /3 mL (0.083 %) nebu 3 mL, albuterol-ipratropium 2.5 mg-0.5 mg/3 ml nebu 3 mL 1 Dose by Nebulization route every four (4) hours as needed for Wheezing or Shortness of Breath. simvastatin (ZOCOR) 40 mg tablet Take 40 mg by mouth nightly. aspirin delayed-release 81 mg tablet Take 81 mg by mouth daily. montelukast (SINGULAIR) 10 mg tablet Take 10 mg by mouth daily. albuterol (PROVENTIL HFA, VENTOLIN HFA, PROAIR HFA) 90 mcg/actuation inhaler Take 2 Puffs by inhalation every four (4) hours as needed for Wheezing. omeprazole (PRILOSEC) 20 mg capsule Take 20 mg by mouth daily. 9/20 echo  Interpretation Summary    LV: Estimated LVEF is 60 - 65%. Visually measured ejection fraction. Normal cavity size, wall thickness and systolic function (ejection fraction normal). Wall motion: normal. Mild (grade 1) left ventricular diastolic dysfunction. LA: Left Atrium volume index is 29.54 mL/m2. RV: Not well visualized. TV: Mild tricuspid valve regurgitation is present. PA: Pulmonary arterial systolic pressure is 30 mmHg. Pericardium: Pericardial fat pad present. MV: Mild mitral valve regurgitation is present. Comparison Study Information      Prior Study    There is a prior study available for comparison. Prior study date: 10/7/2015. As compared to the previous study, there are no significant changes. 04/14/22    ECHO ADULT COMPLETE 04/14/2022 4/14/2022    Interpretation Summary    Left Ventricle: Left ventricle size is normal. Mildly increased wall thickness. Normal wall motion. Normal left ventricular systolic function with a visually estimated EF of 55 - 60%. Normal diastolic function. Tricuspid Valve: Unable to assess RVSP due to inadequate or insignificant tricuspid regurgitation.     Left Atrium: Left atrial volume index is normal (16-34 mL/m2). LA Vol Index A/L is 29 mL/m2. Signed by: Juanita Jovel DO on 4/14/2022  3:55 PM  03/14/22    NUCLEAR CARDIAC STRESS TEST 03/14/2022 3/14/2022    Interpretation Summary    Nuclear Findings: LVEF measures 76%. TID ratio is 1.06. Nuclear Findings: LV perfusion is normal. There is no evidence of inducible ischemia. Nuclear Findings: Normal left ventricular systolic function post-stress. LVEF measures 76%. Nuclear Findings: Findings suggest a low risk of myocardial ischemia. ECG: Resting ECG demonstrates normal sinus rhythm. ECG: Stress ECG was not diagnostic due to resting ST-T abnormalities. Stress Test: A pharmacological stress test was performed using lexiscan. Blood pressure demonstrated a normal response and heart rate demonstrated a normal response to stress. The patient's heart rate recovery was normal. The patient reported no symptoms during the stress test.    Signed by: Isaura Nair MD on 3/14/2022 12:56 PM      Visit Vitals  BP (!) 138/53   Pulse 65   Temp (!) 62 °F (16.7 °C)   Ht 5' 1\" (1.549 m)   Wt 92.5 kg (204 lb)   SpO2 99%   BMI 38.55 kg/m²       Physical Exam  Constitutional:       General: She is not in acute distress. Appearance: She is well-developed. She is obese. She is not diaphoretic. HENT:      Head: Atraumatic. Mouth/Throat:      Pharynx: No oropharyngeal exudate. Eyes:      General: No scleral icterus. Conjunctiva/sclera: Conjunctivae normal.   Neck:      Vascular: No JVD. Comments: Thyroid bruit absent  Cardiovascular:      Rate and Rhythm: Normal rate and regular rhythm. Heart sounds: Murmur (2/6 systolic murmur heard at aortic and mitral area with no radiation) heard. No gallop. Pulmonary:      Effort: Pulmonary effort is normal.      Breath sounds: Normal breath sounds. No stridor. No wheezing or rales. Abdominal:      Palpations: Abdomen is soft. Tenderness: There is no abdominal tenderness.    Musculoskeletal: General: No tenderness. Normal range of motion. Cervical back: Neck supple. Right lower leg: No edema. Left lower leg: No edema. Skin:     General: Skin is warm. Neurological:      Mental Status: She is alert and oriented to person, place, and time. Motor: No abnormal muscle tone. Psychiatric:         Behavior: Behavior normal.       ASSESSMENT and PLAN     Latest Reference Range & Units 8/27/20 08:21 7/14/21 09:23 1/18/22 09:13 11/17/22 09:02   Triglyceride <150 MG/DL 91 73 129 87   Cholesterol, total <200 MG/ 148 179 133   HDL Cholesterol 40 - 60 MG/DL 90 (H) 79 (H) 68 (H) 62 (H)   CHOL/HDL Ratio 0 - 5.0   1.7 1.9 2.6 2.1   LDL, calculated 0 - 100 MG/DL 46.8 54.4 85.2 53.6   VLDL, calculated MG/DL 18.2 14.6 25.8 17.4   (H): Data is abnormally high      12/16 Patient seen in hospital for elevated cardiac enzyme- probably secondary to profound anemia and tachycardia from hyperthyroidism. Has mild stable dyspnea. No chest pain. ETT revealed 1-2 mm ST segment depression with no chest pain. Patient remains asymtpomatic. Will continue risk factor modification. Patient had significant thyroid bruit and known thyroid nodules- now has resolved. Continue intense risk factor modification. 2/23/2022 dyspnea could be due to significant weight gain and underlying hyperthyroidism history but underlying cardiac structural and functional disease and ischemia should be ruled out given her age and risk factors. Check echo and a stress test.  Healthy diet was discussed and Mediterranean diet guidelines given. Would like to increase the dose of methimazole and see how she responds symptomatically given palpitations and dyspnea. Discussed with patient and she will discuss with her endocrinologist, Dr. Alan George. 4/20/2022 cardiac testing has not shown any ischemia and systolic function is normal.  Patient has chronic diastolic CHF most likely.   Since heart rate is high add low-dose beta-blocker and increase as tolerated. Discussed with patient and explained. Might exacerbate her asthma if it is due to bronchial asthma. She will watch out and let us know. Follow home BP and heart rate chart in 2 weeks or so. Diet weight and exercise reinforced. She has been reading about the diet but not changing yet but will try now. Discussed with her pulmonologist on phone Dr. Parish Menjivar regarding the use of beta-blockers and Xolair. He was okay to use both the medications together without any significant concern. 7/27/2022 CHF is clinically compensated but NYHA class III. No clinical fluid overload. Blood pressure is controlled with low diastolic but no symptoms. Continue the same medications. Periodic follow-up of electrolytes renal and liver function. Recent thyroid function normal per patient. We will try to get those copies from Dr. Latonia Crow. Detailed discussion regarding diet weight and exercise. Recommended that she walk more and cut down her portion sizes. Diagnoses and all orders for this visit:    1. Chronic diastolic congestive heart failure (HCC)  -     metoprolol succinate (TOPROL-XL) 25 mg XL tablet; Take 1 Tablet by mouth daily.  -     METABOLIC PANEL, BASIC; Future    2. Essential hypertension    3. Hypercholesteremia    4. Severe obesity (BMI >= 40) (HCC)        Pertinent laboratory and test data reviewed and discussed with patient. See patient instructions also for other medical advice given    Medications Discontinued During This Encounter   Medication Reason    furosemide (LASIX) 20 mg tablet Therapy Completed    metoprolol succinate (TOPROL-XL) 25 mg XL tablet REORDER       Follow-up and Dispositions    Return in about 6 months (around 7/25/2023), or if symptoms worsen or fail to improve, for post test.       1/25/2023 CHF is clinically improving. Functional capacity is better as per patient but still NYHA class III  Blood pressure is controlled.   Would not worry about low diastolic pressure yet as there is no dizziness. Lipids are excellent  She is losing weight well and she was congratulated. Encouraged to continue diet and exercise.

## 2023-01-25 NOTE — PROGRESS NOTES
1. Have you been to the ER, urgent care clinic since your last visit? Hospitalized since your last visit? Yes When: 9/2022 Where: [facility] Reason for visit: abdominal pain    2. Have you seen or consulted any other health care providers outside of the [address] [place] [address] since your last visit? Include any pap smears or colon screening. Yes Where: pcp      3. Since your last visit, have you had any of the following symptoms?      palpitations and shortness of breath. 4.  Have you had any blood work, X-rays or cardiac testing? No         5. Where do you normally have your labs drawn? 6. Do you need any refills today?    yes

## 2023-01-25 NOTE — PATIENT INSTRUCTIONS
Learning About the [facility] Diet  What is the Mediterranean diet? The Mediterranean diet is a style of eating rather than a diet plan. It features foods eaten in [place], [place], [place] and [place], and other countries along the [facility]. It emphasizes eating foods like fish, fruits, vegetables, beans, high-fiber breads and whole grains, nuts, and olive oil. This style of eating includes limited red meat, cheese, and sweets. Why choose the Mediterranean diet? A Mediterranean-style diet may improve heart health. It contains more fat than other heart-healthy diets. But the fats are mainly from nuts, unsaturated oils (such as fish oils and olive oil), and certain nut or seed oils (such as canola, soybean, or flaxseed oil). These fats may help protect the heart and blood vessels. How can you get started on the Mediterranean diet? Here are some things you can do to switch to a more Mediterranean way of eating. What to eat  Eat a variety of fruits and vegetables each day, such as grapes, blueberries, tomatoes, broccoli, peppers, figs, olives, spinach, eggplant, beans, lentils, and chickpeas. Eat a variety of whole-grain foods each day, such as oats, brown rice, and whole wheat bread, pasta, and couscous. Eat fish at least 2 times a week. Try tuna, salmon, mackerel, lake trout, herring, or sardines. Eat moderate amounts of low-fat dairy products, such as milk, cheese, or yogurt. Eat moderate amounts of poultry and eggs. Choose healthy (unsaturated) fats, such as nuts, olive oil, and certain nut or seed oils like canola, soybean, and flaxseed. Limit unhealthy (saturated) fats, such as butter, palm oil, and coconut oil. And limit fats found in animal products, such as meat and dairy products made with whole milk. Try to eat red meat only a few times a month in very small amounts. Limit sweets and desserts to only a few times a week. This includes sugar-sweetened drinks like soda.   The Mediterranean diet may also include red wine with your meal--1 glass each day for women and up to 2 glasses a day for men. Tips for eating at home  Use herbs, spices, garlic, lemon zest, and citrus juice instead of salt to add flavor to foods. Add avocado slices to your sandwich instead of lindsey. Have fish for lunch or dinner instead of red meat. Brush the fish with olive oil, and broil or grill it. Sprinkle your salad with seeds or nuts instead of cheese. Cook with olive or canola oil instead of butter or oils that are high in saturated fat. Switch from 2% milk or whole milk to 1% or fat-free milk. Dip raw vegetables in a vinaigrette dressing or hummus instead of dips made from mayonnaise or sour cream.  Have a piece of fruit for dessert instead of a piece of cake. Try baked apples, or have some dried fruit. Tips for eating out  Try broiled, grilled, baked, or poached fish instead of having it fried or breaded. Ask your  to have your meals prepared with olive oil instead of butter. Order dishes made with marinara sauce or sauces made from olive oil. Avoid sauces made from cream or mayonnaise. Choose whole-grain breads, whole wheat pasta and pizza crust, brown rice, beans, and lentils. Cut back on butter or margarine on bread. Instead, you can dip your bread in a small amount of olive oil. Ask for a side salad or grilled vegetables instead of french fries or chips. Where can you learn more? Go to http://www.durán.com/  Enter O407 in the search box to learn more about \"Learning About the Mediterranean Diet. \"  Current as of: May 9, 2022               Content Version: 13.4  © 1321-0371 [employer]. Care instructions adapted under license by [employer] (which disclaims liability or warranty for this information).  If you have questions about a medical condition or this instruction, always ask your healthcare professional. Mary Cook disclaims any warranty or liability for your use of this information.

## 2023-01-30 ENCOUNTER — HOSPITAL ENCOUNTER (OUTPATIENT)
Dept: ULTRASOUND IMAGING | Age: 73
Discharge: HOME | End: 2023-01-30
Attending: NURSE PRACTITIONER
Payer: MEDICARE

## 2023-01-30 DIAGNOSIS — N20.0 URIC ACID NEPHROLITHIASIS: ICD-10-CM

## 2023-01-30 PROCEDURE — 76770 US EXAM ABDO BACK WALL COMP: CPT

## 2023-02-05 DIAGNOSIS — I50.32 CHRONIC DIASTOLIC CONGESTIVE HEART FAILURE (HCC): Primary | ICD-10-CM

## 2023-03-08 ENCOUNTER — TRANSCRIBE ORDERS (OUTPATIENT)
Facility: HOSPITAL | Age: 73
End: 2023-03-08

## 2023-03-08 DIAGNOSIS — Z12.31 VISIT FOR SCREENING MAMMOGRAM: Primary | ICD-10-CM

## 2023-03-13 ENCOUNTER — HOSPITAL ENCOUNTER (OUTPATIENT)
Facility: HOSPITAL | Age: 73
Discharge: HOME | End: 2023-03-16
Payer: MEDICARE

## 2023-03-13 DIAGNOSIS — Z12.31 VISIT FOR SCREENING MAMMOGRAM: ICD-10-CM

## 2023-03-13 PROCEDURE — 77067 SCR MAMMO BI INCL CAD: CPT

## 2023-04-03 RX ORDER — METOPROLOL SUCCINATE 25 MG/1
TABLET, EXTENDED RELEASE ORAL
COMMUNITY

## 2023-04-03 RX ORDER — ASPIRIN 81 MG/1
81 TABLET ORAL DAILY
COMMUNITY

## 2023-04-03 RX ORDER — MONTELUKAST SODIUM 10 MG/1
TABLET ORAL
COMMUNITY

## 2023-04-03 RX ORDER — HYDROCHLOROTHIAZIDE 12.5 MG/1
TABLET ORAL
COMMUNITY

## 2023-04-03 NOTE — PERIOP NOTE
PRE-SURGICAL INSTRUCTIONS        Patient's Name:  Vandana LUCAS Date:  4/3/2023            Covid Testing Date and Time:    Surgery Date: 4/7/2023                Do NOT eat or drink anything, including candy, gum, or ice chips after midnight on 4/7, unless you have specific instructions from your surgeon or anesthesia provider to do so. You may brush your teeth before coming to the hospital.  No smoking 24 hours prior to the day of surgery. No alcohol 24 hours prior to the day of surgery. No recreational drugs for one week prior to the day of surgery. Leave all valuables, including money/purse, at home. Remove all jewelry, nail polish, acrylic nails, and makeup (including mascara); no lotions powders, deodorant, or perfume/cologne/after shave on the skin. Follow instruction for Hibiclens washes and CHG wipes from surgeon's office. Glasses/contact lenses and dentures may be worn to the hospital.  They will be removed prior to surgery. Call your doctor if symptoms of a cold or illness develop within 24-48 hours prior to your surgery. 11.  If you are having an outpatient procedure, please make arrangements for a responsible ADULT TO [address] [place] [address] and stay with you for 24 hours after your surgery. 12. ONE VISITOR in the hospital at this time for outpatient procedures. Exceptions may be made for surgical admissions, per nursing unit guidelines      Special Instructions:      Bring list of CURRENT medications. Bring inhaler. Bring CPAP machine. Bring any pertinent legal medical records. Take these medications the morning of surgery with a sip of water:  per MD  Follow physician instructions about insulin. Follow physician instructions about stopping anticoagulants. Complete bowel prep per MD instructions. On the day of surgery, come in the main entrance of [facility]. Let the  at the desk know you are there for surgery.   A staff member will come

## 2023-04-06 ENCOUNTER — ANESTHESIA EVENT (OUTPATIENT)
Facility: HOSPITAL | Age: 73
End: 2023-04-06
Payer: MEDICARE

## 2023-04-07 ENCOUNTER — ANESTHESIA (OUTPATIENT)
Facility: HOSPITAL | Age: 73
End: 2023-04-07
Payer: MEDICARE

## 2023-04-07 ENCOUNTER — HOSPITAL ENCOUNTER (OUTPATIENT)
Facility: HOSPITAL | Age: 73
Setting detail: OUTPATIENT SURGERY
Discharge: HOME | End: 2023-04-07
Attending: STUDENT IN AN ORGANIZED HEALTH CARE EDUCATION/TRAINING PROGRAM | Admitting: STUDENT IN AN ORGANIZED HEALTH CARE EDUCATION/TRAINING PROGRAM
Payer: MEDICARE

## 2023-04-07 VITALS
OXYGEN SATURATION: 99 % | HEIGHT: 61 IN | TEMPERATURE: 98.5 F | WEIGHT: 196.56 LBS | HEART RATE: 68 BPM | SYSTOLIC BLOOD PRESSURE: 136 MMHG | DIASTOLIC BLOOD PRESSURE: 60 MMHG | RESPIRATION RATE: 21 BRPM | BODY MASS INDEX: 37.11 KG/M2

## 2023-04-07 PROCEDURE — 2500000003 HC RX 250 WO HCPCS: Performed by: NURSE ANESTHETIST, CERTIFIED REGISTERED

## 2023-04-07 PROCEDURE — 2580000003 HC RX 258: Performed by: NURSE ANESTHETIST, CERTIFIED REGISTERED

## 2023-04-07 PROCEDURE — 6360000002 HC RX W HCPCS: Performed by: NURSE ANESTHETIST, CERTIFIED REGISTERED

## 2023-04-07 PROCEDURE — 88305 TISSUE EXAM BY PATHOLOGIST: CPT

## 2023-04-07 RX ORDER — LIDOCAINE HYDROCHLORIDE 10 MG/ML
1 INJECTION, SOLUTION EPIDURAL; INFILTRATION; INTRACAUDAL; PERINEURAL
Status: DISCONTINUED | OUTPATIENT
Start: 2023-04-07 | End: 2023-04-07 | Stop reason: HOSPADM

## 2023-04-07 RX ORDER — SODIUM CHLORIDE 0.9 % (FLUSH) 0.9 %
5-40 SYRINGE (ML) INJECTION PRN
Status: DISCONTINUED | OUTPATIENT
Start: 2023-04-07 | End: 2023-04-07 | Stop reason: HOSPADM

## 2023-04-07 RX ORDER — PROPOFOL 10 MG/ML
INJECTION, EMULSION INTRAVENOUS CONTINUOUS PRN
Status: DISCONTINUED | OUTPATIENT
Start: 2023-04-07 | End: 2023-04-07 | Stop reason: SDUPTHER

## 2023-04-07 RX ORDER — ONDANSETRON 2 MG/ML
4 INJECTION INTRAMUSCULAR; INTRAVENOUS
Status: CANCELLED | OUTPATIENT
Start: 2023-04-07 | End: 2023-04-08

## 2023-04-07 RX ORDER — LIDOCAINE HYDROCHLORIDE 20 MG/ML
INJECTION, SOLUTION EPIDURAL; INFILTRATION; INTRACAUDAL; PERINEURAL PRN
Status: DISCONTINUED | OUTPATIENT
Start: 2023-04-07 | End: 2023-04-07 | Stop reason: SDUPTHER

## 2023-04-07 RX ORDER — SODIUM CHLORIDE 0.9 % (FLUSH) 0.9 %
5-40 SYRINGE (ML) INJECTION EVERY 12 HOURS SCHEDULED
Status: DISCONTINUED | OUTPATIENT
Start: 2023-04-07 | End: 2023-04-07 | Stop reason: HOSPADM

## 2023-04-07 RX ORDER — SODIUM CHLORIDE 9 MG/ML
INJECTION, SOLUTION INTRAVENOUS PRN
Status: DISCONTINUED | OUTPATIENT
Start: 2023-04-07 | End: 2023-04-07 | Stop reason: HOSPADM

## 2023-04-07 RX ADMIN — PROPOFOL 150 MCG/KG/MIN: 10 INJECTION, EMULSION INTRAVENOUS at 09:27

## 2023-04-07 RX ADMIN — PROPOFOL 50 MG: 10 INJECTION, EMULSION INTRAVENOUS at 09:27

## 2023-04-07 RX ADMIN — SODIUM CHLORIDE: 9 INJECTION, SOLUTION INTRAVENOUS at 09:05

## 2023-04-07 RX ADMIN — LIDOCAINE HYDROCHLORIDE 20 MG: 20 INJECTION, SOLUTION EPIDURAL; INFILTRATION; INTRACAUDAL; PERINEURAL at 09:27

## 2023-04-07 ASSESSMENT — PAIN - FUNCTIONAL ASSESSMENT
PAIN_FUNCTIONAL_ASSESSMENT: 0-10
PAIN_FUNCTIONAL_ASSESSMENT: NONE - DENIES PAIN
PAIN_FUNCTIONAL_ASSESSMENT: NONE - DENIES PAIN

## 2023-04-07 ASSESSMENT — ENCOUNTER SYMPTOMS: SHORTNESS OF BREATH: 1

## 2023-04-07 NOTE — ANESTHESIA PRE PROCEDURE
09:02 AM    HCT 29.5 11/17/2022 09:02 AM    MCV 66.0 11/17/2022 09:02 AM    RDW 20.5 11/17/2022 09:02 AM     11/17/2022 09:02 AM       CMP:   Lab Results   Component Value Date/Time     02/07/2023 09:39 AM    K 3.7 02/07/2023 09:39 AM     02/07/2023 09:39 AM    CO2 23 02/07/2023 09:39 AM    BUN 10 02/07/2023 09:39 AM    CREATININE 1.18 02/07/2023 09:39 AM    GFRAA 57 09/18/2022 02:50 AM    AGRATIO 1.0 11/17/2022 09:02 AM    LABGLOM 49 02/07/2023 09:39 AM    GLUCOSE 119 02/07/2023 09:39 AM    PROT 6.7 11/17/2022 09:02 AM    CALCIUM 10.0 02/07/2023 09:39 AM    BILITOT 0.5 11/17/2022 09:02 AM    ALKPHOS 87 11/17/2022 09:02 AM    AST 8 11/17/2022 09:02 AM    ALT 13 11/17/2022 09:02 AM       POC Tests: No results for input(s): POCGLU, POCNA, POCK, POCCL, POCBUN, POCHEMO, POCHCT in the last 72 hours. Coags: No results found for: PROTIME, INR, APTT    HCG (If Applicable): No results found for: PREGTESTUR, PREGSERUM, HCG, HCGQUANT     ABGs: No results found for: PHART, PO2ART, GMA3LUP, HQC2CFC, BEART, S3YPRJCI     Type & Screen (If Applicable):  No results found for: LABABO, LABRH    Drug/Infectious Status (If Applicable):  No results found for: HIV, HEPCAB    COVID-19 Screening (If Applicable):   Lab Results   Component Value Date/Time    COVID19 Not Detected 10/15/2021 01:10 PM           Anesthesia Evaluation  Patient summary reviewed  Airway: Mallampati: III     Neck ROM: limited  Mouth opening: > = 3 FB   Dental:    (+) poor dentition      Pulmonary: breath sounds clear to auscultation  (+) shortness of breath:  asthma:                            Cardiovascular:  Exercise tolerance: good (>4 METS),   (+) hypertension: mild, CHF:,         Rhythm: regular  Rate: normal                    Neuro/Psych:   Negative Neuro/Psych ROS              GI/Hepatic/Renal:   (+) GERD: poorly controlled,           Endo/Other:    (+) hyperthyroidism::., malignancy/cancer.                  Abdominal:

## 2023-04-07 NOTE — ANESTHESIA POSTPROCEDURE EVALUATION
Department of Anesthesiology  Postprocedure Note    Patient: Toni Flowers  MRN: [id number]  YOB: 1950  Date of evaluation: 4/7/2023      Procedure Summary     Date: 04/07/23 Room / Location: [facility] ENDO 03 / [facility] ENDOSCOPY    Anesthesia Start: 3269 Anesthesia Stop: 0938    Procedure: EGD ESOPHAGOGASTRODUODENOSCOPY with gastric  biopsies (Upper GI Region) Diagnosis:       Constipation, unspecified constipation type      Iron deficiency anemia, unspecified iron deficiency anemia type      BMI 37.0-37.9, adult      (Constipation, unspecified constipation type [K59.00])      (Iron deficiency anemia, unspecified iron deficiency anemia type [D50.9])      (BMI 37.0-37.9, adult [Z68.37])    Surgeons: Paco Dinero MD Responsible Provider: Jaziel Arnold MD    Anesthesia Type: MAC ASA Status: 3          Anesthesia Type: MAC    Anya Phase I: Anya Score: 10    Anya Phase II: Anya Score: 10      Anesthesia Post Evaluation    Patient location during evaluation: bedside  Patient participation: complete - patient participated  Level of consciousness: responsive to verbal stimuli  Airway patency: patent  Nausea & Vomiting: no nausea  Complications: no  Respiratory status: acceptable  Hydration status: euvolemic

## 2023-04-07 NOTE — DISCHARGE INSTRUCTIONS
changes in your health, and be sure to contact your doctor if:    Your throat still hurts after a day or two. You do not get better as expected. Where can you learn more? Go to http://www.wagner.com/  Enter J454 in the search box to learn more about \"Upper GI Endoscopy: What to Expect at Home. \"  Current as of: September 8, 2021               Content Version: 13.2  © 2006-2022 [employer]. Care instructions adapted under license by [employer] (which disclaims liability or warranty for this information). If you have questions about a medical condition or this instruction, always ask your healthcare professional. [place] [address] any warranty or liability for your use of this information. DISCHARGE SUMMARY from Nurse     POST-PROCEDURE INSTRUCTIONS:    Call your Physician if you:  Observe any excess bleeding. Develop a temperature over 100.5o F. Experience abdominal, shoulder or chest pain. Notice any signs of decreased circulation or nerve impairment to an extremity such as a change in color, persistent numbness, tingling, coldness or increase in pain. Vomit blood or you have nausea and vomiting lasting longer than 4 hours. Are unable to take medications. Are unable to urinate within 8 hours after discharge following general anesthesia or intravenous sedation. For the next 24 hours after receiving general anesthesia or intravenous sedation, or while taking prescription Narcotics, limit your activities:  Do NOT drive a motor vehicle, operate hazard machinery or power tools, or perform tasks that require coordination. The medication you received during your procedure may have some effect on your mental awareness. Do NOT make important personal or business decisions. The medication you received during your procedure may have some effect on your mental awareness. Do NOT drink alcoholic beverages.   These drinks do not mix well with

## 2023-04-07 NOTE — H&P
[employer]  [phone]    Chief Complaint: CISCO    History of present illness:  Patient referred from Dr Miguel Angel Max office due to chronic CISCO. Colonoscopy was completed a year ago with Dr Tam Jacobson. PMH:   Past Medical History:   Diagnosis Date    Alopecia     Arthritis     Asthma     CHF (congestive heart failure) (HCC)     Chronic lung disease     GERD (gastroesophageal reflux disease)     Hypercholesteremia     Hypertension     Hyperthyroidism     Thyroid disease      Allergies:    Allergies   Allergen Reactions    Latex Dermatitis, Itching and Swelling    Hydrocodone-Acetaminophen Itching     Medications:   Current Facility-Administered Medications:     lidocaine PF 1 % injection 1 mL, 1 mL, IntraDERmal, Once PRN, [facility], APRN - CRNA    sodium chloride flush 0.9 % injection 5-40 mL, 5-40 mL, IntraVENous, 2 times per day, [facility] C[facility], APRN - CRNA    sodium chloride flush 0.9 % injection 5-40 mL, 5-40 mL, IntraVENous, PRN, [facility], APRN - CRNA    0.9 % sodium chloride infusion, , IntraVENous, PRN, [facility], APRN - CRNA    famotidine (PEPCID) 20 mg in sodium chloride (PF) 0.9 % 10 mL injection, 20 mg, IntraVENous, Once, [facility], APRN - CRNA  FH:   Family History   Problem Relation Age of Onset    Stroke Mother 80    Cancer Father     Heart Disease Father     Cancer Brother     Asthma Sister     Hypertension Father     Breast Problems Sister     Asthma Brother     Breast Cancer Maternal Grandmother 78    Cancer Sister     Cancer Brother         lung cancer    Hypertension Mother     Breast Cancer Maternal Grandfather     Diabetes Paternal Uncle     Heart Attack Neg Hx      Social:   Social History     Socioeconomic History    Marital status:      Spouse name: None    Number of children: None    Years of education: None    Highest education level: None   Tobacco Use    Smoking status: Former     Packs/day: 0.50     Types: Cigarettes     Start date: 3/13/1972     Quit date: 3/13/1977

## 2023-04-19 ENCOUNTER — HOSPITAL ENCOUNTER (OUTPATIENT)
Facility: HOSPITAL | Age: 73
Discharge: HOME | End: 2023-04-22
Payer: MEDICARE

## 2023-04-19 DIAGNOSIS — R92.8 ABNORMAL MAMMOGRAM OF LEFT BREAST: ICD-10-CM

## 2023-04-19 PROCEDURE — 76642 ULTRASOUND BREAST LIMITED: CPT

## 2023-04-19 PROCEDURE — G0279 TOMOSYNTHESIS, MAMMO: HCPCS

## 2023-08-07 ENCOUNTER — OFFICE VISIT (OUTPATIENT)
Age: 73
End: 2023-08-07
Payer: MEDICARE

## 2023-08-07 VITALS
WEIGHT: 191 LBS | DIASTOLIC BLOOD PRESSURE: 55 MMHG | BODY MASS INDEX: 36.06 KG/M2 | HEART RATE: 57 BPM | SYSTOLIC BLOOD PRESSURE: 149 MMHG | HEIGHT: 61 IN | OXYGEN SATURATION: 94 %

## 2023-08-07 DIAGNOSIS — I50.32 CHRONIC DIASTOLIC (CONGESTIVE) HEART FAILURE (HCC): Primary | ICD-10-CM

## 2023-08-07 DIAGNOSIS — E78.00 PURE HYPERCHOLESTEROLEMIA: ICD-10-CM

## 2023-08-07 DIAGNOSIS — I10 ESSENTIAL (PRIMARY) HYPERTENSION: ICD-10-CM

## 2023-08-07 DIAGNOSIS — D50.9 IRON DEFICIENCY ANEMIA, UNSPECIFIED IRON DEFICIENCY ANEMIA TYPE: ICD-10-CM

## 2023-08-07 DIAGNOSIS — E66.01 SEVERE OBESITY (BMI 35.0-39.9) WITH COMORBIDITY (HCC): ICD-10-CM

## 2023-08-07 PROCEDURE — G8427 DOCREV CUR MEDS BY ELIG CLIN: HCPCS | Performed by: INTERNAL MEDICINE

## 2023-08-07 PROCEDURE — G8417 CALC BMI ABV UP PARAM F/U: HCPCS | Performed by: INTERNAL MEDICINE

## 2023-08-07 PROCEDURE — 3077F SYST BP >= 140 MM HG: CPT | Performed by: INTERNAL MEDICINE

## 2023-08-07 PROCEDURE — 1090F PRES/ABSN URINE INCON ASSESS: CPT | Performed by: INTERNAL MEDICINE

## 2023-08-07 PROCEDURE — 1036F TOBACCO NON-USER: CPT | Performed by: INTERNAL MEDICINE

## 2023-08-07 PROCEDURE — 99214 OFFICE O/P EST MOD 30 MIN: CPT | Performed by: INTERNAL MEDICINE

## 2023-08-07 PROCEDURE — 1123F ACP DISCUSS/DSCN MKR DOCD: CPT | Performed by: INTERNAL MEDICINE

## 2023-08-07 PROCEDURE — 3078F DIAST BP <80 MM HG: CPT | Performed by: INTERNAL MEDICINE

## 2023-08-07 PROCEDURE — 3017F COLORECTAL CA SCREEN DOC REV: CPT | Performed by: INTERNAL MEDICINE

## 2023-08-07 PROCEDURE — G8399 PT W/DXA RESULTS DOCUMENT: HCPCS | Performed by: INTERNAL MEDICINE

## 2023-08-07 RX ORDER — LISINOPRIL 10 MG/1
10 TABLET ORAL DAILY
Qty: 90 TABLET | Refills: 1 | Status: SHIPPED | OUTPATIENT
Start: 2023-08-07

## 2023-08-07 RX ORDER — METOPROLOL SUCCINATE 25 MG/1
TABLET, EXTENDED RELEASE ORAL
Qty: 90 TABLET | Refills: 3 | Status: SHIPPED | OUTPATIENT
Start: 2023-08-07

## 2023-08-07 ASSESSMENT — ENCOUNTER SYMPTOMS
SHORTNESS OF BREATH: 1
GASTROINTESTINAL NEGATIVE: 1
EYES NEGATIVE: 1

## 2023-08-07 NOTE — PROGRESS NOTES
Adriano Malave is a 68y.o. year old female. Follow-up of CHF, hypertension, hyperlipidemia, obesity    2/22 seen after little over 5 years for palpitations and shortness of breath. Has gained about 50 to 60 pounds of weight in the last 1 year  4/22 symptoms are not improving so far and the results of echo and nuclear stress test will be discussed. 8/7/2023 shortness of breath is significantly improving. No significant edema. No chest pain, dizziness. Feels racing of the heartbeat when she is walking in the back starts hurting. Improves on resting. Review of Systems   Constitutional: Negative. HENT: Negative. Eyes: Negative. Respiratory:  Positive for shortness of breath. Cardiovascular:  Positive for palpitations. Gastrointestinal: Negative. Endocrine: Negative. Genitourinary: Negative. Musculoskeletal: Negative. Neurological: Negative. Psychiatric/Behavioral: Negative. All other systems reviewed and are negative. Physical Exam  Vitals and nursing note reviewed. Constitutional:       Appearance: Normal appearance. She is obese. HENT:      Head: Normocephalic and atraumatic. Nose: Nose normal.   Eyes:      Conjunctiva/sclera: Conjunctivae normal.   Cardiovascular:      Rate and Rhythm: Normal rate and regular rhythm. Pulses: Normal pulses. Heart sounds: Normal heart sounds. Pulmonary:      Effort: Pulmonary effort is normal.      Breath sounds: Normal breath sounds. Abdominal:      General: Bowel sounds are normal.      Palpations: Abdomen is soft. Musculoskeletal:         General: Normal range of motion. Right lower leg: No edema. Left lower leg: No edema. Skin:     General: Skin is warm and dry. Neurological:      General: No focal deficit present. Mental Status: She is alert and oriented to person, place, and time.    Psychiatric:         Mood and Affect: Mood normal.         Behavior: Behavior normal.      Allergies

## 2023-08-07 NOTE — PROGRESS NOTES
1. Have you been to the ER, urgent care clinic since your last visit? Hospitalized since your last visit?  no         2. Where do you normally have your labs drawn? [facility]    3. Do you need any refills today? yes    4. Which local pharmacy do you use (enter pharmacy)? 5. Which mail order pharmacy do you use (enter pharmacy)? [facility]     6. Are you here for surgical clearance and if so who will be doing your     procedure/surgery (care team)?    no

## 2023-08-21 DIAGNOSIS — I10 ESSENTIAL (PRIMARY) HYPERTENSION: ICD-10-CM

## 2023-08-21 RX ORDER — METOPROLOL SUCCINATE 25 MG/1
25 TABLET, EXTENDED RELEASE ORAL DAILY
Qty: 90 TABLET | Refills: 3 | Status: SHIPPED | OUTPATIENT
Start: 2023-08-21 | End: 2023-08-22 | Stop reason: SDUPTHER

## 2023-08-22 DIAGNOSIS — I10 ESSENTIAL (PRIMARY) HYPERTENSION: ICD-10-CM

## 2023-08-22 RX ORDER — METOPROLOL SUCCINATE 25 MG/1
25 TABLET, EXTENDED RELEASE ORAL DAILY
Qty: 90 TABLET | Refills: 3 | Status: SHIPPED | OUTPATIENT
Start: 2023-08-22

## 2023-09-07 ENCOUNTER — HOSPITAL ENCOUNTER (OUTPATIENT)
Facility: HOSPITAL | Age: 73
Discharge: HOME | End: 2023-09-07
Payer: MEDICARE

## 2023-09-07 DIAGNOSIS — I10 ESSENTIAL (PRIMARY) HYPERTENSION: ICD-10-CM

## 2023-09-07 LAB
ANION GAP SERPL CALC-SCNC: 7 MMOL/L (ref 3–18)
BUN SERPL-MCNC: 23 MG/DL (ref 7–18)
BUN/CREAT SERPL: 18 (ref 12–20)
CALCIUM SERPL-MCNC: 10.1 MG/DL (ref 8.5–10.1)
CHLORIDE SERPL-SCNC: 104 MMOL/L (ref 100–111)
CO2 SERPL-SCNC: 29 MMOL/L (ref 21–32)
CREAT SERPL-MCNC: 1.28 MG/DL (ref 0.6–1.3)
GLUCOSE SERPL-MCNC: 122 MG/DL (ref 74–99)
POTASSIUM SERPL-SCNC: 3.9 MMOL/L (ref 3.5–5.5)
SODIUM SERPL-SCNC: 140 MMOL/L (ref 136–145)

## 2023-09-07 PROCEDURE — 36415 COLL VENOUS BLD VENIPUNCTURE: CPT

## 2023-09-07 PROCEDURE — 80048 BASIC METABOLIC PNL TOTAL CA: CPT

## 2023-09-20 ENCOUNTER — TELEPHONE (OUTPATIENT)
Age: 73
End: 2023-09-20

## 2023-09-20 NOTE — TELEPHONE ENCOUNTER
Labs reviewed, Creat and GFR mildly increased from prior  Creat 1.18, now 1.28  May hold HCTZ if no edema  No other changes at this time.
Pt called in ref to labs dr Ana Wise ordered. Pt was told by PCP there where some abnormalities and to contact us. Labs in chart done 9/7/23. Patient wants to know are there any meds she should stop ?
[de-identified] : 24 year old female here to establish care and for a full physical examination. The patients complete medical, family and social history was documented and reviewed with the patient.  The patients medications were identified and also reviewed with the patient as well as any allergies to any medications. All active and previous medical problems were identified and discussed with the patient.  \par \par patient here with back pain, bilateral knee pain since birth of her second child, 3 months ago\par having a hard time

## 2023-09-21 ENCOUNTER — TELEPHONE (OUTPATIENT)
Age: 73
End: 2023-09-21

## 2023-09-21 NOTE — TELEPHONE ENCOUNTER
Pt. made aware of msg. below. Labs reviewed, Creat and GFR mildly increased from prior  Creat 1.18, now 1.28  May hold HCTZ if no edema  No other changes at this time.

## 2023-09-25 ENCOUNTER — HOSPITAL ENCOUNTER (OUTPATIENT)
Facility: HOSPITAL | Age: 73
Discharge: HOME | End: 2023-09-28
Payer: MEDICARE

## 2023-09-25 DIAGNOSIS — K42.9 UMBILICAL HERNIA WITHOUT OBSTRUCTION OR GANGRENE: ICD-10-CM

## 2023-09-25 PROCEDURE — 74150 CT ABDOMEN W/O CONTRAST: CPT

## 2023-09-29 ENCOUNTER — OFFICE VISIT (OUTPATIENT)
Age: 73
End: 2023-09-29
Payer: MEDICARE

## 2023-09-29 VITALS
HEIGHT: 61 IN | SYSTOLIC BLOOD PRESSURE: 156 MMHG | BODY MASS INDEX: 35.68 KG/M2 | TEMPERATURE: 98.1 F | RESPIRATION RATE: 20 BRPM | HEART RATE: 60 BPM | WEIGHT: 189 LBS | DIASTOLIC BLOOD PRESSURE: 78 MMHG

## 2023-09-29 DIAGNOSIS — K43.6 INCARCERATED VENTRAL HERNIA: Primary | ICD-10-CM

## 2023-09-29 PROCEDURE — G8399 PT W/DXA RESULTS DOCUMENT: HCPCS | Performed by: SURGERY

## 2023-09-29 PROCEDURE — 1090F PRES/ABSN URINE INCON ASSESS: CPT | Performed by: SURGERY

## 2023-09-29 PROCEDURE — G8417 CALC BMI ABV UP PARAM F/U: HCPCS | Performed by: SURGERY

## 2023-09-29 PROCEDURE — 3077F SYST BP >= 140 MM HG: CPT | Performed by: SURGERY

## 2023-09-29 PROCEDURE — G8427 DOCREV CUR MEDS BY ELIG CLIN: HCPCS | Performed by: SURGERY

## 2023-09-29 PROCEDURE — 3017F COLORECTAL CA SCREEN DOC REV: CPT | Performed by: SURGERY

## 2023-09-29 PROCEDURE — 1123F ACP DISCUSS/DSCN MKR DOCD: CPT | Performed by: SURGERY

## 2023-09-29 PROCEDURE — 99214 OFFICE O/P EST MOD 30 MIN: CPT | Performed by: SURGERY

## 2023-09-29 PROCEDURE — 1036F TOBACCO NON-USER: CPT | Performed by: SURGERY

## 2023-09-29 PROCEDURE — 3078F DIAST BP <80 MM HG: CPT | Performed by: SURGERY

## 2023-09-29 RX ORDER — OXYCODONE HYDROCHLORIDE AND ACETAMINOPHEN 5; 325 MG/1; MG/1
1 TABLET ORAL EVERY 4 HOURS PRN
COMMUNITY

## 2023-09-29 ASSESSMENT — ENCOUNTER SYMPTOMS
ABDOMINAL PAIN: 1
SHORTNESS OF BREATH: 0
CHEST TIGHTNESS: 0

## 2023-09-29 NOTE — H&P (VIEW-ONLY)
CC:   Chief Complaint   Patient presents with    Surgical Consult     Umbilical hernia        Assessment:    ICD-10-CM    1. Incarcerated ventral hernia  K43.6 SCHEDULE SURGERY          Plan: I personally reviewed her CT scan of the abdomen and pelvis demonstrating a fat-containing hernia which is what we clinically see on exam today secondary to her incision. The hernia is incarcerated and causing discomfort and therefore recommend repair at this time. I recommended open air of the incarcerated ventral hernia with mesh using the same incision. The risks and benefits of the procedure were reviewed with the patient including infection, bleeding, need for repeat procedure, injury to surrounding structures, chronic pain and mesh failure, recurrent hernia. The patient's questions were answered. Postoperative expectations including lifting restrictions were reviewed and she will be able to follow these instructions. HPI:  Ione Hodgkin is a 68 y.o. female who is referred by Tracee Redd for incisional hernia. She states for the past month she has been experiencing crampy abdominal pain all over her abdomen starting in the middle. She reports no nausea or vomiting or changes in bowel habits. Approximately 2 years ago she noticed a hernia develop at previous laparoscopic site that has gotten much bigger. She states is about half the size of a cantaloupe. 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 She has never undergone hernia repair. Symptoms are becoming more frequent with crampy abdominal pain. Allergies:   Allergies   Allergen Reactions    Latex Dermatitis, Itching and Swelling    Hydrocodone-Acetaminophen Itching       Medication Review:  Current Outpatient Medications on File Prior to Visit   Medication Sig Dispense Refill    oxyCODONE-acetaminophen Grandfather     Diabetes Paternal Uncle     Heart Attack Neg [facility] Outpatient Visit on 09/07/2023   Component Date Value Ref Range Status    Sodium 09/07/2023 140  136 - 145 mmol/L Final    Potassium 09/07/2023 3.9  3.5 - 5.5 mmol/L Final    Chloride 09/07/2023 104  100 - 111 mmol/L Final    CO2 09/07/2023 29  21 - 32 mmol/L Final    Anion Gap 09/07/2023 7  3.0 - 18 mmol/L Final    Glucose 09/07/2023 122 (H)  74 - 99 mg/dL Final    BUN 09/07/2023 23 (H)  7.0 - 18 MG/DL Final    Creatinine 09/07/2023 1.28  0.6 - 1.3 MG/DL Final    Bun/Cre Ratio 09/07/2023 18  12 - 20   Final    Est, Glom Filt Rate 09/07/2023 44 (L)  >60 ml/min/1.73m2 Final    Comment:    Pediatric calculator link: Luzma.at. org/professionals/kdoqi/gfr_calculatorped     These results are not intended for use in patients <25years of age. eGFR results are calculated without a race factor using  the 2021 CKD-EPI equation. Careful clinical correlation is recommended, particularly when comparing to results calculated using previous equations. The CKD-EPI equation is less accurate in patients with extremes of muscle mass, extra-renal metabolism of creatinine, excessive creatine ingestion, or following therapy that affects renal tubular secretion. Calcium 09/07/2023 10.1  8.5 - 10.1 MG/DL Final       CT ABDOMEN WO CONTRAST Additional Contrast? None  Narrative: CT ABDOMEN WITHOUT ENHANCEMENT    INDICATION: As above. Umbilical hernia without obstruction or gangrene. TECHNIQUE: Axial images obtained of the abdomen without intravenous contrast.  Coronal and sagittal reformatted images were obtained. All CT scans are  performed using dose optimization techniques as appropriate to the performed  exam including the following: Automated exposure control, adjustment of mA  and/or kV according to patient size, and use of iterative reconstructive  technique. COMPARISON: None. FINDINGS:   Lung Base: Unremarkable.   Liver:

## 2023-09-29 NOTE — PROGRESS NOTES
CC:   Chief Complaint   Patient presents with    Surgical Consult     Umbilical hernia        Assessment:    ICD-10-CM    1. Incarcerated ventral hernia  K43.6 SCHEDULE SURGERY          Plan: I personally reviewed her CT scan of the abdomen and pelvis demonstrating a fat-containing hernia which is what we clinically see on exam today secondary to her incision. The hernia is incarcerated and causing discomfort and therefore recommend repair at this time. I recommended open air of the incarcerated ventral hernia with mesh using the same incision. The risks and benefits of the procedure were reviewed with the patient including infection, bleeding, need for repeat procedure, injury to surrounding structures, chronic pain and mesh failure, recurrent hernia. The patient's questions were answered. Postoperative expectations including lifting restrictions were reviewed and she will be able to follow these instructions. HPI:  Yadi Sharp is a 68 y.o. female who is referred by Tracee Christina for incisional hernia. She states for the past month she has been experiencing crampy abdominal pain all over her abdomen starting in the middle. She reports no nausea or vomiting or changes in bowel habits. Approximately 2 years ago she noticed a hernia develop at previous laparoscopic site that has gotten much bigger. She states is about half the size of a cantaloupe. 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 She has never undergone hernia repair. Symptoms are becoming more frequent with crampy abdominal pain. Allergies:   Allergies   Allergen Reactions    Latex Dermatitis, Itching and Swelling    Hydrocodone-Acetaminophen Itching       Medication Review:  Current Outpatient Medications on File Prior to Visit   Medication Sig Dispense Refill    oxyCODONE-acetaminophen

## 2023-10-03 NOTE — PERIOP NOTE
Instructions for your surgery at [address] [place] [address] Date:  10/3/2023      Patient's Name:  Cristin Cancer           Surgery Date:  10/5              Please enter the main entrance of the hospital and check-in at the  located in the lobby. Once checked in at the , you will take the elevators to the second floor, and report to the waiting room on the left. The room will say Procedure Registration. Do NOT eat or drink anything, including candy, gum, or ice chips after midnight prior to your surgery, unless you have specific instructions from your surgeon or anesthesia provider to do so. Brush your teeth before coming to the hospital. You may swish with water, but do not swallow. No smoking/Vaping/E-Cigarettes 24 hours prior to the day of surgery. No alcohol 24 hours prior to the day of surgery. No recreational drugs for one week prior to the day of surgery. Bring Photo ID, Insurance information, and Co-pay if required on day of surgery. Bring in pertinent legal documents, such as, Medical Power of EMILY MEDRANO, DNR, Advance Directive, etc.  Leave all valuables, including money/purse, at home. Remove all jewelry, including ALL body piercings, nail polish, acrylic nails, and makeup (including mascara); no lotions, powders, deodorant, or perfume/cologne/after shave on the skin. Follow instruction for Hibiclens washes and CHG wipes from surgeon's office. Glasses and dentures may be worn to the hospital. They must be removed prior to surgery. Please bring case/container for glasses or dentures. Contact lenses should not be worn on day of surgery. Call your doctor's office if symptoms of a cold or illness develop within 24-48 hours prior to your surgery. Call your doctor's office if you have any questions concerning insurance or co-pays. 15. AN ADULT (relative or friend 25 years or older) [address]   16. Please make arrangements for a

## 2023-10-04 ENCOUNTER — ANESTHESIA EVENT (OUTPATIENT)
Facility: HOSPITAL | Age: 73
End: 2023-10-04
Payer: MEDICARE

## 2023-10-05 ENCOUNTER — ANESTHESIA (OUTPATIENT)
Facility: HOSPITAL | Age: 73
End: 2023-10-05
Payer: MEDICARE

## 2023-10-05 ENCOUNTER — HOSPITAL ENCOUNTER (OUTPATIENT)
Facility: HOSPITAL | Age: 73
Setting detail: OUTPATIENT SURGERY
Discharge: HOME | End: 2023-10-05
Attending: SURGERY | Admitting: SURGERY
Payer: MEDICARE

## 2023-10-05 VITALS
DIASTOLIC BLOOD PRESSURE: 64 MMHG | SYSTOLIC BLOOD PRESSURE: 147 MMHG | OXYGEN SATURATION: 100 % | HEART RATE: 62 BPM | WEIGHT: 188.8 LBS | BODY MASS INDEX: 35.65 KG/M2 | HEIGHT: 61 IN | TEMPERATURE: 98.2 F | RESPIRATION RATE: 16 BRPM

## 2023-10-05 LAB
EKG ATRIAL RATE: 69 BPM
EKG DIAGNOSIS: NORMAL
EKG P AXIS: 64 DEGREES
EKG P-R INTERVAL: 150 MS
EKG Q-T INTERVAL: 480 MS
EKG QRS DURATION: 86 MS
EKG QTC CALCULATION (BAZETT): 514 MS
EKG R AXIS: 48 DEGREES
EKG T AXIS: 261 DEGREES
EKG VENTRICULAR RATE: 69 BPM

## 2023-10-05 PROCEDURE — 6370000000 HC RX 637 (ALT 250 FOR IP): Performed by: NURSE ANESTHETIST, CERTIFIED REGISTERED

## 2023-10-05 PROCEDURE — 93005 ELECTROCARDIOGRAM TRACING: CPT | Performed by: SURGERY

## 2023-10-05 PROCEDURE — 93010 ELECTROCARDIOGRAM REPORT: CPT | Performed by: INTERNAL MEDICINE

## 2023-10-05 PROCEDURE — 2580000003 HC RX 258: Performed by: NURSE ANESTHETIST, CERTIFIED REGISTERED

## 2023-10-05 PROCEDURE — 6360000002 HC RX W HCPCS: Performed by: SURGERY

## 2023-10-05 PROCEDURE — 3600000002 HC SURGERY LEVEL 2 BASE: Performed by: SURGERY

## 2023-10-05 PROCEDURE — 3600000012 HC SURGERY LEVEL 2 ADDTL 15MIN: Performed by: SURGERY

## 2023-10-05 PROCEDURE — 2709999900 HC NON-CHARGEABLE SUPPLY: Performed by: SURGERY

## 2023-10-05 PROCEDURE — 7100000011 HC PHASE II RECOVERY - ADDTL 15 MIN: Performed by: SURGERY

## 2023-10-05 PROCEDURE — 2500000003 HC RX 250 WO HCPCS: Performed by: SURGERY

## 2023-10-05 PROCEDURE — 7100000000 HC PACU RECOVERY - FIRST 15 MIN: Performed by: SURGERY

## 2023-10-05 PROCEDURE — 2580000003 HC RX 258: Performed by: SURGERY

## 2023-10-05 PROCEDURE — 7100000010 HC PHASE II RECOVERY - FIRST 15 MIN: Performed by: SURGERY

## 2023-10-05 PROCEDURE — 7100000001 HC PACU RECOVERY - ADDTL 15 MIN: Performed by: SURGERY

## 2023-10-05 PROCEDURE — 6360000002 HC RX W HCPCS: Performed by: NURSE ANESTHETIST, CERTIFIED REGISTERED

## 2023-10-05 RX ORDER — SODIUM CHLORIDE, SODIUM LACTATE, POTASSIUM CHLORIDE, CALCIUM CHLORIDE 600; 310; 30; 20 MG/100ML; MG/100ML; MG/100ML; MG/100ML
INJECTION, SOLUTION INTRAVENOUS CONTINUOUS
Status: DISCONTINUED | OUTPATIENT
Start: 2023-10-05 | End: 2023-10-05 | Stop reason: HOSPADM

## 2023-10-05 RX ORDER — FAMOTIDINE 20 MG/1
20 TABLET, FILM COATED ORAL ONCE
Status: COMPLETED | OUTPATIENT
Start: 2023-10-05 | End: 2023-10-05

## 2023-10-05 RX ORDER — SODIUM CHLORIDE 9 MG/ML
INJECTION, SOLUTION INTRAVENOUS PRN
Status: DISCONTINUED | OUTPATIENT
Start: 2023-10-05 | End: 2023-10-05 | Stop reason: HOSPADM

## 2023-10-05 RX ORDER — SODIUM CHLORIDE 0.9 % (FLUSH) 0.9 %
5-40 SYRINGE (ML) INJECTION PRN
Status: DISCONTINUED | OUTPATIENT
Start: 2023-10-05 | End: 2023-10-05 | Stop reason: HOSPADM

## 2023-10-05 RX ORDER — SODIUM CHLORIDE 0.9 % (FLUSH) 0.9 %
5-40 SYRINGE (ML) INJECTION EVERY 12 HOURS SCHEDULED
Status: DISCONTINUED | OUTPATIENT
Start: 2023-10-05 | End: 2023-10-05 | Stop reason: HOSPADM

## 2023-10-05 RX ORDER — MIDAZOLAM HYDROCHLORIDE 1 MG/ML
INJECTION INTRAMUSCULAR; INTRAVENOUS PRN
Status: DISCONTINUED | OUTPATIENT
Start: 2023-10-05 | End: 2023-10-05 | Stop reason: SDUPTHER

## 2023-10-05 RX ORDER — SODIUM CHLORIDE, SODIUM LACTATE, POTASSIUM CHLORIDE, CALCIUM CHLORIDE 600; 310; 30; 20 MG/100ML; MG/100ML; MG/100ML; MG/100ML
INJECTION, SOLUTION INTRAVENOUS CONTINUOUS PRN
Status: DISCONTINUED | OUTPATIENT
Start: 2023-10-05 | End: 2023-10-05 | Stop reason: SDUPTHER

## 2023-10-05 RX ORDER — LIDOCAINE HYDROCHLORIDE 10 MG/ML
1 INJECTION, SOLUTION EPIDURAL; INFILTRATION; INTRACAUDAL; PERINEURAL
Status: DISCONTINUED | OUTPATIENT
Start: 2023-10-05 | End: 2023-10-05 | Stop reason: HOSPADM

## 2023-10-05 RX ADMIN — SODIUM CHLORIDE, POTASSIUM CHLORIDE, SODIUM LACTATE AND CALCIUM CHLORIDE: 600; 310; 30; 20 INJECTION, SOLUTION INTRAVENOUS at 10:19

## 2023-10-05 RX ADMIN — FAMOTIDINE 20 MG: 20 TABLET ORAL at 10:14

## 2023-10-05 RX ADMIN — MIDAZOLAM 2 MG: 1 INJECTION, SOLUTION INTRAMUSCULAR; INTRAVENOUS at 10:22

## 2023-10-05 RX ADMIN — WATER 2000 MG: 1 INJECTION, SOLUTION INTRAMUSCULAR; INTRAVENOUS; SUBCUTANEOUS at 10:22

## 2023-10-05 RX ADMIN — SODIUM CHLORIDE, SODIUM LACTATE, POTASSIUM CHLORIDE, AND CALCIUM CHLORIDE: 600; 310; 30; 20 INJECTION, SOLUTION INTRAVENOUS at 10:22

## 2023-10-05 ASSESSMENT — PAIN SCALES - GENERAL
PAINLEVEL_OUTOF10: 0

## 2023-10-05 ASSESSMENT — PAIN - FUNCTIONAL ASSESSMENT: PAIN_FUNCTIONAL_ASSESSMENT: 0-10

## 2023-10-05 NOTE — PERIOP NOTE
Patient Ean Kasper has been informed that [facility] is not responsible for patient belongings per policy and the signed [id number] [facility] Patient Agreement document. Personal items should be sent home or checked in with security. Patient Ean Kasper selected the following action:                            []  Send personal items home with a family member or friend                                                 []  Check in personal items with security, excluding clothing                            [x]  Maintain personal items at the bedside, against recommendation                                 by [facility]                                   ** If patient Gilda Miller chooses to maintain personal items at the bedside,                                      Complete the patient belongings inventory in the EMR.

## 2023-10-05 NOTE — PROGRESS NOTES
Case cancelled as EKG changes noted by anesthesia prior to medication administered.  Requesting new evaluation by cardiology and new clearance prior to procedure    Jailene Harrell DO

## 2023-10-05 NOTE — INTERVAL H&P NOTE
Update History & Physical    The patient's History and Physical of 10/5/2023 was reviewed with the patient and I examined the patient. There was no change. The surgical site was confirmed by the patient and me. Plan: The risks, benefits, expected outcome, and alternative to the recommended procedure have been discussed with the patient. Patient understands and wants to proceed with the procedure.      Electronically signed by Debbie Andrew DO on 10/5/2023 at 9:52 AM

## 2023-10-05 NOTE — DISCHARGE INSTRUCTIONS
DISCHARGE SUMMARY from Nurse    PATIENT INSTRUCTIONS:    After general anesthesia or intravenous sedation, for 24 hours or while taking prescription Narcotics:  Limit your activities  Do not drive and operate hazardous machinery  Do not make important personal or business decisions  Do  not drink alcoholic beverages  If you have not urinated within 8 hours after discharge, please contact your surgeon on call. Report the following to your surgeon:  Excessive pain, swelling, redness or odor of or around the surgical area  Temperature over 100.5  Nausea and vomiting lasting longer than 4 hours or if unable to take medications  Any signs of decreased circulation or nerve impairment to extremity: change in color, persistent  numbness, tingling, coldness or increase pain  Any questions          These are general instructions for a healthy lifestyle:    No smoking/ No tobacco products/ Avoid exposure to second hand smoke  Surgeon General's Warning:  Quitting smoking now greatly reduces serious risk to your health. Obesity, smoking, and sedentary lifestyle greatly increases your risk for illness    A healthy diet, regular physical exercise & weight monitoring are important for maintaining a healthy lifestyle    You may be retaining fluid if you have a history of heart failure or if you experience any of the following symptoms:  Weight gain of 3 pounds or more overnight or 5 pounds in a week, increased swelling in our hands or feet or shortness of breath while lying flat in bed. Please call your doctor as soon as you notice any of these symptoms; do not wait until your next office visit. The discharge information has been reviewed with the patient and spouse. The patient and spouse verbalized understanding.   Discharge medications reviewed with the patient and spouse and appropriate educational materials and side effects teaching were provided.   ___________________________________________________________________________________________________________________________________

## 2023-10-05 NOTE — PERIOP NOTE
Contacted number listed for son under Discharge Checklist, reached voicemail, left message to return call to facility.

## 2023-10-05 NOTE — ANESTHESIA PRE PROCEDURE
11/17/2022 09:02 AM    RBC 4.47 11/17/2022 09:02 AM    HGB 8.2 11/17/2022 09:02 AM    HCT 29.5 11/17/2022 09:02 AM    MCV 66.0 11/17/2022 09:02 AM    RDW 20.5 11/17/2022 09:02 AM     11/17/2022 09:02 AM       CMP:   Lab Results   Component Value Date/Time     09/07/2023 11:12 AM    K 3.9 09/07/2023 11:12 AM     09/07/2023 11:12 AM    CO2 29 09/07/2023 11:12 AM    BUN 23 09/07/2023 11:12 AM    CREATININE 1.28 09/07/2023 11:12 AM    GFRAA 57 09/18/2022 02:50 AM    AGRATIO 1.0 11/17/2022 09:02 AM    LABGLOM 44 09/07/2023 11:12 AM    LABGLOM 49 02/07/2023 09:39 AM    GLUCOSE 122 09/07/2023 11:12 AM    PROT 6.7 11/17/2022 09:02 AM    CALCIUM 10.1 09/07/2023 11:12 AM    BILITOT 0.5 11/17/2022 09:02 AM    ALKPHOS 87 11/17/2022 09:02 AM    AST 8 11/17/2022 09:02 AM    ALT 13 11/17/2022 09:02 AM       POC Tests: No results for input(s): \"POCGLU\", \"POCNA\", \"POCK\", \"POCCL\", \"POCBUN\", \"POCHEMO\", \"POCHCT\" in the last 72 hours.     Coags: No results found for: \"PROTIME\", \"INR\", \"APTT\"    HCG (If Applicable): No results found for: \"PREGTESTUR\", \"PREGSERUM\", \"HCG\", \"HCGQUANT\"     ABGs: No results found for: \"PHART\", \"PO2ART\", \"LFT5ICK\", \"NRY1KRK\", \"BEART\", \"P4SBXRZU\"     Type & Screen (If Applicable):  No results found for: \"LABABO\", \"LABRH\"    Drug/Infectious Status (If Applicable):  No results found for: \"HIV\", \"HEPCAB\"    COVID-19 Screening (If Applicable):   Lab Results   Component Value Date/Time    COVID19 Not Detected 10/15/2021 01:10 PM           Anesthesia Evaluation  Patient summary reviewed  Airway: Mallampati: II          Dental: normal exam         Pulmonary:Negative Pulmonary ROS and normal exam    (+) asthma:                            Cardiovascular:Negative CV ROS  Exercise tolerance: good (>4 METS),   (+) hypertension:, CHF:,         Rhythm: regular  Rate: normal                    Neuro/Psych:   Negative Neuro/Psych ROS              GI/Hepatic/Renal: Neg GI/Hepatic/Renal ROS  (+) GERD:,

## 2023-10-05 NOTE — PERIOP NOTE
Called and spoke with Dr. Drena Brittle in regards to patient's cardiology consult. Per Dr. Drena Brittle, patient is able to be discharged once recovered. Per Dr. Drena Brittle, her office will contact patient to set up Cardiology consult. 46- Dr. Julisa López with Anesthesia aware of above.

## 2023-10-05 NOTE — ANESTHESIA POSTPROCEDURE EVALUATION
Department of Anesthesiology  Postprocedure Note    Patient: Yadi Sharp  MRN: [id number]  YOB: 1950  Date of evaluation: 10/5/2023      Procedure Summary     Date: 10/05/23 Room / Location: [address] [place] [address] 03 / 100 [facility] MAIN OR    Anesthesia Start: 5668 Anesthesia Stop: 2796    Procedure: OPEN REPAIR OF INCARCERATED VENTRAL HERNIA REPAIR WITH MESH Diagnosis:       Incarcerated ventral hernia      (Incarcerated ventral hernia [K43.6])    Surgeons: Ivania Hernandez DO Responsible Provider: Nora Kuhn MD    Anesthesia Type: general ASA Status: 3          Anesthesia Type: No value filed.     Anya Phase I: Anya Score: 10    Anya Phase II: Anya Score: 10      Anesthesia Post Evaluation    Patient location during evaluation: bedside  Patient participation: complete - patient participated  Airway patency: patent  Complications: no  Cardiovascular status: hemodynamically stable  Respiratory status: acceptable  Hydration status: stable

## 2023-10-06 ENCOUNTER — OFFICE VISIT (OUTPATIENT)
Age: 73
End: 2023-10-06
Payer: MEDICARE

## 2023-10-06 VITALS
BODY MASS INDEX: 35.9 KG/M2 | OXYGEN SATURATION: 96 % | HEART RATE: 60 BPM | SYSTOLIC BLOOD PRESSURE: 161 MMHG | DIASTOLIC BLOOD PRESSURE: 59 MMHG | WEIGHT: 190 LBS

## 2023-10-06 DIAGNOSIS — I10 ESSENTIAL (PRIMARY) HYPERTENSION: ICD-10-CM

## 2023-10-06 DIAGNOSIS — R94.31 ABNORMAL EKG: Primary | ICD-10-CM

## 2023-10-06 DIAGNOSIS — E78.00 PURE HYPERCHOLESTEROLEMIA: ICD-10-CM

## 2023-10-06 DIAGNOSIS — I50.32 CHRONIC DIASTOLIC (CONGESTIVE) HEART FAILURE (HCC): ICD-10-CM

## 2023-10-06 DIAGNOSIS — E66.01 SEVERE OBESITY (BMI 35.0-39.9) WITH COMORBIDITY (HCC): ICD-10-CM

## 2023-10-06 PROCEDURE — 3078F DIAST BP <80 MM HG: CPT | Performed by: NURSE PRACTITIONER

## 2023-10-06 PROCEDURE — G8484 FLU IMMUNIZE NO ADMIN: HCPCS | Performed by: NURSE PRACTITIONER

## 2023-10-06 PROCEDURE — 3077F SYST BP >= 140 MM HG: CPT | Performed by: NURSE PRACTITIONER

## 2023-10-06 PROCEDURE — 99214 OFFICE O/P EST MOD 30 MIN: CPT | Performed by: NURSE PRACTITIONER

## 2023-10-06 PROCEDURE — 1090F PRES/ABSN URINE INCON ASSESS: CPT | Performed by: NURSE PRACTITIONER

## 2023-10-06 PROCEDURE — G8427 DOCREV CUR MEDS BY ELIG CLIN: HCPCS | Performed by: NURSE PRACTITIONER

## 2023-10-06 PROCEDURE — 1036F TOBACCO NON-USER: CPT | Performed by: NURSE PRACTITIONER

## 2023-10-06 PROCEDURE — G8399 PT W/DXA RESULTS DOCUMENT: HCPCS | Performed by: NURSE PRACTITIONER

## 2023-10-06 PROCEDURE — 3017F COLORECTAL CA SCREEN DOC REV: CPT | Performed by: NURSE PRACTITIONER

## 2023-10-06 PROCEDURE — 1123F ACP DISCUSS/DSCN MKR DOCD: CPT | Performed by: NURSE PRACTITIONER

## 2023-10-06 PROCEDURE — G8417 CALC BMI ABV UP PARAM F/U: HCPCS | Performed by: NURSE PRACTITIONER

## 2023-10-06 RX ORDER — HYDROCHLOROTHIAZIDE 25 MG/1
25 TABLET ORAL PRN
Qty: 30 TABLET | Refills: 1
Start: 2023-10-06

## 2023-10-06 ASSESSMENT — ENCOUNTER SYMPTOMS
EYES NEGATIVE: 1
ABDOMINAL PAIN: 0
APNEA: 0
WHEEZING: 0
BACK PAIN: 0
GASTROINTESTINAL NEGATIVE: 1
NAUSEA: 0
SHORTNESS OF BREATH: 0
ABDOMINAL DISTENTION: 0
COUGH: 0
CHEST TIGHTNESS: 0

## 2023-10-06 NOTE — PERIOP NOTE
Dr. Murphy Guru at bedside assessing patient and talking to her about being her being stable and will continue to monitor her until Dr. Madison De Luna speaks with cardiology and a disposition will be determined. Emotional support given as well as reassurance. Patient remains asymptomatic and comfortable with. Warming device on patient.

## 2023-10-06 NOTE — PERIOP NOTE
Received patient via stretcher from OR awake resting quietly. Patient presented with new onset St depression in OR prior to induction and surgery cancelled. Pt 's prior EKG before one taken in OR did have ST depression. STdepression was confirmed with the  2 lead EKG in OR. Pt denies chest pain and shortness of breath.

## 2023-10-18 ENCOUNTER — HOSPITAL ENCOUNTER (OUTPATIENT)
Facility: HOSPITAL | Age: 73
Discharge: HOME | End: 2023-10-21
Payer: MEDICARE

## 2023-10-18 DIAGNOSIS — R92.8 ABNORMAL MAMMOGRAM OF LEFT BREAST: ICD-10-CM

## 2023-10-18 PROCEDURE — 76642 ULTRASOUND BREAST LIMITED: CPT

## 2023-10-23 ENCOUNTER — OFFICE VISIT (OUTPATIENT)
Age: 73
End: 2023-10-23
Payer: MEDICARE

## 2023-10-23 VITALS
OXYGEN SATURATION: 98 % | HEIGHT: 61 IN | HEART RATE: 62 BPM | BODY MASS INDEX: 35.3 KG/M2 | WEIGHT: 187 LBS | SYSTOLIC BLOOD PRESSURE: 138 MMHG | DIASTOLIC BLOOD PRESSURE: 55 MMHG

## 2023-10-23 DIAGNOSIS — I50.32 CHRONIC DIASTOLIC (CONGESTIVE) HEART FAILURE (HCC): ICD-10-CM

## 2023-10-23 DIAGNOSIS — R94.31 ABNORMAL EKG: Primary | ICD-10-CM

## 2023-10-23 DIAGNOSIS — I10 ESSENTIAL (PRIMARY) HYPERTENSION: ICD-10-CM

## 2023-10-23 DIAGNOSIS — E66.01 SEVERE OBESITY (BMI 35.0-39.9) WITH COMORBIDITY (HCC): ICD-10-CM

## 2023-10-23 DIAGNOSIS — E78.00 PURE HYPERCHOLESTEROLEMIA: ICD-10-CM

## 2023-10-23 PROCEDURE — 1090F PRES/ABSN URINE INCON ASSESS: CPT | Performed by: NURSE PRACTITIONER

## 2023-10-23 PROCEDURE — 1036F TOBACCO NON-USER: CPT | Performed by: NURSE PRACTITIONER

## 2023-10-23 PROCEDURE — G8417 CALC BMI ABV UP PARAM F/U: HCPCS | Performed by: NURSE PRACTITIONER

## 2023-10-23 PROCEDURE — 3075F SYST BP GE 130 - 139MM HG: CPT | Performed by: NURSE PRACTITIONER

## 2023-10-23 PROCEDURE — G8484 FLU IMMUNIZE NO ADMIN: HCPCS | Performed by: NURSE PRACTITIONER

## 2023-10-23 PROCEDURE — 99214 OFFICE O/P EST MOD 30 MIN: CPT | Performed by: NURSE PRACTITIONER

## 2023-10-23 PROCEDURE — 3017F COLORECTAL CA SCREEN DOC REV: CPT | Performed by: NURSE PRACTITIONER

## 2023-10-23 PROCEDURE — 1123F ACP DISCUSS/DSCN MKR DOCD: CPT | Performed by: NURSE PRACTITIONER

## 2023-10-23 PROCEDURE — 3078F DIAST BP <80 MM HG: CPT | Performed by: NURSE PRACTITIONER

## 2023-10-23 PROCEDURE — G8399 PT W/DXA RESULTS DOCUMENT: HCPCS | Performed by: NURSE PRACTITIONER

## 2023-10-23 PROCEDURE — G8427 DOCREV CUR MEDS BY ELIG CLIN: HCPCS | Performed by: NURSE PRACTITIONER

## 2023-10-23 ASSESSMENT — ENCOUNTER SYMPTOMS
BACK PAIN: 0
ABDOMINAL DISTENTION: 0
GASTROINTESTINAL NEGATIVE: 1
CHEST TIGHTNESS: 0
APNEA: 0
WHEEZING: 0
COUGH: 0
SHORTNESS OF BREATH: 0
ABDOMINAL PAIN: 0
NAUSEA: 0
EYES NEGATIVE: 1

## 2023-10-23 NOTE — PROGRESS NOTES
1. Have you been to the ER, urgent care clinic since your last visit? Hospitalized since your last visit?     no    2. Where do you normally have your labs drawn? 3. Do you need any refills today?    no
Constitutional:       Appearance: Normal appearance. She is obese. HENT:      Head: Normocephalic and atraumatic. Nose: Nose normal.   Eyes:      Conjunctiva/sclera: Conjunctivae normal.   Cardiovascular:      Rate and Rhythm: Normal rate and regular rhythm. Pulses: Normal pulses. Heart sounds: Normal heart sounds. Pulmonary:      Effort: Pulmonary effort is normal.      Breath sounds: Normal breath sounds. Abdominal:      General: Bowel sounds are normal.      Palpations: Abdomen is soft. Musculoskeletal:         General: Normal range of motion. Right lower leg: No edema. Left lower leg: No edema. Skin:     General: Skin is warm and dry. Neurological:      General: No focal deficit present. Mental Status: She is alert and oriented to person, place, and time.    Psychiatric:         Mood and Affect: Mood normal.         Behavior: Behavior normal.        Allergies   Allergen Reactions    Latex Dermatitis, Itching and Swelling    Hydrocodone-Acetaminophen Itching       Past Medical History:   Diagnosis Date    Alopecia     Arthritis     Asthma     CHF (congestive heart failure) (HCC)     Chronic lung disease     GERD (gastroesophageal reflux disease)     Hypercholesteremia     Hypertension     Hyperthyroidism     resolved    Osteoarthritis     degenerative disc disease    Thyroid disease        Family History   Problem Relation Age of Onset    Stroke Mother 80    Cancer Father     Heart Disease Father     Cancer Brother     Asthma Sister     Hypertension Father     Breast Problems Sister     Asthma Brother     Breast Cancer Maternal Grandmother 78    Cancer Sister     Cancer Brother         lung cancer    Hypertension Mother     Breast Cancer Maternal Grandfather     Diabetes Paternal Uncle     Heart Attack Neg Hx        Social History     Tobacco Use    Smoking status: Former     Packs/day: .5     Types: Cigarettes     Start date: 3/13/1972     Quit date: 3/13/1977     Years

## 2023-11-06 NOTE — PERIOP NOTE
Instructions for your surgery at [address] [place] [address] Date:  11/6/2023      Patient's Name:  Mayte Montelongo           Surgery Date:  11/14/2023              Please enter the main entrance of the hospital and check-in at the  located in the [facility]. Once checked in at the , you will take the elevators to the second floor, and report to the waiting room on the left. The room will say Procedure Registration. Do NOT eat or drink anything, including candy, gum, or ice chips after midnight prior to your surgery, unless you have specific instructions from your surgeon or anesthesia provider to do so. Brush your teeth before coming to the hospital. You may swish with water, but do not swallow. No smoking/Vaping/E-Cigarettes 24 hours prior to the day of surgery. No alcohol 24 hours prior to the day of surgery. No recreational drugs for one week prior to the day of surgery. Bring Photo ID, Insurance information, and Co-pay if required on day of surgery. Bring in pertinent legal documents, such as, Medical Power of EMILY MEDRANO, DNR, Advance Directive, etc.  Leave all valuables, including money/purse, at home. Remove all jewelry, including ALL body piercings, nail polish, acrylic nails, and makeup (including mascara); no lotions, powders, deodorant, or perfume/cologne/after shave on the skin. Follow instruction for Hibiclens washes and CHG wipes from surgeon's office. Glasses and dentures may be worn to the hospital. They must be removed prior to surgery. Please bring case/container for glasses or dentures. Contact lenses should not be worn on day of surgery. Call your doctor's office if symptoms of a cold or illness develop within 24-48 hours prior to your surgery. Call your doctor's office if you have any questions concerning insurance or co-pays. 15. AN ADULT (relative or friend 25 years or older) [address]   16. Please make arrangements

## 2023-11-13 ENCOUNTER — ANESTHESIA EVENT (OUTPATIENT)
Facility: HOSPITAL | Age: 73
End: 2023-11-13
Payer: MEDICARE

## 2023-11-14 ENCOUNTER — HOSPITAL ENCOUNTER (OUTPATIENT)
Facility: HOSPITAL | Age: 73
Setting detail: OUTPATIENT SURGERY
Discharge: HOME | End: 2023-11-14
Attending: SURGERY | Admitting: SURGERY
Payer: MEDICARE

## 2023-11-14 ENCOUNTER — ANESTHESIA (OUTPATIENT)
Facility: HOSPITAL | Age: 73
End: 2023-11-14
Payer: MEDICARE

## 2023-11-14 VITALS
BODY MASS INDEX: 34.17 KG/M2 | HEART RATE: 64 BPM | DIASTOLIC BLOOD PRESSURE: 68 MMHG | OXYGEN SATURATION: 97 % | WEIGHT: 181 LBS | TEMPERATURE: 97.9 F | RESPIRATION RATE: 18 BRPM | SYSTOLIC BLOOD PRESSURE: 134 MMHG | HEIGHT: 61 IN

## 2023-11-14 DIAGNOSIS — Z98.890 S/P REPAIR OF VENTRAL HERNIA: Primary | ICD-10-CM

## 2023-11-14 DIAGNOSIS — Z87.19 S/P REPAIR OF VENTRAL HERNIA: Primary | ICD-10-CM

## 2023-11-14 LAB
ERYTHROCYTE [DISTWIDTH] IN BLOOD BY AUTOMATED COUNT: 13.7 % (ref 11.6–14.5)
HCT VFR BLD AUTO: 41.3 % (ref 35–45)
HGB BLD-MCNC: 13.3 G/DL (ref 12–16)
MCH RBC QN AUTO: 29 PG (ref 24–34)
MCHC RBC AUTO-ENTMCNC: 32.2 G/DL (ref 31–37)
MCV RBC AUTO: 90.2 FL (ref 78–100)
NRBC # BLD: 0 K/UL (ref 0–0.01)
NRBC BLD-RTO: 0 PER 100 WBC
PLATELET # BLD AUTO: 242 K/UL (ref 135–420)
PMV BLD AUTO: 11.3 FL (ref 9.2–11.8)
RBC # BLD AUTO: 4.58 M/UL (ref 4.2–5.3)
WBC # BLD AUTO: 6.2 K/UL (ref 4.6–13.2)

## 2023-11-14 PROCEDURE — 3700000001 HC ADD 15 MINUTES (ANESTHESIA): Performed by: SURGERY

## 2023-11-14 PROCEDURE — 7100000011 HC PHASE II RECOVERY - ADDTL 15 MIN: Performed by: SURGERY

## 2023-11-14 PROCEDURE — 2580000003 HC RX 258: Performed by: NURSE ANESTHETIST, CERTIFIED REGISTERED

## 2023-11-14 PROCEDURE — 7100000010 HC PHASE II RECOVERY - FIRST 15 MIN: Performed by: SURGERY

## 2023-11-14 PROCEDURE — C1781 MESH (IMPLANTABLE): HCPCS | Performed by: SURGERY

## 2023-11-14 PROCEDURE — 2500000003 HC RX 250 WO HCPCS: Performed by: SURGERY

## 2023-11-14 PROCEDURE — 3700000000 HC ANESTHESIA ATTENDED CARE: Performed by: SURGERY

## 2023-11-14 PROCEDURE — 85027 COMPLETE CBC AUTOMATED: CPT

## 2023-11-14 PROCEDURE — 7100000000 HC PACU RECOVERY - FIRST 15 MIN: Performed by: SURGERY

## 2023-11-14 PROCEDURE — 2580000003 HC RX 258: Performed by: SURGERY

## 2023-11-14 PROCEDURE — 6360000002 HC RX W HCPCS: Performed by: SURGERY

## 2023-11-14 PROCEDURE — 3600000012 HC SURGERY LEVEL 2 ADDTL 15MIN: Performed by: SURGERY

## 2023-11-14 PROCEDURE — 6360000002 HC RX W HCPCS: Performed by: NURSE ANESTHETIST, CERTIFIED REGISTERED

## 2023-11-14 PROCEDURE — 3600000002 HC SURGERY LEVEL 2 BASE: Performed by: SURGERY

## 2023-11-14 PROCEDURE — 7100000001 HC PACU RECOVERY - ADDTL 15 MIN: Performed by: SURGERY

## 2023-11-14 PROCEDURE — 2720000010 HC SURG SUPPLY STERILE: Performed by: SURGERY

## 2023-11-14 PROCEDURE — 2709999900 HC NON-CHARGEABLE SUPPLY: Performed by: SURGERY

## 2023-11-14 PROCEDURE — 2500000003 HC RX 250 WO HCPCS: Performed by: NURSE ANESTHETIST, CERTIFIED REGISTERED

## 2023-11-14 PROCEDURE — A4217 STERILE WATER/SALINE, 500 ML: HCPCS | Performed by: SURGERY

## 2023-11-14 PROCEDURE — 6370000000 HC RX 637 (ALT 250 FOR IP): Performed by: NURSE ANESTHETIST, CERTIFIED REGISTERED

## 2023-11-14 PROCEDURE — 6370000000 HC RX 637 (ALT 250 FOR IP): Performed by: SURGERY

## 2023-11-14 DEVICE — PATCH HERN M W4.3XL5.5IN UNCOATED MFIL PROPYLENE OVL SELF: Type: IMPLANTABLE DEVICE | Site: ABDOMEN | Status: FUNCTIONAL

## 2023-11-14 RX ORDER — FENTANYL CITRATE 50 UG/ML
INJECTION, SOLUTION INTRAMUSCULAR; INTRAVENOUS PRN
Status: DISCONTINUED | OUTPATIENT
Start: 2023-11-14 | End: 2023-11-14 | Stop reason: SDUPTHER

## 2023-11-14 RX ORDER — ONDANSETRON 2 MG/ML
4 INJECTION INTRAMUSCULAR; INTRAVENOUS
Status: DISCONTINUED | OUTPATIENT
Start: 2023-11-14 | End: 2023-11-14 | Stop reason: HOSPADM

## 2023-11-14 RX ORDER — SODIUM CHLORIDE 0.9 % (FLUSH) 0.9 %
5-40 SYRINGE (ML) INJECTION EVERY 12 HOURS SCHEDULED
Status: DISCONTINUED | OUTPATIENT
Start: 2023-11-14 | End: 2023-11-14 | Stop reason: HOSPADM

## 2023-11-14 RX ORDER — ESMOLOL HYDROCHLORIDE 10 MG/ML
INJECTION INTRAVENOUS PRN
Status: DISCONTINUED | OUTPATIENT
Start: 2023-11-14 | End: 2023-11-14 | Stop reason: SDUPTHER

## 2023-11-14 RX ORDER — ROCURONIUM BROMIDE 10 MG/ML
INJECTION, SOLUTION INTRAVENOUS PRN
Status: DISCONTINUED | OUTPATIENT
Start: 2023-11-14 | End: 2023-11-14 | Stop reason: SDUPTHER

## 2023-11-14 RX ORDER — NEOSTIGMINE METHYLSULFATE 1 MG/ML
INJECTION, SOLUTION INTRAVENOUS PRN
Status: DISCONTINUED | OUTPATIENT
Start: 2023-11-14 | End: 2023-11-14 | Stop reason: SDUPTHER

## 2023-11-14 RX ORDER — SODIUM CHLORIDE 9 MG/ML
INJECTION, SOLUTION INTRAVENOUS PRN
Status: DISCONTINUED | OUTPATIENT
Start: 2023-11-14 | End: 2023-11-14 | Stop reason: HOSPADM

## 2023-11-14 RX ORDER — DIPHENHYDRAMINE HYDROCHLORIDE 50 MG/ML
12.5 INJECTION INTRAMUSCULAR; INTRAVENOUS
Status: DISCONTINUED | OUTPATIENT
Start: 2023-11-14 | End: 2023-11-14 | Stop reason: HOSPADM

## 2023-11-14 RX ORDER — SODIUM CHLORIDE, SODIUM LACTATE, POTASSIUM CHLORIDE, CALCIUM CHLORIDE 600; 310; 30; 20 MG/100ML; MG/100ML; MG/100ML; MG/100ML
INJECTION, SOLUTION INTRAVENOUS CONTINUOUS
Status: DISCONTINUED | OUTPATIENT
Start: 2023-11-14 | End: 2023-11-14 | Stop reason: HOSPADM

## 2023-11-14 RX ORDER — LABETALOL HYDROCHLORIDE 5 MG/ML
10 INJECTION, SOLUTION INTRAVENOUS
Status: DISCONTINUED | OUTPATIENT
Start: 2023-11-14 | End: 2023-11-14 | Stop reason: HOSPADM

## 2023-11-14 RX ORDER — OXYCODONE HYDROCHLORIDE 5 MG/1
5 TABLET ORAL PRN
Status: DISCONTINUED | OUTPATIENT
Start: 2023-11-14 | End: 2023-11-14 | Stop reason: HOSPADM

## 2023-11-14 RX ORDER — FAMOTIDINE 20 MG/1
20 TABLET, FILM COATED ORAL ONCE
Status: COMPLETED | OUTPATIENT
Start: 2023-11-14 | End: 2023-11-14

## 2023-11-14 RX ORDER — PROPOFOL 10 MG/ML
INJECTION, EMULSION INTRAVENOUS PRN
Status: DISCONTINUED | OUTPATIENT
Start: 2023-11-14 | End: 2023-11-14 | Stop reason: SDUPTHER

## 2023-11-14 RX ORDER — SODIUM CHLORIDE 0.9 % (FLUSH) 0.9 %
5-40 SYRINGE (ML) INJECTION PRN
Status: DISCONTINUED | OUTPATIENT
Start: 2023-11-14 | End: 2023-11-14 | Stop reason: HOSPADM

## 2023-11-14 RX ORDER — LIDOCAINE HYDROCHLORIDE 10 MG/ML
1 INJECTION, SOLUTION EPIDURAL; INFILTRATION; INTRACAUDAL; PERINEURAL
Status: DISCONTINUED | OUTPATIENT
Start: 2023-11-14 | End: 2023-11-14 | Stop reason: HOSPADM

## 2023-11-14 RX ORDER — OXYCODONE HYDROCHLORIDE AND ACETAMINOPHEN 5; 325 MG/1; MG/1
1 TABLET ORAL EVERY 6 HOURS PRN
Qty: 28 TABLET | Refills: 0 | Status: SHIPPED | OUTPATIENT
Start: 2023-11-14 | End: 2023-11-21

## 2023-11-14 RX ORDER — GLYCOPYRROLATE 0.2 MG/ML
INJECTION INTRAMUSCULAR; INTRAVENOUS PRN
Status: DISCONTINUED | OUTPATIENT
Start: 2023-11-14 | End: 2023-11-14 | Stop reason: SDUPTHER

## 2023-11-14 RX ORDER — HYDRALAZINE HYDROCHLORIDE 20 MG/ML
10 INJECTION INTRAMUSCULAR; INTRAVENOUS
Status: DISCONTINUED | OUTPATIENT
Start: 2023-11-14 | End: 2023-11-14 | Stop reason: HOSPADM

## 2023-11-14 RX ORDER — HALOPERIDOL 5 MG/ML
1 INJECTION INTRAMUSCULAR
Status: DISCONTINUED | OUTPATIENT
Start: 2023-11-14 | End: 2023-11-14 | Stop reason: HOSPADM

## 2023-11-14 RX ORDER — SUCCINYLCHOLINE/SOD CL,ISO/PF 100 MG/5ML
SYRINGE (ML) INTRAVENOUS PRN
Status: DISCONTINUED | OUTPATIENT
Start: 2023-11-14 | End: 2023-11-14 | Stop reason: SDUPTHER

## 2023-11-14 RX ORDER — OXYCODONE HYDROCHLORIDE AND ACETAMINOPHEN 5; 325 MG/1; MG/1
1 TABLET ORAL ONCE
Status: COMPLETED | OUTPATIENT
Start: 2023-11-14 | End: 2023-11-14

## 2023-11-14 RX ORDER — DEXAMETHASONE SODIUM PHOSPHATE 4 MG/ML
INJECTION, SOLUTION INTRA-ARTICULAR; INTRALESIONAL; INTRAMUSCULAR; INTRAVENOUS; SOFT TISSUE PRN
Status: DISCONTINUED | OUTPATIENT
Start: 2023-11-14 | End: 2023-11-14 | Stop reason: SDUPTHER

## 2023-11-14 RX ORDER — LORAZEPAM 2 MG/ML
0.5 INJECTION INTRAMUSCULAR
Status: DISCONTINUED | OUTPATIENT
Start: 2023-11-14 | End: 2023-11-14 | Stop reason: HOSPADM

## 2023-11-14 RX ORDER — ONDANSETRON 2 MG/ML
INJECTION INTRAMUSCULAR; INTRAVENOUS PRN
Status: DISCONTINUED | OUTPATIENT
Start: 2023-11-14 | End: 2023-11-14 | Stop reason: SDUPTHER

## 2023-11-14 RX ORDER — OXYCODONE HYDROCHLORIDE 10 MG/1
10 TABLET ORAL PRN
Status: DISCONTINUED | OUTPATIENT
Start: 2023-11-14 | End: 2023-11-14 | Stop reason: HOSPADM

## 2023-11-14 RX ORDER — LIDOCAINE HYDROCHLORIDE 20 MG/ML
INJECTION, SOLUTION EPIDURAL; INFILTRATION; INTRACAUDAL; PERINEURAL PRN
Status: DISCONTINUED | OUTPATIENT
Start: 2023-11-14 | End: 2023-11-14 | Stop reason: SDUPTHER

## 2023-11-14 RX ADMIN — PROPOFOL 150 MG: 10 INJECTION, EMULSION INTRAVENOUS at 07:30

## 2023-11-14 RX ADMIN — SODIUM CHLORIDE, POTASSIUM CHLORIDE, SODIUM LACTATE AND CALCIUM CHLORIDE: 600; 310; 30; 20 INJECTION, SOLUTION INTRAVENOUS at 06:41

## 2023-11-14 RX ADMIN — GLYCOPYRROLATE 0.4 MG: 0.2 INJECTION INTRAMUSCULAR; INTRAVENOUS at 07:59

## 2023-11-14 RX ADMIN — ROCURONIUM BROMIDE 25 MG: 10 INJECTION, SOLUTION INTRAVENOUS at 07:34

## 2023-11-14 RX ADMIN — HYDROMORPHONE HYDROCHLORIDE 0.25 MG: 1 INJECTION, SOLUTION INTRAMUSCULAR; INTRAVENOUS; SUBCUTANEOUS at 09:10

## 2023-11-14 RX ADMIN — OXYCODONE HYDROCHLORIDE AND ACETAMINOPHEN 1 TABLET: 5; 325 TABLET ORAL at 10:56

## 2023-11-14 RX ADMIN — SUGAMMADEX 200 MG: 100 INJECTION, SOLUTION INTRAVENOUS at 08:07

## 2023-11-14 RX ADMIN — DEXAMETHASONE SODIUM PHOSPHATE 4 MG: 4 INJECTION, SOLUTION INTRAMUSCULAR; INTRAVENOUS at 07:39

## 2023-11-14 RX ADMIN — FENTANYL CITRATE 100 MCG: 50 INJECTION INTRAMUSCULAR; INTRAVENOUS at 07:30

## 2023-11-14 RX ADMIN — ESMOLOL HYDROCHLORIDE 20 MG: 10 INJECTION, SOLUTION INTRAVENOUS at 07:36

## 2023-11-14 RX ADMIN — ONDANSETRON 4 MG: 2 INJECTION INTRAMUSCULAR; INTRAVENOUS at 07:50

## 2023-11-14 RX ADMIN — ESMOLOL HYDROCHLORIDE 30 MG: 10 INJECTION, SOLUTION INTRAVENOUS at 08:08

## 2023-11-14 RX ADMIN — Medication 100 MG: at 07:31

## 2023-11-14 RX ADMIN — WATER 2000 MG: 1 INJECTION, SOLUTION INTRAMUSCULAR; INTRAVENOUS; SUBCUTANEOUS at 07:34

## 2023-11-14 RX ADMIN — HYDROMORPHONE HYDROCHLORIDE 0.5 MG: 1 INJECTION, SOLUTION INTRAMUSCULAR; INTRAVENOUS; SUBCUTANEOUS at 08:27

## 2023-11-14 RX ADMIN — FAMOTIDINE 20 MG: 20 TABLET, FILM COATED ORAL at 06:41

## 2023-11-14 RX ADMIN — ESMOLOL HYDROCHLORIDE 40 MG: 10 INJECTION, SOLUTION INTRAVENOUS at 07:30

## 2023-11-14 RX ADMIN — NEOSTIGMINE METHYLSULFATE 3 MG: 1 INJECTION, SOLUTION INTRAVENOUS at 07:59

## 2023-11-14 RX ADMIN — HYDROMORPHONE HYDROCHLORIDE 0.5 MG: 1 INJECTION, SOLUTION INTRAMUSCULAR; INTRAVENOUS; SUBCUTANEOUS at 08:39

## 2023-11-14 RX ADMIN — LIDOCAINE HYDROCHLORIDE 80 MG: 20 INJECTION, SOLUTION EPIDURAL; INFILTRATION; INTRACAUDAL; PERINEURAL at 07:30

## 2023-11-14 RX ADMIN — HYDROMORPHONE HYDROCHLORIDE 0.25 MG: 1 INJECTION, SOLUTION INTRAMUSCULAR; INTRAVENOUS; SUBCUTANEOUS at 09:05

## 2023-11-14 ASSESSMENT — PAIN DESCRIPTION - LOCATION
LOCATION: ABDOMEN

## 2023-11-14 ASSESSMENT — PAIN DESCRIPTION - DESCRIPTORS
DESCRIPTORS: ACHING;SHARP
DESCRIPTORS: ACHING;THROBBING
DESCRIPTORS: ACHING;THROBBING
DESCRIPTORS: THROBBING;ACHING
DESCRIPTORS: ACHING;THROBBING
DESCRIPTORS: ACHING;THROBBING

## 2023-11-14 ASSESSMENT — PAIN DESCRIPTION - ORIENTATION
ORIENTATION: MID
ORIENTATION: ANTERIOR

## 2023-11-14 ASSESSMENT — PAIN - FUNCTIONAL ASSESSMENT
PAIN_FUNCTIONAL_ASSESSMENT: ACTIVITIES ARE NOT PREVENTED
PAIN_FUNCTIONAL_ASSESSMENT: 0-10
PAIN_FUNCTIONAL_ASSESSMENT: ACTIVITIES ARE NOT PREVENTED

## 2023-11-14 ASSESSMENT — PAIN DESCRIPTION - PAIN TYPE
TYPE: SURGICAL PAIN

## 2023-11-14 ASSESSMENT — PAIN SCALES - WONG BAKER
WONGBAKER_NUMERICALRESPONSE: 0
WONGBAKER_NUMERICALRESPONSE: 0

## 2023-11-14 ASSESSMENT — PAIN SCALES - GENERAL
PAINLEVEL_OUTOF10: 8
PAINLEVEL_OUTOF10: 6
PAINLEVEL_OUTOF10: 10
PAINLEVEL_OUTOF10: 6
PAINLEVEL_OUTOF10: 7
PAINLEVEL_OUTOF10: 6

## 2023-11-14 ASSESSMENT — ENCOUNTER SYMPTOMS: SHORTNESS OF BREATH: 1

## 2023-11-14 NOTE — H&P
hernia tender to attempts at reduction no overlying skin changes area is soft   Neurological:      General: No focal deficit present. Mental Status: She is alert and oriented to person, place, and time. Mental status is at baseline. Psychiatric:         Mood and Affect: Mood normal.         Behavior: Behavior normal.         Thought Content: Thought content normal.         Judgment: Judgment normal.            I have reviewed the information entered by the clinical staff and/or patient and verified it as accurate or edited where necessary.       Electronically signed by:     Ivania Hernandez DO, MPH

## 2023-11-14 NOTE — PERIOP NOTE
Patient Jason Hightower has been informed that [facility] is not responsible for patient belongings per policy and the signed [facility] Patient Agreement document. Personal items should be sent home or checked in with security. Patient Jason Hightower selected the following action:                            []  Send personal items home with a family member or friend                                                 []  Check in personal items with security, excluding clothing                            [x]  Maintain personal items at the bedside, against recommendation                                 by [facility]                                   ** If patient Kimberlee Lockett chooses to maintain personal items at the bedside,                                      Complete the patient belongings inventory in the EMR.

## 2023-11-14 NOTE — DISCHARGE INSTRUCTIONS
appears very red, hot, painful or swollen. Excessive bleeding occurs form the incision. *Between the hours 9-5 Monday-Friday please call the office at [phone].   If you do not receive a call back the same day, please do not hesitate to call my cell phone at [phone]    *If there is a medical emergency please go to the nearest emergency room immediately and do not hesitate to call my cell phone    [phone], after hours please call my cell phone

## 2023-11-14 NOTE — OP NOTE
Operative Note      Patient: Sunny Munguia  YOB: 1950  MRN: [id number]    Date of Procedure: 11/14/2023    Pre-Op Diagnosis Codes:     * Incarcerated ventral hernia [K43.6]    Post-Op Diagnosis: Same       Procedure(s):  OPEN REPAIR INCARCERATED VENRAL HERNIA WITH MESH    Surgeon(s):  Donald Angel DO    Assistant:   Surgical Assistant: Gladis Landau    Anesthesia: General    Estimated Blood Loss (mL): Minimal    Complications: None    Specimens:   * No specimens in log *    Implants:  Implant Name Type Inv. Item Serial No.  Lot No. LRB No. Used Action   PATCH DOMINGA M W4.3XL5.5IN UNCOATED MFIL PROPYLENE OVL SELF - [id number]  [address] [facility] M W4.3XL5.5IN UNCOATED MFIL PROPYLENE OVL SELF [id number] BARD DAVOL-WD [id number] N/A 1 Implanted         Drains: * No LDAs found *    Findings: see dictation         Detailed Description of Procedure:   After informed consent was obtained the patient was taken to the operating room and placed in the supine position. General anesthesia was administered and titrated to effect. The abdomen was prepped and draped in the usual sterile fashion and a time out procedure was performed. Next using a 15 blade scalpel the previous incision was opened. We immediately encountered incarcerated omentum once the hernia sac was opened. Once this was freed from the hernia sac we were able to reduce it back in the abdomen. Finger sweep was performed and there was no other adhesions. The fascial defect was 5.0cm. We then elected to use the 11. [place] Michelle 4x14 cm skirted coated mesh and inserted into the abdomen. The securetak device was then used all along perimeter. I then used 0 ethibond suture. To secure along the perimeter of the fascia. The fascial defect over the mesh was then closed with 0 looped PDS. The deep dermis was then closed 3-0 vicryl suture and skin was closed with 4-0 monocryl in a subcuticular manner.     Electronically signed by Donald Angel DO on 11/14/2023 at 8:01

## 2023-12-04 ENCOUNTER — TELEPHONE (OUTPATIENT)
Age: 73
End: 2023-12-04

## 2023-12-04 NOTE — TELEPHONE ENCOUNTER
Pt states the surgical site has clear discharge coming out of it and when it dries its brown, has been putting sterile gauze over it, if anything touches the site it hurts has follow up on Wed 12/06/23 denies fevers. Please advise.

## 2023-12-06 ENCOUNTER — OFFICE VISIT (OUTPATIENT)
Age: 73
End: 2023-12-06

## 2023-12-06 VITALS
HEART RATE: 68 BPM | SYSTOLIC BLOOD PRESSURE: 166 MMHG | WEIGHT: 183 LBS | OXYGEN SATURATION: 95 % | RESPIRATION RATE: 18 BRPM | TEMPERATURE: 98.2 F | HEIGHT: 61 IN | BODY MASS INDEX: 34.55 KG/M2 | DIASTOLIC BLOOD PRESSURE: 70 MMHG

## 2023-12-06 DIAGNOSIS — Z98.890 S/P REPAIR OF VENTRAL HERNIA: Primary | ICD-10-CM

## 2023-12-06 DIAGNOSIS — Z87.19 S/P REPAIR OF VENTRAL HERNIA: Primary | ICD-10-CM

## 2023-12-06 PROCEDURE — 99024 POSTOP FOLLOW-UP VISIT: CPT | Performed by: SURGERY

## 2024-02-05 ENCOUNTER — OFFICE VISIT (OUTPATIENT)
Age: 74
End: 2024-02-05
Payer: MEDICARE

## 2024-02-05 ENCOUNTER — HOSPITAL ENCOUNTER (OUTPATIENT)
Facility: HOSPITAL | Age: 74
Discharge: HOME | End: 2024-02-08
Payer: MEDICARE

## 2024-02-05 VITALS
BODY MASS INDEX: 33.42 KG/M2 | HEIGHT: 61 IN | WEIGHT: 177 LBS | SYSTOLIC BLOOD PRESSURE: 146 MMHG | DIASTOLIC BLOOD PRESSURE: 53 MMHG | HEART RATE: 71 BPM | OXYGEN SATURATION: 97 %

## 2024-02-05 DIAGNOSIS — I10 ESSENTIAL (PRIMARY) HYPERTENSION: ICD-10-CM

## 2024-02-05 DIAGNOSIS — E66.9 OBESITY (BMI 30.0-34.9): ICD-10-CM

## 2024-02-05 DIAGNOSIS — E78.00 PURE HYPERCHOLESTEROLEMIA: ICD-10-CM

## 2024-02-05 DIAGNOSIS — N18.32 CHRONIC KIDNEY DISEASE (CKD) STAGE G3B/A1, MODERATELY DECREASED GLOMERULAR FILTRATION RATE (GFR) BETWEEN 30-44 ML/MIN/1.73 SQUARE METER AND ALBUMINURIA CREATININE RATIO LESS THAN 30 MG/G (HCC): ICD-10-CM

## 2024-02-05 DIAGNOSIS — I50.32 CHRONIC DIASTOLIC (CONGESTIVE) HEART FAILURE (HCC): Primary | ICD-10-CM

## 2024-02-05 PROBLEM — E66.811 OBESITY (BMI 30.0-34.9): Status: ACTIVE | Noted: 2024-02-05

## 2024-02-05 LAB
25(OH)D3 SERPL-MCNC: 36 NG/ML (ref 30–100)
ALBUMIN SERPL-MCNC: 3.4 G/DL (ref 3.4–5)
ANION GAP SERPL CALC-SCNC: 2 MMOL/L (ref 3–18)
APPEARANCE UR: CLEAR
BACTERIA URNS QL MICRO: NEGATIVE /HPF
BASOPHILS # BLD: 0 K/UL (ref 0–0.1)
BASOPHILS NFR BLD: 1 % (ref 0–2)
BILIRUB UR QL: NEGATIVE
BUN SERPL-MCNC: 20 MG/DL (ref 7–18)
BUN/CREAT SERPL: 18 (ref 12–20)
CALCIUM SERPL-MCNC: 10.1 MG/DL (ref 8.5–10.1)
CALCIUM SERPL-MCNC: 9.8 MG/DL (ref 8.5–10.1)
CHLORIDE SERPL-SCNC: 109 MMOL/L (ref 100–111)
CO2 SERPL-SCNC: 33 MMOL/L (ref 21–32)
COLOR UR: YELLOW
CREAT SERPL-MCNC: 1.11 MG/DL (ref 0.6–1.3)
CREAT UR-MCNC: 155 MG/DL (ref 30–125)
DIFFERENTIAL METHOD BLD: ABNORMAL
EOSINOPHIL # BLD: 0.1 K/UL (ref 0–0.4)
EOSINOPHIL NFR BLD: 2 % (ref 0–5)
EPITH CASTS URNS QL MICRO: NORMAL /LPF (ref 0–5)
ERYTHROCYTE [DISTWIDTH] IN BLOOD BY AUTOMATED COUNT: 14 % (ref 11.6–14.5)
GLUCOSE SERPL-MCNC: 102 MG/DL (ref 74–99)
GLUCOSE UR STRIP.AUTO-MCNC: NEGATIVE MG/DL
HCT VFR BLD AUTO: 42.3 % (ref 35–45)
HGB BLD-MCNC: 13.8 G/DL (ref 12–16)
HGB UR QL STRIP: NEGATIVE
IMM GRANULOCYTES # BLD AUTO: 0 K/UL (ref 0–0.04)
IMM GRANULOCYTES NFR BLD AUTO: 0 % (ref 0–0.5)
KETONES UR QL STRIP.AUTO: NEGATIVE MG/DL
LEUKOCYTE ESTERASE UR QL STRIP.AUTO: NEGATIVE
LYMPHOCYTES # BLD: 2.5 K/UL (ref 0.9–3.6)
LYMPHOCYTES NFR BLD: 34 % (ref 21–52)
MCH RBC QN AUTO: 29.7 PG (ref 24–34)
MCHC RBC AUTO-ENTMCNC: 32.6 G/DL (ref 31–37)
MCV RBC AUTO: 91 FL (ref 78–100)
MICROALBUMIN UR-MCNC: 0.53 MG/DL (ref 0–3)
MICROALBUMIN/CREAT UR-RTO: 3 MG/G (ref 0–30)
MONOCYTES # BLD: 0.4 K/UL (ref 0.05–1.2)
MONOCYTES NFR BLD: 5 % (ref 3–10)
NEUTS SEG # BLD: 4.2 K/UL (ref 1.8–8)
NEUTS SEG NFR BLD: 58 % (ref 40–73)
NITRITE UR QL STRIP.AUTO: NEGATIVE
NRBC # BLD: 0 K/UL (ref 0–0.01)
NRBC BLD-RTO: 0 PER 100 WBC
PH UR STRIP: 5.5 (ref 5–8)
PHOSPHATE SERPL-MCNC: 2.8 MG/DL (ref 2.5–4.9)
PLATELET # BLD AUTO: 240 K/UL (ref 135–420)
PMV BLD AUTO: 12 FL (ref 9.2–11.8)
POTASSIUM SERPL-SCNC: 3.6 MMOL/L (ref 3.5–5.5)
PROT UR STRIP-MCNC: NEGATIVE MG/DL
PTH-INTACT SERPL-MCNC: 82.8 PG/ML (ref 18.4–88)
RBC # BLD AUTO: 4.65 M/UL (ref 4.2–5.3)
RBC #/AREA URNS HPF: NEGATIVE /HPF (ref 0–5)
SODIUM SERPL-SCNC: 144 MMOL/L (ref 136–145)
SP GR UR REFRACTOMETRY: 1.02 (ref 1–1.03)
UROBILINOGEN UR QL STRIP.AUTO: 0.2 EU/DL (ref 0.2–1)
WBC # BLD AUTO: 7.2 K/UL (ref 4.6–13.2)
WBC URNS QL MICRO: NEGATIVE /HPF (ref 0–4)

## 2024-02-05 PROCEDURE — 3078F DIAST BP <80 MM HG: CPT | Performed by: INTERNAL MEDICINE

## 2024-02-05 PROCEDURE — 1090F PRES/ABSN URINE INCON ASSESS: CPT | Performed by: INTERNAL MEDICINE

## 2024-02-05 PROCEDURE — G8399 PT W/DXA RESULTS DOCUMENT: HCPCS | Performed by: INTERNAL MEDICINE

## 2024-02-05 PROCEDURE — 99214 OFFICE O/P EST MOD 30 MIN: CPT | Performed by: INTERNAL MEDICINE

## 2024-02-05 PROCEDURE — 82043 UR ALBUMIN QUANTITATIVE: CPT

## 2024-02-05 PROCEDURE — 3017F COLORECTAL CA SCREEN DOC REV: CPT | Performed by: INTERNAL MEDICINE

## 2024-02-05 PROCEDURE — 85025 COMPLETE CBC W/AUTO DIFF WBC: CPT

## 2024-02-05 PROCEDURE — 1123F ACP DISCUSS/DSCN MKR DOCD: CPT | Performed by: INTERNAL MEDICINE

## 2024-02-05 PROCEDURE — 81001 URINALYSIS AUTO W/SCOPE: CPT

## 2024-02-05 PROCEDURE — G8484 FLU IMMUNIZE NO ADMIN: HCPCS | Performed by: INTERNAL MEDICINE

## 2024-02-05 PROCEDURE — 83970 ASSAY OF PARATHORMONE: CPT

## 2024-02-05 PROCEDURE — 82570 ASSAY OF URINE CREATININE: CPT

## 2024-02-05 PROCEDURE — G8427 DOCREV CUR MEDS BY ELIG CLIN: HCPCS | Performed by: INTERNAL MEDICINE

## 2024-02-05 PROCEDURE — 1036F TOBACCO NON-USER: CPT | Performed by: INTERNAL MEDICINE

## 2024-02-05 PROCEDURE — 80069 RENAL FUNCTION PANEL: CPT

## 2024-02-05 PROCEDURE — 36415 COLL VENOUS BLD VENIPUNCTURE: CPT

## 2024-02-05 PROCEDURE — 82306 VITAMIN D 25 HYDROXY: CPT

## 2024-02-05 PROCEDURE — G8417 CALC BMI ABV UP PARAM F/U: HCPCS | Performed by: INTERNAL MEDICINE

## 2024-02-05 PROCEDURE — 3077F SYST BP >= 140 MM HG: CPT | Performed by: INTERNAL MEDICINE

## 2024-02-05 RX ORDER — LISINOPRIL 20 MG/1
20 TABLET ORAL DAILY
Qty: 90 TABLET | Refills: 3 | Status: SHIPPED | OUTPATIENT
Start: 2024-02-05

## 2024-02-05 ASSESSMENT — ENCOUNTER SYMPTOMS
CHEST TIGHTNESS: 0
WHEEZING: 0
ABDOMINAL PAIN: 0
APNEA: 0
ABDOMINAL DISTENTION: 0
EYES NEGATIVE: 1
COUGH: 0
SHORTNESS OF BREATH: 1
BACK PAIN: 0
GASTROINTESTINAL NEGATIVE: 1
NAUSEA: 0

## 2024-02-05 NOTE — PROGRESS NOTES
Room 5      1. Have you been to the ER, urgent care clinic since your last visit?  Hospitalized since your last visit?     Yes When: 11/2023 Where: [facility] Reason for visit: [facility]      2.  Where do you normally have your labs drawn?   no    3. Do you need any refills today?   no    4. Which local pharmacy do you use (enter pharmacy)?   [facility]     5. Which mail order pharmacy do you use (enter pharmacy)?   no     6. Are you here for surgical clearance and if so who will be doing your     procedure/surgery (care team)?   no

## 2024-02-05 NOTE — PROGRESS NOTES
Haley Meyers is a 73 y.o. year old female.    Follow-up of CHF, hypertension, hyperlipidemia, obesity    2/22 seen after little over 5 years for palpitations and shortness of breath.  Has gained about 50 to 60 pounds of weight in the last 1 year  4/22 symptoms are not improving so far and the results of echo and nuclear stress test will be discussed.  8/7/2023 shortness of breath is significantly improving.  No significant edema.  No chest pain, dizziness.  Feels racing of the heartbeat when she is walking in the back starts hurting.  Improves on resting.  10/2023  Patient seen for EKG changes.  She was in pre-op for hernia surgery and EKG revealed SR with ST depressions, which are new from previous EKG.  He surgery was canceled pending cardiac evaluation.  She denies chest pain, shortness of breath, palpitations.  She c/o increased BLE edema.  10/23/2023  Seen in follow-up for testing results due to abnormal EKG with ST depressions.  She presents for testing results.  Denies chest pain shortness of breath or palpitations edema has resolved after resuming HCTZ.  2/5/2024 [place] if rushing to get ready. No edema, dizziness, palpitations.  Chest tightness in the retrosternal area without any relation to activity or food.  No radiation or associated symptoms noted.  The symptoms last for about half an hour and happen less than once a week.          Review of Systems   Constitutional: Negative.  Negative for activity change and fatigue.   HENT: Negative.  Negative for congestion.    Eyes: Negative.  Negative for visual disturbance.   Respiratory:  Positive for shortness of breath. Negative for apnea, cough, chest tightness and wheezing.    Cardiovascular:  Positive for chest pain. Negative for palpitations and leg swelling.   Gastrointestinal: Negative.  Negative for abdominal distention, abdominal pain and nausea.   Endocrine: Negative.    Genitourinary: Negative.  Negative for hematuria.   Musculoskeletal: Negative.

## 2024-02-26 NOTE — PROGRESS NOTES
Subjective: Her pain is substantially improved. She is tolerating diet. Past medical history and ROS were reviewed and unchanged. Abdomen: Soft, nontender nondistended    Assessment / Plan    Status post diverticulitis, simple, resolved  Complete antibiotic course per ED  Follow-up in the office in 2 weeks  Surgery not planned at this point    A total of 15 minutes was spent with the patient, with >50% of time spent on counseling and coordination of care. The diagnoses and plan were discussed with patient. All questions answered. Plan of care agreed to by all concerned.
Suzy June presents today for   Chief Complaint   Patient presents with    Follow-up     seen in ED over the weekend for diverticutlis     Pain is 0 now, slight twinge here and there of lower left quad. Is someone accompanying this pt? no    Is the patient using any DME equipment during OV? no    Coordination of Care:  1. Have you been to the ER, urgent care clinic since your last visit? Hospitalized since your last visit? Yes, Tuesday went to ED, put on cipro and flagyl due to ct showing diverticulitis. 2. Have you seen or consulted any other health care providers outside of the [address] [place] [address] since your last visit? Include any pap smears or colon screening.  no
Yes

## 2024-04-08 ENCOUNTER — HOSPITAL ENCOUNTER (OUTPATIENT)
Facility: HOSPITAL | Age: 74
Discharge: HOME | End: 2024-04-11
Payer: MEDICARE

## 2024-04-08 DIAGNOSIS — N28.9 RENAL LESION: ICD-10-CM

## 2024-04-08 DIAGNOSIS — D17.9 BENIGN LIPOMATOUS NEOPLASM, UNSPECIFIED: ICD-10-CM

## 2024-04-08 PROCEDURE — 76770 US EXAM ABDO BACK WALL COMP: CPT

## 2024-04-16 ENCOUNTER — HOSPITAL ENCOUNTER (OUTPATIENT)
Facility: HOSPITAL | Age: 74
Discharge: HOME | End: 2024-04-19
Payer: MEDICARE

## 2024-04-16 DIAGNOSIS — R10.31 RLQ ABDOMINAL PAIN: ICD-10-CM

## 2024-04-16 PROCEDURE — 74176 CT ABD & PELVIS W/O CONTRAST: CPT

## 2024-06-07 NOTE — PROGRESS NOTES
Pulmonary/RT Daily Note     Visit # 2/36       Rj Gipson 70 y.o. presented today for pulmonary rehab. She stated that there have been no changes in medication or health since last visit. Pt was mildly SOB today, and blood pressure was elevated (170/72) upon arrival.  Pt was taught IS technique, and BP was re-evaluated. It had decreased significantly, but still slightly elevated (144/66). Rj Gipson has a target heart rate of 108-123. She tolerated the exercise session moderately. Patient states RPE for session today was 14 and RPD was a 4. Her pain level upon finishing exercise session is a 0.  Today the pt did:    breathing retraining 15 mins  Stretches: 5 mins    Nustep: @4 10 mins  Education: Spacer training and initial training on IS.  30  Physician available for emergencies: Dr. Callie Lange, RT 7/7/2021 8:40 AM
08-Jun-2024

## 2024-06-17 ENCOUNTER — HOSPITAL ENCOUNTER (OUTPATIENT)
Facility: HOSPITAL | Age: 74
Discharge: HOME | End: 2024-06-20
Payer: MEDICARE

## 2024-06-17 VITALS — BODY MASS INDEX: 32.47 KG/M2 | HEIGHT: 61 IN | WEIGHT: 172 LBS

## 2024-06-17 DIAGNOSIS — R92.8 ABNORMAL MAMMOGRAM: ICD-10-CM

## 2024-06-17 PROCEDURE — 76642 ULTRASOUND BREAST LIMITED: CPT

## 2024-06-17 PROCEDURE — G0279 TOMOSYNTHESIS, MAMMO: HCPCS

## 2024-08-21 ENCOUNTER — OFFICE VISIT (OUTPATIENT)
Age: 74
End: 2024-08-21
Payer: MEDICARE

## 2024-08-21 VITALS
SYSTOLIC BLOOD PRESSURE: 137 MMHG | WEIGHT: 169 LBS | OXYGEN SATURATION: 98 % | BODY MASS INDEX: 31.91 KG/M2 | HEART RATE: 61 BPM | DIASTOLIC BLOOD PRESSURE: 78 MMHG | HEIGHT: 61 IN

## 2024-08-21 DIAGNOSIS — I10 ESSENTIAL (PRIMARY) HYPERTENSION: Primary | ICD-10-CM

## 2024-08-21 DIAGNOSIS — I50.32 CHRONIC DIASTOLIC (CONGESTIVE) HEART FAILURE (HCC): ICD-10-CM

## 2024-08-21 DIAGNOSIS — E66.9 OBESITY (BMI 30.0-34.9): ICD-10-CM

## 2024-08-21 DIAGNOSIS — E78.00 PURE HYPERCHOLESTEROLEMIA: ICD-10-CM

## 2024-08-21 PROCEDURE — 1123F ACP DISCUSS/DSCN MKR DOCD: CPT | Performed by: INTERNAL MEDICINE

## 2024-08-21 PROCEDURE — 3078F DIAST BP <80 MM HG: CPT | Performed by: INTERNAL MEDICINE

## 2024-08-21 PROCEDURE — G8427 DOCREV CUR MEDS BY ELIG CLIN: HCPCS | Performed by: INTERNAL MEDICINE

## 2024-08-21 PROCEDURE — G8399 PT W/DXA RESULTS DOCUMENT: HCPCS | Performed by: INTERNAL MEDICINE

## 2024-08-21 PROCEDURE — 1036F TOBACCO NON-USER: CPT | Performed by: INTERNAL MEDICINE

## 2024-08-21 PROCEDURE — 99214 OFFICE O/P EST MOD 30 MIN: CPT | Performed by: INTERNAL MEDICINE

## 2024-08-21 PROCEDURE — 3017F COLORECTAL CA SCREEN DOC REV: CPT | Performed by: INTERNAL MEDICINE

## 2024-08-21 PROCEDURE — G8417 CALC BMI ABV UP PARAM F/U: HCPCS | Performed by: INTERNAL MEDICINE

## 2024-08-21 PROCEDURE — 3075F SYST BP GE 130 - 139MM HG: CPT | Performed by: INTERNAL MEDICINE

## 2024-08-21 PROCEDURE — 1090F PRES/ABSN URINE INCON ASSESS: CPT | Performed by: INTERNAL MEDICINE

## 2024-08-21 ASSESSMENT — ENCOUNTER SYMPTOMS
BACK PAIN: 0
SHORTNESS OF BREATH: 0
GASTROINTESTINAL NEGATIVE: 1
RESPIRATORY NEGATIVE: 1
EYES NEGATIVE: 1

## 2024-08-21 NOTE — PATIENT INSTRUCTIONS
The medication list included in this document is our record of what you are currently taking, including any changes that were made at today's visit.  If you find any differences when compared to your medications at home, or have any questions that were not answered at your visit, please contact the office.    After the recommended changes have been made in blood pressure medicines, patient advised to keep BP/HR(pulse rate) chart twice daily and bring us results in middle of next month . Patient may send the results via \"My Chart\" if desired.  Please rest for 5-10 minutes before checking blood pressure.  Sit on a comfortable chair without crossing the legs and put your arm on a table.  We recommend that you use an upper arm cuff.  Check the blood pressure 3 times each time you check the blood pressure and record the lowest reading.  If you check the blood pressure in both arms, use the higher reading.

## 2024-08-21 NOTE — PROGRESS NOTES
Room 7    Patient brought medication list.    1. Have you been to the ER, urgent care clinic since your last visit?  Hospitalized since your last visit?  No    2.  Where do you normally have your labs drawn?  [facility]      3. Do you need any refills today? Unsure      4. Which local pharmacy do you use?   [facility]      5. Which mail order pharmacy do you use?   [facility]       6. Are you here for surgical clearance? No,  who will be doing your procedure/surgery (care team)?   N/A  
demonstrates normal sinus rhythm.    ECG: The ECG was abnormal with non-specific ST segment.    Stress Test: A pharmacological stress test was performed using lexiscan. The patient reported no symptoms during the stress test. Hemodynamics are adequate for diagnosis. Blood pressure demonstrated a normal response and heart rate demonstrated a normal response to stress. The patient's heart rate recovery was normal.       ASSESSMENT & PLAN    Lab Results   Component Value Date    CHOL 133 11/17/2022    CHOL 179 01/18/2022    CHOL 148 07/14/2021    CHOL 155 08/27/2020    CHOL 137 06/17/2019     Lab Results   Component Value Date    TRIG 87 11/17/2022    TRIG 129 01/18/2022    TRIG 73 07/14/2021    TRIG 91 08/27/2020    TRIG 78 06/17/2019     Lab Results   Component Value Date    HDL 62 (H) 11/17/2022    HDL 68 (H) 01/18/2022    HDL 79 (H) 07/14/2021    HDL 90 (H) 08/27/2020    HDL 73 (H) 06/17/2019     Lab Results   Component Value Date    LDL 53.6 11/17/2022    LDL 85.2 01/18/2022    LDL 54.4 07/14/2021    LDL 46.8 08/27/2020    LDL 48.4 06/17/2019     Lab Results   Component Value Date    VLDL 17.4 11/17/2022    VLDL 25.8 01/18/2022    VLDL 14.6 07/14/2021    VLDL 18.2 08/27/2020    VLDL 15.6 06/17/2019       Pertinent laboratory and test data reviewed and discussed with patient.      12/16 Patient seen in hospital for elevated cardiac enzyme- probably secondary to profound anemia and tachycardia from hyperthyroidism.  Has mild stable dyspnea. No chest pain.  ETT revealed 1-2 mm ST segment depression with no chest pain. Patient remains asymtpomatic. Will continue risk factor modification.    Patient had significant thyroid bruit and known thyroid nodules- now has resolved.  Continue intense risk factor modification.    2/23/2022 dyspnea could be due to significant weight gain and underlying hyperthyroidism history but underlying cardiac structural and functional disease and ischemia should be ruled out given her age

## 2025-01-02 DIAGNOSIS — I10 ESSENTIAL (PRIMARY) HYPERTENSION: ICD-10-CM

## 2025-01-02 RX ORDER — LISINOPRIL 20 MG/1
20 TABLET ORAL DAILY
Qty: 90 TABLET | Refills: 3 | Status: SHIPPED | OUTPATIENT
Start: 2025-01-02

## 2025-01-02 RX ORDER — METOPROLOL SUCCINATE 25 MG/1
25 TABLET, EXTENDED RELEASE ORAL DAILY
Qty: 90 TABLET | Refills: 3 | Status: SHIPPED | OUTPATIENT
Start: 2025-01-02

## 2025-01-27 ENCOUNTER — OFFICE VISIT (OUTPATIENT)
Age: 75
End: 2025-01-27
Payer: MEDICARE

## 2025-01-27 VITALS
OXYGEN SATURATION: 97 % | BODY MASS INDEX: 31.15 KG/M2 | HEART RATE: 73 BPM | HEIGHT: 61 IN | TEMPERATURE: 96.8 F | RESPIRATION RATE: 16 BRPM | DIASTOLIC BLOOD PRESSURE: 72 MMHG | WEIGHT: 165 LBS | SYSTOLIC BLOOD PRESSURE: 138 MMHG

## 2025-01-27 DIAGNOSIS — K64.4 ANAL SKIN TAG: Primary | ICD-10-CM

## 2025-01-27 DIAGNOSIS — K62.5 RECTAL BLEEDING: ICD-10-CM

## 2025-01-27 PROCEDURE — 3078F DIAST BP <80 MM HG: CPT | Performed by: COLON & RECTAL SURGERY

## 2025-01-27 PROCEDURE — 46600 DIAGNOSTIC ANOSCOPY SPX: CPT | Performed by: COLON & RECTAL SURGERY

## 2025-01-27 PROCEDURE — G8427 DOCREV CUR MEDS BY ELIG CLIN: HCPCS | Performed by: COLON & RECTAL SURGERY

## 2025-01-27 PROCEDURE — 1123F ACP DISCUSS/DSCN MKR DOCD: CPT | Performed by: COLON & RECTAL SURGERY

## 2025-01-27 PROCEDURE — 3017F COLORECTAL CA SCREEN DOC REV: CPT | Performed by: COLON & RECTAL SURGERY

## 2025-01-27 PROCEDURE — G8399 PT W/DXA RESULTS DOCUMENT: HCPCS | Performed by: COLON & RECTAL SURGERY

## 2025-01-27 PROCEDURE — 3075F SYST BP GE 130 - 139MM HG: CPT | Performed by: COLON & RECTAL SURGERY

## 2025-01-27 PROCEDURE — 1036F TOBACCO NON-USER: CPT | Performed by: COLON & RECTAL SURGERY

## 2025-01-27 PROCEDURE — 99203 OFFICE O/P NEW LOW 30 MIN: CPT | Performed by: COLON & RECTAL SURGERY

## 2025-01-27 PROCEDURE — 1159F MED LIST DOCD IN RCRD: CPT | Performed by: COLON & RECTAL SURGERY

## 2025-01-27 PROCEDURE — G8417 CALC BMI ABV UP PARAM F/U: HCPCS | Performed by: COLON & RECTAL SURGERY

## 2025-01-27 PROCEDURE — 1090F PRES/ABSN URINE INCON ASSESS: CPT | Performed by: COLON & RECTAL SURGERY

## 2025-01-27 PROCEDURE — 1126F AMNT PAIN NOTED NONE PRSNT: CPT | Performed by: COLON & RECTAL SURGERY

## 2025-01-27 NOTE — PROGRESS NOTES
HPI: Haley Meyers is a 74 y.o. female presenting with chief complain of anal tag.  Initially seen by her PCP.  There was some irritation.  At 1 point it blood 2 months ago.  She moves her bowels 1-2 times per day.  Denies constipation or diarrhea.  1 episode of bleeding.  No incontinence.  Sister has had polyps.  Last colonoscopy here with me in 2021 with polyps and 5-year recall recommended.    Past Medical History:   Diagnosis Date    Alopecia     Arthritis     Asthma     CHF (congestive heart failure) (HCC)     Chronic lung disease     GERD (gastroesophageal reflux disease)     Hypercholesteremia     Hypertension     Hyperthyroidism     resolved    Osteoarthritis     degenerative disc disease    Thyroid disease        Past Surgical History:   Procedure Laterality Date    BUNIONECTOMY      bilateral and \"removed some arthritis in feet\"    CATARACT REMOVAL      with implants    COLONOSCOPY N/A 2/21/2018    COLONOSCOPY/polypectomy/polyloop/ink tatoo  performed by Benjy Sandra MD at [facility] ENDOSCOPY    COLONOSCOPY  08/2015    COLONOSCOPY N/A 2/24/2021    COLONOSCOPY with Polypectomies performed by Benjy Sandra MD at [facility] ENDOSCOPY    HAMMER TOE SURGERY      right     HEMORRHOID SURGERY      HYSTERECTOMY (CERVIX STATUS UNKNOWN) N/A     KIDNEY REMOVAL Left 2019    patient states had mass removed from kidney    ORTHOPEDIC SURGERY Right     rt big toe    OTHER SURGICAL HISTORY      keiloid removed off chest near shoulder area    TOTAL COLECTOMY  10/06/2015    lap right hemicolectomy due to polyp-nonmalignant    TUBAL LIGATION      UPPER GASTROINTESTINAL ENDOSCOPY N/A 04/07/2023    EGD ESOPHAGOGASTRODUODENOSCOPY with gastric  biopsies performed by Bill Finney MD at [facility] ENDOSCOPY    US GUIDED CORE BREAST BIOPSY Right 2013    benign    VENTRAL HERNIA REPAIR N/A 11/14/2023    OPEN REPAIR INCARCERATED VENRAL HERNIA WITH MESH performed by Mary Chou DO at [facility] MAIN OR       Family History   Problem Relation Age of

## 2025-02-10 ENCOUNTER — OFFICE VISIT (OUTPATIENT)
Age: 75
End: 2025-02-10
Payer: MEDICARE

## 2025-02-10 ENCOUNTER — HOSPITAL ENCOUNTER (OUTPATIENT)
Facility: HOSPITAL | Age: 75
Setting detail: SPECIMEN
Discharge: HOME | End: 2025-02-13
Payer: MEDICARE

## 2025-02-10 VITALS
OXYGEN SATURATION: 97 % | TEMPERATURE: 97.3 F | WEIGHT: 164 LBS | HEART RATE: 70 BPM | SYSTOLIC BLOOD PRESSURE: 138 MMHG | RESPIRATION RATE: 16 BRPM | BODY MASS INDEX: 30.96 KG/M2 | HEIGHT: 61 IN | DIASTOLIC BLOOD PRESSURE: 82 MMHG

## 2025-02-10 DIAGNOSIS — K64.4 ANAL SKIN TAG: Primary | ICD-10-CM

## 2025-02-10 PROCEDURE — 88304 TISSUE EXAM BY PATHOLOGIST: CPT

## 2025-02-10 PROCEDURE — 46230 REMOVAL OF ANAL TAGS: CPT | Performed by: COLON & RECTAL SURGERY

## 2025-02-10 RX ORDER — CALCIUM CARBONATE 500(1250)
500 TABLET ORAL DAILY
COMMUNITY

## 2025-02-10 NOTE — PROGRESS NOTES
Subjective: Here to have skin tag removed.    Past medical history and ROS were reviewed and unchanged.     Procedure: Area prepped with alcohol swab.  1% lidocaine injected.  Perianal skin tag excised with scissors.  No bleeding.  Dry dressing applied.    Assessment / Plan    Status post excision of anal skin tag  Follow-up in the office as needed  Colonoscopy as previously scheduled    The diagnoses and plan were discussed with patient.  All questions answered.  Plan of care agreed to by all concerned.

## 2025-02-11 ENCOUNTER — TRANSCRIBE ORDERS (OUTPATIENT)
Facility: HOSPITAL | Age: 75
End: 2025-02-11

## 2025-02-11 ENCOUNTER — HOSPITAL ENCOUNTER (OUTPATIENT)
Facility: HOSPITAL | Age: 75
Discharge: HOME | End: 2025-02-14
Payer: MEDICARE

## 2025-02-11 DIAGNOSIS — I10 ESSENTIAL HYPERTENSION: ICD-10-CM

## 2025-02-11 DIAGNOSIS — E78.00 HYPERCHOLESTEREMIA: Primary | ICD-10-CM

## 2025-02-11 DIAGNOSIS — E78.00 HYPERCHOLESTEREMIA: ICD-10-CM

## 2025-02-11 LAB
ALBUMIN SERPL-MCNC: 3.3 G/DL (ref 3.4–5)
ALBUMIN/GLOB SERPL: 1.1 (ref 0.8–1.7)
ALP SERPL-CCNC: 94 U/L (ref 45–117)
ALT SERPL-CCNC: 11 U/L (ref 13–56)
ANION GAP SERPL CALC-SCNC: 2 MMOL/L (ref 3–18)
AST SERPL-CCNC: 8 U/L (ref 10–38)
BASOPHILS # BLD: 0.03 K/UL (ref 0–0.1)
BASOPHILS NFR BLD: 0.4 % (ref 0–2)
BILIRUB SERPL-MCNC: 0.6 MG/DL (ref 0.2–1)
BUN SERPL-MCNC: 16 MG/DL (ref 7–18)
BUN/CREAT SERPL: 17 (ref 12–20)
CALCIUM SERPL-MCNC: 9.1 MG/DL (ref 8.5–10.1)
CHLORIDE SERPL-SCNC: 112 MMOL/L (ref 100–111)
CHOLEST SERPL-MCNC: 151 MG/DL
CO2 SERPL-SCNC: 29 MMOL/L (ref 21–32)
CREAT SERPL-MCNC: 0.94 MG/DL (ref 0.6–1.3)
DIFFERENTIAL METHOD BLD: ABNORMAL
EOSINOPHIL # BLD: 0.15 K/UL (ref 0–0.4)
EOSINOPHIL NFR BLD: 2.1 % (ref 0–5)
ERYTHROCYTE [DISTWIDTH] IN BLOOD BY AUTOMATED COUNT: 14.6 % (ref 11.6–14.5)
GLOBULIN SER CALC-MCNC: 3.1 G/DL (ref 2–4)
GLUCOSE SERPL-MCNC: 81 MG/DL (ref 74–99)
HCT VFR BLD AUTO: 42.5 % (ref 35–45)
HDLC SERPL-MCNC: 90 MG/DL (ref 40–60)
HDLC SERPL: 1.7 (ref 0–5)
HGB BLD-MCNC: 13.5 G/DL (ref 12–16)
IMM GRANULOCYTES # BLD AUTO: 0.01 K/UL (ref 0–0.04)
IMM GRANULOCYTES NFR BLD AUTO: 0.1 % (ref 0–0.5)
LDLC SERPL CALC-MCNC: 47 MG/DL (ref 0–100)
LIPID PANEL: ABNORMAL
LYMPHOCYTES # BLD: 2.52 K/UL (ref 0.9–3.6)
LYMPHOCYTES NFR BLD: 35.6 % (ref 21–52)
MCH RBC QN AUTO: 28.6 PG (ref 24–34)
MCHC RBC AUTO-ENTMCNC: 31.8 G/DL (ref 31–37)
MCV RBC AUTO: 90 FL (ref 78–100)
MONOCYTES # BLD: 0.32 K/UL (ref 0.05–1.2)
MONOCYTES NFR BLD: 4.5 % (ref 3–10)
NEUTS SEG # BLD: 4.04 K/UL (ref 1.8–8)
NEUTS SEG NFR BLD: 57.3 % (ref 40–73)
NRBC # BLD: 0 K/UL (ref 0–0.01)
NRBC BLD-RTO: 0 PER 100 WBC
PLATELET # BLD AUTO: 242 K/UL (ref 135–420)
PMV BLD AUTO: 12.1 FL (ref 9.2–11.8)
POTASSIUM SERPL-SCNC: 4.3 MMOL/L (ref 3.5–5.5)
PROT SERPL-MCNC: 6.4 G/DL (ref 6.4–8.2)
RBC # BLD AUTO: 4.72 M/UL (ref 4.2–5.3)
SODIUM SERPL-SCNC: 143 MMOL/L (ref 136–145)
T4 FREE SERPL-MCNC: 1.3 NG/DL (ref 0.7–1.5)
TRIGL SERPL-MCNC: 70 MG/DL
TSH SERPL DL<=0.05 MIU/L-ACNC: 0.27 UIU/ML (ref 0.36–3.74)
VLDLC SERPL CALC-MCNC: 14 MG/DL
WBC # BLD AUTO: 7.1 K/UL (ref 4.6–13.2)

## 2025-02-11 PROCEDURE — 84443 ASSAY THYROID STIM HORMONE: CPT

## 2025-02-11 PROCEDURE — 80053 COMPREHEN METABOLIC PANEL: CPT

## 2025-02-11 PROCEDURE — 80061 LIPID PANEL: CPT

## 2025-02-11 PROCEDURE — 36415 COLL VENOUS BLD VENIPUNCTURE: CPT

## 2025-02-11 PROCEDURE — 84439 ASSAY OF FREE THYROXINE: CPT

## 2025-02-11 PROCEDURE — 85025 COMPLETE CBC W/AUTO DIFF WBC: CPT

## 2025-03-19 ENCOUNTER — TRANSCRIBE ORDERS (OUTPATIENT)
Facility: HOSPITAL | Age: 75
End: 2025-03-19

## 2025-03-19 DIAGNOSIS — N28.9 RENAL LESION: Primary | ICD-10-CM

## 2025-03-19 DIAGNOSIS — D30.8: ICD-10-CM

## 2025-04-04 ENCOUNTER — HOSPITAL ENCOUNTER (OUTPATIENT)
Facility: HOSPITAL | Age: 75
Discharge: HOME | End: 2025-04-04
Payer: MEDICARE

## 2025-04-04 DIAGNOSIS — N28.9 RENAL LESION: ICD-10-CM

## 2025-04-04 DIAGNOSIS — D30.8: ICD-10-CM

## 2025-04-04 PROCEDURE — 76770 US EXAM ABDO BACK WALL COMP: CPT

## 2025-06-23 ENCOUNTER — TRANSCRIBE ORDERS (OUTPATIENT)
Facility: HOSPITAL | Age: 75
End: 2025-06-23

## 2025-06-23 DIAGNOSIS — M81.0 OSTEOPOROSIS, POST-MENOPAUSAL: ICD-10-CM

## 2025-06-23 DIAGNOSIS — Z12.31 ENCOUNTER FOR SCREENING MAMMOGRAM FOR MALIGNANT NEOPLASM OF BREAST: Primary | ICD-10-CM

## 2025-09-03 ENCOUNTER — OFFICE VISIT (OUTPATIENT)
Age: 75
End: 2025-09-03
Payer: MEDICARE

## 2025-09-03 ENCOUNTER — TRANSCRIBE ORDERS (OUTPATIENT)
Facility: HOSPITAL | Age: 75
End: 2025-09-03

## 2025-09-03 VITALS
HEART RATE: 54 BPM | BODY MASS INDEX: 31.53 KG/M2 | HEIGHT: 61 IN | DIASTOLIC BLOOD PRESSURE: 60 MMHG | WEIGHT: 167 LBS | SYSTOLIC BLOOD PRESSURE: 137 MMHG | OXYGEN SATURATION: 97 %

## 2025-09-03 DIAGNOSIS — E66.811 OBESITY (BMI 30.0-34.9): ICD-10-CM

## 2025-09-03 DIAGNOSIS — R92.8 ABNORMAL MAMMOGRAM OF LEFT BREAST: Primary | ICD-10-CM

## 2025-09-03 DIAGNOSIS — E78.00 PURE HYPERCHOLESTEROLEMIA: ICD-10-CM

## 2025-09-03 DIAGNOSIS — I50.32 CHRONIC DIASTOLIC (CONGESTIVE) HEART FAILURE (HCC): ICD-10-CM

## 2025-09-03 DIAGNOSIS — I10 ESSENTIAL (PRIMARY) HYPERTENSION: Primary | ICD-10-CM

## 2025-09-03 PROCEDURE — G8417 CALC BMI ABV UP PARAM F/U: HCPCS | Performed by: INTERNAL MEDICINE

## 2025-09-03 PROCEDURE — 99214 OFFICE O/P EST MOD 30 MIN: CPT | Performed by: INTERNAL MEDICINE

## 2025-09-03 PROCEDURE — 1090F PRES/ABSN URINE INCON ASSESS: CPT | Performed by: INTERNAL MEDICINE

## 2025-09-03 PROCEDURE — 3075F SYST BP GE 130 - 139MM HG: CPT | Performed by: INTERNAL MEDICINE

## 2025-09-03 PROCEDURE — 1036F TOBACCO NON-USER: CPT | Performed by: INTERNAL MEDICINE

## 2025-09-03 PROCEDURE — G8428 CUR MEDS NOT DOCUMENT: HCPCS | Performed by: INTERNAL MEDICINE

## 2025-09-03 PROCEDURE — 3078F DIAST BP <80 MM HG: CPT | Performed by: INTERNAL MEDICINE

## 2025-09-03 PROCEDURE — G8399 PT W/DXA RESULTS DOCUMENT: HCPCS | Performed by: INTERNAL MEDICINE

## 2025-09-03 PROCEDURE — 3017F COLORECTAL CA SCREEN DOC REV: CPT | Performed by: INTERNAL MEDICINE

## 2025-09-03 PROCEDURE — 93000 ELECTROCARDIOGRAM COMPLETE: CPT | Performed by: INTERNAL MEDICINE

## 2025-09-03 PROCEDURE — 1123F ACP DISCUSS/DSCN MKR DOCD: CPT | Performed by: INTERNAL MEDICINE

## 2025-09-03 RX ORDER — METOPROLOL SUCCINATE 25 MG/1
25 TABLET, EXTENDED RELEASE ORAL DAILY
Qty: 90 TABLET | Refills: 3 | Status: SHIPPED | OUTPATIENT
Start: 2025-09-03

## 2025-09-03 RX ORDER — ATORVASTATIN CALCIUM 40 MG/1
40 TABLET, FILM COATED ORAL DAILY
COMMUNITY

## 2025-09-03 RX ORDER — LISINOPRIL 20 MG/1
20 TABLET ORAL DAILY
Qty: 90 TABLET | Refills: 3 | Status: SHIPPED | OUTPATIENT
Start: 2025-09-03

## 2025-09-03 ASSESSMENT — ENCOUNTER SYMPTOMS
BACK PAIN: 0
RESPIRATORY NEGATIVE: 1
GASTROINTESTINAL NEGATIVE: 1
EYES NEGATIVE: 1
SHORTNESS OF BREATH: 0

## (undated) DEVICE — MEDI-VAC NON-CONDUCTIVE SUCTION TUBING: Brand: CARDINAL HEALTH

## (undated) DEVICE — SUTURE VCRL SZ 3-0 L27IN ABSRB UD L26MM SH 1/2 CIR J416H

## (undated) DEVICE — REM POLYHESIVE ADULT PATIENT RETURN ELECTRODE: Brand: VALLEYLAB

## (undated) DEVICE — TRAP SPEC COLL POLYP POLYSTYR --

## (undated) DEVICE — FLEX ADVANTAGE 1500CC: Brand: FLEX ADVANTAGE

## (undated) DEVICE — MIRAGE SWIFT II PILLOW LGE: Brand: MIRAGE SWIFT II

## (undated) DEVICE — AIRLIFE™ NASAL OXYGEN CANNULA CURVED, NONFLARED TIP WITH 14 FOOT (4.3 M) CRUSH-RESISTANT TUBING, OVER-THE-EAR STYLE: Brand: AIRLIFE™

## (undated) DEVICE — BLADE ES ELASTOMERIC COAT INSUL DURABLE BEND UPTO 90DEG

## (undated) DEVICE — FLUFF AND POLYMER UNDERPAD,EXTRA HEAVY: Brand: WINGS

## (undated) DEVICE — NEEDLE, QUINCKE 25GX5": Brand: MEDLINE

## (undated) DEVICE — BINDER ABD 4-PANEL 12INCH 63-74INCH L N ST

## (undated) DEVICE — SUTURE PDS II SZ 1 L96IN ABSRB VLT TP-1 L65MM 1/2 CIR Z880G

## (undated) DEVICE — KIT OR TURNOVER

## (undated) DEVICE — SUTURE MCRYL SZ 4-0 L27IN ABSRB UD L24MM PS-1 3/8 CIR PRIM Y935H

## (undated) DEVICE — DRAPE TWL SURG 16X26IN BLU ORB04] ALLCARE INC]

## (undated) DEVICE — BANDAGE ADH W0.75XL3IN UNIV WVN FAB NAT GEN USE STRP N ADH

## (undated) DEVICE — CATH IV SAFE STR 22GX1IN BLU -- PROTECTIV PLUS

## (undated) DEVICE — NDL INJ SCLERO 25G 240CM -- INTERJECT M00518360 BX/5

## (undated) DEVICE — GLOVE SURG SZ 6 CRM LTX FREE POLYISOPRENE POLYMER BEAD ANTI

## (undated) DEVICE — ELECTRODE ES AD DISPER HYDRGEL THN FOAM ADH SCALLOPED EDGE

## (undated) DEVICE — DRAPE TOWEL: Brand: CONVERTORS

## (undated) DEVICE — STAPLER INT DIA5MM 25 ABSRB STRP FIX DISP FOR HERN MESH

## (undated) DEVICE — INTENDED FOR TISSUE SEPARATION, AND OTHER PROCEDURES THAT REQUIRE A SHARP SURGICAL BLADE TO PUNCTURE OR CUT.: Brand: BARD-PARKER ® STAINLESS STEEL BLADES

## (undated) DEVICE — ELECTRODE PT RET AD L9FT HI MOIST COND ADH HYDRGEL CORDED

## (undated) DEVICE — DRAPE,T,LAPARO,TRANS,STERILE: Brand: MEDLINE

## (undated) DEVICE — SNARE POLYP M W27MMXL240CM OVL STIFF DISP CAPTIVATOR

## (undated) DEVICE — GLOVE SURG SZ 65 L12IN FNGR THK79MIL GRN LTX FREE

## (undated) DEVICE — SUTURE ETHBND EXCEL SZ 0 L18IN NONABSORBABLE GRN L36MM CT-1 CX21D

## (undated) DEVICE — FORCEPS BX 240CM JAW 3.2MM L CAP NDL MIC MESH TTH M00513372

## (undated) DEVICE — Device: Brand: SPOT EX ENDOSCOPIC TATTOO

## (undated) DEVICE — MCKA3-17-100-4 AVNS,MULTI-RF,RFQKIT,,17X100,1: Brand: AVANOS

## (undated) DEVICE — TRAY SUPP STD NO DRUG W EXTENSION SET

## (undated) DEVICE — AVANOS* SHORT BEVEL NEEDLE: Brand: AVANOS

## (undated) DEVICE — CUFF BLD PRESSURE MONITORING LNG AD 23-33 CM 1 TUBE MY CUF

## (undated) DEVICE — DRAPE,REIN 53X77,STERILE: Brand: MEDLINE

## (undated) DEVICE — Z DISCONTINUED USE 2418401 LINE SAMP AD L2M ETCO2/O2 NONINTUBATED SHT TERM FOR

## (undated) DEVICE — PACK PROCEDURE SURG MAJ W/ BASIN LF

## (undated) DEVICE — SOLUTION IV 1000ML 0.9% SOD CHL

## (undated) DEVICE — ADHESIVE SKIN CLSR 0.7ML TOP DERMBND ADV

## (undated) DEVICE — Device

## (undated) DEVICE — SINGLE USE LIGATING DEVICE: Brand: SINGLE USE LIGATING DEVICE